# Patient Record
Sex: MALE | Race: WHITE | Employment: OTHER
[De-identification: names, ages, dates, MRNs, and addresses within clinical notes are randomized per-mention and may not be internally consistent; named-entity substitution may affect disease eponyms.]

---

## 2017-02-17 ENCOUNTER — OFFICE VISIT (OUTPATIENT)
Dept: UROLOGY | Facility: CLINIC | Age: 77
End: 2017-02-17

## 2017-02-17 VITALS
HEART RATE: 79 BPM | HEIGHT: 73 IN | DIASTOLIC BLOOD PRESSURE: 73 MMHG | BODY MASS INDEX: 31.28 KG/M2 | TEMPERATURE: 98 F | SYSTOLIC BLOOD PRESSURE: 125 MMHG | WEIGHT: 236 LBS

## 2017-02-17 DIAGNOSIS — N40.1 BPH WITH OBSTRUCTION/LOWER URINARY TRACT SYMPTOMS: ICD-10-CM

## 2017-02-17 DIAGNOSIS — N13.8 BPH WITH OBSTRUCTION/LOWER URINARY TRACT SYMPTOMS: ICD-10-CM

## 2017-02-17 DIAGNOSIS — N20.0 KIDNEY STONES: Primary | ICD-10-CM

## 2017-02-17 DIAGNOSIS — N21.0 BLADDER STONES: ICD-10-CM

## 2017-02-17 PROCEDURE — 99214 OFFICE O/P EST MOD 30 MIN: CPT | Performed by: UROLOGY

## 2017-02-17 ASSESSMENT — ENCOUNTER SYMPTOMS
VOMITING: 0
EYE REDNESS: 0
ABDOMINAL PAIN: 0
BACK PAIN: 0
COUGH: 0
SHORTNESS OF BREATH: 0
COLOR CHANGE: 0
NAUSEA: 0
WHEEZING: 0
EYE PAIN: 0

## 2017-02-20 ENCOUNTER — HOSPITAL ENCOUNTER (OUTPATIENT)
Dept: GENERAL RADIOLOGY | Age: 77
Discharge: HOME | End: 2017-02-20
Payer: MEDICARE

## 2017-02-20 ENCOUNTER — HOSPITAL ENCOUNTER (OUTPATIENT)
Age: 77
Discharge: HOME | End: 2017-02-20
Payer: MEDICARE

## 2017-02-20 DIAGNOSIS — N20.0 KIDNEY STONES: ICD-10-CM

## 2017-02-20 PROCEDURE — 74000 XR ABDOMEN LIMITED (KUB): CPT

## 2017-02-20 PROCEDURE — 74000 XR ABDOMEN LIMITED (KUB): CPT | Performed by: RADIOLOGY

## 2017-03-21 ENCOUNTER — OFFICE VISIT (OUTPATIENT)
Dept: INTERNAL MEDICINE CLINIC | Age: 77
End: 2017-03-21
Payer: MEDICARE

## 2017-03-21 VITALS
WEIGHT: 238 LBS | DIASTOLIC BLOOD PRESSURE: 70 MMHG | BODY MASS INDEX: 31.54 KG/M2 | HEIGHT: 73 IN | SYSTOLIC BLOOD PRESSURE: 122 MMHG

## 2017-03-21 DIAGNOSIS — I11.0 HYPERTENSIVE HEART DISEASE WITH HEART FAILURE (HCC): ICD-10-CM

## 2017-03-21 DIAGNOSIS — M17.11 PRIMARY OSTEOARTHRITIS OF RIGHT KNEE: ICD-10-CM

## 2017-03-21 DIAGNOSIS — Z13.29 THYROID DISORDER SCREENING: ICD-10-CM

## 2017-03-21 DIAGNOSIS — Z23 NEED FOR VACCINATION: ICD-10-CM

## 2017-03-21 DIAGNOSIS — K21.9 GASTROESOPHAGEAL REFLUX DISEASE, ESOPHAGITIS PRESENCE NOT SPECIFIED: ICD-10-CM

## 2017-03-21 DIAGNOSIS — Z12.5 PROSTATE CANCER SCREENING: ICD-10-CM

## 2017-03-21 DIAGNOSIS — I10 ESSENTIAL HYPERTENSION: Primary | ICD-10-CM

## 2017-03-21 DIAGNOSIS — Z93.2 ILEOSTOMY STATUS (HCC): ICD-10-CM

## 2017-03-21 PROCEDURE — 90732 PPSV23 VACC 2 YRS+ SUBQ/IM: CPT | Performed by: INTERNAL MEDICINE

## 2017-03-21 PROCEDURE — G0009 ADMIN PNEUMOCOCCAL VACCINE: HCPCS | Performed by: INTERNAL MEDICINE

## 2017-03-21 PROCEDURE — 99213 OFFICE O/P EST LOW 20 MIN: CPT | Performed by: INTERNAL MEDICINE

## 2017-03-21 RX ORDER — SIMVASTATIN 40 MG
TABLET ORAL
Qty: 90 TABLET | Refills: 3 | Status: SHIPPED | OUTPATIENT
Start: 2017-03-21 | End: 2018-03-19 | Stop reason: SDUPTHER

## 2017-03-21 ASSESSMENT — ENCOUNTER SYMPTOMS
COUGH: 0
DOUBLE VISION: 0
NAUSEA: 0
HEARTBURN: 0
HEMOPTYSIS: 0
BACK PAIN: 0
BLURRED VISION: 0

## 2017-03-21 ASSESSMENT — PATIENT HEALTH QUESTIONNAIRE - PHQ9
SUM OF ALL RESPONSES TO PHQ9 QUESTIONS 1 & 2: 0
2. FEELING DOWN, DEPRESSED OR HOPELESS: 0
SUM OF ALL RESPONSES TO PHQ QUESTIONS 1-9: 0
1. LITTLE INTEREST OR PLEASURE IN DOING THINGS: 0

## 2017-03-22 ENCOUNTER — HOSPITAL ENCOUNTER (OUTPATIENT)
Age: 77
Setting detail: SPECIMEN
Discharge: HOME | End: 2017-03-22
Payer: MEDICARE

## 2017-03-22 DIAGNOSIS — Z12.5 PROSTATE CANCER SCREENING: ICD-10-CM

## 2017-03-22 DIAGNOSIS — I10 ESSENTIAL HYPERTENSION: ICD-10-CM

## 2017-03-22 DIAGNOSIS — Z13.29 THYROID DISORDER SCREENING: ICD-10-CM

## 2017-03-22 LAB
ABSOLUTE EOS #: 0.2 K/UL (ref 0–0.4)
ABSOLUTE LYMPH #: 1.6 K/UL (ref 1–4.8)
ABSOLUTE MONO #: 0.7 K/UL (ref 0.1–1.2)
ANION GAP SERPL CALCULATED.3IONS-SCNC: 14 MMOL/L (ref 9–17)
BASOPHILS # BLD: 0 % (ref 0–2)
BASOPHILS ABSOLUTE: 0 K/UL (ref 0–0.2)
BUN BLDV-MCNC: 18 MG/DL (ref 8–23)
BUN/CREAT BLD: ABNORMAL (ref 9–20)
CALCIUM SERPL-MCNC: 9.4 MG/DL (ref 8.6–10.4)
CHLORIDE BLD-SCNC: 105 MMOL/L (ref 98–107)
CHOLESTEROL/HDL RATIO: 3.6
CHOLESTEROL: 151 MG/DL
CO2: 26 MMOL/L (ref 20–31)
CREAT SERPL-MCNC: 0.93 MG/DL (ref 0.7–1.2)
DIFFERENTIAL TYPE: ABNORMAL
EOSINOPHILS RELATIVE PERCENT: 2 % (ref 1–4)
GFR AFRICAN AMERICAN: >60 ML/MIN
GFR NON-AFRICAN AMERICAN: >60 ML/MIN
GFR SERPL CREATININE-BSD FRML MDRD: ABNORMAL ML/MIN/{1.73_M2}
GFR SERPL CREATININE-BSD FRML MDRD: ABNORMAL ML/MIN/{1.73_M2}
GLUCOSE BLD-MCNC: 102 MG/DL (ref 70–99)
HCT VFR BLD CALC: 42.8 % (ref 41–53)
HDLC SERPL-MCNC: 42 MG/DL
HEMOGLOBIN: 14.5 G/DL (ref 13.5–17.5)
LDL CHOLESTEROL: 88 MG/DL (ref 0–130)
LYMPHOCYTES # BLD: 18 % (ref 24–44)
MCH RBC QN AUTO: 29.9 PG (ref 26–34)
MCHC RBC AUTO-ENTMCNC: 33.9 G/DL (ref 31–37)
MCV RBC AUTO: 88.1 FL (ref 80–100)
MONOCYTES # BLD: 8 % (ref 2–11)
PDW BLD-RTO: 14.7 % (ref 12.5–15.4)
PLATELET # BLD: 150 K/UL (ref 140–450)
PLATELET ESTIMATE: ABNORMAL
PMV BLD AUTO: 9.1 FL (ref 6–12)
POTASSIUM SERPL-SCNC: 4.6 MMOL/L (ref 3.7–5.3)
PROSTATE SPECIFIC ANTIGEN: 1.23 UG/L
RBC # BLD: 4.85 M/UL (ref 4.5–5.9)
RBC # BLD: ABNORMAL 10*6/UL
SEG NEUTROPHILS: 72 % (ref 36–66)
SEGMENTED NEUTROPHILS ABSOLUTE COUNT: 6.9 K/UL (ref 1.8–7.7)
SODIUM BLD-SCNC: 145 MMOL/L (ref 135–144)
TRIGL SERPL-MCNC: 103 MG/DL
TSH SERPL DL<=0.05 MIU/L-ACNC: 3.5 MIU/L (ref 0.3–5)
VLDLC SERPL CALC-MCNC: NORMAL MG/DL (ref 1–30)
WBC # BLD: 9.4 K/UL (ref 3.5–11)
WBC # BLD: ABNORMAL 10*3/UL

## 2017-04-26 DIAGNOSIS — N20.0 KIDNEY STONES: ICD-10-CM

## 2017-04-27 RX ORDER — ALLOPURINOL 300 MG/1
TABLET ORAL
Qty: 90 TABLET | Refills: 1 | Status: SHIPPED | OUTPATIENT
Start: 2017-04-27 | End: 2017-11-17 | Stop reason: SDUPTHER

## 2017-06-02 RX ORDER — SILDENAFIL CITRATE 20 MG/1
20 TABLET ORAL PRN
Qty: 30 TABLET | Refills: 0 | Status: SHIPPED | OUTPATIENT
Start: 2017-06-02 | End: 2017-08-14 | Stop reason: SDUPTHER

## 2017-07-05 RX ORDER — FINASTERIDE 5 MG/1
TABLET, FILM COATED ORAL
Qty: 90 TABLET | Refills: 3 | Status: SHIPPED | OUTPATIENT
Start: 2017-07-05 | End: 2017-08-14 | Stop reason: SDUPTHER

## 2017-07-25 ENCOUNTER — OFFICE VISIT (OUTPATIENT)
Dept: INTERNAL MEDICINE CLINIC | Age: 77
End: 2017-07-25
Payer: MEDICARE

## 2017-07-25 ENCOUNTER — HOSPITAL ENCOUNTER (OUTPATIENT)
Age: 77
Setting detail: SPECIMEN
Discharge: HOME | End: 2017-07-25
Payer: MEDICARE

## 2017-07-25 VITALS
SYSTOLIC BLOOD PRESSURE: 104 MMHG | BODY MASS INDEX: 31.41 KG/M2 | DIASTOLIC BLOOD PRESSURE: 62 MMHG | HEIGHT: 73 IN | WEIGHT: 237 LBS

## 2017-07-25 DIAGNOSIS — M17.11 PRIMARY OSTEOARTHRITIS OF RIGHT KNEE: ICD-10-CM

## 2017-07-25 DIAGNOSIS — Z93.2 ILEOSTOMY STATUS (HCC): ICD-10-CM

## 2017-07-25 DIAGNOSIS — M1A.9XX0 CHRONIC GOUT WITHOUT TOPHUS, UNSPECIFIED CAUSE, UNSPECIFIED SITE: ICD-10-CM

## 2017-07-25 DIAGNOSIS — K21.9 GASTROESOPHAGEAL REFLUX DISEASE, ESOPHAGITIS PRESENCE NOT SPECIFIED: ICD-10-CM

## 2017-07-25 DIAGNOSIS — N20.0 RENAL CALCULUS, LEFT: ICD-10-CM

## 2017-07-25 DIAGNOSIS — I11.0 HYPERTENSIVE HEART DISEASE WITH HEART FAILURE (HCC): ICD-10-CM

## 2017-07-25 DIAGNOSIS — H40.9 GLAUCOMA OF RIGHT EYE, UNSPECIFIED GLAUCOMA: ICD-10-CM

## 2017-07-25 DIAGNOSIS — N52.9 ERECTILE DYSFUNCTION, UNSPECIFIED ERECTILE DYSFUNCTION TYPE: ICD-10-CM

## 2017-07-25 DIAGNOSIS — N40.1 BENIGN PROSTATIC HYPERPLASIA WITH LOWER URINARY TRACT SYMPTOMS, UNSPECIFIED MORPHOLOGY: ICD-10-CM

## 2017-07-25 DIAGNOSIS — I11.0 HYPERTENSIVE HEART DISEASE WITH HEART FAILURE (HCC): Primary | ICD-10-CM

## 2017-07-25 LAB
-: ABNORMAL
AMORPHOUS: ABNORMAL
BACTERIA: ABNORMAL
BILIRUBIN URINE: NEGATIVE
CASTS UA: ABNORMAL /LPF (ref 0–2)
COLOR: YELLOW
CRYSTALS, UA: ABNORMAL /HPF
EPITHELIAL CELLS UA: ABNORMAL /HPF (ref 0–5)
GLUCOSE URINE: NEGATIVE
KETONES, URINE: NEGATIVE
LEUKOCYTE ESTERASE, URINE: NEGATIVE
MUCUS: ABNORMAL
NITRITE, URINE: NEGATIVE
OTHER OBSERVATIONS UA: ABNORMAL
PH UA: 5 (ref 5–8)
PROTEIN UA: ABNORMAL
RBC UA: ABNORMAL /HPF (ref 0–2)
RENAL EPITHELIAL, UA: ABNORMAL /HPF
SPECIFIC GRAVITY UA: 1.02 (ref 1–1.03)
TRICHOMONAS: ABNORMAL
TURBIDITY: ABNORMAL
URINE HGB: ABNORMAL
UROBILINOGEN, URINE: NORMAL
WBC UA: ABNORMAL /HPF (ref 0–5)
YEAST: ABNORMAL

## 2017-07-25 PROCEDURE — 99215 OFFICE O/P EST HI 40 MIN: CPT | Performed by: INTERNAL MEDICINE

## 2017-07-25 RX ORDER — OMEPRAZOLE 40 MG/1
CAPSULE, DELAYED RELEASE ORAL
Qty: 30 CAPSULE | Refills: 11 | Status: SHIPPED | OUTPATIENT
Start: 2017-07-25 | End: 2018-08-15 | Stop reason: SDUPTHER

## 2017-07-25 RX ORDER — TAMSULOSIN HYDROCHLORIDE 0.4 MG/1
CAPSULE ORAL
Refills: 0 | COMMUNITY
Start: 2017-06-19 | End: 2017-08-14 | Stop reason: SDUPTHER

## 2017-08-14 ENCOUNTER — OFFICE VISIT (OUTPATIENT)
Dept: UROLOGY | Age: 77
End: 2017-08-14
Payer: MEDICARE

## 2017-08-14 ENCOUNTER — HOSPITAL ENCOUNTER (OUTPATIENT)
Age: 77
Setting detail: SPECIMEN
Discharge: HOME | End: 2017-08-14
Payer: MEDICARE

## 2017-08-14 VITALS
TEMPERATURE: 98.4 F | BODY MASS INDEX: 31.41 KG/M2 | DIASTOLIC BLOOD PRESSURE: 82 MMHG | RESPIRATION RATE: 16 BRPM | WEIGHT: 236.99 LBS | SYSTOLIC BLOOD PRESSURE: 129 MMHG | HEART RATE: 98 BPM | HEIGHT: 73 IN

## 2017-08-14 DIAGNOSIS — R31.29 MICROHEMATURIA: Primary | ICD-10-CM

## 2017-08-14 DIAGNOSIS — N52.9 ERECTILE DYSFUNCTION, UNSPECIFIED ERECTILE DYSFUNCTION TYPE: ICD-10-CM

## 2017-08-14 DIAGNOSIS — R31.29 MICROHEMATURIA: ICD-10-CM

## 2017-08-14 DIAGNOSIS — N40.1 BENIGN PROSTATIC HYPERPLASIA WITH LOWER URINARY TRACT SYMPTOMS, UNSPECIFIED MORPHOLOGY: ICD-10-CM

## 2017-08-14 DIAGNOSIS — N20.0 KIDNEY STONE: ICD-10-CM

## 2017-08-14 LAB
-: ABNORMAL
AMORPHOUS: ABNORMAL
BACTERIA: ABNORMAL
BILIRUBIN URINE: NEGATIVE
CASTS UA: ABNORMAL /LPF (ref 0–8)
COLOR: YELLOW
CRYSTALS, UA: ABNORMAL /HPF
EPITHELIAL CELLS UA: ABNORMAL /HPF (ref 0–5)
GLUCOSE URINE: NEGATIVE
KETONES, URINE: NEGATIVE
LEUKOCYTE ESTERASE, URINE: NEGATIVE
MUCUS: ABNORMAL
NITRITE, URINE: NEGATIVE
OTHER OBSERVATIONS UA: ABNORMAL
PH UA: 5 (ref 5–8)
PROTEIN UA: NEGATIVE
RBC UA: ABNORMAL /HPF (ref 0–4)
RENAL EPITHELIAL, UA: ABNORMAL /HPF
SPECIFIC GRAVITY UA: 1.02 (ref 1–1.03)
TRICHOMONAS: ABNORMAL
TURBIDITY: CLEAR
URINE HGB: ABNORMAL
UROBILINOGEN, URINE: NORMAL
WBC UA: ABNORMAL /HPF (ref 0–5)
YEAST: ABNORMAL

## 2017-08-14 PROCEDURE — 99214 OFFICE O/P EST MOD 30 MIN: CPT | Performed by: NURSE PRACTITIONER

## 2017-08-14 RX ORDER — SILDENAFIL CITRATE 20 MG/1
20 TABLET ORAL PRN
Qty: 30 TABLET | Refills: 5 | Status: SHIPPED | OUTPATIENT
Start: 2017-08-14 | End: 2018-10-23 | Stop reason: SDUPTHER

## 2017-08-14 RX ORDER — FINASTERIDE 5 MG/1
5 TABLET, FILM COATED ORAL DAILY
Qty: 90 TABLET | Refills: 3 | Status: SHIPPED | OUTPATIENT
Start: 2017-08-14 | End: 2018-08-28

## 2017-08-14 RX ORDER — TAMSULOSIN HYDROCHLORIDE 0.4 MG/1
0.4 CAPSULE ORAL DAILY
Qty: 90 CAPSULE | Refills: 3 | Status: SHIPPED | OUTPATIENT
Start: 2017-08-14 | End: 2018-09-06 | Stop reason: SDUPTHER

## 2017-08-14 ASSESSMENT — ENCOUNTER SYMPTOMS
WHEEZING: 0
EYE PAIN: 0
VOMITING: 0
NAUSEA: 0
ABDOMINAL PAIN: 0
EYE REDNESS: 0
COLOR CHANGE: 0
COUGH: 0
SHORTNESS OF BREATH: 0
BACK PAIN: 0

## 2017-08-15 ENCOUNTER — TELEPHONE (OUTPATIENT)
Dept: UROLOGY | Age: 77
End: 2017-08-15

## 2017-08-15 DIAGNOSIS — R31.29 MICROHEMATURIA: Primary | ICD-10-CM

## 2017-08-28 ENCOUNTER — HOSPITAL ENCOUNTER (INPATIENT)
Age: 77
LOS: 1 days | Discharge: HOME | DRG: 694 | End: 2017-08-29
Attending: EMERGENCY MEDICINE | Admitting: INTERNAL MEDICINE
Payer: MEDICARE

## 2017-08-28 ENCOUNTER — HOSPITAL ENCOUNTER (OUTPATIENT)
Dept: CT IMAGING | Age: 77
Discharge: HOME | DRG: 694 | End: 2017-08-28
Payer: MEDICARE

## 2017-08-28 DIAGNOSIS — N13.2 HYDRONEPHROSIS WITH URINARY OBSTRUCTION DUE TO URETERAL CALCULUS: ICD-10-CM

## 2017-08-28 DIAGNOSIS — N20.0 NEPHROLITHIASIS: ICD-10-CM

## 2017-08-28 DIAGNOSIS — R31.29 MICROHEMATURIA: ICD-10-CM

## 2017-08-28 DIAGNOSIS — N17.9 AKI (ACUTE KIDNEY INJURY) (HCC): Primary | ICD-10-CM

## 2017-08-28 PROBLEM — N13.30 HYDRONEPHROSIS, RIGHT: Status: ACTIVE | Noted: 2017-08-28

## 2017-08-28 PROBLEM — I10 HYPERTENSION: Status: ACTIVE | Noted: 2017-08-28

## 2017-08-28 LAB
-: ABNORMAL
AMORPHOUS: ABNORMAL
ANION GAP SERPL CALCULATED.3IONS-SCNC: 12 MMOL/L (ref 9–17)
BACTERIA: ABNORMAL
BILIRUBIN URINE: NEGATIVE
BUN BLDV-MCNC: 25 MG/DL (ref 8–23)
BUN BLDV-MCNC: 25 MG/DL (ref 8–23)
BUN/CREAT BLD: ABNORMAL (ref 9–20)
CALCIUM SERPL-MCNC: 9.5 MG/DL (ref 8.6–10.4)
CASTS UA: ABNORMAL /LPF
CHLORIDE BLD-SCNC: 103 MMOL/L (ref 98–107)
CO2: 25 MMOL/L (ref 20–31)
COLOR: YELLOW
COMMENT UA: ABNORMAL
CREAT SERPL-MCNC: 1.34 MG/DL (ref 0.7–1.2)
CREAT SERPL-MCNC: 1.69 MG/DL (ref 0.7–1.2)
CRYSTALS, UA: ABNORMAL /HPF
EPITHELIAL CELLS UA: ABNORMAL /HPF
GFR AFRICAN AMERICAN: 48 ML/MIN
GFR AFRICAN AMERICAN: >60 ML/MIN
GFR NON-AFRICAN AMERICAN: 39 ML/MIN
GFR NON-AFRICAN AMERICAN: 51 ML/MIN
GFR SERPL CREATININE-BSD FRML MDRD: ABNORMAL ML/MIN/{1.73_M2}
GLUCOSE BLD-MCNC: 86 MG/DL (ref 70–99)
GLUCOSE URINE: NEGATIVE
KETONES, URINE: NEGATIVE
LEUKOCYTE ESTERASE, URINE: NEGATIVE
MUCUS: ABNORMAL
NITRITE, URINE: NEGATIVE
OTHER OBSERVATIONS UA: ABNORMAL
PH UA: 6 (ref 5–8)
POTASSIUM SERPL-SCNC: 4.9 MMOL/L (ref 3.7–5.3)
PROTEIN UA: NEGATIVE
RBC UA: ABNORMAL /HPF
RENAL EPITHELIAL, UA: ABNORMAL /HPF
SODIUM BLD-SCNC: 140 MMOL/L (ref 135–144)
SPECIFIC GRAVITY UA: 1.02 (ref 1–1.03)
TRICHOMONAS: ABNORMAL
TURBIDITY: CLEAR
URINE HGB: ABNORMAL
UROBILINOGEN, URINE: NORMAL
WBC UA: ABNORMAL /HPF
YEAST: ABNORMAL

## 2017-08-28 PROCEDURE — 1200000000 HC SEMI PRIVATE

## 2017-08-28 PROCEDURE — 0T9680Z DRAINAGE OF RIGHT URETER WITH DRAINAGE DEVICE, VIA NATURAL OR ARTIFICIAL OPENING ENDOSCOPIC: ICD-10-PCS | Performed by: INTERNAL MEDICINE

## 2017-08-28 PROCEDURE — 81001 URINALYSIS AUTO W/SCOPE: CPT

## 2017-08-28 PROCEDURE — 94760 N-INVAS EAR/PLS OXIMETRY 1: CPT

## 2017-08-28 PROCEDURE — 6360000002 HC RX W HCPCS: Performed by: EMERGENCY MEDICINE

## 2017-08-28 PROCEDURE — 74178 CT ABD&PLV WO CNTR FLWD CNTR: CPT

## 2017-08-28 PROCEDURE — 2580000003 HC RX 258: Performed by: NURSE PRACTITIONER

## 2017-08-28 PROCEDURE — 6360000002 HC RX W HCPCS: Performed by: RADIOLOGY

## 2017-08-28 PROCEDURE — 82565 ASSAY OF CREATININE: CPT

## 2017-08-28 PROCEDURE — 99285 EMERGENCY DEPT VISIT HI MDM: CPT

## 2017-08-28 PROCEDURE — 99223 1ST HOSP IP/OBS HIGH 75: CPT | Performed by: INTERNAL MEDICINE

## 2017-08-28 PROCEDURE — 84520 ASSAY OF UREA NITROGEN: CPT

## 2017-08-28 PROCEDURE — 36415 COLL VENOUS BLD VENIPUNCTURE: CPT

## 2017-08-28 PROCEDURE — 96374 THER/PROPH/DIAG INJ IV PUSH: CPT

## 2017-08-28 PROCEDURE — 96375 TX/PRO/DX INJ NEW DRUG ADDON: CPT

## 2017-08-28 PROCEDURE — 2580000003 HC RX 258: Performed by: RADIOLOGY

## 2017-08-28 PROCEDURE — 80048 BASIC METABOLIC PNL TOTAL CA: CPT

## 2017-08-28 PROCEDURE — 6370000000 HC RX 637 (ALT 250 FOR IP): Performed by: RADIOLOGY

## 2017-08-28 PROCEDURE — 6360000004 HC RX CONTRAST MEDICATION: Performed by: NURSE PRACTITIONER

## 2017-08-28 PROCEDURE — G0378 HOSPITAL OBSERVATION PER HR: HCPCS

## 2017-08-28 RX ORDER — RANITIDINE 150 MG/1
150 TABLET ORAL DAILY PRN
COMMUNITY
End: 2018-08-15 | Stop reason: ALTCHOICE

## 2017-08-28 RX ORDER — POTASSIUM CHLORIDE 20 MEQ/1
40 TABLET, EXTENDED RELEASE ORAL PRN
Status: DISCONTINUED | OUTPATIENT
Start: 2017-08-28 | End: 2017-08-29 | Stop reason: HOSPADM

## 2017-08-28 RX ORDER — MORPHINE SULFATE 4 MG/ML
4 INJECTION, SOLUTION INTRAMUSCULAR; INTRAVENOUS
Status: DISCONTINUED | OUTPATIENT
Start: 2017-08-28 | End: 2017-08-29 | Stop reason: HOSPADM

## 2017-08-28 RX ORDER — FINASTERIDE 5 MG/1
5 TABLET, FILM COATED ORAL DAILY
Status: DISCONTINUED | OUTPATIENT
Start: 2017-08-28 | End: 2017-08-29 | Stop reason: HOSPADM

## 2017-08-28 RX ORDER — POTASSIUM CHLORIDE 20MEQ/15ML
40 LIQUID (ML) ORAL PRN
Status: DISCONTINUED | OUTPATIENT
Start: 2017-08-28 | End: 2017-08-29 | Stop reason: HOSPADM

## 2017-08-28 RX ORDER — SODIUM CHLORIDE 0.9 % (FLUSH) 0.9 %
10 SYRINGE (ML) INJECTION PRN
Status: DISCONTINUED | OUTPATIENT
Start: 2017-08-28 | End: 2017-08-31 | Stop reason: HOSPADM

## 2017-08-28 RX ORDER — HYDROCODONE BITARTRATE AND ACETAMINOPHEN 5; 325 MG/1; MG/1
1 TABLET ORAL EVERY 4 HOURS PRN
Status: DISCONTINUED | OUTPATIENT
Start: 2017-08-28 | End: 2017-08-29 | Stop reason: HOSPADM

## 2017-08-28 RX ORDER — SODIUM CHLORIDE 0.9 % (FLUSH) 0.9 %
10 SYRINGE (ML) INJECTION PRN
Status: DISCONTINUED | OUTPATIENT
Start: 2017-08-28 | End: 2017-08-29 | Stop reason: HOSPADM

## 2017-08-28 RX ORDER — SODIUM CHLORIDE 9 MG/ML
INJECTION, SOLUTION INTRAVENOUS CONTINUOUS
Status: DISCONTINUED | OUTPATIENT
Start: 2017-08-28 | End: 2017-08-29 | Stop reason: HOSPADM

## 2017-08-28 RX ORDER — BISACODYL 10 MG
10 SUPPOSITORY, RECTAL RECTAL DAILY PRN
Status: DISCONTINUED | OUTPATIENT
Start: 2017-08-28 | End: 2017-08-29 | Stop reason: HOSPADM

## 2017-08-28 RX ORDER — LATANOPROST 50 UG/ML
1 SOLUTION/ DROPS OPHTHALMIC DAILY
Status: DISCONTINUED | OUTPATIENT
Start: 2017-08-28 | End: 2017-08-29 | Stop reason: HOSPADM

## 2017-08-28 RX ORDER — ONDANSETRON 2 MG/ML
4 INJECTION INTRAMUSCULAR; INTRAVENOUS EVERY 6 HOURS PRN
Status: DISCONTINUED | OUTPATIENT
Start: 2017-08-28 | End: 2017-08-29 | Stop reason: HOSPADM

## 2017-08-28 RX ORDER — ACETAMINOPHEN 325 MG/1
650 TABLET ORAL EVERY 4 HOURS PRN
Status: DISCONTINUED | OUTPATIENT
Start: 2017-08-28 | End: 2017-08-29 | Stop reason: HOSPADM

## 2017-08-28 RX ORDER — LISINOPRIL 5 MG/1
5 TABLET ORAL DAILY
Status: DISCONTINUED | OUTPATIENT
Start: 2017-08-28 | End: 2017-08-29

## 2017-08-28 RX ORDER — TAMSULOSIN HYDROCHLORIDE 0.4 MG/1
0.4 CAPSULE ORAL DAILY
Status: DISCONTINUED | OUTPATIENT
Start: 2017-08-28 | End: 2017-08-29 | Stop reason: HOSPADM

## 2017-08-28 RX ORDER — POTASSIUM CHLORIDE 7.45 MG/ML
10 INJECTION INTRAVENOUS PRN
Status: DISCONTINUED | OUTPATIENT
Start: 2017-08-28 | End: 2017-08-29 | Stop reason: HOSPADM

## 2017-08-28 RX ORDER — KETOROLAC TROMETHAMINE 30 MG/ML
15 INJECTION, SOLUTION INTRAMUSCULAR; INTRAVENOUS ONCE
Status: COMPLETED | OUTPATIENT
Start: 2017-08-28 | End: 2017-08-28

## 2017-08-28 RX ORDER — PANTOPRAZOLE SODIUM 40 MG/1
40 TABLET, DELAYED RELEASE ORAL
Status: DISCONTINUED | OUTPATIENT
Start: 2017-08-29 | End: 2017-08-29 | Stop reason: HOSPADM

## 2017-08-28 RX ORDER — FENTANYL CITRATE 50 UG/ML
75 INJECTION, SOLUTION INTRAMUSCULAR; INTRAVENOUS ONCE
Status: COMPLETED | OUTPATIENT
Start: 2017-08-28 | End: 2017-08-28

## 2017-08-28 RX ORDER — SODIUM CHLORIDE 0.9 % (FLUSH) 0.9 %
10 SYRINGE (ML) INJECTION EVERY 12 HOURS SCHEDULED
Status: DISCONTINUED | OUTPATIENT
Start: 2017-08-28 | End: 2017-08-29 | Stop reason: HOSPADM

## 2017-08-28 RX ORDER — MAGNESIUM SULFATE 1 G/100ML
1 INJECTION INTRAVENOUS PRN
Status: DISCONTINUED | OUTPATIENT
Start: 2017-08-28 | End: 2017-08-29 | Stop reason: HOSPADM

## 2017-08-28 RX ORDER — MORPHINE SULFATE 4 MG/ML
2 INJECTION, SOLUTION INTRAMUSCULAR; INTRAVENOUS
Status: DISCONTINUED | OUTPATIENT
Start: 2017-08-28 | End: 2017-08-29 | Stop reason: HOSPADM

## 2017-08-28 RX ORDER — ALLOPURINOL 300 MG/1
300 TABLET ORAL DAILY
Status: DISCONTINUED | OUTPATIENT
Start: 2017-08-28 | End: 2017-08-29 | Stop reason: HOSPADM

## 2017-08-28 RX ORDER — HEPARIN SODIUM 5000 [USP'U]/ML
5000 INJECTION, SOLUTION INTRAVENOUS; SUBCUTANEOUS EVERY 8 HOURS SCHEDULED
Status: DISCONTINUED | OUTPATIENT
Start: 2017-08-28 | End: 2017-08-29 | Stop reason: HOSPADM

## 2017-08-28 RX ORDER — 0.9 % SODIUM CHLORIDE 0.9 %
250 INTRAVENOUS SOLUTION INTRAVENOUS ONCE
Status: COMPLETED | OUTPATIENT
Start: 2017-08-28 | End: 2017-08-28

## 2017-08-28 RX ORDER — SIMVASTATIN 40 MG
40 TABLET ORAL NIGHTLY
Status: DISCONTINUED | OUTPATIENT
Start: 2017-08-28 | End: 2017-08-29 | Stop reason: HOSPADM

## 2017-08-28 RX ADMIN — HEPARIN SODIUM 5000 UNITS: 5000 INJECTION, SOLUTION INTRAVENOUS; SUBCUTANEOUS at 20:18

## 2017-08-28 RX ADMIN — Medication 10 ML: at 20:19

## 2017-08-28 RX ADMIN — Medication 10 ML: at 08:51

## 2017-08-28 RX ADMIN — LATANOPROST 1 DROP: 50 SOLUTION OPHTHALMIC at 20:19

## 2017-08-28 RX ADMIN — SODIUM CHLORIDE 250 ML: 9 INJECTION, SOLUTION INTRAVENOUS at 08:50

## 2017-08-28 RX ADMIN — FENTANYL CITRATE 75 MCG: 50 INJECTION INTRAMUSCULAR; INTRAVENOUS at 16:15

## 2017-08-28 RX ADMIN — IOVERSOL 130 ML: 741 INJECTION INTRA-ARTERIAL; INTRAVENOUS at 08:50

## 2017-08-28 RX ADMIN — KETOROLAC TROMETHAMINE 15 MG: 30 INJECTION, SOLUTION INTRAMUSCULAR at 16:11

## 2017-08-28 RX ADMIN — MORPHINE SULFATE 2 MG: 4 INJECTION, SOLUTION INTRAMUSCULAR; INTRAVENOUS at 23:47

## 2017-08-28 RX ADMIN — HYDROCODONE BITARTRATE AND ACETAMINOPHEN 1 TABLET: 5; 325 TABLET ORAL at 20:18

## 2017-08-28 RX ADMIN — SIMVASTATIN 40 MG: 40 TABLET, FILM COATED ORAL at 20:19

## 2017-08-28 RX ADMIN — SODIUM CHLORIDE: 9 INJECTION, SOLUTION INTRAVENOUS at 22:40

## 2017-08-28 RX ADMIN — LISINOPRIL 5 MG: 5 TABLET ORAL at 20:27

## 2017-08-28 ASSESSMENT — PAIN SCALES - GENERAL
PAINLEVEL_OUTOF10: 3
PAINLEVEL_OUTOF10: 5
PAINLEVEL_OUTOF10: 9
PAINLEVEL_OUTOF10: 4
PAINLEVEL_OUTOF10: 0

## 2017-08-28 NOTE — ED PROVIDER NOTES
during the key portions. I have personally reviewed all images and agree with the resident's interpretation. I have reviewed the emergency nurses triage note. I agree with the chief complaint, past medical history, past surgical history, allergies, medications, social and family history as documented unless otherwise noted below. Documentation of the HPI, Physical Exam and Medical Decision Making performed by medical students or scribes is based on my personal performance of the HPI, PE and MDM. For Phys Assistant/ Nurse Practitioner cases/documentation I have had a face to face evaluation of this patient and have completed at least one if not all key elements of the E/M (history, physical exam, and MDM). Additional findings are as noted.     Tania Villeda MD  Attending Emergency  Physician       Juan Paulson MD  08/28/17 [id number]

## 2017-08-28 NOTE — H&P
[facility]    HISTORY AND PHYSICAL EXAMINATION            Date:   8/28/2017  Patient name:  Nicholas Dietrich  Date of admission:  8/28/2017  2:40 PM  MRN:   [id number]  YOB: 1940    CHIEF COMPLAINT     Chief Complaint   Patient presents with    Flank Pain     Right side     History Obtained From:  Patient and chart review. HISTORY OF PRESENT ILLNESS     The patient is a 76 y.o.  male with history of multiple kidney stones, HTN, HLD, colon resection with colostomy who presented with right flank pain. Patient has had history of kidney stones s/p ESWL 3 years ago and was known to have this right nonobstructing calculi for years. He follows with urology with Dr. Keegan Chopra. He was having microscopic hematuria and had a CT urogram performed today. After the procedure, he developed 9/10 pain in the right lower back. CT urogram showed obstructing 7 mm stone in the right proximal ureter. Denies any incontinence, dysuria, hematuria, urinary frequency, fever/chills. In ED, patient's pain was controlled with fentanyl and toradol. Patient is admitted to the hospital for right nephrolithiasis. PAST MEDICAL HISTORY      has a past medical history of Acute respiratory failure following trauma and surgery ([address] Acute respiratory failure following trauma and surgery ([address] Altered bowel elimination due to intestinal ostomy ([address] Full dentures; Full dentures; Gall stones; GERD (gastroesophageal reflux disease); GI bleed; Hemorrhage of gastrointestinal tract, unspecified; Hemorrhage of gastrointestinal tract, unspecified; Hyperlipidemia; Hypertension; Kidney stones; Kidney stones; Primary localized osteoarthrosis, lower leg; Primary localized osteoarthrosis, lower leg; Prostate disorder; Transient disorder of initiating or maintaining sleep; Transient disorder of initiating or maintaining sleep;  Unspecified disorder of skin and subcutaneous tissue; Wears glasses; and Wears glasses. PAST SURGICAL HISTORY      has a past surgical history that includes Knee arthroscopy (Right); Tonsillectomy; Cataract removal with implant (Bilateral); Kidney stone surgery (Left); Colonoscopy; total colectomy; colostomy (Right); Lithotripsy (Left, 06-20-14); Abdomen surgery; Dilatation, esophagus; fracture surgery; eye surgery; Total knee arthroplasty (03/23/2015); Total knee arthroplasty (Right, 3/23/2015); and joint replacement (Right, 3-23-15). HOME MEDICATIONS     Prior to Admission medications    Medication Sig Start Date End Date Taking? Authorizing Provider   tamsulosin (FLOMAX) 0.4 MG capsule Take 1 capsule by mouth daily 8/14/17  Yes Nila Call CNP   finasteride (PROSCAR) 5 MG tablet Take 1 tablet by mouth daily 8/14/17  Yes Nila Call CNP   sildenafil (REVATIO) 20 MG tablet Take 1 tablet by mouth as needed (take 1-2 tablets as needed for erection) Take 1-5 tabs as needed prior to activity    Do not run through insurance- cash pay. 8/14/17  Yes Nila Call CNP   omeprazole (PRILOSEC) 40 MG delayed release capsule take 1 capsule by mouth once daily 7/25/17  Yes Zenia Juan MD   allopurinol (ZYLOPRIM) 300 MG tablet take 1 tablet by mouth once daily 4/27/17  Yes Tadeo Cameron MD   simvastatin (ZOCOR) 40 MG tablet take 1 tablet by mouth every evening 3/21/17  Yes Zenia Juan MD   lisinopril (PRINIVIL;ZESTRIL) 5 MG tablet Take 1 tablet by mouth daily 11/16/16  Yes Zenia Juan MD   latanoprost (XALATAN) 0.005 % ophthalmic solution  5/11/16  Yes Historical Provider, MD   Multiple Vitamins-Minerals (MULTIVITAMIN & MINERAL PO) Take 1 tablet by mouth 2 times daily. Yes Historical Provider, MD   Travoprost (TRAVATAN OP) Apply 2 drops to eye daily    Historical Provider, MD   acetaminophen (TYLENOL) 500 MG tablet Take 500 mg by mouth every 6 hours as needed for Pain.     Historical Provider, MD   magnesium hydroxide (MILK OF MAGNESIA) 400 MG/5ML suspension Take 5 mLs by mouth daily as needed for Constipation. Historical Provider, MD       ALLERGIES     Sulfa antibiotics and Vimovo [naproxen-esomeprazole]    SOCIAL HISTORY      reports that he has never smoked. He has never used smokeless tobacco. He reports that he does not drink alcohol or use illicit drugs. FAMILY HISTORY     family history includes Heart Disease in his father; Other in his mother. REVIEW OF SYSTEMS     · CONSTITUTIONAL: Negative for weight loss  · EYES: Negative for visual changes, diplopia, scleral icterus. · ENT: Negative for Headaches, hearing loss, vertigo, mouth sores, sore throat. · CARDIOVASCULAR: Negative for lightheadedness/orthostatic symptoms ,chest pain, dyspnea on exertion, palpitations or loss of consciousness. · RESPIRATORY: Negative for cough or wheezing, sputum production, hemoptysis, pleuritic pain. · GASTROINTESTINAL: +colostomy bag, denies abdominal pain  · GENITOURINARY: +right flank pain, voids whenever he empties colostomy bag (4-6x a day), denies dysuria, hematuria, nocturia  · MUSCULOSKELETAL: Negative for gait disturbance, weakness, joint complaints. · INTEGUMENTARY: Negative for rash, pruritis. · NEUROLOGICAL Negative for headache, dizziness, change in muscle strength, numbness/tingling, change in gait, balance, coordination,   · PSYCHIATRIC: Negative for change in mood, affect, memory, mentation, behavior. PHYSICAL EXAM      /74  Pulse 62  Temp 98.3 °F (36.8 °C) (Oral)   Resp 16  Ht 6' 1\" (1.854 m)  Wt 236 lb (107 kg)  SpO2 95%  BMI 31.14 kg/m2     · General Appearance: Well nourished  · HEENT: Normocephalic, no lesions, without obvious abnormality.  Eye: no icterus, no redness  · Lungs: Clear to auscultation bilaterally  · Heart: Regular rate and rhythm, S1, S2 normal, no murmur, click, rub or gallop  · Abdomen: +colostomy bag in place Soft, non-tender; bowel sounds normal; no masses,  no TO 10 /HPF    Casts UA NOT REPORTED /LPF    Crystals UA NOT REPORTED NONE /HPF    Epithelial Cells UA 0 TO 2 /HPF    Renal Epithelial, Urine NOT REPORTED 0 /HPF    Bacteria, UA FEW (A) NONE    Mucus, UA NOT REPORTED NONE    Trichomonas, UA NOT REPORTED NONE    Amorphous, UA 1+ (A) NONE    Other Observations UA NOT REPORTED NREQ    Yeast, UA NOT REPORTED NONE       Imaging/Diagonstics:  Ct Urogram    Result Date: 8/28/2017  EXAMINATION: CT UROGRAM 8/28/2017 8:51 am TECHNIQUE: CT of the abdomen and pelvis was performed before and after the administration of intravenous contrast as per CT urogram protocol. Multiplanar reformatted images as well as MIP urogram images are provided for review. Dose modulation, iterative reconstruction, and/or weight based adjustment of the mA/kV was utilized to reduce the radiation dose to as low as reasonably achievable. COMPARISON: September 25, 2015 HISTORY: Diagnosis-MICROHEMATURIA Pt Complaint-PAINLESS MICROSCOPIC HEMATURIA, PT STATES NO COMPLAINTS, STATES JUST A YEARLY CHECK Pertinent Surgeries-COMPLETE COLECTOMY W/ILEOSTOMY FINDINGS: Lower Chest: Heart size upper limits normal.  Coronary artery calcifications. Trace bilateral pleural effusions associated atelectasis. Small hiatal hernia. KIDNEYS AND URINARY TRACT: No renal calculi are identified. Mild to moderate right hydronephrosis secondary to a 7 mm calculus in the proximal right ureter. Numerous bilateral renal cysts are again seen largest exophytic inferior pole left kidney measuring 8 cm. ORGANS: Lack of intravenous contrast limits evaluation of the solid organs and bowel. The liver re- demonstrates numerous hepatic cysts. Spleen, pancreas adrenal glands and gallbladder appear grossly unremarkable. GI/BOWEL: No bowel obstruction or inflammation. Status post colectomy with right lower quadrant ostomy. PELVIS: Multiple calculi are again seen within the dependent aspect of the bladder.   Stable enlarged prostate gland impinging the base of the bladder. PERITONEUM/RETROPERITONEUM: No lymphadenopathy is noted. Normal caliber aorta with mild atherosclerosis. No ascites or free air. BONES/SOFT TISSUES: Stable multilevel degenerative change of the visualized thoracolumbar spine. 1. Mild to moderate right hydronephrosis secondary to 7 mm calculus in the proximal right ureter. 2. Multiple calculi are again seen within the dependent aspect of the bladder. 3. Stable enlarged prostate gland impinging the base of the bladder. 4. Numerous hepatic and bilateral renal cysts are again seen largest exophytic inferior pole left kidney measuring 8 cm. 5. Trace bilateral pleural effusions. 6. Additional chronic findings as described including coronary artery disease. The findings were sent to the Radiology Results [address] at 12:25 pm on 8/28/2017to be communicated to a licensed caregiver. ASSESSMENT     Active Problems:    Hyperlipidemia    Ileostomy status ([address]    Right nephrolithiasis    Hydronephrosis, right    MERRICK (acute kidney injury) ([address]    Recurrent kidney stones    Hypertension      PLAN     Patient status Admit as inpatient to Med/Surg    Right nephrolithiasis with hydronephrosis and MERRICK  - CT urogram: Mild to moderate right hydronephrosis secondary to 7 mm calculus in the proximal right ureter  - UA negative, Cr 1.64 (b/l 1.0)  - urology consulted  - pain control  - PT/OT  - general diet, NPO after midnight  - resume home meds    DVT prophylaxis: heparin (porcine) injection 5,000 TID  GI prophylaxis: Protonix 40 mg daily    Above plan discussed with the patient and family in room, who agree to the above plan      The severity of this patient's signs and symptoms (specify pain 2/2 right nephrolithiasis) indicate the need for an inpatient admission. This plan will be discussed with the rounding attending: Dr. Abel Villalba.       Pearl Roman MD PGY-1  8/28/2017 5:46 PM  Internal Medicine Inpatient [facility]       Attending    Pt seen and examined today   I have discussed the care of pt , including pertinent history and exam findings,  with the resident. I have reviewed the key elements of all parts of the encounter with the resident. I agree with the assessment, plan and orders as documented by the resident.     Electronically signed by Shani Levine MD on 8/28/2017 at 11:29 PM

## 2017-08-28 NOTE — IP AVS SNAPSHOT
After Visit Summary  (Discharge Instructions)    Medication List for Home    Based on the information you provided to us as well as any changes during this visit, the following is your updated medication list.  Compare this with your prescription bottles at home. If you have any questions or concerns, contact your primary care physician's office. Daily Medication List (This medication list can be shared with any healthcare provider who is helping you manage your medications)      There are NEW medications for you. START taking them after you leave the hospital        Last Dose    Next Dose Due AM NOON PM NIGHT    HYDROcodone-acetaminophen 5-325 MG per tablet   Commonly known as:  NORCO   Take 1 tablet by mouth every 6 hours as needed for Pain . Earliest Fill Date: 8/29/17                1 tablet on 8/28/2017  8:18 PM                              These are medications you told us you were taking at home, CONTINUE taking them after you leave the hospital        Last Dose    Next Dose Due AM NOON PM NIGHT    acetaminophen 500 MG tablet   Commonly known as:  TYLENOL   Take 500 mg by mouth every 6 hours as needed for Pain. As needee                       allopurinol 300 MG tablet   Commonly known as:  ZYLOPRIM   take 1 tablet by mouth once daily                  tomorrow                       finasteride 5 MG tablet   Commonly known as:  PROSCAR   Take 1 tablet by mouth daily                  tomorrow                       latanoprost 0.005 % ophthalmic solution   Commonly known as:  XALATAN                1 drop on 8/28/2017  8:19 PM     Follow home regimen                       magnesium hydroxide 400 MG/5ML suspension   Commonly known as:  MILK OF MAGNESIA   Take 5 mLs by mouth daily as needed for Constipation. MULTIVITAMIN & MINERAL PO   Take 1 tablet by mouth 2 times daily. omeprazole 40 MG delayed release capsule   Commonly known as:  PRILOSEC   take 1 capsule by mouth once daily                                         sildenafil 20 MG tablet   Commonly known as:  REVATIO   Take 1 tablet by mouth as needed (take 1-2 tablets as needed for erection) Take 1-5 tabs as needed prior to activity  Do not run through insurance- cash pay.                                          simvastatin 40 MG tablet   Commonly known as:  ZOCOR   take 1 tablet by mouth every evening                40 mg on 8/28/2017  8:19 PM                            tamsulosin 0.4 MG capsule   Commonly known as:  FLOMAX   Take 1 capsule by mouth daily                                         ZANTAC 150 MG tablet   Generic drug:  ranitidine   Take 150 mg by mouth daily as needed for Heartburn                                           ASK your doctor about these medications if you have questions        Last Dose    Next Dose Due AM NOON PM NIGHT    TRAVATAN OP   Apply 2 drops to eye daily                                           These are the medications you have told us you were taking at home, STOP taking them after you leave the hospital     lisinopril 5 MG tablet   Commonly known as:  PRINIVIL;ZESTRIL            Where to Get Your Medications      You can get these medications from any pharmacy     Bring a paper prescription for each of these medications     HYDROcodone-acetaminophen 5-325 MG per tablet               Allergies as of 8/29/2017        Reactions    Sulfa Antibiotics     Vimovo [Naproxen-esomeprazole]     Pain shoulder      Immunizations as of 8/29/2017  Reviewed on 8/28/2017    Name Date Dose VIS Date Route    Influenza Vaccine, unspecified formulation 10/3/2016 -- -- --    Comment: [facility]    Influenza Vaccine, unspecified formulation 11/24/2015 -- -- --    Comment: uploaded via claim file    Influenza Virus Vaccine 11/12/2014 0.5 mL 8/19/2014 Intramuscular Pneumococcal 13-valent Conjugate ([id number]) 2015 0.5 mL 2013 Intramuscular    Pneumococcal Conjugate 7-valent 2007 -- -- --    Pneumococcal Polysaccharide ([id number]) 3/21/2017 0.5 mL 2015 Intramuscular    Zoster 10/3/2012 -- -- --      Last Vitals          Most Recent Value    Temp  98.1 °F (36.7 °C)    Pulse  74    Resp  14    BP  131/77         After Visit Summary    This summary was created for you. Thank you for entrusting your care to us. The following information includes details about your hospital/visit stay along with steps you should take to help with your recovery once you leave the hospital.  In this packet, you will find information about the topics listed below:    · Instructions about your medications including a list of your home medications  · A summary of your hospital visit  · Follow-up appointments once you have left the hospital  · Your care plan at home      You may receive a survey regarding the care you received during your stay. Your input is valuable to us. We encourage you to complete and return your survey in the envelope provided. We hope you will choose us in the future for your healthcare needs. Patient Information     Patient Name JASPER Cabezas 1940      Care Provided at:     Name Address Phone       [address] [phone]            Your Visit    Here you will find information about your visit, including the reason for your visit. Please take this sheet with you when you visit your doctor or other health care provider in the future. It will help determine the best possible medical care for you at that time. If you have any questions once you leave the hospital, please call the department phone number listed below.         Why you were here     Your primary diagnosis was:  Right Nephrolithiasis    Your diagnoses also included:  Hydronephrosis, Right, [place] (Acute Kidney Appointment with Watson Coulter CNP at [facility] ([phone])   Please arrive 15 minutes prior to appointment, bring photo ID and insurance card. [address]  [address]         Preventive Care        Date Due    Yearly Flu Vaccine (1) 8/1/2017    Tetanus Combination Vaccine (1 - Tdap) 3/21/2019 (Originally 6/21/1959)    Cholesterol Screening 3/22/2022                 Care Plan Once You Return Home    This section includes instructions you will need to follow once you leave the hospital.  Your care team will discuss these with you, so you and those caring for you know how to best care for your health needs at home. This section may also include educational information about certain health topics that may be of help to you. Diet Instructions     ? Good nutrition is important when healing from an illness, injury, or surgery. Follow any nutrition recommendations given to you during your hospital stay. ? If you were given an oral nutrition supplement while in the hospital, continue to take this supplement at home. You can take it with meals, in-between meals, and/or before bedtime. These supplements can be purchased at most local grocery stores, pharmacies, and [employer]-stores. ? If you have any questions about your diet or nutrition, call the hospital and ask for the dietitian. GENERAL DIET           Activity Instructions     Up as tolerated           Labs and Other Follow-ups after Discharge     Basic Metabolic Panel                   MyChart Signup     Our records indicate that you have an active Jovie account. You can view your After Visit Summary by going to [email or web address] and logging in with your Jovie username and password.       If you don't have a Jovie username and password but a parent or guardian has access to your record, the parent or guardian should login with their own [employer] username and password and access your record to view the After Visit Summary. Additional Information  If you have questions, please contact the physician practice where you receive care. Remember, [employer] is NOT to be used for urgent needs. For medical emergencies, dial 911. For questions regarding your [employer] account call [phone]. If you have a clinical question, please call your doctor's office. View your information online  ? Review your current list of  medications, immunization, and allergies. ? Review your future test results online . ? Review your discharge instructions provided by your caregivers at discharge    Certain functionality such as prescription refills, scheduling appointments or sending messages to your provider are not activated if your provider does not use [employer] in his/her office    For questions regarding your [employer] account call [phone]. If you have a clinical question, please call your doctor's office. The information on all pages of the After Visit Summary has been reviewed with me, the patient and/or responsible adult, by my health care provider(s). I had the opportunity to ask questions regarding this information. I understand I should dispose of my armband safely at home to protect my health information. A complete copy of the After Visit Summary has been given to me, the patient and/or responsible adult. Patient Signature/Responsible Adult:____________________    Clinician Signature:_____________________    Date:_____________________    Time:_____________________        More Information           Cystoscopy: Care Instructions  Your Care Instructions  Cystoscopy is a test. It uses a thin, lighted tube called a cystoscope to see the inside of the bladder and the urethra. The urethra is the tube that carries urine out of the body.   This test is helpful because it lets your doctor see areas of your bladder bladder to outside the body. The doctor uses a thin, lighted tool called a cystoscope. Your bladder is filled with fluid. This stretches the bladder so that your doctor can look closely at the inside of your bladder. After the cystoscopy, your urethra may be sore at first, and it may burn when you urinate for the first few days after the procedure. You may feel the need to urinate more often, and your urine may be pink. These symptoms should get better in 1 or 2 days. You will probably be able to go back to most of your usual activities in 1 or 2 days. This care sheet gives you a general idea about how long it will take for you to recover. But each person recovers at a different pace. Follow the steps below to get better as quickly as possible. How can you care for yourself at home? Activity  · Rest when you feel tired. Getting enough sleep will help you recover. · Try to walk each day. Start by walking a little more than you did the day before. Bit by bit, increase the amount you walk. Walking boosts blood flow and helps prevent pneumonia and constipation. · Avoid strenuous activities, such as bicycle riding, jogging, weight lifting, or aerobic exercise, until your doctor says it is okay. · Ask your doctor when you can drive again. · Most people are able to return to work within 1 or 2 days after the procedure. · You may shower and take baths as usual.  · Ask your doctor when it is okay for you to have sex. Diet  · You can eat your normal diet. If your stomach is upset, try bland, low-fat foods like plain rice, broiled chicken, toast, and yogurt. · Drink plenty of fluids (unless your doctor tells you not to). Medicines  · Take pain medicines exactly as directed. ¨ If the doctor gave you a prescription medicine for pain, take it as prescribed. ¨ If you are not taking a prescription pain medicine, ask your doctor if you can take an over-the-counter medicine. long as several months. Your doctor will take it out when you no longer need it. This care sheet gives you a general idea about how long it will take for you to recover. But each person recovers at a different pace. Follow the steps below to get better as quickly as possible. How can you care for yourself at home? Activity  · Rest when you feel tired. Getting enough sleep will help you recover. · Avoid strenuous activities, such as bicycle riding, jogging, weight lifting, or aerobic exercise, until your doctor says it is okay. · Ask your doctor when you can drive again. · Most people are able to return to work the day after the procedure. If your work requires intense activity, you may feel pain in your kidney area or get tired easily. If this happens, you may need to do less strenuous activities while the stent is in. · Ask your doctor when it is okay for you to have sex. Diet  · You can eat your normal diet. If your stomach is upset, try bland, low-fat foods like plain rice, broiled chicken, toast, and yogurt. · Drink plenty of fluids (unless your doctor tells you not to). Medicines  · Your doctor will tell you if and when you can restart your medicines. He or she will also give you instructions about taking any new medicines. · If you take blood thinners, such as warfarin (Coumadin), clopidogrel (Plavix), or aspirin, be sure to talk to your doctor. He or she will tell you if and when to start taking those medicines again. Make sure that you understand exactly what your doctor wants you to do. · Be safe with medicines. Take pain medicines exactly as directed. ¨ If the doctor gave you a prescription medicine for pain, take it as prescribed. ¨ If you are not taking a prescription pain medicine, ask your doctor if you can take an over-the-counter medicine. · If you think your pain medicine is making you sick to your stomach:  ¨ Take your medicine after meals (unless your doctor has told you not to). ¨ Ask your doctor for a different pain medicine. · If your doctor prescribed antibiotics, take them as directed. Do not stop taking them just because you feel better. You need to take the full course of antibiotics. Follow-up care is a key part of your treatment and safety. Be sure to make and go to all appointments, and call your doctor if you are having problems. It's also a good idea to know your test results and keep a list of the medicines you take. When should you call for help? Call 911 anytime you think you may need emergency care. For example, call if:  · You passed out (lost consciousness). · You have severe trouble breathing. · You have sudden chest pain and shortness of breath, or you cough up blood. · You have severe belly pain. Call your doctor now or seek immediate medical care if:  · Part or all of the stent comes out of your urethra. · You have pain that does not get better after you take pain medicine. · You have symptoms of a urinary infection. For example:  ¨ You have blood or pus in your urine. ¨ You have pain in your back just below your rib cage. This is called flank pain. ¨ You have a fever, chills, or body aches. ¨ It hurts to urinate. ¨ You have groin or belly pain. · You cannot control when you urinate, or you leak urine. Watch closely for changes in your health, and be sure to contact your doctor if you have any problems. Where can you learn more? Go to [email or web address] and sign in to your FamilyApp account. Enter O299 in the [facility] box to learn more about \"Ureteral Stent Placement: What to Expect at Home. \"     If you do not have an account, please click on the \"Sign Up Now\" link. Current as of: August 12, 2016  Content Version: 11.2  © 7678-3168 [employer]. Care instructions adapted under license by [facility] ([facility]).  If you have questions about a medical condition Acetaminophen and hydrocodone can pass into breast milk and may harm a nursing baby. You should not breast-feed while using this medicine. How should I take acetaminophen and hydrocodone? Follow all directions on your prescription label. Never take this medicine in larger amounts, or for longer than prescribed. An overdose can damage your liver or cause death. Tell your doctor if the medicine seems to stop working as well in relieving your pain. Hydrocodone may be habit-forming, even at regular doses. Never share this medicine with another person, especially someone with a history of drug abuse or addiction. MISUSE OF NARCOTIC MEDICINE CAN CAUSE ADDICTION, OVERDOSE, OR DEATH, especially in a child or other person using the medicine without a prescription. Selling or giving away acetaminophen and hydrocodone is against the law. Measure liquid medicine with the dosing syringe provided, or with a special dose-measuring spoon or medicine cup. If you do not have a dose-measuring device, ask your pharmacist for one. If you need surgery or medical tests, tell the doctor ahead of time that you are using this medicine. You may need to stop using the medicine for a short time. Do not stop using this medicine suddenly after long-term use, or you could have unpleasant withdrawal symptoms. Ask your doctor how to safely stop using acetaminophen and hydrocodone. Store at room temperature away from moisture and heat. Keep track of the amount of medicine used from each new bottle. Hydrocodone is a drug of abuse and you should be aware if anyone is using your medicine improperly or without a prescription. Always check your bottle to make sure you have received the correct pills (same brand and type) of medicine prescribed by your doctor. What happens if I miss a dose? Since acetaminophen and hydrocodone is taken as needed, you may not be on a dosing schedule.  If you are taking the medication regularly, take the missed dose as soon as you remember. Skip the missed dose if it is almost time for your next scheduled dose. Do not use extra medicine to make up the missed dose. What happens if I overdose? Seek emergency medical attention or call the Poison Help line at 1-305.226.2113. An overdose of acetaminophen and hydrocodone can be fatal.   The first signs of an acetaminophen overdose include loss of appetite, nausea, vomiting, stomach pain, sweating, and confusion or weakness. Later symptoms may include pain in your upper stomach, dark urine, and yellowing of your skin or the whites of your eyes. Overdose symptoms may also include extreme drowsiness, pinpoint pupils, cold and clammy skin, muscle weakness, fainting, weak pulse, slow heart rate, coma, blue lips, shallow breathing, or no breathing  What should I avoid while taking acetaminophen and hydrocodone? This medication may impair your thinking or reactions. Avoid driving or operating machinery until you know how acetaminophen and hydrocodone will affect you. Dizziness or severe drowsiness can cause falls or other accidents. Ask a doctor or pharmacist before using any other cold, allergy, pain, or sleep medication. Acetaminophen (sometimes abbreviated as APAP) is contained in many combination medicines. Taking certain products together can cause you to get too much acetaminophen which can lead to a fatal overdose. Check the label to see if a medicine contains acetaminophen or APAP. Avoid drinking alcohol. It may increase your risk of liver damage while taking acetaminophen. What are the possible side effects of acetaminophen and hydrocodone? Get emergency medical help if you have signs of an allergic reaction: hives; difficulty breathing; swelling of your face, lips, tongue, or throat.   In rare cases, acetaminophen may cause a severe skin reaction that can be fatal. This could occur even if you have taken acetaminophen in the past directions, precautions, warnings, drug interactions, allergic reactions, or adverse effects. If you have questions about the drugs you are taking, check with your doctor, nurse or pharmacist.  Copyright 9158-7544 [address]: 14.11. Revision date: 9/29/2016. Care instructions adapted under license by your healthcare professional. If you have questions about a medical condition or this instruction, always ask your healthcare professional. [place] [address] any warranty or liability for your use of this information.

## 2017-08-28 NOTE — IP AVS SNAPSHOT
Patient Information     Patient Name JASPER Nunn 1940      Important Information for Heart Failure       If your condition worsens or if you have any concerns, call your doctor or seek emergency medical services (dial 9-1-1) as needed. If you have any of the following symptoms/conditions, call your doctor. Call your primary care physician to obtain results of outstanding lab tests, cultures, x-rays, or other tests. If you have a current diagnosis or history of any of the following, please review the information carefully. HEART FAILURE PATIENTS:   DISCHARGE WEIGHT: Weight: 236 lb (107 kg)  Record daily weights. Notify your doctor if you have a weight gain 2 pounds in a day, or 3-5 pounds in 1 week. Notify your doctor for increased shortness of breath, or swelling in legs or feet. Follow a low sodium diet. Resume normal activity unless otherwise instructed by your doctor.

## 2017-08-28 NOTE — ED NOTES
Pt reports having new dx of kidney stone in ureter as outpt at [facility]. Pt returned to ER due to worsening pain.      Steve Tan RN  08/28/17 [id number]

## 2017-08-28 NOTE — PROGRESS NOTES
ER spoke with urology who agreed to see this patient on 8/29/17. [address] [facility]

## 2017-08-28 NOTE — ED NOTES
Pt presents in ED c/o right side flank pain and nausea; began 8/28; pt reports having testing done this morning for the same issues; testing was complete at [facility]. Pts wife at bedside. Pt is eupneic, A&OX4, and PWD. Call light in reach.       Nishi Thakur RN  08/28/17 [id number]

## 2017-08-28 NOTE — PROGRESS NOTES
ADMISSION NOTE       Patient admitted to room  2059. Time of admit:  [address] from:  ER    Reason for admission:  Right nephrolithiasis    Where patient has been residing for the last 24 hrs:  Private residence     Has the patient been admitted to any facility in the last 4 weeks, which one:  no    Family at bedside:  Yes, wife    Patient is currently resting in bed, vitals obtained, denies pain at this time. No distress noted. Patient has been oriented to room, educated on how to use call light, and to call for assistance prior to getting up. Bed in lowest and locked position. 2 siderails up for safety. Call light within reach.

## 2017-08-28 NOTE — ED PROVIDER NOTES
[address]  Emergency Department Encounter  Emergency Medicine Resident     Pt Name: Loida Martinez  MRN: [id number]  [place] 1940  Date of evaluation: 8/28/17  PCP:  Eva Sapp MD    [address] [place] [address]       Chief Complaint   Patient presents with    Flank Pain     Right side       HISTORY OF PRESENT ILLNESS  (Location/Symptom, Timing/Onset, Context/Setting, Quality, Duration, Modifying Factors, Severity.)      Loida Martinez is a 76 y.o. male who presents with Right flank pain since 8:30 this morning. Patient has CT urogram to evaluate known kidney stones at approximately 8:15 this morning. Patient states almost immediately after the test was finished she began to have sharp, stabbing, intermittent right flank pain, rated as 7/10. Patient complains of associated nausea and chills. Patient denies abdominal pain, radiation of pain, fever, chest pain, shortness breath. CT urogram shows mild to moderate right hydronephrosis with 7 mm calculus. Patient has appointment with urologist on 9/12/17. PAST MEDICAL / SURGICAL / SOCIAL / FAMILY HISTORY      has a past medical history of Acute respiratory failure following trauma and surgery ([address] Acute respiratory failure following trauma and surgery ([address] Altered bowel elimination due to intestinal ostomy ([address] Full dentures; Full dentures; Gall stones; GERD (gastroesophageal reflux disease); GI bleed; Hemorrhage of gastrointestinal tract, unspecified; Hemorrhage of gastrointestinal tract, unspecified; Hyperlipidemia; Hypertension; Kidney stones; Kidney stones; Primary localized osteoarthrosis, lower leg; Primary localized osteoarthrosis, lower leg; Prostate disorder; Transient disorder of initiating or maintaining sleep; Transient disorder of initiating or maintaining sleep; Unspecified disorder of skin and subcutaneous tissue; Wears glasses; and Wears glasses.      has a past surgical history that includes Knee arthroscopy Dat Gates MD   latanoprost (XALATAN) 0.005 % ophthalmic solution  5/11/16  Yes Historical Provider, MD   Multiple Vitamins-Minerals (MULTIVITAMIN & MINERAL PO) Take 1 tablet by mouth 2 times daily. Yes Historical Provider, MD   Travoprost (TRAVATAN OP) Apply 2 drops to eye daily    Historical Provider, MD   acetaminophen (TYLENOL) 500 MG tablet Take 500 mg by mouth every 6 hours as needed for Pain. Historical Provider, MD   magnesium hydroxide (MILK OF MAGNESIA) 400 MG/5ML suspension Take 5 mLs by mouth daily as needed for Constipation.     Historical Provider, MD       REVIEW OF SYSTEMS    (2-9 systems for level 4, 10 or more for level 5)      Constitutional ROS - No recent fevers, + recent chills  Neurological ROS - No Headache, No Syncope  Opthalmologic ROS- No eye pain, No vision changes   ENT ROS - No sore throat, No congestion  Respiratory ROS - No cough, No shortness of breath  Cardiovascular ROS - No chest pain, No palpitations   Gastrointestinal ROS - No abdominal pain, + nausea, No vomiting, + right flank pain  Genito-Urinary ROS - No dysuria, No hematuria  Musculoskeletal ROS - No back pain, No neck pain  Dermatological ROS - No wound, No rash      PHYSICAL EXAM   (up to 7 for level 4, 8 or more for level 5)      INITIAL VITALS:   BP (!) 145/65  Pulse 65  Temp 97.7 °F (36.5 °C) (Oral)   Resp 18  Ht 6' 1\" (1.854 m)  Wt 236 lb (107 kg)  SpO2 98%  BMI 31.14 kg/m2    CONSTITUTIONAL: AOx4, no apparent distress, appears stated age   HEAD: normocephalic, atraumatic   ENT: moist mucous membranes, uvula midline   NECK: supple, symmetric   BACK: symmetric   LUNGS: clear to auscultation bilaterally   CARDIOVASCULAR: regular rate and rhythm, no murmurs, rubs or gallops   ABDOMEN: Soft, Mild right-sided CVA tenderness, non-distended   : deferred     NEUROLOGIC:  MAEx4, no focal sensory or motor deficits   MUSCULOSKELETAL: no clubbing, cyanosis or edema   SKIN: no exposed rash     DIFFERENTIAL DIAGNOSIS     PLAN (LABS / IMAGING / EKG):  Orders Placed This Encounter   Procedures    Basic Metabolic Panel    UA W/REFLEX CULTURE    Microscopic Urinalysis    Inpatient consult to Internal Medicine    Inpatient consult to Urology       MEDICATIONS ORDERED:  Orders Placed This Encounter   Medications    ketorolac (TORADOL) injection 15 mg    fentaNYL (SUBLIMAZE) injection 75 mcg       DDX: Kidney stone, hydronephrosis    DIAGNOSTIC RESULTS / EMERGENCY DEPARTMENT COURSE / MDM     LABS:  Results for orders placed or performed during the hospital encounter of 24/71/73   Basic Metabolic Panel   Result Value Ref Range    Glucose 86 70 - 99 mg/dL    BUN 25 (H) 8 - 23 mg/dL    CREATININE 1.69 (H) 0.70 - 1.20 mg/dL    Bun/Cre Ratio NOT REPORTED 9 - 20    Calcium 9.5 8.6 - 10.4 mg/dL    Sodium 140 135 - 144 mmol/L    Potassium 4.9 3.7 - 5.3 mmol/L    Chloride 103 98 - 107 mmol/L    CO2 25 20 - 31 mmol/L    Anion Gap 12 9 - 17 mmol/L    GFR Non-African American 39 (L) >60 mL/min    GFR  48 (L) >60 mL/min    GFR Comment          GFR Staging NOT REPORTED    UA W/REFLEX CULTURE   Result Value Ref Range    Color, UA YELLOW YEL    Turbidity UA CLEAR CLEAR    Glucose, Ur NEGATIVE NEG    Bilirubin Urine NEGATIVE NEG    Ketones, Urine NEGATIVE NEG    Specific Gravity, UA 1.019 1.000 - 1.030    Urine Hgb SMALL (A) NEG    pH, UA 6.0 5.0 - 8.0    Protein, UA NEGATIVE NEG    Urobilinogen, Urine Normal NORM    Nitrite, Urine NEGATIVE NEG    Leukocyte Esterase, Urine NEGATIVE NEG    Urinalysis Comments NOT REPORTED    Microscopic Urinalysis   Result Value Ref Range    -          WBC, UA 0 TO 2 /HPF    RBC, UA 5 TO 10 /HPF    Casts UA NOT REPORTED /LPF    Crystals UA NOT REPORTED NONE /HPF    Epithelial Cells UA 0 TO 2 /HPF    Renal Epithelial, Urine NOT REPORTED 0 /HPF    Bacteria, UA FEW (A) NONE    Mucus, UA NOT REPORTED NONE    Trichomonas, UA NOT REPORTED NONE    Amorphous, UA 1+ (A) NONE    Other Observations change of the visualized thoracolumbar spine. 1. Mild to moderate right hydronephrosis secondary to 7 mm calculus in the proximal right ureter. 2. Multiple calculi are again seen within the dependent aspect of the bladder. 3. Stable enlarged prostate gland impinging the base of the bladder. 4. Numerous hepatic and bilateral renal cysts are again seen largest exophytic inferior pole left kidney measuring 8 cm. 5. Trace bilateral pleural effusions. 6. Additional chronic findings as described including coronary artery disease. The findings were sent to the Radiology Results [address] at 12:25 pm on 8/28/2017to be communicated to a licensed caregiver. EKG  None indicated    All EKG's are interpreted by the Emergency Department Physician who either signs or Co-signs this chart in the absence of a cardiologist.    EMERGENCY DEPARTMENT COURSE:  76 y.o. male with right-sided flank pain being this morning after CT urogram.  CT urogram shows mild to moderate right hydronephrosis and 7 mm stone. Will order BMP and urinalysis. Will then speak with patients Urologist, Dr. Kevin Carlin. PLAN/MEDS:  Orders Placed This Encounter   Procedures    Basic Metabolic Panel    UA W/REFLEX CULTURE    Microscopic Urinalysis    Inpatient consult to Internal Medicine    Inpatient consult to Urology       Orders Placed This Encounter   Medications    ketorolac (TORADOL) injection 15 mg    fentaNYL (SUBLIMAZE) injection 75 mcg     Patient found to have worsening creatinine from this morning as well as persistent right flank pain and a known stone. Decision was made to admit the patient to the hospital.    4:45- I spoke with Dr. Francoise Pierson who is agreeable to admit the patient to the hospital.    5:32 PM- I spoke with urology who is agreeable to see the patient tomorrow in inpatient setting.     PROCEDURES:  None    CONSULTS:  IP CONSULT TO INTERNAL MEDICINE  IP CONSULT TO UROLOGY    CRITICAL CARE:  None    FINAL IMPRESSION      1. MERRICK (acute kidney injury) ([facility]    2. Nephrolithiasis    3. Hydronephrosis with urinary obstruction due to ureteral calculus          DISPOSITION / PLAN     DISPOSITION     PATIENT REFERRED TO:  No follow-up provider specified. DISCHARGE MEDICATIONS:  New Prescriptions    No medications on file       eKyanna Eastman DO  Emergency Medicine Resident    (Please note that portions of this note were completed with a voice recognition program.  Efforts were made to edit the dictations but occasionally words are mis-transcribed.  Whenever words are used in this note in any gender, they shall be construed as though they were used in the gender appropriate to the circumstances; and whenever words are used in this note in the singular or plural form, they shall be construed as though they were used in the form appropriate to the circumstances.)       Keyanna Eastman DO  Resident  08/28/17 8441

## 2017-08-29 ENCOUNTER — ANESTHESIA EVENT (OUTPATIENT)
Dept: OPERATING ROOM | Age: 77
DRG: 694 | End: 2017-08-29
Payer: MEDICARE

## 2017-08-29 ENCOUNTER — APPOINTMENT (OUTPATIENT)
Dept: GENERAL RADIOLOGY | Age: 77
DRG: 694 | End: 2017-08-29
Payer: MEDICARE

## 2017-08-29 ENCOUNTER — ANESTHESIA (OUTPATIENT)
Dept: OPERATING ROOM | Age: 77
DRG: 694 | End: 2017-08-29
Payer: MEDICARE

## 2017-08-29 VITALS — DIASTOLIC BLOOD PRESSURE: 57 MMHG | TEMPERATURE: 74 F | SYSTOLIC BLOOD PRESSURE: 98 MMHG | OXYGEN SATURATION: 98 %

## 2017-08-29 VITALS
SYSTOLIC BLOOD PRESSURE: 131 MMHG | OXYGEN SATURATION: 98 % | DIASTOLIC BLOOD PRESSURE: 77 MMHG | HEART RATE: 74 BPM | HEIGHT: 73 IN | TEMPERATURE: 98.1 F | WEIGHT: 236 LBS | BODY MASS INDEX: 31.28 KG/M2 | RESPIRATION RATE: 14 BRPM

## 2017-08-29 LAB
ANION GAP SERPL CALCULATED.3IONS-SCNC: 15 MMOL/L (ref 9–17)
BUN BLDV-MCNC: 28 MG/DL (ref 8–23)
BUN/CREAT BLD: ABNORMAL (ref 9–20)
CALCIUM SERPL-MCNC: 8.8 MG/DL (ref 8.6–10.4)
CHLORIDE BLD-SCNC: 104 MMOL/L (ref 98–107)
CO2: 21 MMOL/L (ref 20–31)
CREAT SERPL-MCNC: 2.11 MG/DL (ref 0.7–1.2)
GFR AFRICAN AMERICAN: 37 ML/MIN
GFR NON-AFRICAN AMERICAN: 30 ML/MIN
GFR SERPL CREATININE-BSD FRML MDRD: ABNORMAL ML/MIN/{1.73_M2}
GFR SERPL CREATININE-BSD FRML MDRD: ABNORMAL ML/MIN/{1.73_M2}
GLUCOSE BLD-MCNC: 96 MG/DL (ref 70–99)
HCT VFR BLD CALC: 40.3 % (ref 41–53)
HEMOGLOBIN: 13.3 G/DL (ref 13.5–17.5)
MCH RBC QN AUTO: 30 PG (ref 26–34)
MCHC RBC AUTO-ENTMCNC: 33.1 G/DL (ref 31–37)
MCV RBC AUTO: 90.6 FL (ref 80–100)
PDW BLD-RTO: 14.1 % (ref 11.5–14.9)
PLATELET # BLD: 119 K/UL (ref 150–450)
PMV BLD AUTO: 8.9 FL (ref 6–12)
POTASSIUM SERPL-SCNC: 4.4 MMOL/L (ref 3.7–5.3)
RBC # BLD: 4.45 M/UL (ref 4.5–5.9)
SODIUM BLD-SCNC: 140 MMOL/L (ref 135–144)
WBC # BLD: 8.5 K/UL (ref 3.5–11)

## 2017-08-29 PROCEDURE — 3209999900 FLUORO FOR SURGICAL PROCEDURES

## 2017-08-29 PROCEDURE — 3700000000 HC ANESTHESIA ATTENDED CARE: Performed by: UROLOGY

## 2017-08-29 PROCEDURE — 3600000012 HC SURGERY LEVEL 2 ADDTL 15MIN: Performed by: UROLOGY

## 2017-08-29 PROCEDURE — 36415 COLL VENOUS BLD VENIPUNCTURE: CPT

## 2017-08-29 PROCEDURE — C1769 GUIDE WIRE: HCPCS | Performed by: UROLOGY

## 2017-08-29 PROCEDURE — 80048 BASIC METABOLIC PNL TOTAL CA: CPT

## 2017-08-29 PROCEDURE — 6360000002 HC RX W HCPCS: Performed by: RADIOLOGY

## 2017-08-29 PROCEDURE — 2580000003 HC RX 258: Performed by: NURSE PRACTITIONER

## 2017-08-29 PROCEDURE — 6360000002 HC RX W HCPCS

## 2017-08-29 PROCEDURE — C2617 STENT, NON-COR, TEM W/O DEL: HCPCS | Performed by: UROLOGY

## 2017-08-29 PROCEDURE — 2500000003 HC RX 250 WO HCPCS

## 2017-08-29 PROCEDURE — 99232 SBSQ HOSP IP/OBS MODERATE 35: CPT | Performed by: INTERNAL MEDICINE

## 2017-08-29 PROCEDURE — 3600000002 HC SURGERY LEVEL 2 BASE: Performed by: UROLOGY

## 2017-08-29 PROCEDURE — G0378 HOSPITAL OBSERVATION PER HR: HCPCS

## 2017-08-29 PROCEDURE — 85027 COMPLETE CBC AUTOMATED: CPT

## 2017-08-29 PROCEDURE — 74000 XR ABDOMEN LIMITED (KUB): CPT

## 2017-08-29 PROCEDURE — 3700000001 HC ADD 15 MINUTES (ANESTHESIA): Performed by: UROLOGY

## 2017-08-29 PROCEDURE — 7100000000 HC PACU RECOVERY - FIRST 15 MIN: Performed by: UROLOGY

## 2017-08-29 PROCEDURE — 7100000001 HC PACU RECOVERY - ADDTL 15 MIN: Performed by: UROLOGY

## 2017-08-29 DEVICE — STENT URET 6FR L28CM PERCFLX HYDR+ DBL PGTL THRD 2: Type: IMPLANTABLE DEVICE | Status: FUNCTIONAL

## 2017-08-29 RX ORDER — DIPHENHYDRAMINE HYDROCHLORIDE 50 MG/ML
12.5 INJECTION INTRAMUSCULAR; INTRAVENOUS
Status: DISCONTINUED | OUTPATIENT
Start: 2017-08-29 | End: 2017-08-29

## 2017-08-29 RX ORDER — ONDANSETRON 2 MG/ML
4 INJECTION INTRAMUSCULAR; INTRAVENOUS
Status: DISCONTINUED | OUTPATIENT
Start: 2017-08-29 | End: 2017-08-29

## 2017-08-29 RX ORDER — MEPERIDINE HYDROCHLORIDE 25 MG/ML
12.5 INJECTION INTRAMUSCULAR; INTRAVENOUS; SUBCUTANEOUS EVERY 5 MIN PRN
Status: DISCONTINUED | OUTPATIENT
Start: 2017-08-29 | End: 2017-08-29

## 2017-08-29 RX ORDER — PROPOFOL 10 MG/ML
INJECTION, EMULSION INTRAVENOUS PRN
Status: DISCONTINUED | OUTPATIENT
Start: 2017-08-29 | End: 2017-08-29 | Stop reason: SDUPTHER

## 2017-08-29 RX ORDER — FENTANYL CITRATE 50 UG/ML
INJECTION, SOLUTION INTRAMUSCULAR; INTRAVENOUS PRN
Status: DISCONTINUED | OUTPATIENT
Start: 2017-08-29 | End: 2017-08-29 | Stop reason: SDUPTHER

## 2017-08-29 RX ORDER — MIDAZOLAM HYDROCHLORIDE 1 MG/ML
INJECTION INTRAMUSCULAR; INTRAVENOUS PRN
Status: DISCONTINUED | OUTPATIENT
Start: 2017-08-29 | End: 2017-08-29 | Stop reason: SDUPTHER

## 2017-08-29 RX ORDER — LABETALOL HYDROCHLORIDE 5 MG/ML
5 INJECTION, SOLUTION INTRAVENOUS EVERY 10 MIN PRN
Status: DISCONTINUED | OUTPATIENT
Start: 2017-08-29 | End: 2017-08-29

## 2017-08-29 RX ORDER — HYDROCODONE BITARTRATE AND ACETAMINOPHEN 5; 325 MG/1; MG/1
1 TABLET ORAL EVERY 6 HOURS PRN
Qty: 10 TABLET | Refills: 0 | Status: SHIPPED | OUTPATIENT
Start: 2017-08-29 | End: 2017-11-28 | Stop reason: ALTCHOICE

## 2017-08-29 RX ORDER — LIDOCAINE HYDROCHLORIDE 10 MG/ML
INJECTION, SOLUTION EPIDURAL; INFILTRATION; INTRACAUDAL; PERINEURAL PRN
Status: DISCONTINUED | OUTPATIENT
Start: 2017-08-29 | End: 2017-08-29 | Stop reason: SDUPTHER

## 2017-08-29 RX ORDER — CEFAZOLIN SODIUM 1 G/3ML
INJECTION, POWDER, FOR SOLUTION INTRAMUSCULAR; INTRAVENOUS PRN
Status: DISCONTINUED | OUTPATIENT
Start: 2017-08-29 | End: 2017-08-29 | Stop reason: SDUPTHER

## 2017-08-29 RX ORDER — MORPHINE SULFATE 2 MG/ML
2 INJECTION, SOLUTION INTRAMUSCULAR; INTRAVENOUS EVERY 5 MIN PRN
Status: DISCONTINUED | OUTPATIENT
Start: 2017-08-29 | End: 2017-08-29

## 2017-08-29 RX ADMIN — CEFAZOLIN SODIUM 1000 MG: 1 INJECTION, POWDER, FOR SOLUTION INTRAMUSCULAR; INTRAVENOUS at 12:51

## 2017-08-29 RX ADMIN — MIDAZOLAM 1 MG: 1 INJECTION INTRAMUSCULAR; INTRAVENOUS at 12:38

## 2017-08-29 RX ADMIN — MORPHINE SULFATE 2 MG: 4 INJECTION, SOLUTION INTRAMUSCULAR; INTRAVENOUS at 04:39

## 2017-08-29 RX ADMIN — LIDOCAINE HYDROCHLORIDE 50 MG: 10 INJECTION, SOLUTION EPIDURAL; INFILTRATION; INTRACAUDAL; PERINEURAL at 12:40

## 2017-08-29 RX ADMIN — ONDANSETRON 4 MG: 2 INJECTION INTRAMUSCULAR; INTRAVENOUS at 12:38

## 2017-08-29 RX ADMIN — PROPOFOL 200 MG: 10 INJECTION, EMULSION INTRAVENOUS at 12:40

## 2017-08-29 RX ADMIN — FENTANYL CITRATE 50 MCG: 50 INJECTION, SOLUTION INTRAMUSCULAR; INTRAVENOUS at 12:40

## 2017-08-29 RX ADMIN — SODIUM CHLORIDE: 9 INJECTION, SOLUTION INTRAVENOUS at 12:39

## 2017-08-29 ASSESSMENT — PAIN SCALES - GENERAL
PAINLEVEL_OUTOF10: 4
PAINLEVEL_OUTOF10: 0

## 2017-08-29 ASSESSMENT — ENCOUNTER SYMPTOMS
STRIDOR: 0
SHORTNESS OF BREATH: 0

## 2017-08-29 ASSESSMENT — PAIN - FUNCTIONAL ASSESSMENT: PAIN_FUNCTIONAL_ASSESSMENT: 0-10

## 2017-08-29 NOTE — PROGRESS NOTES
secondary to a 7 mm calculus in the proximal right ureter. Numerous bilateral renal cysts are again seen largest exophytic inferior pole left kidney measuring 8 cm. ORGANS: Lack of intravenous contrast limits evaluation of the solid organs and bowel. The liver re- demonstrates numerous hepatic cysts. Spleen, pancreas adrenal glands and gallbladder appear grossly unremarkable. GI/BOWEL: No bowel obstruction or inflammation. Status post colectomy with right lower quadrant ostomy. PELVIS: Multiple calculi are again seen within the dependent aspect of the bladder. Stable enlarged prostate gland impinging the base of the bladder. PERITONEUM/RETROPERITONEUM: No lymphadenopathy is noted. Normal caliber aorta with mild atherosclerosis. No ascites or free air. BONES/SOFT TISSUES: Stable multilevel degenerative change of the visualized thoracolumbar spine. 1. Mild to moderate right hydronephrosis secondary to 7 mm calculus in the proximal right ureter. 2. Multiple calculi are again seen within the dependent aspect of the bladder. 3. Stable enlarged prostate gland impinging the base of the bladder. 4. Numerous hepatic and bilateral renal cysts are again seen largest exophytic inferior pole left kidney measuring 8 cm. 5. Trace bilateral pleural effusions. 6. Additional chronic findings as described including coronary artery disease. The findings were sent to the Radiology Results [address] at 12:25 pm on 8/28/2017to be communicated to a licensed caregiver.        ASSSESSMENT     Principal Problem:    Right nephrolithiasis  Active Problems:    Hyperlipidemia    Ileostomy status (HCC)    Glaucoma of right eye    Hydronephrosis, right    MERRICK (acute kidney injury) ([address]    Recurrent kidney stones    Hypertension      PLAN     Right nephrolithiasis with hydronephrosis and MERRICK  - CT urogram: Mild to moderate right hydronephrosis secondary to 7 mm calculus in the proximal right ureter  - UA negative  - Cr 1.64 (b/l 1.0) -> 2.11   - pain control  - PT/OT  - NPO  - stent placement and cysto per urology this afternoon  - follow up urology as outpatient     DVT prophylaxis: heparin (porcine) injection 5,000 TID  GI prophylaxis: Protonix 40 mg daily    Plan will be discussed with the rounding attending: Dr. Jing Weber. MD JEOVANNY Verma [address]. Phone [phone]   Fax: [phone]  Answering Service: [phone]      IM Attending    Pt seen and examined today   I have discussed the care of pt , including pertinent history and exam findings,  with the resident. I have reviewed the key elements of all parts of the encounter with the resident. I agree with the assessment, plan and orders as documented by the resident.     Electronically signed by Rufino Blankenship MD on 8/29/2017 at 12:40 PM

## 2017-08-29 NOTE — FLOWSHEET NOTE
08/29/17 0836   Encounter Summary   Place of [address] [place] [address] Completed   Spiritual/Buddhist   Intervention Contacted support as requested per patient/family request   Who? [facility]   Why?  Spiritual Support   At Request Of Patient/Family

## 2017-08-29 NOTE — CARE COORDINATION
CASE MANAGEMENT NOTE:    Admission Date:  8/28/2017 Isaiasscar Doctor is a 76 y.o.  male    Admitted for : Right nephrolithiasis [N20.0]    Met with:  Patient and wife    PCP:  Dr. Og Graft:  John[employer] Ojeda Medicare, has prescription drug coverage      Current Residence/ Living Arrangements:  Condo on one level, 2 steps to enter             Current Services PTA:  None    Is patient agreeable to VNS: No    Freedom of choice provided: NA         VNS chosen:  NA    DME: None    Home Oxygen: None    Nebulizer: None    Supplier: NA    Potential Assistance Needed: NA    SNF needed: No    Pharmacy:  [facility] on [address]      Does Patient want to use MEDS to BEDS? NO    Family Members/Caregivers that pt would like involved in their care:    Yes, wife    If yes, list name here:  Mrs. Diana Tnag.    Transportation Provider: Patient drives                       Discharge Plan:  Patient lives in [place] with wife. Patient is independent with driving and ADLs. No DME needed, and patient does not require VNS. Pt. Has [employer] with prescription drug coverage.   Plan is for patient to discharge to home today.//NH              Readmission Risk              Readmission Risk:        6.25       Age 72 or Greater:  1    Admitted from SNF or Requires Paid or Family Care:  0    Currently has CHF,COPD,ARF,CRI,or is on dialysis:  0    Takes more than 5 Prescription Medications:  4    Takes Digoxin,Insulin,Anticoagulants,Narcotics or ASA/Plavix:  [facility] in Past 12 Months:  0    On Disability:  0    Patient Considers own Health:  1.25          Electronically signed by: Philip Marinelli RN on 8/29/2017 at 2:45 PM

## 2017-08-29 NOTE — BRIEF OP NOTE
Brief Postoperative Note    Abhijeet Rodas  YOB: 1940  [id number]    Pre-operative Diagnosis: right ureteral stone, bladder stones    Post-operative Diagnosis: Same    Procedure: cysto, right ureteral stent placement. Anesthesia: General    Surgeons/Assistants: Matt    Estimated Blood Loss: less than 50     Complications: None    Specimens: Was Not Obtained    Findings: multiple large bladder stones noted, right ureteral stent placed.      Electronically signed by Guanaco Rollins MD on 8/29/2017 at 1:02 PM

## 2017-08-29 NOTE — CONSULTS
bettye. eyes, cataracts extracted with iol's    FRACTURE SURGERY      JOINT REPLACEMENT Right 3-23-15    KIDNEY STONE SURGERY Left     KNEE ARTHROSCOPY Right     LITHOTRIPSY Left 06-20-14    w/ cystoscopy C & P    TONSILLECTOMY      TOTAL COLECTOMY      \"PARTIAL SMALL INTESTINES , PARTIAL RECTUM\"    TOTAL KNEE ARTHROPLASTY  03/23/2015    Right with biomet and GPS product application    TOTAL KNEE ARTHROPLASTY Right 3/23/2015     Previous  surgery: none     Medications:    Scheduled Meds:   finasteride  5 mg Oral Daily    allopurinol  300 mg Oral Daily    lisinopril  5 mg Oral Daily    pantoprazole  40 mg Oral QAM AC    simvastatin  40 mg Oral Nightly    tamsulosin  0.4 mg Oral Daily    sodium chloride flush  10 mL Intravenous 2 times per day    heparin (porcine)  5,000 Units Subcutaneous 3 times per day    latanoprost  1 drop Right Eye Daily     Continuous Infusions:   sodium chloride 100 mL/hr at 08/28/17 2240     PRN Meds:.sodium chloride flush, potassium chloride **OR** potassium chloride **OR** potassium chloride, magnesium sulfate, acetaminophen, morphine **OR** morphine, magnesium hydroxide, bisacodyl, ondansetron, HYDROcodone 5 mg - acetaminophen    Allergies:  Sulfa antibiotics and Vimovo [naproxen-esomeprazole]    Social History:    Social History     Social History    Marital status:      Spouse name: N/A    Number of children: N/A    Years of education: N/A     Occupational History    Not on file.      Social History Main Topics    Smoking status: Never Smoker    Smokeless tobacco: Never Used    Alcohol use No      Comment: occassional    Drug use: No    Sexual activity: Not on file     Other Topics Concern    Not on file     Social History Narrative       Family History:    Family History   Problem Relation Age of Onset    Heart Disease Father     Other Mother      Previous Urologic Family history: none    REVIEW OF SYSTEMS:  Constitutional: negative  Eyes: negative  Respiratory: negative  Cardiovascular: negative  Gastrointestinal: negative  Genitourinary: see HPI  Musculoskeletal: negative  Skin: negative   Neurological: negative  Hematological/Lymphatic: negative  Psychological: negative    Physical Exam:    This a 76 y.o. male   Patient Vitals for the past 24 hrs:   BP Temp Temp src Pulse Resp SpO2 Height Weight   08/29/17 0540 137/81 98.4 °F (36.9 °C) - 82 17 96 % - -   08/28/17 2338 (!) 101/57 98.2 °F (36.8 °C) - 55 17 96 % - -   08/28/17 1820 (!) 147/72 97.9 °F (36.6 °C) Oral 56 18 97 % - -   08/28/17 1744 130/74 98.3 °F (36.8 °C) Oral 62 16 95 % - -   08/28/17 1445 (!) 145/65 97.7 °F (36.5 °C) Oral 65 18 98 % 6' 1\" (1.854 m) 236 lb (107 kg)     Constitutional: Patient in no acute distress; Neuro: alert and oriented to person place and time. Psych: Mood and affect normal.  Skin: Normal  Lungs: Respiratory effort normal  Cardiovascular:  Normal peripheral pulses  Abdomen: Soft, non-tender, non-distended with no CVA, flank pain, hepatosplenomegaly or hernia. Kidneys normal.  Bladder non-tender and not distended.   Lymphatics: no palpable lymphadenopathy  Penis normal and circumcised  Urethral meatus normal  Scrotal exam normal  Testicles normal bilaterally  Epididymis normal bilaterally  No evidence of inguinal hernia  LABS:  Recent Labs      08/29/17   0624   WBC  8.5   HGB  13.3*   HCT  40.3*   MCV  90.6   PLT  119*     Recent Labs      08/28/17   0717  08/28/17   1410  08/29/17   0624   NA   --   140  140   K   --   4.9  4.4   CL   --   103  104   CO2   --   25  21   BUN  25*  25*  28*   CREATININE  1.34*  1.69*  2.11*     Lab Results   Component Value Date    PSA 1.23 03/22/2017    PSA 1.59 12/23/2014    PSA 1.05 07/23/2013       Additional Lab/culture results:    Urinalysis: Recent Labs      08/28/17   1621   COLORU  YELLOW   PHUR  6.0   WBCUA  0 TO 2   RBCUA  5 TO 10   MUCUS  NOT REPORTED   TRICHOMONAS  NOT REPORTED   YEAST  NOT REPORTED   BACTERIA

## 2017-08-29 NOTE — DISCHARGE INSTR - DIET

## 2017-08-29 NOTE — DISCHARGE SUMMARY
[facility]    Discharge Summary      NAME:  Macario Conner  :  1940  MRN:  [id number]    Admit date:  2017  Discharge date:   2017    Admitting Physician:  Chente Cordoba MD    Primary Diagnosis on Admission:   Present on Admission:   Right nephrolithiasis   Hydronephrosis, right   MERRICK (acute kidney injury) ([address]   Recurrent kidney stones   Hypertension   Hyperlipidemia   Ileostomy status ([address]   Glaucoma of right eye      Secondary Diagnoses:   does not have any pertinent problems on file. Admission Condition:  poor     Discharged Condition: good    Hospital Course: The patient was admitted for the management of right nephrolithiasis. CT urogram showed 7 mm obstructing calculi in the proximal right ureter. Patient was given pain control and urology consult. Urology performed cysto and stent placement on the right ureter. Today on day of discharge pt feels better with no further complaints. Vitals and Labs are at pts baseline. All consultants involved during this admission are agreeable to d/c.     Consults:  urology    Significant Diagnostic/theraputic interventions: cysto and right ureteral stent placement      Lab Results   Component Value Date    WBC 8.5 2017    HGB 13.3 (L) 2017    HCT 40.3 (L) 2017     (L) 2017    CHOL 151 2017    TRIG 103 2017    HDL 42 2017    ALT 17 2016    AST 20 2016     2017    K 4.4 2017     2017    CREATININE 2.11 (H) 2017    BUN 28 (H) 2017    CO2 21 2017    TSH 3.50 2017    PSA 1.23 2017    INR 1.1 2012    LABMICR 5 2012         Disposition:   home    Instructions to Patient:     Follow up with Dylan Richey MD in  1 week    Dylan Richey, [address] [place] [id number] [phone]          Samanta Montoya MD Chip Latif Arvin [address] [facility] [phone]    Call  his office will call to set up stent removal    Daniele Owusu MD  [address]  [phone]    In 1 week  call for hospital follow up      Discharge Medications:     Gomez Barba   Home Medication Instructions [id number]    Printed on:08/29/17 1621   Medication Information                      acetaminophen (TYLENOL) 500 MG tablet  Take 500 mg by mouth every 6 hours as needed for Pain. allopurinol (ZYLOPRIM) 300 MG tablet  take 1 tablet by mouth once daily             finasteride (PROSCAR) 5 MG tablet  Take 1 tablet by mouth daily             HYDROcodone-acetaminophen (NORCO) 5-325 MG per tablet  Take 1 tablet by mouth every 6 hours as needed for Pain . Earliest Fill Date: 8/29/17             latanoprost (XALATAN) 0.005 % ophthalmic solution               magnesium hydroxide (MILK OF MAGNESIA) 400 MG/5ML suspension  Take 5 mLs by mouth daily as needed for Constipation. Multiple Vitamins-Minerals (MULTIVITAMIN & MINERAL PO)  Take 1 tablet by mouth 2 times daily. omeprazole (PRILOSEC) 40 MG delayed release capsule  take 1 capsule by mouth once daily             ranitidine (ZANTAC) 150 MG tablet  Take 150 mg by mouth daily as needed for Heartburn             sildenafil (REVATIO) 20 MG tablet  Take 1 tablet by mouth as needed (take 1-2 tablets as needed for erection) Take 1-5 tabs as needed prior to activity    Do not run through insurance- cash pay.              simvastatin (ZOCOR) 40 MG tablet  take 1 tablet by mouth every evening             tamsulosin (FLOMAX) 0.4 MG capsule  Take 1 capsule by mouth daily             Travoprost (TRAVATAN OP)  Apply 2 drops to eye daily                 Send Copies to: Daniele Owusu MD,       Note that over 30 minutes was spent in preparing discharge papers, discussing discharge with patient and family, medication review,

## 2017-08-30 ENCOUNTER — HOSPITAL ENCOUNTER (EMERGENCY)
Age: 77
Discharge: HOME | End: 2017-08-30
Attending: EMERGENCY MEDICINE
Payer: MEDICARE

## 2017-08-30 VITALS
DIASTOLIC BLOOD PRESSURE: 67 MMHG | RESPIRATION RATE: 24 BRPM | BODY MASS INDEX: 30.88 KG/M2 | WEIGHT: 233 LBS | OXYGEN SATURATION: 94 % | HEIGHT: 73 IN | TEMPERATURE: 97.9 F | HEART RATE: 87 BPM | SYSTOLIC BLOOD PRESSURE: 118 MMHG

## 2017-08-30 DIAGNOSIS — R31.9 HEMATURIA: Primary | ICD-10-CM

## 2017-08-30 LAB
-: ABNORMAL
ABSOLUTE EOS #: 0.4 K/UL (ref 0–0.4)
ABSOLUTE LYMPH #: 1.7 K/UL (ref 1–4.8)
ABSOLUTE MONO #: 0.8 K/UL (ref 0.1–1.3)
AMORPHOUS: ABNORMAL
ANION GAP SERPL CALCULATED.3IONS-SCNC: 13 MMOL/L (ref 9–17)
BACTERIA: ABNORMAL
BASOPHILS # BLD: 1 %
BASOPHILS ABSOLUTE: 0.1 K/UL (ref 0–0.2)
BILIRUBIN URINE: NEGATIVE
BUN BLDV-MCNC: 23 MG/DL (ref 8–23)
BUN/CREAT BLD: ABNORMAL (ref 9–20)
CALCIUM SERPL-MCNC: 9.2 MG/DL (ref 8.6–10.4)
CASTS UA: ABNORMAL /LPF
CHLORIDE BLD-SCNC: 105 MMOL/L (ref 98–107)
CO2: 24 MMOL/L (ref 20–31)
COLOR: ABNORMAL
COMMENT UA: ABNORMAL
CREAT SERPL-MCNC: 1.44 MG/DL (ref 0.7–1.2)
CRYSTALS, UA: ABNORMAL /HPF
DIFFERENTIAL TYPE: ABNORMAL
EOSINOPHILS RELATIVE PERCENT: 6 %
EPITHELIAL CELLS UA: ABNORMAL /HPF
GFR AFRICAN AMERICAN: 57 ML/MIN
GFR NON-AFRICAN AMERICAN: 47 ML/MIN
GFR SERPL CREATININE-BSD FRML MDRD: ABNORMAL ML/MIN/{1.73_M2}
GFR SERPL CREATININE-BSD FRML MDRD: ABNORMAL ML/MIN/{1.73_M2}
GLUCOSE BLD-MCNC: 122 MG/DL (ref 70–99)
GLUCOSE URINE: NEGATIVE
HCT VFR BLD CALC: 39.6 % (ref 41–53)
HEMOGLOBIN: 12.9 G/DL (ref 13.5–17.5)
KETONES, URINE: NEGATIVE
LEUKOCYTE ESTERASE, URINE: ABNORMAL
LYMPHOCYTES # BLD: 23 %
MCH RBC QN AUTO: 29.9 PG (ref 26–34)
MCHC RBC AUTO-ENTMCNC: 32.7 G/DL (ref 31–37)
MCV RBC AUTO: 91.4 FL (ref 80–100)
MONOCYTES # BLD: 11 %
MUCUS: ABNORMAL
NITRITE, URINE: NEGATIVE
OTHER OBSERVATIONS UA: ABNORMAL
PDW BLD-RTO: 13.7 % (ref 11.5–14.9)
PH UA: 5.5 (ref 5–8)
PLATELET # BLD: 143 K/UL (ref 150–450)
PLATELET ESTIMATE: ABNORMAL
PMV BLD AUTO: 9 FL (ref 6–12)
POTASSIUM SERPL-SCNC: 5.3 MMOL/L (ref 3.7–5.3)
PROTEIN UA: ABNORMAL
RBC # BLD: 4.34 M/UL (ref 4.5–5.9)
RBC # BLD: ABNORMAL 10*6/UL
RBC UA: ABNORMAL /HPF
RENAL EPITHELIAL, UA: ABNORMAL /HPF
SEG NEUTROPHILS: 59 %
SEGMENTED NEUTROPHILS ABSOLUTE COUNT: 4.3 K/UL (ref 1.3–9.1)
SODIUM BLD-SCNC: 142 MMOL/L (ref 135–144)
SPECIFIC GRAVITY UA: 1.01 (ref 1–1.03)
TRICHOMONAS: ABNORMAL
TURBIDITY: ABNORMAL
URINE HGB: ABNORMAL
UROBILINOGEN, URINE: NORMAL
WBC # BLD: 7.3 K/UL (ref 3.5–11)
WBC # BLD: ABNORMAL 10*3/UL
WBC UA: ABNORMAL /HPF
YEAST: ABNORMAL

## 2017-08-30 PROCEDURE — 87086 URINE CULTURE/COLONY COUNT: CPT

## 2017-08-30 PROCEDURE — 99283 EMERGENCY DEPT VISIT LOW MDM: CPT

## 2017-08-30 PROCEDURE — 81001 URINALYSIS AUTO W/SCOPE: CPT

## 2017-08-30 PROCEDURE — 36415 COLL VENOUS BLD VENIPUNCTURE: CPT

## 2017-08-30 PROCEDURE — 80048 BASIC METABOLIC PNL TOTAL CA: CPT

## 2017-08-30 PROCEDURE — 85025 COMPLETE CBC W/AUTO DIFF WBC: CPT

## 2017-08-31 ENCOUNTER — CARE COORDINATION (OUTPATIENT)
Dept: CARE COORDINATION | Age: 77
End: 2017-08-31

## 2017-08-31 LAB
CULTURE: NO GROWTH
CULTURE: NORMAL
Lab: NORMAL
SPECIMEN DESCRIPTION: NORMAL
SPECIMEN DESCRIPTION: NORMAL
STATUS: NORMAL

## 2017-08-31 NOTE — OP NOTE
[address] [facility]                [address] [facility], [facility]                               OPERATIVE REPORT    PATIENT NAME: Elodia Tanner                        :       1940  MED REC NO:   [id number]                                         ROOM:      2059  ACCOUNT NO:   [de-identified]                                      ADMISSION  DATE:  2017  PROVIDER:     Siobhan Danielson          DATE OF PROCEDURE:  2017    SURGEON:  Siobhan Danielson. PREOPERATIVE DIAGNOSIS:  Right ureteral stone and acute renal failure. POSTOPERATIVE DIAGNOSIS:  Right ureteral stone and acute renal  failure. PROCEDURE PERFORMED:  Cystoscopy with right ureteral stent placement. ANESTHESIA:  General.    COMPLICATIONS:  None. BLEEDING:  None. SPECIMENS:  None. DRAINS:  A 6 x 26 stent. HISTORY OF PRESENT ILLNESS:  The patient is a 64-year-old male well  known to me with a history of bladder stones and BPH. He recently had  an episode of gross hematuria. He underwent a CT ureter, which  demonstrated an obstructing 7 mm proximal right ureteral stone. He  was admitted with flank pain. His creatinine continued to worsen. He  is here today for stent placement. PROCEDURE IN DETAIL:  The patient was brought back to the operating  room and laid on the operating table in supine position. Once general  anesthesia was obtained, he was placed in dorsal lithotomy position,  prepped and draped in the usual sterile fashion. Time-out was  performed. He was appropriately identified, antibiotics were  administered. The cystoscope was inserted through the urethra into  the bladder. The prostate was quite enlarged. Upon entering into the  bladder, multiple large bladder stones were seen. I was able to  identify the right ureteral orifice relatively well. The remainder of  the bladder was unremarkable.   After _____, wire was advanced up the  right ureteral

## 2017-09-01 ENCOUNTER — TELEPHONE (OUTPATIENT)
Dept: UROLOGY | Age: 77
End: 2017-09-01

## 2017-09-01 DIAGNOSIS — Z01.818 PRE-OP TESTING: Primary | ICD-10-CM

## 2017-09-06 ENCOUNTER — HOSPITAL ENCOUNTER (OUTPATIENT)
Age: 77
Discharge: HOME | End: 2017-09-06
Payer: MEDICARE

## 2017-09-06 PROCEDURE — 93005 ELECTROCARDIOGRAM TRACING: CPT

## 2017-09-07 ENCOUNTER — TELEPHONE (OUTPATIENT)
Dept: UROLOGY | Age: 77
End: 2017-09-07

## 2017-09-07 LAB
EKG ATRIAL RATE: 83 BPM
EKG P AXIS: 57 DEGREES
EKG P-R INTERVAL: 186 MS
EKG Q-T INTERVAL: 412 MS
EKG QRS DURATION: 78 MS
EKG QTC CALCULATION (BAZETT): 484 MS
EKG R AXIS: 50 DEGREES
EKG T AXIS: 66 DEGREES
EKG VENTRICULAR RATE: 83 BPM

## 2017-09-08 ENCOUNTER — HOSPITAL ENCOUNTER (OUTPATIENT)
Age: 77
Setting detail: SPECIMEN
Discharge: HOME | End: 2017-09-08
Payer: MEDICARE

## 2017-09-08 DIAGNOSIS — Z01.818 PRE-OP TESTING: ICD-10-CM

## 2017-09-08 LAB
-: NORMAL
AMORPHOUS: NORMAL
BACTERIA: NORMAL
BILIRUBIN URINE: NEGATIVE
CASTS UA: NORMAL /LPF (ref 0–8)
COLOR: ABNORMAL
COMMENT UA: ABNORMAL
CRYSTALS, UA: NORMAL /HPF
EPITHELIAL CELLS UA: NORMAL /HPF (ref 0–5)
GLUCOSE URINE: NEGATIVE
KETONES, URINE: NEGATIVE
LEUKOCYTE ESTERASE, URINE: ABNORMAL
MUCUS: NORMAL
NITRITE, URINE: NEGATIVE
OTHER OBSERVATIONS UA: NORMAL
PH UA: 5 (ref 5–8)
PROTEIN UA: ABNORMAL
RBC UA: NORMAL /HPF (ref 0–4)
RENAL EPITHELIAL, UA: NORMAL /HPF
SPECIFIC GRAVITY UA: 1.02 (ref 1–1.03)
TRICHOMONAS: NORMAL
TURBIDITY: ABNORMAL
URINE HGB: ABNORMAL
UROBILINOGEN, URINE: NORMAL
WBC UA: NORMAL /HPF (ref 0–5)
YEAST: NORMAL

## 2017-09-09 LAB
CULTURE: NORMAL
CULTURE: NORMAL
Lab: NORMAL
SPECIMEN DESCRIPTION: NORMAL
STATUS: NORMAL

## 2017-09-11 ENCOUNTER — HOSPITAL ENCOUNTER (OUTPATIENT)
Age: 77
Discharge: HOME | End: 2017-09-11
Payer: MEDICARE

## 2017-09-11 PROCEDURE — 82365 CALCULUS SPECTROSCOPY: CPT

## 2017-09-12 ENCOUNTER — TELEPHONE (OUTPATIENT)
Dept: UROLOGY | Age: 77
End: 2017-09-12

## 2017-09-15 LAB
STONE COMPOSITION: NORMAL
STONE DESCRIPTION: NORMAL
STONE MASS: NORMAL MG
STONE NUMBER: NORMAL
STONE SIZE: NORMAL MM

## 2017-10-31 ENCOUNTER — OFFICE VISIT (OUTPATIENT)
Dept: UROLOGY | Age: 77
End: 2017-10-31
Payer: MEDICARE

## 2017-10-31 VITALS
HEIGHT: 73 IN | BODY MASS INDEX: 31.62 KG/M2 | SYSTOLIC BLOOD PRESSURE: 137 MMHG | DIASTOLIC BLOOD PRESSURE: 86 MMHG | WEIGHT: 238.6 LBS | HEART RATE: 89 BPM | TEMPERATURE: 97.9 F

## 2017-10-31 DIAGNOSIS — N40.1 BPH WITH OBSTRUCTION/LOWER URINARY TRACT SYMPTOMS: ICD-10-CM

## 2017-10-31 DIAGNOSIS — N21.0 BLADDER STONES: Primary | ICD-10-CM

## 2017-10-31 DIAGNOSIS — N13.8 BPH WITH OBSTRUCTION/LOWER URINARY TRACT SYMPTOMS: ICD-10-CM

## 2017-10-31 DIAGNOSIS — N20.1 URETERAL STONE: ICD-10-CM

## 2017-10-31 PROCEDURE — 99214 OFFICE O/P EST MOD 30 MIN: CPT | Performed by: UROLOGY

## 2017-10-31 ASSESSMENT — ENCOUNTER SYMPTOMS
BACK PAIN: 0
NAUSEA: 0
VOMITING: 0
WHEEZING: 0
EYE REDNESS: 0
EYE PAIN: 0
ABDOMINAL PAIN: 0
COUGH: 0
COLOR CHANGE: 0
SHORTNESS OF BREATH: 0

## 2017-10-31 NOTE — PROGRESS NOTES
[facility]  [facility] [place]  [address]  [address]  [address] [facility] [address]  Dept: [address] [facility] Urology Office Note - Established    Patient:  Carolina Juan  YOB: 1940  Date: 10/31/2017    The patient is a 76 y.o. male who presents today for evaluation of the following problems:   Chief Complaint   Patient presents with    Follow-up     URS Cystolithopaxy       HPI  Here in follow up for on ureteroscopy and cystolithalopaxy. He is dong well. Stent is out. He has a good stream and feels like he empties well. He is usually up 1 time at night. He is on Flomax and is happy with his voiding overall. Summary of old records: N/A    Additional History: N/A    Procedures Today: N/A    Urinalysis today:  No results found for this visit on 10/31/17. Last several PSA's:  Lab Results   Component Value Date    PSA 1.23 03/22/2017    PSA 1.59 12/23/2014    PSA 1.05 07/23/2013     Last total testosterone:  No results found for: TESTOSTERONE    AUA Symptom Score (10/31/2017):   INCOMPLETE EMPTYING: How often have you had the sensation of not emptying your bladder?: Not at all  FREQUENCY: How often do you have to urinate less than every two hours?: Not at all  INTERMITTENCY: How often have you found you stopped and started again several times when you urinated?: Not at all  URGENCY: How often have you found it difficult to postpone urination?: Not at all  WEAK STREAM: How often have you had a weak urinary stream?: Not at all  STRAINING: How often have you had to strain to start  urination?: Not at all  NOCTURIA: How many times did you typically get up at night to uriniate?: NONE  TOTAL I-PSS SCORE[de-identified] 0  How would you feel if you were to spend the rest of your life with your urinary condition?: Pleased    Last BUN and creatinine:  Lab Results   Component Value Date    BUN 23 08/30/2017     Lab Results   Component Value Date    CREATININE 1.44 (H) 08/30/2017 Additional Lab/Culture results: none    Reviewed stone analysis, mix of calcium and uric acid stones. Imaging Reviewed during this Office Visit: none    (results were independently reviewed by physician and radiology report verified)    PAST MEDICAL, FAMILY AND SOCIAL HISTORY UPDATE:  Past Medical History:   Diagnosis Date    Acute respiratory failure following trauma and surgery ([address]     Acute respiratory failure following trauma and surgery ([address]     Altered bowel elimination due to intestinal ostomy ([address]     iliostomy    Full dentures     pt said he has partial upper and lower    Full dentures     Gall stones     GERD (gastroesophageal reflux disease)     GI bleed     Hemorrhage of gastrointestinal tract, unspecified     Hemorrhage of gastrointestinal tract, unspecified     Hyperlipidemia     Hypertension     Kidney stones     Kidney stones     Primary localized osteoarthrosis, lower leg     both shoulders, neck, legs.  Primary localized osteoarthrosis, lower leg     Prostate disorder     Transient disorder of initiating or maintaining sleep     Transient disorder of initiating or maintaining sleep     Unspecified disorder of skin and subcutaneous tissue     Wears glasses     Wears glasses      Past Surgical History:   Procedure Laterality Date    ABDOMEN SURGERY      CATARACT REMOVAL WITH IMPLANT Bilateral     COLONOSCOPY      X3    COLOSTOMY Right     COLOSTOMY BAG ON RT.  SIDE    CYSTOSCOPY Right 8/29/2017    CYSTOSCOPY URETERAL STENT INSERTION performed by Anival Sarabia MD at [facility] 6, ESOPHAGUS      EYE SURGERY      bettye. eyes, cataracts extracted with iol's    FRACTURE SURGERY      JOINT REPLACEMENT Right 3-23-15    KIDNEY STONE SURGERY Left     KNEE ARTHROSCOPY Right     LITHOTRIPSY Left 06-20-14    w/ cystoscopy C & P    TONSILLECTOMY      TOTAL COLECTOMY      \"PARTIAL SMALL INTESTINES , PARTIAL RECTUM\"    TOTAL KNEE ARTHROPLASTY  03/23/2015 Right with biomet and GPS product application    TOTAL KNEE ARTHROPLASTY Right 3/23/2015     Family History   Problem Relation Age of Onset    Heart Disease Father    Gerry Moore Other Mother      Outpatient Prescriptions Marked as Taking for the 10/31/17 encounter (Office Visit) with Max Espitia MD   Medication Sig Dispense Refill    HYDROcodone-acetaminophen (NORCO) 5-325 MG per tablet Take 1 tablet by mouth every 6 hours as needed for Pain . Earliest Fill Date: 8/29/17 10 tablet 0    ranitidine (ZANTAC) 150 MG tablet Take 150 mg by mouth daily as needed for Heartburn      tamsulosin (FLOMAX) 0.4 MG capsule Take 1 capsule by mouth daily 90 capsule 3    finasteride (PROSCAR) 5 MG tablet Take 1 tablet by mouth daily 90 tablet 3    sildenafil (REVATIO) 20 MG tablet Take 1 tablet by mouth as needed (take 1-2 tablets as needed for erection) Take 1-5 tabs as needed prior to activity    Do not run through insurance- cash pay. 30 tablet 5    omeprazole (PRILOSEC) 40 MG delayed release capsule take 1 capsule by mouth once daily 30 capsule 11    allopurinol (ZYLOPRIM) 300 MG tablet take 1 tablet by mouth once daily 90 tablet 1    simvastatin (ZOCOR) 40 MG tablet take 1 tablet by mouth every evening 90 tablet 3    latanoprost (XALATAN) 0.005 % ophthalmic solution   0    Travoprost (TRAVATAN OP) Apply 2 drops to eye daily      acetaminophen (TYLENOL) 500 MG tablet Take 500 mg by mouth every 6 hours as needed for Pain.  magnesium hydroxide (MILK OF MAGNESIA) 400 MG/5ML suspension Take 5 mLs by mouth daily as needed for Constipation.  Multiple Vitamins-Minerals (MULTIVITAMIN & MINERAL PO) Take 1 tablet by mouth 2 times daily.          Sulfa antibiotics and Vimovo [naproxen-esomeprazole]  History   Smoking Status    Never Smoker   Smokeless Tobacco    Never Used     (If patient a smoker, smoking cessation counseling offered)    History   Alcohol Use No     Comment: occassional       REVIEW OF SYSTEMS:  Review of Systems   Constitutional: Negative for appetite change, chills and fever. Eyes: Negative for pain, redness and visual disturbance. Respiratory: Negative for cough, shortness of breath and wheezing. Cardiovascular: Negative for chest pain and leg swelling. Gastrointestinal: Negative for abdominal pain, nausea and vomiting. Genitourinary: Negative for difficulty urinating, discharge, dysuria, flank pain, frequency, hematuria, scrotal swelling, testicular pain and urgency. Musculoskeletal: Negative for back pain, joint swelling and myalgias. Skin: Negative for color change, rash and wound. Neurological: Negative for dizziness, tremors and numbness. Hematological: Negative for adenopathy. Does not bruise/bleed easily. Physical Exam:      Vitals:    10/31/17 0901   BP: 137/86   Pulse: 89   Temp: 97.9 °F (36.6 °C)     Body mass index is 31.48 kg/m². Patient is a 76 y.o. male in no acute distress and alert and oriented to person, place and time. Physical Exam  Constitutional: Patient in no acute distress. Neuro: Alert and oriented to person, place and time. Psych: Mood normal, affect normal  Skin: No rash noted  HEENT: Head: Normocephalic and atraumatic  Conjunctivae and EOM are normal. Pupils are equal, round  Nose: Normal  Right External Ear: Normal; Left External Ear: Normal  Mouth: Mucosa Moist  Neck: Supple  Lungs: Respiratory effort is normal  Cardiovascular: Warm & Pink  Abdomen: Soft, non-tender, non-distended with no CVA,  No flank tenderness,  Or hepatosplenomegaly   Lymphatics: No palpable lymphadenopathy. Bladder non-tender and not distended. Musculoskeletal: Normal gait and station  Testiscles: Normal, bilaterally  Prostate:    Assessment and Plan      1. Bladder stones    2. Ureteral stone    3. BPH with obstruction/lower urinary tract symptoms           Plan:         Overall doing well.    Had bladder stones, likely related to BPH, but doing well on Flomax  Discussed stone prevention, continue Allopurinol. F/U in 1 year. Increase fluid output. Return in about 1 year (around 10/31/2018). Prescriptions Ordered:  No orders of the defined types were placed in this encounter. Orders Placed:  No orders of the defined types were placed in this encounter.          Gordo Hurtado MD

## 2017-10-31 NOTE — LETTER
[facility]  [facility] [place]  [address]  [address]  [address] [place] [address]  Dept: [phone]  Dept Fax: [phone]        10/31/17    Patient: Miki Mejía  YOB: 1940    Dear Darren Meeks MD,    I had the pleasure of seeing one of your patients, Farzana Moeller today in the office today. Below are the relevant portions of my assessment and plan of care. IMPRESSION:     1. Bladder stones    2. Ureteral stone    3. BPH with obstruction/lower urinary tract symptoms        PLAN:        Overall doing well. Had bladder stones, likely related to BPH, but doing well on Flomax  Discussed stone prevention, continue Allopurinol. F/U in 1 year. Increase fluid output. Return in about 1 year (around 10/31/2018). Prescriptions Ordered:  No orders of the defined types were placed in this encounter. Orders Placed:  No orders of the defined types were placed in this encounter. Thank you for allowing me to participate in the care of this patient. I will keep you updated on this patient's follow up and I look forward to serving you and your patients again in the future.         Amrik Peñaloza MD

## 2017-11-17 DIAGNOSIS — N20.0 KIDNEY STONES: ICD-10-CM

## 2017-11-17 RX ORDER — ALLOPURINOL 300 MG/1
TABLET ORAL
Qty: 90 TABLET | Refills: 2 | Status: SHIPPED | OUTPATIENT
Start: 2017-11-17 | End: 2018-08-15 | Stop reason: SDUPTHER

## 2017-11-28 ENCOUNTER — OFFICE VISIT (OUTPATIENT)
Dept: INTERNAL MEDICINE CLINIC | Age: 77
End: 2017-11-28
Payer: MEDICARE

## 2017-11-28 VITALS
BODY MASS INDEX: 31.54 KG/M2 | WEIGHT: 238 LBS | SYSTOLIC BLOOD PRESSURE: 134 MMHG | HEIGHT: 73 IN | DIASTOLIC BLOOD PRESSURE: 78 MMHG

## 2017-11-28 DIAGNOSIS — N13.30 HYDRONEPHROSIS, RIGHT: ICD-10-CM

## 2017-11-28 DIAGNOSIS — N20.0 RIGHT NEPHROLITHIASIS: ICD-10-CM

## 2017-11-28 DIAGNOSIS — Z93.2 ILEOSTOMY STATUS (HCC): ICD-10-CM

## 2017-11-28 DIAGNOSIS — E78.2 MIXED HYPERLIPIDEMIA: ICD-10-CM

## 2017-11-28 DIAGNOSIS — N17.9 AKI (ACUTE KIDNEY INJURY) (HCC): Primary | ICD-10-CM

## 2017-11-28 DIAGNOSIS — N20.0 RECURRENT KIDNEY STONES: ICD-10-CM

## 2017-11-28 DIAGNOSIS — I10 ESSENTIAL HYPERTENSION: ICD-10-CM

## 2017-11-28 DIAGNOSIS — I11.0 HYPERTENSIVE HEART DISEASE WITH HEART FAILURE (HCC): ICD-10-CM

## 2017-11-28 DIAGNOSIS — H40.9 GLAUCOMA OF RIGHT EYE, UNSPECIFIED GLAUCOMA TYPE: ICD-10-CM

## 2017-11-28 PROBLEM — N52.9 ERECTILE DYSFUNCTION: Status: RESOLVED | Noted: 2017-07-25 | Resolved: 2017-11-28

## 2017-11-28 PROCEDURE — 99214 OFFICE O/P EST MOD 30 MIN: CPT | Performed by: INTERNAL MEDICINE

## 2017-11-28 NOTE — PROGRESS NOTES
Skin is warm and dry. Chevis Pries / INSERTS / [place]    [x] Reviewed    LAB TESTS ;    CBC:  Lab Results   Component Value Date    WBC 7.3 08/30/2017    HGB 12.9 08/30/2017     08/30/2017     02/16/2012        BMP:    Lab Results   Component Value Date     08/30/2017    K 5.3 08/30/2017     08/30/2017    CO2 24 08/30/2017    BUN 23 08/30/2017    CREATININE 1.44 08/30/2017    GLUCOSE 122 08/30/2017    GLUCOSE 100 02/16/2012      LIVER PROFILE:  Lab Results   Component Value Date    ALT 17 05/23/2016    AST 20 05/23/2016    PROT 7.2 05/23/2016    BILITOT 0.62 05/23/2016    LABALBU 4.1 05/23/2016    LABALBU 4.3 02/16/2012                       Imaging/[employer]stics:  Xr Abdomen Limited (kub)    Result Date: 2/20/2017  EXAMINATION: SUPINE VIEW(S) OF THE ABDOMEN 2/20/2017 11:09 am COMPARISON: 07/22/2014 HISTORY: ORDERING SYSTEM PROVIDED HISTORY: Kidney stone  6 mm right renal calculi midpole right kidney not previously detected under comparison study        INSERTS ;         LANIE / Christos Hodge was seen today for congestive heart failure. Diagnoses and all orders for this visit:    Hypertensive heart disease with heart failure ([address]  Good Control . Continue present plan. Gastroesophageal reflux disease, esophagitis presence not specified  Good Control . Continue present plan. Renal calculus, left  Asymptomatic   Primary osteoarthritis of right knee    Ileostomy status (HCC)  Stable . Therapy reviewed . Continue present regimen. Encounter Diagnoses   Name Primary?  Hypertensive heart disease with heart failure (HCC) Yes    Gastroesophageal reflux disease, esophagitis presence not specified     Renal calculus, left     Primary osteoarthritis of right knee     Ileostomy status ([address]     Benign prostatic hyperplasia with lower urinary tract symptoms, unspecified morphology  Fair control . Therapy reviewed .    Continue to monitor Amy Apt Glaucoma of right eye, unspecified glaucoma Asymptomatic     Chronic gout without tophus, unspecified cause, unspecified site  Asymptomatic         Erectile dysfunction, unspecified erectile dysfunction type  doing well . --- patient is doing fine, advised to loose 4 pounds . SALIENT  ACTIONS TODAYS VISIT     Today's office visit SALIENT ACTIONS   Medications Discontinued During This Encounter   Medication Reason    HYDROcodone-acetaminophen ([address]) 5-325 MG per tablet Therapy completed      No orders of the defined types were placed in this encounter. Orders Placed This Encounter   Procedures    Basic Metabolic Panel     Standing Status:   Future     Standing Expiration Date:   11/28/2018    CBC Auto Differential     Standing Status:   Future     Standing Expiration Date:   11/28/2018    Urinalysis with Microscopic     Standing Status:   Future     Standing Expiration Date:   11/28/2018     Order Specific Question:   SPECIFY(EX-CATH,MIDSTREAM,CYSTO,ETC)? Answer:   midstrweam           Current Outpatient Prescriptions   Medication Sig Dispense Refill    allopurinol (ZYLOPRIM) 300 MG tablet take 1 tablet by mouth once daily 90 tablet 2    ranitidine (ZANTAC) 150 MG tablet Take 150 mg by mouth daily as needed for Heartburn      tamsulosin (FLOMAX) 0.4 MG capsule Take 1 capsule by mouth daily 90 capsule 3    finasteride (PROSCAR) 5 MG tablet Take 1 tablet by mouth daily 90 tablet 3    sildenafil (REVATIO) 20 MG tablet Take 1 tablet by mouth as needed (take 1-2 tablets as needed for erection) Take 1-5 tabs as needed prior to activity    Do not run through insurance- cash pay.  30 tablet 5    omeprazole (PRILOSEC) 40 MG delayed release capsule take 1 capsule by mouth once daily 30 capsule 11    simvastatin (ZOCOR) 40 MG tablet take 1 tablet by mouth every evening 90 tablet 3    latanoprost (XALATAN) 0.005 % ophthalmic solution   0    Travoprost (TRAVATAN OP) Apply 2 drops to

## 2018-02-26 ENCOUNTER — HOSPITAL ENCOUNTER (OUTPATIENT)
Age: 78
Setting detail: SPECIMEN
Discharge: HOME | End: 2018-02-26
Payer: MEDICARE

## 2018-02-26 DIAGNOSIS — I10 ESSENTIAL HYPERTENSION: ICD-10-CM

## 2018-02-26 DIAGNOSIS — N17.9 AKI (ACUTE KIDNEY INJURY) (HCC): ICD-10-CM

## 2018-02-26 DIAGNOSIS — N20.0 RECURRENT KIDNEY STONES: ICD-10-CM

## 2018-02-26 LAB
-: NORMAL
ABSOLUTE EOS #: 0.21 K/UL (ref 0–0.44)
ABSOLUTE IMMATURE GRANULOCYTE: <0.03 K/UL (ref 0–0.3)
ABSOLUTE LYMPH #: 1.66 K/UL (ref 1.1–3.7)
ABSOLUTE MONO #: 0.6 K/UL (ref 0.1–1.2)
AMORPHOUS: NORMAL
ANION GAP SERPL CALCULATED.3IONS-SCNC: 11 MMOL/L (ref 9–17)
BACTERIA: NORMAL
BASOPHILS # BLD: 1 % (ref 0–2)
BASOPHILS ABSOLUTE: 0.08 K/UL (ref 0–0.2)
BILIRUBIN URINE: NEGATIVE
BUN BLDV-MCNC: 17 MG/DL (ref 8–23)
BUN/CREAT BLD: NORMAL (ref 9–20)
CALCIUM SERPL-MCNC: 9.2 MG/DL (ref 8.6–10.4)
CASTS UA: NORMAL /LPF (ref 0–8)
CHLORIDE BLD-SCNC: 106 MMOL/L (ref 98–107)
CO2: 25 MMOL/L (ref 20–31)
COLOR: YELLOW
CREAT SERPL-MCNC: 0.82 MG/DL (ref 0.7–1.2)
CRYSTALS, UA: NORMAL /HPF
DIFFERENTIAL TYPE: NORMAL
EOSINOPHILS RELATIVE PERCENT: 3 % (ref 1–4)
EPITHELIAL CELLS UA: NORMAL /HPF (ref 0–5)
GFR AFRICAN AMERICAN: >60 ML/MIN
GFR NON-AFRICAN AMERICAN: >60 ML/MIN
GFR SERPL CREATININE-BSD FRML MDRD: NORMAL ML/MIN/{1.73_M2}
GFR SERPL CREATININE-BSD FRML MDRD: NORMAL ML/MIN/{1.73_M2}
GLUCOSE BLD-MCNC: 92 MG/DL (ref 70–99)
GLUCOSE URINE: NEGATIVE
HCT VFR BLD CALC: 41.4 % (ref 40.7–50.3)
HEMOGLOBIN: 13.4 G/DL (ref 13–17)
IMMATURE GRANULOCYTES: 0 %
KETONES, URINE: NEGATIVE
LEUKOCYTE ESTERASE, URINE: NEGATIVE
LYMPHOCYTES # BLD: 24 % (ref 24–43)
MCH RBC QN AUTO: 29.1 PG (ref 25.2–33.5)
MCHC RBC AUTO-ENTMCNC: 32.4 G/DL (ref 28.4–34.8)
MCV RBC AUTO: 90 FL (ref 82.6–102.9)
MONOCYTES # BLD: 9 % (ref 3–12)
MUCUS: NORMAL
NITRITE, URINE: NEGATIVE
NRBC AUTOMATED: 0 PER 100 WBC
OTHER OBSERVATIONS UA: NORMAL
PDW BLD-RTO: 13.6 % (ref 11.8–14.4)
PH UA: 5.5 (ref 5–8)
PLATELET # BLD: 148 K/UL (ref 138–453)
PLATELET ESTIMATE: NORMAL
PMV BLD AUTO: 11.1 FL (ref 8.1–13.5)
POTASSIUM SERPL-SCNC: 4 MMOL/L (ref 3.7–5.3)
PROTEIN UA: NEGATIVE
RBC # BLD: 4.6 M/UL (ref 4.21–5.77)
RBC # BLD: NORMAL 10*6/UL
RBC UA: NORMAL /HPF (ref 0–4)
RENAL EPITHELIAL, UA: NORMAL /HPF
SEG NEUTROPHILS: 63 % (ref 36–65)
SEGMENTED NEUTROPHILS ABSOLUTE COUNT: 4.35 K/UL (ref 1.5–8.1)
SODIUM BLD-SCNC: 142 MMOL/L (ref 135–144)
SPECIFIC GRAVITY UA: 1.02 (ref 1–1.03)
TRICHOMONAS: NORMAL
TURBIDITY: CLEAR
URINE HGB: NEGATIVE
UROBILINOGEN, URINE: NORMAL
WBC # BLD: 6.9 K/UL (ref 3.5–11.3)
WBC # BLD: NORMAL 10*3/UL
WBC UA: NORMAL /HPF (ref 0–5)
YEAST: NORMAL

## 2018-03-19 RX ORDER — SIMVASTATIN 40 MG
TABLET ORAL
Qty: 90 TABLET | Refills: 3 | Status: SHIPPED | OUTPATIENT
Start: 2018-03-19 | End: 2019-01-15 | Stop reason: SDUPTHER

## 2018-03-23 ENCOUNTER — OFFICE VISIT (OUTPATIENT)
Dept: ORTHOPEDIC SURGERY | Age: 78
End: 2018-03-23
Payer: MEDICARE

## 2018-03-23 VITALS — HEART RATE: 88 BPM | WEIGHT: 236 LBS | HEIGHT: 73 IN | BODY MASS INDEX: 31.28 KG/M2 | RESPIRATION RATE: 18 BRPM

## 2018-03-23 DIAGNOSIS — Z96.651 HISTORY OF TOTAL RIGHT KNEE REPLACEMENT: Primary | ICD-10-CM

## 2018-03-23 PROCEDURE — 99213 OFFICE O/P EST LOW 20 MIN: CPT | Performed by: ORTHOPAEDIC SURGERY

## 2018-03-23 NOTE — PROGRESS NOTES
Isaac Hu M.D.            [address] [facility] [address] [facility],[address], [facility] [address]             Dept Phone: [phone]             Dept Fax:  [phone] returns today he's 3 years status post right total knee. He has no complains whatsoever. He states that he does walk several miles on a treadmill without difficulty    Examination patient's right knee notes his motion is 0-135 degrees. Excellent stability and patellar tracking throughout. X-rays taken today reviewed by me show the right total knee next the position without interval change. Impression  3 years status post right total knee    Plan  Patient is doing remarkably well. He is very active and exercising. We'll see him back here 2 years          Review of Systems   Constitutional: Negative. HENT: Negative. Respiratory: Negative. Cardiovascular: Negative. Musculoskeletal: Negative. Neurological: Negative. Past Medical History:   Diagnosis Date    Acute respiratory failure following trauma and surgery ([address]     Acute respiratory failure following trauma and surgery ([address]     Altered bowel elimination due to intestinal ostomy ([address]     iliostomy    Full dentures     pt said he has partial upper and lower    Full dentures     Gall stones     GERD (gastroesophageal reflux disease)     GI bleed     Hemorrhage of gastrointestinal tract, unspecified     Hemorrhage of gastrointestinal tract, unspecified     Hyperlipidemia     Hypertension     Kidney stones     Kidney stones     Primary localized osteoarthrosis, lower leg     both shoulders, neck, legs.     Primary localized osteoarthrosis, lower leg     Prostate disorder     Transient disorder of initiating or maintaining sleep     Transient disorder of initiating or maintaining sleep     Unspecified disorder of skin and subcutaneous tissue     Wears glasses     Wears glasses      Past Surgical History:   Procedure Laterality Date    ABDOMEN SURGERY      CATARACT REMOVAL WITH IMPLANT Bilateral     COLONOSCOPY      X3    COLOSTOMY Right     COLOSTOMY BAG ON RT. SIDE    CYSTOSCOPY Right 8/29/2017    CYSTOSCOPY URETERAL STENT INSERTION performed by Ulices Huerta MD at [facility] 6, ESOPHAGUS      EYE SURGERY      bettye. eyes, cataracts extracted with iol's    FRACTURE SURGERY      JOINT REPLACEMENT Right 3-23-15    KIDNEY STONE SURGERY Left     KNEE ARTHROSCOPY Right     LITHOTRIPSY Left 06-20-14    w/ cystoscopy C & P    TONSILLECTOMY      TOTAL COLECTOMY      \"PARTIAL SMALL INTESTINES , PARTIAL RECTUM\"    TOTAL KNEE ARTHROPLASTY  03/23/2015    Right with biomet and GPS product application    TOTAL KNEE ARTHROPLASTY Right 3/23/2015     Family History   Problem Relation Age of Onset    Heart Disease Father     Other Mother            Orders Placed This Encounter   Procedures    XR Knee Bilateral Standing     Standing Status:   Future     Number of Occurrences:   1     Standing Expiration Date:   3/23/2019       1. History of total right knee replacement        Tru Wu MD    Please note that this chart was generated using voice recognition Dragon dictation software. Although every effort was made to ensure the accuracy of this automated transcription, some errors in transcription may have occurred.

## 2018-03-27 ENCOUNTER — OFFICE VISIT (OUTPATIENT)
Dept: INTERNAL MEDICINE CLINIC | Age: 78
End: 2018-03-27
Payer: MEDICARE

## 2018-03-27 VITALS
HEIGHT: 73 IN | BODY MASS INDEX: 31.28 KG/M2 | DIASTOLIC BLOOD PRESSURE: 87 MMHG | SYSTOLIC BLOOD PRESSURE: 150 MMHG | WEIGHT: 236 LBS

## 2018-03-27 DIAGNOSIS — E78.2 MIXED HYPERLIPIDEMIA: Primary | ICD-10-CM

## 2018-03-27 DIAGNOSIS — N40.1 BENIGN PROSTATIC HYPERPLASIA WITH WEAK URINARY STREAM: ICD-10-CM

## 2018-03-27 DIAGNOSIS — Z93.2 ILEOSTOMY STATUS (HCC): ICD-10-CM

## 2018-03-27 DIAGNOSIS — I50.32 HEART FAILURE, DIASTOLIC, CHRONIC (HCC): ICD-10-CM

## 2018-03-27 DIAGNOSIS — H40.9 GLAUCOMA OF RIGHT EYE, UNSPECIFIED GLAUCOMA TYPE: ICD-10-CM

## 2018-03-27 DIAGNOSIS — N20.0 RECURRENT KIDNEY STONES: ICD-10-CM

## 2018-03-27 DIAGNOSIS — R39.12 BENIGN PROSTATIC HYPERPLASIA WITH WEAK URINARY STREAM: ICD-10-CM

## 2018-03-27 DIAGNOSIS — M1A.9XX0 CHRONIC GOUT WITHOUT TOPHUS, UNSPECIFIED CAUSE, UNSPECIFIED SITE: ICD-10-CM

## 2018-03-27 DIAGNOSIS — I11.0 HYPERTENSIVE HEART DISEASE WITH HEART FAILURE (HCC): ICD-10-CM

## 2018-03-27 DIAGNOSIS — K21.9 GASTROESOPHAGEAL REFLUX DISEASE, ESOPHAGITIS PRESENCE NOT SPECIFIED: ICD-10-CM

## 2018-03-27 PROBLEM — I10 HYPERTENSION: Status: RESOLVED | Noted: 2017-08-28 | Resolved: 2018-03-27

## 2018-03-27 PROBLEM — N13.30 HYDRONEPHROSIS, RIGHT: Status: RESOLVED | Noted: 2017-08-28 | Resolved: 2018-03-27

## 2018-03-27 PROBLEM — N17.9 AKI (ACUTE KIDNEY INJURY) (HCC): Status: RESOLVED | Noted: 2017-08-28 | Resolved: 2018-03-27

## 2018-03-27 PROCEDURE — 99214 OFFICE O/P EST MOD 30 MIN: CPT | Performed by: INTERNAL MEDICINE

## 2018-03-27 ASSESSMENT — PATIENT HEALTH QUESTIONNAIRE - PHQ9
SUM OF ALL RESPONSES TO PHQ QUESTIONS 1-9: 0
SUM OF ALL RESPONSES TO PHQ9 QUESTIONS 1 & 2: 0
2. FEELING DOWN, DEPRESSED OR HOPELESS: 0
1. LITTLE INTEREST OR PLEASURE IN DOING THINGS: 0

## 2018-08-09 ENCOUNTER — HOSPITAL ENCOUNTER (OUTPATIENT)
Dept: GENERAL RADIOLOGY | Age: 78
Discharge: HOME | End: 2018-08-11
Payer: MEDICARE

## 2018-08-09 ENCOUNTER — HOSPITAL ENCOUNTER (OUTPATIENT)
Age: 78
Discharge: HOME | End: 2018-08-11
Payer: MEDICARE

## 2018-08-09 DIAGNOSIS — N20.0 KIDNEY STONE: ICD-10-CM

## 2018-08-09 PROCEDURE — 74018 RADEX ABDOMEN 1 VIEW: CPT

## 2018-08-14 ENCOUNTER — OFFICE VISIT (OUTPATIENT)
Dept: UROLOGY | Age: 78
End: 2018-08-14
Payer: MEDICARE

## 2018-08-14 VITALS
WEIGHT: 238 LBS | BODY MASS INDEX: 31.54 KG/M2 | HEART RATE: 91 BPM | SYSTOLIC BLOOD PRESSURE: 130 MMHG | HEIGHT: 73 IN | TEMPERATURE: 98.2 F | DIASTOLIC BLOOD PRESSURE: 80 MMHG

## 2018-08-14 DIAGNOSIS — N21.0 BLADDER STONES: ICD-10-CM

## 2018-08-14 DIAGNOSIS — N40.1 BENIGN PROSTATIC HYPERPLASIA WITH NOCTURIA: Primary | ICD-10-CM

## 2018-08-14 DIAGNOSIS — N20.1 URETERAL STONE: ICD-10-CM

## 2018-08-14 DIAGNOSIS — R35.1 BENIGN PROSTATIC HYPERPLASIA WITH NOCTURIA: Primary | ICD-10-CM

## 2018-08-14 PROCEDURE — 99213 OFFICE O/P EST LOW 20 MIN: CPT | Performed by: NURSE PRACTITIONER

## 2018-08-14 RX ORDER — TAMSULOSIN HYDROCHLORIDE 0.4 MG/1
0.4 CAPSULE ORAL DAILY
Qty: 90 CAPSULE | Refills: 3 | Status: CANCELLED | OUTPATIENT
Start: 2018-08-14

## 2018-08-14 RX ORDER — SILDENAFIL CITRATE 20 MG/1
20 TABLET ORAL PRN
Qty: 30 TABLET | Refills: 5 | Status: CANCELLED | OUTPATIENT
Start: 2018-08-14

## 2018-08-14 RX ORDER — FINASTERIDE 5 MG/1
5 TABLET, FILM COATED ORAL DAILY
Qty: 90 TABLET | Refills: 3 | Status: CANCELLED | OUTPATIENT
Start: 2018-08-14

## 2018-08-14 ASSESSMENT — ENCOUNTER SYMPTOMS
EYE PAIN: 0
ABDOMINAL PAIN: 0
BACK PAIN: 0
COLOR CHANGE: 0
VOMITING: 0
NAUSEA: 0
EYE REDNESS: 0
COUGH: 0
WHEEZING: 0
SHORTNESS OF BREATH: 0

## 2018-08-14 NOTE — PROGRESS NOTES
you were to spend the rest of your life with your urinary condition?: Pleased    Last BUN and creatinine:  Lab Results   Component Value Date    BUN 17 02/26/2018     Lab Results   Component Value Date    CREATININE 0.82 02/26/2018       Additional Lab/Culture results:     Imaging Reviewed during this Office Visit:   (results were independently reviewed by physician and radiology report verified)    PAST MEDICAL, FAMILY AND SOCIAL HISTORY UPDATE:  Past Medical History:   Diagnosis Date    Acute respiratory failure following trauma and surgery [facility]     Acute respiratory failure following trauma and surgery ([address]     Altered bowel elimination due to intestinal ostomy ([address]     iliostomy    Full dentures     pt said he has partial upper and lower    Full dentures     Gall stones     GERD (gastroesophageal reflux disease)     GI bleed     Hemorrhage of gastrointestinal tract, unspecified     Hemorrhage of gastrointestinal tract, unspecified     Hyperlipidemia     Hypertension     Kidney stones     Kidney stones     Primary localized osteoarthrosis, lower leg     both shoulders, neck, legs.  Primary localized osteoarthrosis, lower leg     Prostate disorder     Transient disorder of initiating or maintaining sleep     Transient disorder of initiating or maintaining sleep     Unspecified disorder of skin and subcutaneous tissue     Wears glasses     Wears glasses      Past Surgical History:   Procedure Laterality Date    ABDOMEN SURGERY      CATARACT REMOVAL WITH IMPLANT Bilateral     COLONOSCOPY      X3    COLOSTOMY Right     COLOSTOMY BAG ON RT.  SIDE    CYSTOSCOPY Right 8/29/2017    CYSTOSCOPY URETERAL STENT INSERTION performed by Syl Balbuena MD at [facility] 6, ESOPHAGUS      EYE SURGERY      bettye. eyes, cataracts extracted with iol's    FRACTURE SURGERY      JOINT REPLACEMENT Right 3-23-15    KIDNEY STONE SURGERY Left     KNEE ARTHROSCOPY Right     LITHOTRIPSY Left 06-20-14    w/ cystoscopy C & P    TONSILLECTOMY      TOTAL COLECTOMY      \"PARTIAL SMALL INTESTINES , PARTIAL RECTUM\"    TOTAL KNEE ARTHROPLASTY  03/23/2015    Right with biomet and GPS product application    TOTAL KNEE ARTHROPLASTY Right 3/23/2015     Family History   Problem Relation Age of Onset    Heart Disease Father     Other Mother      Outpatient Prescriptions Marked as Taking for the 8/14/18 encounter (Office Visit) with MOISES Edmond CNP   Medication Sig Dispense Refill    simvastatin (ZOCOR) 40 MG tablet take 1 tablet by mouth every evening 90 tablet 3    allopurinol (ZYLOPRIM) 300 MG tablet take 1 tablet by mouth once daily 90 tablet 2    ranitidine (ZANTAC) 150 MG tablet Take 150 mg by mouth daily as needed for Heartburn      tamsulosin (FLOMAX) 0.4 MG capsule Take 1 capsule by mouth daily 90 capsule 3    finasteride (PROSCAR) 5 MG tablet Take 1 tablet by mouth daily 90 tablet 3    sildenafil (REVATIO) 20 MG tablet Take 1 tablet by mouth as needed (take 1-2 tablets as needed for erection) Take 1-5 tabs as needed prior to activity    Do not run through insurance- cash pay. 30 tablet 5    omeprazole (PRILOSEC) 40 MG delayed release capsule take 1 capsule by mouth once daily 30 capsule 11    latanoprost (XALATAN) 0.005 % ophthalmic solution   0    Travoprost (TRAVATAN OP) Apply 2 drops to eye daily      acetaminophen (TYLENOL) 500 MG tablet Take 500 mg by mouth every 6 hours as needed for Pain.  magnesium hydroxide (MILK OF MAGNESIA) 400 MG/5ML suspension Take 5 mLs by mouth daily as needed for Constipation.  Multiple Vitamins-Minerals (MULTIVITAMIN & MINERAL PO) Take 1 tablet by mouth 2 times daily.          Sulfa antibiotics and Vimovo [naproxen-esomeprazole]  History   Smoking Status    Never Smoker   Smokeless Tobacco    Never Used     (If patient a smoker, smoking cessation counseling offered)    History   Alcohol Use No     Comment: occassional       REVIEW OF SYSTEMS:  Review of Systems    Physical Exam:      Vitals:    08/14/18 1003   BP: 130/80   Pulse: 91   Temp: 98.2 °F (36.8 °C)     Body mass index is 31.4 kg/m². Patient is a [de-identified] y.o. male in no acute distress and alert and oriented to person, place and time. Physical Exam  Constitutional: Patient in no acute distress. Neuro: Alert and oriented to person, place and time. Psych: Mood normal, affect normal  Skin: No rash noted  HEENT: Head: Normocephalic and atraumatic  Conjunctivae and EOM are normal. Pupils are equal, round  Nose: Normal  Right External Ear: Normal; Left External Ear: Normal  Mouth: Mucosa Moist  Neck: Supple  Lungs: Respiratory effort is normal  Cardiovascular: strong and regular, no edema  Abdomen: Soft, non-tender, non-distended. Bladder non-tender and not distended. Musculoskeletal: Normal gait and station      Assessment and Plan      1. Benign prostatic hyperplasia with nocturia    2. Ureteral stone    3. Bladder stones           Plan:    Continue Flomax and Finasteride- prefers PCP reorder    KUB shows no stone    Continue to stay well hydrated    Needs no refill on Sildenafil at this time  No Follow-up on file. Prescriptions Ordered:  No orders of the defined types were placed in this encounter. Orders Placed:  No orders of the defined types were placed in this encounter.          MOISES Asher - CNP

## 2018-08-14 NOTE — LETTER
[facility]  [facility] [place]  [address]  [address]  [address] [place] [id number]  Dept: [phone]  Dept Fax: [phone]        8/14/18    Patient: Ermalinda Fothergill  YOB: 1940    Dear Flora Jensen MD,    I had the pleasure of seeing one of your patients, Brianna Castillo today in the office today. Below are the relevant portions of my assessment and plan of care. IMPRESSION:     1. Benign prostatic hyperplasia with nocturia    2. Ureteral stone    3. Bladder stones        PLAN:   Continue Flomax and Finasteride- prefers PCP reorder    KUB shows no stone    Continue to stay well hydrated    Needs no refill on Sildenafil at this time  No Follow-up on file. Prescriptions Ordered:  No orders of the defined types were placed in this encounter. Orders Placed:  No orders of the defined types were placed in this encounter. Thank you for allowing me to participate in the care of this patient. I will keep you updated on this patient's follow up and I look forward to serving you and your patients again in the future.     Alice Jacobsen, APRN - CNP

## 2018-08-15 ENCOUNTER — OFFICE VISIT (OUTPATIENT)
Dept: INTERNAL MEDICINE CLINIC | Age: 78
End: 2018-08-15
Payer: MEDICARE

## 2018-08-15 VITALS
SYSTOLIC BLOOD PRESSURE: 132 MMHG | BODY MASS INDEX: 31.54 KG/M2 | WEIGHT: 238 LBS | HEIGHT: 73 IN | DIASTOLIC BLOOD PRESSURE: 84 MMHG

## 2018-08-15 DIAGNOSIS — E78.2 MIXED HYPERLIPIDEMIA: Primary | ICD-10-CM

## 2018-08-15 DIAGNOSIS — I11.0 HYPERTENSIVE HEART DISEASE WITH HEART FAILURE (HCC): ICD-10-CM

## 2018-08-15 DIAGNOSIS — K43.9 ABDOMINAL WALL HERNIA: ICD-10-CM

## 2018-08-15 PROCEDURE — 99213 OFFICE O/P EST LOW 20 MIN: CPT | Performed by: INTERNAL MEDICINE

## 2018-08-15 RX ORDER — OMEPRAZOLE 40 MG/1
CAPSULE, DELAYED RELEASE ORAL
Qty: 30 CAPSULE | Refills: 11 | Status: SHIPPED | OUTPATIENT
Start: 2018-08-15 | End: 2019-01-09 | Stop reason: SDUPTHER

## 2018-08-15 ASSESSMENT — ENCOUNTER SYMPTOMS
EYE DISCHARGE: 0
SHORTNESS OF BREATH: 0
BLOOD IN STOOL: 0
TROUBLE SWALLOWING: 0
ABDOMINAL DISTENTION: 0
COLOR CHANGE: 0
COUGH: 0
EYE PAIN: 0
WHEEZING: 0
DIARRHEA: 0

## 2018-08-15 ASSESSMENT — PATIENT HEALTH QUESTIONNAIRE - PHQ9
2. FEELING DOWN, DEPRESSED OR HOPELESS: 0
SUM OF ALL RESPONSES TO PHQ QUESTIONS 1-9: 0
SUM OF ALL RESPONSES TO PHQ9 QUESTIONS 1 & 2: 0
1. LITTLE INTEREST OR PLEASURE IN DOING THINGS: 0
SUM OF ALL RESPONSES TO PHQ QUESTIONS 1-9: 0

## 2018-08-15 NOTE — PROGRESS NOTES
Subjective:      Patient ID: Maite Simon is a [de-identified] y.o. male. Chronic Disease Visit Information    BP Readings from Last 3 Encounters:   08/15/18 132/84   08/14/18 130/80   03/27/18 (!) 150/87          Microalb/Crt. Ratio (mcg/mg creat)   Date Value   02/16/2012 5     LDL Cholesterol (mg/dL)   Date Value   03/22/2017 88     HDL (mg/dL)   Date Value   03/22/2017 42     BUN (mg/dL)   Date Value   02/26/2018 17     CREATININE (mg/dL)   Date Value   02/26/2018 0.82     Glucose (mg/dL)   Date Value   02/26/2018 92   02/16/2012 100            Have you changed or started any medications since your last visit including any over-the-counter medicines, vitamins, or herbal medicines? no   Are you having any side effects from any of your medications? -  no  Have you stopped taking any of your medications? Is so, why? -  no    Have you seen any other physician or provider since your last visit? Yes - Records Obtained  Have you had any other diagnostic tests since your last visit? Yes - Records Obtained  Have you been seen in the emergency room and/or had an admission to a hospital since we last saw you? No  Have you had your annual diabetic retinal (eye) exam? No  Have you had your routine dental cleaning in the past 6 months? yes -     Have you activated your [employer] account? If not, what are your barriers?  Yes     Patient Care Team:  Liane Capellan MD as PCP - Eliot Rodriguez MD as PCP - [facility] Attributed Provider         Medical History Review  Past Medical, Family, and Social History reviewed and does contribute to the patient presenting condition    Health Maintenance   Topic Date Due    Shingles Vaccine (1 of 2 - 2 Dose Series) 12/03/2012    DTaP/Tdap/Td vaccine (1 - Tdap) 03/21/2019 (Originally 6/21/1959)    Flu vaccine (1) 09/01/2018    Pneumococcal low/med risk  Completed       For check up  Concern for hernia abdo          Review of Systems   Constitutional: Negative for appetite change, diaphoresis and fatigue. HENT: Negative for ear discharge and trouble swallowing. Eyes: Negative for pain and discharge. Respiratory: Negative for cough, shortness of breath and wheezing. Cardiovascular: Negative for chest pain and palpitations. Gastrointestinal: Negative for abdominal distention, blood in stool and diarrhea. Stoma abdo      Endocrine: Negative for polydipsia and polyphagia. Genitourinary: Negative for difficulty urinating and frequency. Musculoskeletal: Negative for gait problem, myalgias and neck pain. Skin: Negative for color change and rash. Allergic/Immunologic: Negative for environmental allergies and food allergies. Neurological: Negative for dizziness and headaches. Hematological: Negative for adenopathy. Does not bruise/bleed easily. Psychiatric/Behavioral: Negative for behavioral problems and sleep disturbance. Objective:   Physical Exam   Constitutional: He is oriented to person, place, and time. He appears well-developed and well-nourished. Truncal obese   HENT:   Head: Normocephalic and atraumatic. Eyes: Conjunctivae and EOM are normal. Right eye exhibits no discharge. Left eye exhibits no discharge. Right conjunctiva is not injected. Left conjunctiva is not injected. Right eye exhibits normal extraocular motion. Left eye exhibits normal extraocular motion. Neck: Normal range of motion. Neck supple. No JVD present. No edema and no erythema present. No thyroid mass and no thyromegaly present. Cardiovascular: Normal rate and regular rhythm. Exam reveals no friction rub. No murmur heard. Pulmonary/Chest: Effort normal and breath sounds normal. No accessory muscle usage. No tachypnea and no bradypnea. No respiratory distress. He has no wheezes. He has no rales. Abdominal: Soft. Bowel sounds are normal. He exhibits no distension. There is no tenderness. There is no rebound.        Stoma  abdo wal hernia around the stoma  No inceration Musculoskeletal: Normal range of motion. He exhibits no edema or tenderness. Lymphadenopathy:        Head (right side): No submental and no submandibular adenopathy present. Head (left side): No submental and no submandibular adenopathy present. He has no cervical adenopathy. Neurological: He is alert and oriented to person, place, and time. He displays no atrophy. No cranial nerve deficit or sensory deficit. He exhibits normal muscle tone. Coordination normal.   Skin: Skin is warm. No bruising, no ecchymosis and no rash noted. He is not diaphoretic. No pallor. Psychiatric: He has a normal mood and affect. His behavior is normal. His mood appears not anxious. His affect is not angry. His speech is not slurred. He is not aggressive. Cognition and memory are not impaired. He expresses no homicidal ideation. I have personally reviewed and agree with the patient JAILYN, RUBEN and [facility] [place] Luciana is a [de-identified] y.o. male who presents today for follow up on his chronic medical conditions as noted below.   Rafaela Archer is c/o of   Chief Complaint   Patient presents with    Hyperlipidemia       Patient Active Problem List:     Hyperlipidemia     Hypertensive heart disease with heart failure ([address]     Ileostomy status ([address]     Esophageal reflux     Benign prostatic hyperplasia with lower urinary tract symptoms     Glaucoma of right eye     Chronic gout without tophus     Recurrent kidney stones     Past Medical History:   Diagnosis Date    Acute respiratory failure following trauma and surgery ([address]     Acute respiratory failure following trauma and surgery ([address]     Altered bowel elimination due to intestinal ostomy ([address]     iliostomy    Full dentures     pt said he has partial upper and lower    Full dentures     Gall stones     GERD (gastroesophageal reflux disease)     GI bleed     Hemorrhage of gastrointestinal tract, unspecified     Hemorrhage of gastrointestinal tract, tablet 5    omeprazole (PRILOSEC) 40 MG delayed release capsule take 1 capsule by mouth once daily 30 capsule 11    latanoprost (XALATAN) 0.005 % ophthalmic solution   0    Travoprost (TRAVATAN OP) Apply 2 drops to eye daily      acetaminophen (TYLENOL) 500 MG tablet Take 500 mg by mouth every 6 hours as needed for Pain.  magnesium hydroxide (MILK OF MAGNESIA) 400 MG/5ML suspension Take 5 mLs by mouth daily as needed for Constipation.  Multiple Vitamins-Minerals (MULTIVITAMIN & MINERAL PO) Take 1 tablet by mouth 2 times daily. No current facility-administered medications for this visit. ALLERGIES:    Allergies   Allergen Reactions    Sulfa Antibiotics     Vimovo [Naproxen-Esomeprazole]      Pain shoulder         Social History   Substance Use Topics    Smoking status: Never Smoker    Smokeless tobacco: Never Used    Alcohol use No      Comment: occassional      Body mass index is 31.4 kg/m². /84   Ht 6' 1\" (1.854 m)   Wt 238 lb (108 kg)   BMI 31.40 kg/m²     Lab Results   Component Value Date     02/26/2018    K 4.0 02/26/2018     02/26/2018    CO2 25 02/26/2018    BUN 17 02/26/2018    CREATININE 0.82 02/26/2018    GLUCOSE 92 02/26/2018    GLUCOSE 100 02/16/2012    CALCIUM 9.2 02/26/2018    PROT 7.2 05/23/2016    LABALBU 4.1 05/23/2016    LABALBU 4.3 02/16/2012    BILITOT 0.62 05/23/2016    ALKPHOS 58 05/23/2016    AST 20 05/23/2016    ALT 17 05/23/2016       Lab Results   Component Value Date    WBC 6.9 02/26/2018    RBC 4.60 02/26/2018    RBC 4.59 02/16/2012    HGB 13.4 02/26/2018    HCT 41.4 02/26/2018    MCV 90.0 02/26/2018    MCH 29.1 02/26/2018    MCHC 32.4 02/26/2018    RDW 13.6 02/26/2018     02/26/2018     02/16/2012    MPV 11.1 02/26/2018       No results found for: LABA1C    Lab Results   Component Value Date    HDL 42 03/22/2017    LDLCHOLESTEROL 88 03/22/2017       Assessment / Plan:      Diagnosis Orders   1. Mixed hyperlipidemia     2.

## 2018-08-28 RX ORDER — FINASTERIDE 5 MG/1
TABLET, FILM COATED ORAL
Qty: 90 TABLET | Refills: 3 | Status: SHIPPED | OUTPATIENT
Start: 2018-08-28 | End: 2019-08-27 | Stop reason: SDUPTHER

## 2018-09-07 RX ORDER — TAMSULOSIN HYDROCHLORIDE 0.4 MG/1
0.4 CAPSULE ORAL DAILY
Qty: 90 CAPSULE | Refills: 3 | Status: SHIPPED | OUTPATIENT
Start: 2018-09-07 | End: 2019-08-27 | Stop reason: SDUPTHER

## 2018-09-21 ENCOUNTER — OFFICE VISIT (OUTPATIENT)
Dept: INTERNAL MEDICINE CLINIC | Age: 78
End: 2018-09-21
Payer: MEDICARE

## 2018-09-21 VITALS
DIASTOLIC BLOOD PRESSURE: 74 MMHG | SYSTOLIC BLOOD PRESSURE: 138 MMHG | HEIGHT: 73 IN | OXYGEN SATURATION: 98 % | WEIGHT: 237 LBS | HEART RATE: 75 BPM | BODY MASS INDEX: 31.41 KG/M2

## 2018-09-21 DIAGNOSIS — Z23 NEED FOR VACCINATION: ICD-10-CM
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
## 2018-09-21 DIAGNOSIS — M79.605 LEFT LEG PAIN: ICD-10-CM

## 2018-09-21 PROCEDURE — 99213 OFFICE O/P EST LOW 20 MIN: CPT | Performed by: PHYSICIAN ASSISTANT

## 2018-09-21 PROCEDURE — 90662 IIV NO PRSV INCREASED AG IM: CPT | Performed by: PHYSICIAN ASSISTANT

## 2018-09-21 PROCEDURE — G0008 ADMIN INFLUENZA VIRUS VAC: HCPCS | Performed by: PHYSICIAN ASSISTANT

## 2018-09-21 ASSESSMENT — ENCOUNTER SYMPTOMS
SHORTNESS OF BREATH: 0
BACK PAIN: 0
COUGH: 0

## 2018-09-21 NOTE — PROGRESS NOTES
finasteride (PROSCAR) 5 MG tablet take 1 tablet by mouth once daily 90 tablet 3    allopurinol (ZYLOPRIM) 300 MG tablet take 1 tablet by mouth once daily 90 tablet 3    omeprazole (PRILOSEC) 40 MG delayed release capsule take 1 capsule by mouth once daily 30 capsule 11    simvastatin (ZOCOR) 40 MG tablet take 1 tablet by mouth every evening 90 tablet 3    sildenafil (REVATIO) 20 MG tablet Take 1 tablet by mouth as needed (take 1-2 tablets as needed for erection) Take 1-5 tabs as needed prior to activity    Do not run through insurance- cash pay. 30 tablet 5    latanoprost (XALATAN) 0.005 % ophthalmic solution   0    Travoprost (TRAVATAN OP) Apply 2 drops to eye daily      acetaminophen (TYLENOL) 500 MG tablet Take 500 mg by mouth every 6 hours as needed for Pain.  magnesium hydroxide (MILK OF MAGNESIA) 400 MG/5ML suspension Take 5 mLs by mouth daily as needed for Constipation.  Multiple Vitamins-Minerals (MULTIVITAMIN & MINERAL PO) Take 1 tablet by mouth 2 times daily. No current facility-administered medications for this visit. SOCIAL HISTORY    Reviewed and no change from previous record. Kishore Hernandez  reports that he has never smoked. He has never used smokeless tobacco.    FAMILY HISTORY:    Reviewed and No change from previous visit    REVIEW OF SYSTEMS:    Review of Systems   Constitutional: Negative for chills and fever. Respiratory: Negative for cough and shortness of breath. Cardiovascular: Negative for chest pain, palpitations and leg swelling. Musculoskeletal: Positive for falls and joint pain. Negative for back pain and neck pain. Skin: Negative for itching and rash.      PHYSICAL EXAM:      Vitals:    09/21/18 1128   BP: 138/74   Pulse: 75   SpO2: 98%   Weight: 237 lb (107.5 kg)   Height: 6' 0.99\" (1.854 m)     BP Readings from Last 3 Encounters:   09/21/18 138/74   08/15/18 132/84   08/14/18 130/80      General - alert, well appearing, and in no distress  Skin - Dragon dictation software. Although every effort was made to ensure the accuracy of this automated transcription, some errors in transcription may have occurred.

## 2018-09-21 NOTE — PATIENT INSTRUCTIONS
motion hurts, try it without bending your knee quite as far. This may help you avoid any painful motion. 3. Slowly move your leg up and down. 4. Repeat 8 to 12 times. 5. When you can do this exercise with ease and no pain, add some resistance. To do this:  6. Tie the ends of an exercise band together to form a loop. Attach one end of the loop to a secure object or shut a door on it to hold it in place. (Or you can have someone hold one end of the loop to provide resistance.)  7. Loop the other end of the exercise band around the lower part of your affected leg. 8. Repeat steps 1 through 4, slowly pulling back on the exercise band with your leg. Hip extension    1. Stand facing a wall with your hands on the wall at about chest level. 2. Keeping the knee of your affected leg straight, kick that leg straight back behind you. 3. Relax, and lower your leg back to the starting position. 4. Repeat 8 to 12 times. 5. When you can do this exercise with ease and no pain, add some resistance. To do this:  6. Tie the ends of an exercise band together to form a loop. Attach one end of the loop to a secure object or shut a door on it to hold it in place. (Or you can have someone hold one end of the loop to provide resistance.)  7. Loop the other end of the exercise band around the lower part of your affected leg. 8. Repeat steps 1 through 4, slowly pulling back on the exercise band with your leg. Hamstring wall stretch    1. Lie on your back in a doorway, with your good leg through the open door. 2. Slide your affected leg up the wall to straighten your knee. You should feel a gentle stretch down the back of your leg. 1. Do not arch your back. 2. Do not bend either knee. 3. Keep one heel touching the floor and the other heel touching the wall. Do not point your toes. 3. Hold the stretch for at least 1 minute to begin. Then try to lengthen the time you hold the stretch to as long as 6 minutes.   4. Repeat 2 to 4

## 2018-10-23 DIAGNOSIS — N52.9 ERECTILE DYSFUNCTION, UNSPECIFIED ERECTILE DYSFUNCTION TYPE: Primary | ICD-10-CM

## 2018-10-23 RX ORDER — SILDENAFIL CITRATE 20 MG/1
TABLET ORAL
Qty: 30 TABLET | Refills: 11 | Status: SHIPPED | OUTPATIENT
Start: 2018-10-23 | End: 2019-01-09

## 2018-10-23 NOTE — TELEPHONE ENCOUNTER
Pt request refill for sildenafil last written on 8/14/17 for #30 with 5 refills. Last office visit was 8/14/18. Please advise.

## 2019-01-09 ENCOUNTER — OFFICE VISIT (OUTPATIENT)
Dept: INTERNAL MEDICINE CLINIC | Age: 79
End: 2019-01-09
Payer: MEDICARE

## 2019-01-09 VITALS
OXYGEN SATURATION: 98 % | WEIGHT: 237 LBS | BODY MASS INDEX: 31.41 KG/M2 | HEIGHT: 73 IN | HEART RATE: 89 BPM | SYSTOLIC BLOOD PRESSURE: 138 MMHG | DIASTOLIC BLOOD PRESSURE: 84 MMHG

## 2019-01-09 DIAGNOSIS — N20.0 RECURRENT KIDNEY STONES: ICD-10-CM

## 2019-01-09 DIAGNOSIS — K21.9 GASTROESOPHAGEAL REFLUX DISEASE, ESOPHAGITIS PRESENCE NOT SPECIFIED: ICD-10-CM

## 2019-01-09 DIAGNOSIS — I11.0 HYPERTENSIVE HEART DISEASE WITH HEART FAILURE (HCC): Primary | ICD-10-CM

## 2019-01-09 DIAGNOSIS — Z93.2 ILEOSTOMY STATUS (HCC): ICD-10-CM

## 2019-01-09 DIAGNOSIS — M1A.9XX0 CHRONIC GOUT WITHOUT TOPHUS, UNSPECIFIED CAUSE, UNSPECIFIED SITE: ICD-10-CM

## 2019-01-09 DIAGNOSIS — H40.9 GLAUCOMA OF RIGHT EYE, UNSPECIFIED GLAUCOMA TYPE: ICD-10-CM

## 2019-01-09 DIAGNOSIS — E78.2 MIXED HYPERLIPIDEMIA: ICD-10-CM

## 2019-01-09 DIAGNOSIS — R35.1 BENIGN PROSTATIC HYPERPLASIA WITH NOCTURIA: ICD-10-CM

## 2019-01-09 DIAGNOSIS — N40.1 BENIGN PROSTATIC HYPERPLASIA WITH NOCTURIA: ICD-10-CM

## 2019-01-09 PROCEDURE — 99214 OFFICE O/P EST MOD 30 MIN: CPT | Performed by: INTERNAL MEDICINE

## 2019-01-09 RX ORDER — OMEPRAZOLE 40 MG/1
40 CAPSULE, DELAYED RELEASE ORAL DAILY
Qty: 90 CAPSULE | Refills: 3 | Status: SHIPPED | OUTPATIENT
Start: 2019-01-09 | End: 2020-01-30

## 2019-01-09 ASSESSMENT — PATIENT HEALTH QUESTIONNAIRE - PHQ9
SUM OF ALL RESPONSES TO PHQ QUESTIONS 1-9: 0
SUM OF ALL RESPONSES TO PHQ9 QUESTIONS 1 & 2: 0
1. LITTLE INTEREST OR PLEASURE IN DOING THINGS: 0
SUM OF ALL RESPONSES TO PHQ QUESTIONS 1-9: 0
2. FEELING DOWN, DEPRESSED OR HOPELESS: 0

## 2019-01-15 ENCOUNTER — OFFICE VISIT (OUTPATIENT)
Dept: INTERNAL MEDICINE CLINIC | Age: 79
End: 2019-01-15
Payer: MEDICARE

## 2019-01-15 VITALS
DIASTOLIC BLOOD PRESSURE: 90 MMHG | BODY MASS INDEX: 31.41 KG/M2 | HEART RATE: 74 BPM | WEIGHT: 236.99 LBS | HEIGHT: 73 IN | SYSTOLIC BLOOD PRESSURE: 160 MMHG | OXYGEN SATURATION: 98 %

## 2019-01-15 DIAGNOSIS — I35.0 SENILE CALCIFIC AORTIC VALVE SCLEROSIS: ICD-10-CM

## 2019-01-15 DIAGNOSIS — I10 ESSENTIAL HYPERTENSION: Primary | ICD-10-CM

## 2019-01-15 PROCEDURE — 99213 OFFICE O/P EST LOW 20 MIN: CPT | Performed by: INTERNAL MEDICINE

## 2019-01-15 RX ORDER — AMLODIPINE BESYLATE 5 MG/1
5 TABLET ORAL DAILY
Qty: 30 TABLET | Refills: 0 | Status: SHIPPED | OUTPATIENT
Start: 2019-01-15 | End: 2019-02-08 | Stop reason: SDUPTHER

## 2019-01-15 RX ORDER — SIMVASTATIN 40 MG
20 TABLET ORAL NIGHTLY
Qty: 45 TABLET | Refills: 3 | Status: SHIPPED | OUTPATIENT
Start: 2019-01-15 | End: 2019-06-14

## 2019-01-15 RX ORDER — LISINOPRIL 10 MG/1
10 TABLET ORAL DAILY
Qty: 90 TABLET | Refills: 1 | Status: SHIPPED | OUTPATIENT
Start: 2019-01-15 | End: 2019-01-15

## 2019-01-22 ENCOUNTER — HOSPITAL ENCOUNTER (OUTPATIENT)
Dept: NON INVASIVE DIAGNOSTICS | Age: 79
Discharge: HOME | End: 2019-01-22
Payer: MEDICARE

## 2019-01-22 ENCOUNTER — HOSPITAL ENCOUNTER (OUTPATIENT)
Dept: GENERAL RADIOLOGY | Age: 79
Discharge: HOME | End: 2019-01-24
Payer: MEDICARE

## 2019-01-22 ENCOUNTER — HOSPITAL ENCOUNTER (OUTPATIENT)
Age: 79
Discharge: HOME | End: 2019-01-22
Payer: MEDICARE

## 2019-01-22 ENCOUNTER — HOSPITAL ENCOUNTER (OUTPATIENT)
Age: 79
Discharge: HOME | End: 2019-01-24
Payer: MEDICARE

## 2019-01-22 DIAGNOSIS — I35.0 SENILE CALCIFIC AORTIC VALVE SCLEROSIS: ICD-10-CM

## 2019-01-22 DIAGNOSIS — I10 ESSENTIAL HYPERTENSION: ICD-10-CM

## 2019-01-22 LAB
EKG ATRIAL RATE: 90 BPM
EKG P AXIS: 76 DEGREES
EKG P-R INTERVAL: 208 MS
EKG Q-T INTERVAL: 398 MS
EKG QRS DURATION: 82 MS
EKG QTC CALCULATION (BAZETT): 435 MS
EKG R AXIS: 64 DEGREES
EKG T AXIS: 67 DEGREES
EKG VENTRICULAR RATE: 72 BPM
LV EF: 58 %
LVEF MODALITY: NORMAL

## 2019-01-22 PROCEDURE — 71046 X-RAY EXAM CHEST 2 VIEWS: CPT

## 2019-01-22 PROCEDURE — 93005 ELECTROCARDIOGRAM TRACING: CPT

## 2019-01-22 PROCEDURE — 93306 TTE W/DOPPLER COMPLETE: CPT

## 2019-02-08 DIAGNOSIS — I10 ESSENTIAL HYPERTENSION: ICD-10-CM

## 2019-02-08 RX ORDER — AMLODIPINE BESYLATE 5 MG/1
5 TABLET ORAL DAILY
Qty: 30 TABLET | Refills: 3 | Status: SHIPPED | OUTPATIENT
Start: 2019-02-08 | End: 2019-06-14 | Stop reason: SDUPTHER

## 2019-02-20 ENCOUNTER — TELEPHONE (OUTPATIENT)
Dept: INTERNAL MEDICINE CLINIC | Age: 79
End: 2019-02-20

## 2019-02-21 ENCOUNTER — HOSPITAL ENCOUNTER (OUTPATIENT)
Age: 79
Setting detail: SPECIMEN
Discharge: HOME | End: 2019-02-21
Payer: MEDICARE

## 2019-02-21 DIAGNOSIS — E78.2 MIXED HYPERLIPIDEMIA: ICD-10-CM

## 2019-02-21 DIAGNOSIS — I11.0 HYPERTENSIVE HEART DISEASE WITH HEART FAILURE (HCC): ICD-10-CM

## 2019-02-21 LAB
-: NORMAL
ALBUMIN SERPL-MCNC: 4.1 G/DL (ref 3.5–5.2)
ALBUMIN/GLOBULIN RATIO: 1.2 (ref 1–2.5)
ALP BLD-CCNC: 56 U/L (ref 40–129)
ALT SERPL-CCNC: 14 U/L (ref 5–41)
AMORPHOUS: NORMAL
ANION GAP SERPL CALCULATED.3IONS-SCNC: 14 MMOL/L (ref 9–17)
AST SERPL-CCNC: 17 U/L
BACTERIA: NORMAL
BILIRUB SERPL-MCNC: 0.55 MG/DL (ref 0.3–1.2)
BILIRUBIN URINE: NEGATIVE
BUN BLDV-MCNC: 17 MG/DL (ref 8–23)
BUN/CREAT BLD: ABNORMAL (ref 9–20)
CALCIUM SERPL-MCNC: 9.4 MG/DL (ref 8.6–10.4)
CASTS UA: NORMAL /LPF (ref 0–8)
CHLORIDE BLD-SCNC: 107 MMOL/L (ref 98–107)
CHOLESTEROL/HDL RATIO: 4.4
CHOLESTEROL: 172 MG/DL
CO2: 25 MMOL/L (ref 20–31)
COLOR: YELLOW
CREAT SERPL-MCNC: 0.88 MG/DL (ref 0.7–1.2)
CRYSTALS, UA: NORMAL /HPF
EPITHELIAL CELLS UA: NORMAL /HPF (ref 0–5)
GFR AFRICAN AMERICAN: >60 ML/MIN
GFR NON-AFRICAN AMERICAN: >60 ML/MIN
GFR SERPL CREATININE-BSD FRML MDRD: ABNORMAL ML/MIN/{1.73_M2}
GFR SERPL CREATININE-BSD FRML MDRD: ABNORMAL ML/MIN/{1.73_M2}
GLUCOSE BLD-MCNC: 106 MG/DL (ref 70–99)
GLUCOSE URINE: NEGATIVE
HCT VFR BLD CALC: 45 % (ref 40.7–50.3)
HDLC SERPL-MCNC: 39 MG/DL
HEMOGLOBIN: 14.9 G/DL (ref 13–17)
KETONES, URINE: NEGATIVE
LDL CHOLESTEROL: 100 MG/DL (ref 0–130)
LEUKOCYTE ESTERASE, URINE: NEGATIVE
MCH RBC QN AUTO: 29.9 PG (ref 25.2–33.5)
MCHC RBC AUTO-ENTMCNC: 33.1 G/DL (ref 28.4–34.8)
MCV RBC AUTO: 90.2 FL (ref 82.6–102.9)
MUCUS: NORMAL
NITRITE, URINE: NEGATIVE
NRBC AUTOMATED: 0 PER 100 WBC
OTHER OBSERVATIONS UA: NORMAL
PDW BLD-RTO: 13.5 % (ref 11.8–14.4)
PH UA: 5 (ref 5–8)
PLATELET # BLD: 159 K/UL (ref 138–453)
PMV BLD AUTO: 11.1 FL (ref 8.1–13.5)
POTASSIUM SERPL-SCNC: 4.2 MMOL/L (ref 3.7–5.3)
PROTEIN UA: NEGATIVE
RBC # BLD: 4.99 M/UL (ref 4.21–5.77)
RBC UA: NORMAL /HPF (ref 0–4)
RENAL EPITHELIAL, UA: NORMAL /HPF
SODIUM BLD-SCNC: 146 MMOL/L (ref 135–144)
SPECIFIC GRAVITY UA: 1.02 (ref 1–1.03)
TOTAL PROTEIN: 7.5 G/DL (ref 6.4–8.3)
TRICHOMONAS: NORMAL
TRIGL SERPL-MCNC: 167 MG/DL
TURBIDITY: CLEAR
URINE HGB: NEGATIVE
UROBILINOGEN, URINE: NORMAL
VLDLC SERPL CALC-MCNC: ABNORMAL MG/DL (ref 1–30)
WBC # BLD: 7.3 K/UL (ref 3.5–11.3)
WBC UA: NORMAL /HPF (ref 0–5)
YEAST: NORMAL

## 2019-02-26 ENCOUNTER — OFFICE VISIT (OUTPATIENT)
Dept: INTERNAL MEDICINE CLINIC | Age: 79
End: 2019-02-26
Payer: MEDICARE

## 2019-02-26 VITALS
OXYGEN SATURATION: 98 % | DIASTOLIC BLOOD PRESSURE: 78 MMHG | HEIGHT: 73 IN | WEIGHT: 239 LBS | SYSTOLIC BLOOD PRESSURE: 122 MMHG | HEART RATE: 84 BPM | BODY MASS INDEX: 31.68 KG/M2

## 2019-02-26 DIAGNOSIS — Z93.2 ILEOSTOMY STATUS (HCC): ICD-10-CM

## 2019-02-26 DIAGNOSIS — M1A.9XX0 CHRONIC GOUT WITHOUT TOPHUS, UNSPECIFIED CAUSE, UNSPECIFIED SITE: ICD-10-CM

## 2019-02-26 DIAGNOSIS — E78.2 MIXED HYPERLIPIDEMIA: ICD-10-CM

## 2019-02-26 DIAGNOSIS — N40.1 BENIGN PROSTATIC HYPERPLASIA WITH NOCTURIA: ICD-10-CM

## 2019-02-26 DIAGNOSIS — R35.1 BENIGN PROSTATIC HYPERPLASIA WITH NOCTURIA: ICD-10-CM

## 2019-02-26 DIAGNOSIS — I11.0 HYPERTENSIVE HEART DISEASE WITH HEART FAILURE (HCC): Primary | ICD-10-CM

## 2019-02-26 PROCEDURE — 99214 OFFICE O/P EST MOD 30 MIN: CPT | Performed by: INTERNAL MEDICINE

## 2019-02-26 ASSESSMENT — PATIENT HEALTH QUESTIONNAIRE - PHQ9
SUM OF ALL RESPONSES TO PHQ9 QUESTIONS 1 & 2: 0
SUM OF ALL RESPONSES TO PHQ QUESTIONS 1-9: 0
1. LITTLE INTEREST OR PLEASURE IN DOING THINGS: 0
2. FEELING DOWN, DEPRESSED OR HOPELESS: 0
SUM OF ALL RESPONSES TO PHQ QUESTIONS 1-9: 0

## 2019-03-12 ENCOUNTER — TELEPHONE (OUTPATIENT)
Dept: INTERNAL MEDICINE CLINIC | Age: 79
End: 2019-03-12

## 2019-06-14 DIAGNOSIS — I10 ESSENTIAL HYPERTENSION: ICD-10-CM

## 2019-06-14 RX ORDER — AMLODIPINE BESYLATE 5 MG/1
TABLET ORAL
Qty: 30 TABLET | Refills: 3 | Status: SHIPPED | OUTPATIENT
Start: 2019-06-14 | End: 2019-09-29 | Stop reason: SDUPTHER

## 2019-07-18 ENCOUNTER — OFFICE VISIT (OUTPATIENT)
Dept: INTERNAL MEDICINE CLINIC | Age: 79
End: 2019-07-18
Payer: MEDICARE

## 2019-07-18 VITALS
HEIGHT: 73 IN | DIASTOLIC BLOOD PRESSURE: 88 MMHG | WEIGHT: 238 LBS | HEART RATE: 84 BPM | SYSTOLIC BLOOD PRESSURE: 132 MMHG | BODY MASS INDEX: 31.54 KG/M2 | OXYGEN SATURATION: 97 %

## 2019-07-18 DIAGNOSIS — M1A.9XX0 CHRONIC GOUT WITHOUT TOPHUS, UNSPECIFIED CAUSE, UNSPECIFIED SITE: ICD-10-CM

## 2019-07-18 DIAGNOSIS — N40.1 BENIGN PROSTATIC HYPERPLASIA WITH NOCTURIA: ICD-10-CM

## 2019-07-18 DIAGNOSIS — I10 ESSENTIAL HYPERTENSION: Primary | ICD-10-CM

## 2019-07-18 DIAGNOSIS — H40.9 GLAUCOMA OF RIGHT EYE, UNSPECIFIED GLAUCOMA TYPE: ICD-10-CM

## 2019-07-18 DIAGNOSIS — I50.32 HEART FAILURE, DIASTOLIC, CHRONIC (HCC): ICD-10-CM

## 2019-07-18 DIAGNOSIS — Z93.2 ILEOSTOMY STATUS (HCC): ICD-10-CM

## 2019-07-18 DIAGNOSIS — I11.0 HYPERTENSIVE HEART DISEASE WITH HEART FAILURE (HCC): ICD-10-CM

## 2019-07-18 DIAGNOSIS — K21.9 GASTROESOPHAGEAL REFLUX DISEASE, ESOPHAGITIS PRESENCE NOT SPECIFIED: ICD-10-CM

## 2019-07-18 DIAGNOSIS — E78.2 MIXED HYPERLIPIDEMIA: ICD-10-CM

## 2019-07-18 DIAGNOSIS — C44.310 BASAL CELL CARCINOMA (BCC) OF FACE: ICD-10-CM

## 2019-07-18 DIAGNOSIS — R35.1 BENIGN PROSTATIC HYPERPLASIA WITH NOCTURIA: ICD-10-CM

## 2019-07-18 DIAGNOSIS — I35.0 SENILE CALCIFIC AORTIC VALVE SCLEROSIS: ICD-10-CM

## 2019-07-18 PROCEDURE — 99214 OFFICE O/P EST MOD 30 MIN: CPT | Performed by: INTERNAL MEDICINE

## 2019-07-18 NOTE — PROGRESS NOTES
Visit Information    Have you changed or started any medications since your last visit including any over-the-counter medicines, vitamins, or herbal medicines? no   Are you having any side effects from any of your medications? -  no  Have you stopped taking any of your medications? Is so, why? -  no    Have you seen any other physician or provider since your last visit? No  Have you had any other diagnostic tests since your last visit? No  Have you been seen in the emergency room and/or had an admission to a hospital since we last saw you? No  Have you had your routine dental cleaning in the past 6 months? yes -     Have you activated your [employer] account? If not, what are your barriers?  Yes     Patient Care Team:  Brody Rivera MD as PCP - Joseph Mary MD as PCP - [facility]    Medical History Review  Past Medical, Family, and Social History reviewed and does not contribute to the patient presenting condition    Health Maintenance   Topic Date Due    DTaP/Tdap/Td vaccine (1 - Tdap) 06[birth year]21/1957    Annual Wellness Visit (AWV)  06/21/2001    Shingles Vaccine (2 of 3) 11/28/2012    Flu vaccine (1) 09/01/2019    Pneumococcal 65+ years Vaccine  Completed

## 2019-07-23 ENCOUNTER — HOSPITAL ENCOUNTER (OUTPATIENT)
Age: 79
Setting detail: SPECIMEN
Discharge: HOME | End: 2019-07-23
Payer: MEDICARE

## 2019-07-25 LAB — DERMATOLOGY PATHOLOGY REPORT: NORMAL

## 2019-08-09 DIAGNOSIS — I10 ESSENTIAL HYPERTENSION: ICD-10-CM

## 2019-08-09 DIAGNOSIS — N20.0 KIDNEY STONES: ICD-10-CM

## 2019-08-12 RX ORDER — ALLOPURINOL 300 MG/1
300 TABLET ORAL DAILY
Qty: 90 TABLET | Refills: 5 | Status: SHIPPED | OUTPATIENT
Start: 2019-08-12 | End: 2020-08-11 | Stop reason: SDUPTHER

## 2019-08-27 ENCOUNTER — OFFICE VISIT (OUTPATIENT)
Dept: UROLOGY | Age: 79
End: 2019-08-27
Payer: MEDICARE

## 2019-08-27 VITALS
BODY MASS INDEX: 31.68 KG/M2 | HEART RATE: 88 BPM | SYSTOLIC BLOOD PRESSURE: 133 MMHG | HEIGHT: 73 IN | WEIGHT: 239 LBS | TEMPERATURE: 98.2 F | DIASTOLIC BLOOD PRESSURE: 83 MMHG

## 2019-08-27 DIAGNOSIS — N40.1 BPH WITH OBSTRUCTION/LOWER URINARY TRACT SYMPTOMS: Primary | ICD-10-CM

## 2019-08-27 DIAGNOSIS — N13.8 BPH WITH OBSTRUCTION/LOWER URINARY TRACT SYMPTOMS: Primary | ICD-10-CM

## 2019-08-27 PROCEDURE — 99214 OFFICE O/P EST MOD 30 MIN: CPT | Performed by: UROLOGY

## 2019-08-27 RX ORDER — SIMVASTATIN 40 MG
1 TABLET ORAL DAILY
Refills: 0 | COMMUNITY
Start: 2019-07-23 | End: 2020-01-28 | Stop reason: SDUPTHER

## 2019-08-27 RX ORDER — FINASTERIDE 5 MG/1
5 TABLET, FILM COATED ORAL DAILY
Qty: 90 TABLET | Refills: 3 | Status: ON HOLD | OUTPATIENT
Start: 2019-08-27 | End: 2020-09-14 | Stop reason: ALTCHOICE

## 2019-08-27 RX ORDER — TAMSULOSIN HYDROCHLORIDE 0.4 MG/1
0.4 CAPSULE ORAL DAILY
Qty: 90 CAPSULE | Refills: 3 | Status: ON HOLD | OUTPATIENT
Start: 2019-08-27 | End: 2020-09-14 | Stop reason: ALTCHOICE

## 2019-08-27 ASSESSMENT — ENCOUNTER SYMPTOMS
COLOR CHANGE: 0
COUGH: 0
EYE REDNESS: 0
WHEEZING: 0
ABDOMINAL PAIN: 0
SHORTNESS OF BREATH: 0
EYE PAIN: 0
NAUSEA: 0
BACK PAIN: 0
VOMITING: 0

## 2019-08-27 NOTE — PROGRESS NOTES
Review of Systems   Constitutional: Negative for activity change, chills and fever. Eyes: Negative for pain, redness and visual disturbance. Respiratory: Negative for cough, shortness of breath and wheezing. Cardiovascular: Negative for chest pain and leg swelling. Gastrointestinal: Negative for abdominal pain, nausea and vomiting. Genitourinary: Negative for difficulty urinating, discharge, dysuria, flank pain, frequency, hematuria, scrotal swelling and testicular pain. Musculoskeletal: Negative for back pain, joint swelling and myalgias. Skin: Negative for color change and rash. Neurological: Negative for dizziness, tremors and numbness. Hematological: Negative for adenopathy. Does not bruise/bleed easily.
30 tablet 3    omeprazole (PRILOSEC) 40 MG delayed release capsule Take 1 capsule by mouth daily 90 capsule 3    latanoprost (XALATAN) 0.005 % ophthalmic solution   0    acetaminophen (TYLENOL) 500 MG tablet Take 500 mg by mouth every 6 hours as needed for Pain.  Multiple Vitamins-Minerals (MULTIVITAMIN & MINERAL PO) Take 1 tablet by mouth 2 times daily. Sulfa antibiotics and Vimovo [naproxen-esomeprazole]  Social History     Tobacco Use   Smoking Status Never Smoker   Smokeless Tobacco Never Used     (Ifpatient a smoker, smoking cessation counseling offered)    Social History     Substance and Sexual Activity   Alcohol Use No    Alcohol/week: 0.0 standard drinks    Comment: occassional       REVIEW OF SYSTEMS:  Review of Systems    Physical Exam:      Vitals:    08/27/19 0920   BP: 133/83   Pulse: 88   Temp: 98.2 °F (36.8 °C)     Body mass index is 31.54 kg/m². Patient is a 78 y.o. male in no acute distress and alert and oriented to person, place and time. Physical Exam  Constitutional: Patient in no acute distress. Neuro: Alert and oriented to person, place and time. Psych: Mood normal, affect normal  Lungs: Respiratory effort is normal  Cardiovascular: Warm & Pink  Abdomen: Soft, non-tender, non-distended with no CVA,  No flank tenderness,  Or hepatosplenomegaly   Lymphatics: No palpablelymphadenopathy. Bladder non-tender and not distended. Musculoskeletal: Normal gait and station      Assessment and Plan      1. BPH with obstruction/lower urinary tract symptoms           Plan:          Doing well on combination therapy. Happy with voiding  F/U in 1 year. Return in about 1 year (around 8/27/2020).     Prescriptions Ordered:  Orders Placed This Encounter   Medications    tamsulosin (FLOMAX) 0.4 MG capsule     Sig: Take 1 capsule by mouth daily     Dispense:  90 capsule     Refill:  3    finasteride (PROSCAR) 5 MG tablet     Sig: Take 1 tablet by mouth daily     Dispense:  90 tablet

## 2019-08-29 ASSESSMENT — ENCOUNTER SYMPTOMS
COUGH: 0
ROS SKIN COMMENTS: LESION ON FOREHEAD
SHORTNESS OF BREATH: 0

## 2019-09-29 DIAGNOSIS — I10 ESSENTIAL HYPERTENSION: ICD-10-CM

## 2019-09-30 RX ORDER — AMLODIPINE BESYLATE 5 MG/1
TABLET ORAL
Qty: 30 TABLET | Refills: 3 | Status: SHIPPED | OUTPATIENT
Start: 2019-09-30 | End: 2020-02-03

## 2019-12-17 RX ORDER — SILDENAFIL CITRATE 20 MG/1
20 TABLET ORAL DAILY PRN
Qty: 5 TABLET | Refills: 0 | Status: CANCELLED | OUTPATIENT
Start: 2019-12-17

## 2019-12-18 RX ORDER — SILDENAFIL CITRATE 20 MG/1
20 TABLET ORAL DAILY PRN
Qty: 30 TABLET | Refills: 5 | Status: SHIPPED | OUTPATIENT
Start: 2019-12-18 | End: 2019-12-20 | Stop reason: SDUPTHER

## 2019-12-20 RX ORDER — SILDENAFIL CITRATE 20 MG/1
20 TABLET ORAL DAILY PRN
Qty: 30 TABLET | Refills: 5 | Status: SHIPPED | OUTPATIENT
Start: 2019-12-20 | End: 2021-01-01 | Stop reason: SDUPTHER

## 2020-01-01 ENCOUNTER — HOSPITAL ENCOUNTER (OUTPATIENT)
Age: 80
Setting detail: SPECIMEN
Discharge: HOME | End: 2020-11-16
Payer: MEDICARE

## 2020-01-01 ENCOUNTER — OFFICE VISIT (OUTPATIENT)
Dept: UROLOGY | Age: 80
End: 2020-01-01
Payer: MEDICARE

## 2020-01-01 ENCOUNTER — VIRTUAL VISIT (OUTPATIENT)
Dept: INTERNAL MEDICINE CLINIC | Age: 80
End: 2020-01-01
Payer: MEDICARE

## 2020-01-01 VITALS — DIASTOLIC BLOOD PRESSURE: 88 MMHG | TEMPERATURE: 98.2 F | SYSTOLIC BLOOD PRESSURE: 136 MMHG

## 2020-01-01 LAB
ALBUMIN SERPL-MCNC: 4.1 G/DL (ref 3.5–5.2)
ALBUMIN/GLOBULIN RATIO: 1.3 (ref 1–2.5)
ALP BLD-CCNC: 42 U/L (ref 40–129)
ALT SERPL-CCNC: 12 U/L (ref 5–41)
ANION GAP SERPL CALCULATED.3IONS-SCNC: 8 MMOL/L (ref 9–17)
AST SERPL-CCNC: 20 U/L
BILIRUB SERPL-MCNC: 0.58 MG/DL (ref 0.3–1.2)
BUN BLDV-MCNC: 18 MG/DL (ref 8–23)
BUN/CREAT BLD: ABNORMAL (ref 9–20)
CALCIUM SERPL-MCNC: 9.4 MG/DL (ref 8.6–10.4)
CHLORIDE BLD-SCNC: 108 MMOL/L (ref 98–107)
CHOLESTEROL/HDL RATIO: 3.7
CHOLESTEROL: 164 MG/DL
CO2: 25 MMOL/L (ref 20–31)
CREAT SERPL-MCNC: 0.9 MG/DL (ref 0.7–1.2)
GFR AFRICAN AMERICAN: >60 ML/MIN
GFR NON-AFRICAN AMERICAN: >60 ML/MIN
GFR SERPL CREATININE-BSD FRML MDRD: ABNORMAL ML/MIN/{1.73_M2}
GFR SERPL CREATININE-BSD FRML MDRD: ABNORMAL ML/MIN/{1.73_M2}
GLUCOSE BLD-MCNC: 96 MG/DL (ref 70–99)
HCT VFR BLD CALC: 46.7 % (ref 40.7–50.3)
HDLC SERPL-MCNC: 44 MG/DL
HEMOGLOBIN: 14.9 G/DL (ref 13–17)
LDL CHOLESTEROL: 98 MG/DL (ref 0–130)
MCH RBC QN AUTO: 30.5 PG (ref 25.2–33.5)
MCHC RBC AUTO-ENTMCNC: 31.9 G/DL (ref 28.4–34.8)
MCV RBC AUTO: 95.5 FL (ref 82.6–102.9)
NRBC AUTOMATED: 0 PER 100 WBC
PDW BLD-RTO: 13.6 % (ref 11.8–14.4)
PLATELET # BLD: 175 K/UL (ref 138–453)
PMV BLD AUTO: 10.9 FL (ref 8.1–13.5)
POTASSIUM SERPL-SCNC: 5 MMOL/L (ref 3.7–5.3)
RBC # BLD: 4.89 M/UL (ref 4.21–5.77)
SODIUM BLD-SCNC: 141 MMOL/L (ref 135–144)
TOTAL PROTEIN: 7.2 G/DL (ref 6.4–8.3)
TRIGL SERPL-MCNC: 112 MG/DL
VLDLC SERPL CALC-MCNC: NORMAL MG/DL (ref 1–30)
WBC # BLD: 7.3 K/UL (ref 3.5–11.3)

## 2020-01-01 PROCEDURE — G0407 INPT/TELE FOLLOW UP 25: HCPCS | Performed by: INTERNAL MEDICINE

## 2020-01-01 PROCEDURE — 99212 OFFICE O/P EST SF 10 MIN: CPT | Performed by: UROLOGY

## 2020-01-01 RX ORDER — FINASTERIDE 5 MG/1
5 TABLET, FILM COATED ORAL DAILY
Qty: 30 TABLET | Refills: 1 | Status: SHIPPED | OUTPATIENT
Start: 2020-01-01 | End: 2020-01-01 | Stop reason: SDUPTHER

## 2020-01-01 RX ORDER — TAMSULOSIN HYDROCHLORIDE 0.4 MG/1
0.4 CAPSULE ORAL DAILY
COMMUNITY
End: 2020-01-01 | Stop reason: SDUPTHER

## 2020-01-01 RX ORDER — ALLOPURINOL 300 MG/1
TABLET ORAL
Qty: 90 TABLET | Refills: 5 | Status: SHIPPED | OUTPATIENT
Start: 2020-01-01 | End: 2021-01-01

## 2020-01-01 RX ORDER — FINASTERIDE 5 MG/1
5 TABLET, FILM COATED ORAL DAILY
COMMUNITY
End: 2020-01-01 | Stop reason: SDUPTHER

## 2020-01-01 RX ORDER — FINASTERIDE 5 MG/1
5 TABLET, FILM COATED ORAL DAILY
Qty: 90 TABLET | Refills: 3 | OUTPATIENT
Start: 2020-01-01

## 2020-01-01 RX ORDER — TAMSULOSIN HYDROCHLORIDE 0.4 MG/1
0.4 CAPSULE ORAL DAILY
Qty: 30 CAPSULE | Refills: 1 | Status: SHIPPED | OUTPATIENT
Start: 2020-01-01 | End: 2021-01-01 | Stop reason: SDUPTHER

## 2020-01-01 RX ORDER — TAMSULOSIN HYDROCHLORIDE 0.4 MG/1
0.4 CAPSULE ORAL DAILY
Qty: 90 CAPSULE | Refills: 3 | OUTPATIENT
Start: 2020-01-01

## 2020-01-01 RX ORDER — FINASTERIDE 5 MG/1
5 TABLET, FILM COATED ORAL DAILY
Qty: 30 TABLET | Refills: 11 | Status: SHIPPED | OUTPATIENT
Start: 2020-01-01 | End: 2021-01-01

## 2020-01-01 RX ORDER — AMLODIPINE BESYLATE 5 MG/1
TABLET ORAL
Qty: 30 TABLET | Refills: 3 | Status: SHIPPED | OUTPATIENT
Start: 2020-01-01 | End: 2021-01-01

## 2020-01-01 RX ORDER — TAMSULOSIN HYDROCHLORIDE 0.4 MG/1
0.4 CAPSULE ORAL DAILY
Qty: 30 CAPSULE | Refills: 1 | Status: SHIPPED | OUTPATIENT
Start: 2020-01-01 | End: 2020-01-01 | Stop reason: SDUPTHER

## 2020-01-01 ASSESSMENT — PATIENT HEALTH QUESTIONNAIRE - PHQ9
SUM OF ALL RESPONSES TO PHQ9 QUESTIONS 1 & 2: 2
SUM OF ALL RESPONSES TO PHQ QUESTIONS 1-9: 2
SUM OF ALL RESPONSES TO PHQ QUESTIONS 1-9: 2
2. FEELING DOWN, DEPRESSED OR HOPELESS: 1
1. LITTLE INTEREST OR PLEASURE IN DOING THINGS: 1
SUM OF ALL RESPONSES TO PHQ QUESTIONS 1-9: 2

## 2020-01-01 ASSESSMENT — ENCOUNTER SYMPTOMS
DIARRHEA: 0
WHEEZING: 0
COUGH: 0
VOMITING: 0
NAUSEA: 0
BACK PAIN: 0
EYE REDNESS: 0
CONSTIPATION: 0
ABDOMINAL PAIN: 0
SHORTNESS OF BREATH: 0
EYE PAIN: 0

## 2020-01-27 NOTE — TELEPHONE ENCOUNTER
Medication: Zocor  Last visit: 7/18/19  Next visit: 1/30/2020  Last refill: 7/23/19  Pharmacy: [address] [place] [address]

## 2020-01-28 RX ORDER — SIMVASTATIN 40 MG
40 TABLET ORAL DAILY
Qty: 90 TABLET | Refills: 3 | Status: CANCELLED | OUTPATIENT
Start: 2020-01-28

## 2020-01-28 RX ORDER — SIMVASTATIN 20 MG
20 TABLET ORAL DAILY
Qty: 90 TABLET | Refills: 1 | Status: SHIPPED | OUTPATIENT
Start: 2020-01-28 | End: 2021-01-01

## 2020-01-30 ENCOUNTER — OFFICE VISIT (OUTPATIENT)
Dept: INTERNAL MEDICINE CLINIC | Age: 80
End: 2020-01-30
Payer: MEDICARE

## 2020-01-30 ENCOUNTER — HOSPITAL ENCOUNTER (OUTPATIENT)
Age: 80
Setting detail: SPECIMEN
Discharge: HOME | End: 2020-01-30
Payer: MEDICARE

## 2020-01-30 VITALS
HEART RATE: 72 BPM | HEIGHT: 73 IN | WEIGHT: 230 LBS | SYSTOLIC BLOOD PRESSURE: 114 MMHG | DIASTOLIC BLOOD PRESSURE: 70 MMHG | BODY MASS INDEX: 30.48 KG/M2

## 2020-01-30 LAB
-: ABNORMAL
ALBUMIN SERPL-MCNC: 4 G/DL (ref 3.5–5.2)
ALBUMIN/GLOBULIN RATIO: 1.6 (ref 1–2.5)
ALP BLD-CCNC: 51 U/L (ref 40–129)
ALT SERPL-CCNC: 28 U/L (ref 5–41)
AMORPHOUS: ABNORMAL
ANION GAP SERPL CALCULATED.3IONS-SCNC: 12 MMOL/L (ref 9–17)
AST SERPL-CCNC: 17 U/L
BACTERIA: ABNORMAL
BILIRUB SERPL-MCNC: 0.53 MG/DL (ref 0.3–1.2)
BILIRUBIN URINE: NEGATIVE
BUN BLDV-MCNC: 38 MG/DL (ref 8–23)
BUN/CREAT BLD: ABNORMAL (ref 9–20)
CALCIUM SERPL-MCNC: 8.7 MG/DL (ref 8.6–10.4)
CASTS UA: ABNORMAL /LPF (ref 0–2)
CHLORIDE BLD-SCNC: 103 MMOL/L (ref 98–107)
CO2: 25 MMOL/L (ref 20–31)
COLOR: ABNORMAL
COMMENT UA: ABNORMAL
CREAT SERPL-MCNC: 1.21 MG/DL (ref 0.7–1.2)
CRYSTALS, UA: ABNORMAL /HPF
EPITHELIAL CELLS UA: ABNORMAL /HPF (ref 0–5)
GFR AFRICAN AMERICAN: >60 ML/MIN
GFR NON-AFRICAN AMERICAN: 58 ML/MIN
GFR SERPL CREATININE-BSD FRML MDRD: ABNORMAL ML/MIN/{1.73_M2}
GFR SERPL CREATININE-BSD FRML MDRD: ABNORMAL ML/MIN/{1.73_M2}
GLUCOSE BLD-MCNC: 76 MG/DL (ref 70–99)
GLUCOSE URINE: NEGATIVE
HCT VFR BLD CALC: 43.1 % (ref 40.7–50.3)
HEMOGLOBIN: 13.8 G/DL (ref 13–17)
KETONES, URINE: NEGATIVE
LEUKOCYTE ESTERASE, URINE: NEGATIVE
MCH RBC QN AUTO: 29.6 PG (ref 25.2–33.5)
MCHC RBC AUTO-ENTMCNC: 32 G/DL (ref 28.4–34.8)
MCV RBC AUTO: 92.5 FL (ref 82.6–102.9)
MUCUS: ABNORMAL
NITRITE, URINE: NEGATIVE
NRBC AUTOMATED: 0 PER 100 WBC
OTHER OBSERVATIONS UA: ABNORMAL
PDW BLD-RTO: 13.8 % (ref 11.8–14.4)
PH UA: 5 (ref 5–8)
PLATELET # BLD: 145 K/UL (ref 138–453)
PMV BLD AUTO: 11.5 FL (ref 8.1–13.5)
POTASSIUM SERPL-SCNC: 3.6 MMOL/L (ref 3.7–5.3)
PROTEIN UA: ABNORMAL
RBC # BLD: 4.66 M/UL (ref 4.21–5.77)
RBC UA: ABNORMAL /HPF (ref 0–2)
RENAL EPITHELIAL, UA: ABNORMAL /HPF
SODIUM BLD-SCNC: 140 MMOL/L (ref 135–144)
SPECIFIC GRAVITY UA: 1.03 (ref 1–1.03)
TOTAL PROTEIN: 6.5 G/DL (ref 6.4–8.3)
TRICHOMONAS: ABNORMAL
TURBIDITY: ABNORMAL
URIC ACID: 5.7 MG/DL (ref 3.4–7)
URINE HGB: NEGATIVE
UROBILINOGEN, URINE: NORMAL
WBC # BLD: 7.3 K/UL (ref 3.5–11.3)
WBC UA: ABNORMAL /HPF (ref 0–5)
YEAST: ABNORMAL

## 2020-01-30 PROCEDURE — 99214 OFFICE O/P EST MOD 30 MIN: CPT | Performed by: INTERNAL MEDICINE

## 2020-01-30 ASSESSMENT — PATIENT HEALTH QUESTIONNAIRE - PHQ9
SUM OF ALL RESPONSES TO PHQ QUESTIONS 1-9: 0
SUM OF ALL RESPONSES TO PHQ9 QUESTIONS 1 & 2: 0
1. LITTLE INTEREST OR PLEASURE IN DOING THINGS: 0
2. FEELING DOWN, DEPRESSED OR HOPELESS: 0
SUM OF ALL RESPONSES TO PHQ QUESTIONS 1-9: 0

## 2020-01-30 NOTE — PROGRESS NOTES
Visit Information    Have you changed or started any medications since your last visit including any over-the-counter medicines, vitamins, or herbal medicines? no   Are you having any side effects from any of your medications? -  no  Have you stopped taking any of your medications? Is so, why? -  no    Have you seen any other physician or provider since your last visit? No  Have you had any other diagnostic tests since your last visit? No  Have you been seen in the emergency room and/or had an admission to a hospital since we last saw you? No  Have you had your routine dental cleaning in the past 6 months? yes -     Have you activated your [employer] account? If not, what are your barriers? Yes     Patient Care Team:  John Martin MD as PCP - Marie Cardoza MD as PCP - [facility]    Medical History Review  Past Medical, Family, and Social History reviewed and does contribute to the patient presenting condition    Health Maintenance   Topic Date Due    DTaP/Tdap/Td vaccine (1 - Tdap) [birth year]/1949    Shingles Vaccine (2 of 3) 11/28/2012    Annual Wellness Visit (AWV)  05/29/2019    Flu vaccine (1) 09/01/2019    Lipid screen  02/21/2020    Pneumococcal 65+ years Vaccine  Completed     Chief Complaint   Patient presents with    Annual Exam     Pt is here for routine visit.     Congestive Heart Failure      HCC GAP    Other     artificial feeding

## 2020-02-03 RX ORDER — AMLODIPINE BESYLATE 5 MG/1
TABLET ORAL
Qty: 30 TABLET | Refills: 3 | Status: SHIPPED | OUTPATIENT
Start: 2020-02-03 | End: 2020-06-15

## 2020-06-15 RX ORDER — AMLODIPINE BESYLATE 5 MG/1
TABLET ORAL
Qty: 30 TABLET | Refills: 3 | Status: SHIPPED | OUTPATIENT
Start: 2020-06-15 | End: 2020-01-01

## 2020-08-11 ENCOUNTER — OFFICE VISIT (OUTPATIENT)
Dept: INTERNAL MEDICINE CLINIC | Age: 80
End: 2020-08-11
Payer: MEDICARE

## 2020-08-11 VITALS
HEIGHT: 72 IN | TEMPERATURE: 97.3 F | SYSTOLIC BLOOD PRESSURE: 136 MMHG | BODY MASS INDEX: 31.42 KG/M2 | WEIGHT: 232 LBS | DIASTOLIC BLOOD PRESSURE: 78 MMHG

## 2020-08-11 PROBLEM — L98.9 SKIN LESION OF LEFT UPPER EXTREMITY: Status: ACTIVE | Noted: 2020-08-11

## 2020-08-11 PROBLEM — L81.9 PIGMENTED SKIN LESION OF SUSPECTED MALIGNANT NATURE: Status: ACTIVE | Noted: 2020-08-11

## 2020-08-11 PROCEDURE — G0438 PPPS, INITIAL VISIT: HCPCS | Performed by: INTERNAL MEDICINE

## 2020-08-11 RX ORDER — ALLOPURINOL 300 MG/1
300 TABLET ORAL DAILY
Qty: 90 TABLET | Refills: 5 | Status: SHIPPED | OUTPATIENT
Start: 2020-08-11 | End: 2020-01-01

## 2020-08-11 RX ORDER — ACETAMINOPHEN 500 MG
500 TABLET ORAL EVERY 6 HOURS PRN
Qty: 120 TABLET | Refills: 1 | Status: SHIPPED | OUTPATIENT
Start: 2020-08-11 | End: 2021-01-01 | Stop reason: DRUGHIGH

## 2020-08-11 ASSESSMENT — LIFESTYLE VARIABLES: HOW OFTEN DO YOU HAVE A DRINK CONTAINING ALCOHOL: 0

## 2020-08-11 ASSESSMENT — PATIENT HEALTH QUESTIONNAIRE - PHQ9
SUM OF ALL RESPONSES TO PHQ QUESTIONS 1-9: 0
SUM OF ALL RESPONSES TO PHQ QUESTIONS 1-9: 0

## 2020-08-11 NOTE — PROGRESS NOTES
OFFICE VISIT  PROGRESS NOTE         Date of patient's visit: 8/11/20  Patient's Name:  Cleo Bennett  YOB: 1940       Patient Care Team:  Milo Luz MD as PCP - Eugenia Aleman MD as PCP - [facility]        SUBJECTIVE     HISTORY           History of present illness     Pertinent details  added to ,       Chief Complaint   Patient presents with   Peri Langton Medicare AWV          Encounter Diagnoses   Name Primary?  Kidney stones     Chronic gout without tophus, unspecified cause, unspecified site Yes    Routine general medical examination at a health care facility     Hypertensive heart disease with heart failure ([facility]     Mixed hyperlipidemia     Ileostomy status ([facility]     Benign prostatic hyperplasia with nocturia         Symptom / problem          --history obtained from patient. -   Patient is known to have chronic gout which is under fair control  He is taking medicines regularly   Hypertension with heart failure is compliant with medication  [place] Heart Association class II  No headaches   Prostate enlargement symptoms are mild and stable     MEDICATIONS:      Current Outpatient Medications   Medication Sig Dispense Refill    acetaminophen (TYLENOL) 500 MG tablet Take 1 tablet by mouth every 6 hours as needed for Pain 120 tablet 1    allopurinol (ZYLOPRIM) 300 MG tablet Take 1 tablet by mouth daily 90 tablet 5    amLODIPine (NORVASC) 5 MG tablet take 1 tablet by mouth once daily 30 tablet 3    simvastatin (ZOCOR) 20 MG tablet Take 1 tablet by mouth daily 90 tablet 1    sildenafil (REVATIO) 20 MG tablet Take 1 tablet by mouth daily as needed (sexual activity) 30 tablet 5    tamsulosin (FLOMAX) 0.4 MG capsule Take 1 capsule by mouth daily 90 capsule 3    finasteride (PROSCAR) 5 MG tablet Take 1 tablet by mouth daily 90 tablet 3    latanoprost (XALATAN) 0.005 % ophthalmic solution   0    Multiple Vitamins-Minerals (MULTIVITAMIN & MINERAL PO) Take 1 tablet by mouth 2 times daily. No current facility-administered medications for this visit. ALLERGIES:      Allergies   Allergen Reactions    Sulfa Antibiotics     Vimovo [Naproxen-Esomeprazole]      Pain shoulder         SOCIAL HISTORY   Reviewed and no change from previous record. Benito Gould  reports that he has never smoked. He has never used smokeless tobacco.    FAMILY HISTORY:    Reviewed and No change from previous visit    REVIEW OF SYSTEMS:    Review of Systems        OBJECTIVE      Physical exam           Vitals:    08/11/20 1133   BP: 136/78   Temp: 97.3 °F (36.3 °C)   Weight: 232 lb (105.2 kg)   Height: 6' (1.829 m)         Physical Exam   Vitals:  /78   Temp 97.3 °F (36.3 °C)   Ht 6' (1.829 m)   Wt 232 lb (105.2 kg)   BMI 31.46 kg/m²                 Body mass index is 31.46 kg/m².      Vitals:    08/11/20 1133   BP: 136/78   Temp: 97.3 °F (36.3 °C)   Weight: 232 lb (105.2 kg)   Height: 6' (1.829 m)       General -alert, well appearing, and in no distress  Skin - normal coloration and turgor, no rashes,no suspicious skin lesions noted  Eyes - pupils equal and reactive, extraocular eye movementsintact  Ears - bilateral TM's and external ear canals normal  Nose - normal and patent, no erythema, discharge or polyps  Mouth - mucous membranes are moist, pharynx normal without lesions  Neck - supple, no significant adenopathy  Lymphatics - no palpable lymphadenopathy, no hepatosplenomegaly    Chest - clear to auscultation, no wheezes, rales or rhonchi, symmetric air entry    Heart - normal rate, regular rhythm, no murmurs, rubs, clicks or gallops    Extremities - peripheral pulses normal, no pedal edema       Abdomen - soft, nontender, nondistended, no masses or organomegaly    Back - 5.3 mmol/L    Chloride 103 98 - 107 mmol/L    CO2 25 20 - 31 mmol/L    Anion Gap 12 9 - 17 mmol/L    Alkaline Phosphatase 51 40 - 129 U/L    ALT 28 5 - 41 U/L    AST 17 <40 U/L    Total Bilirubin 0.53 0.3 - 1.2 mg/dL    Total Protein 6.5 6.4 - 8.3 g/dL    Alb 4.0 3.5 - 5.2 g/dL    Albumin/Globulin Ratio 1.6 1.0 - 2.5    GFR Non- 58 (L) >60 mL/min    GFR African American >60 >60 mL/min    GFR Comment          GFR Staging NOT REPORTED    CBC   Result Value Ref Range    WBC 7.3 3.5 - 11.3 k/uL    RBC 4.66 4.21 - 5.77 m/uL    Hemoglobin 13.8 13.0 - 17.0 g/dL    Hematocrit 43.1 40.7 - 50.3 %    MCV 92.5 82.6 - 102.9 fL    MCH 29.6 25.2 - 33.5 pg    MCHC 32.0 28.4 - 34.8 g/dL    RDW 13.8 11.8 - 14.4 %    Platelets 944 691 - 089 k/uL    MPV 11.5 8.1 - 13.5 fL    NRBC Automated 0.0 0.0 per 100 WBC   Microscopic Urinalysis   Result Value Ref Range    -          WBC, UA 2 TO 5 0 - 5 /HPF    RBC, UA None 0 - 2 /HPF    Casts UA NOT REPORTED 0 - 2 /LPF    Crystals, UA NOT REPORTED None /HPF    Epithelial Cells UA 0 TO 2 0 - 5 /HPF    Renal Epithelial, UA NOT REPORTED 0 /HPF    Bacteria, UA NOT REPORTED None    Mucus, UA 1+ (A) None    Trichomonas, UA NOT REPORTED None    Amorphous, UA NOT REPORTED None    Other Observations UA NOT REPORTED NOT REQ. Yeast, UA NOT REPORTED None                     VITALS INCLUDING BMI REVIEWED WITH PATIENT  Labs reviewed as noted above   Discussed with patient        ASSESSMENT / Gaines Vic was seen today for medicare awv. Diagnoses and all orders for this visit:    Chronic gout without tophus, unspecified cause, unspecified site  Fair control with treatment  -     acetaminophen (TYLENOL) 500 MG tablet; Take 1 tablet by mouth every 6 hours as needed for Pain    Kidney stones  Continue allopurinol to prevent renal stones  -     allopurinol (ZYLOPRIM) 300 MG tablet;  Take 1 tablet by mouth daily    Routine general medical examination at [facility] facility    Hypertensive heart disease with heart failure (HCC)  Good control  CHF compensated  Mixed hyperlipidemia    Ileostomy status ([facility] [address]  Functioning well  Benign prostatic hyperplasia with nocturia      Patient has a suspicious lesion on his arm  Pigmented with clear outside border  Referred to  to say for excision  High risk for melanoma         SALIENT  ACTIONS TODAYS VISIT       Today's office visit SALIENT ACTIONS    ----            Discussed use, benefit, and side effects of prescribed medications. [x] yes    All patient questions answered. Patient voiced understanding. Patient given educational materials - see patient instructions  [] yes         Medications Discontinued During This Encounter   Medication Reason    acetaminophen (TYLENOL) 500 MG tablet REORDER    allopurinol (ZYLOPRIM) 300 MG tablet REORDER        No orders of the defined types were placed in this encounter. Patient Instructions     Personalized Preventive Plan for Regina Mckeon - 8/11/2020  Medicare offers a range of preventive health benefits. Some of the tests and screenings are paid in full while other may be subject to a deductible, co-insurance, and/or copay. Some of these benefits include a comprehensive review of your medical history including lifestyle, illnesses that may run in your family, and various assessments and screenings as appropriate. After reviewing your medical record and screening and assessments performed today your provider may have ordered immunizations, labs, imaging, and/or referrals for you. A list of these orders (if applicable) as well as your Preventive Care list are included within your After Visit Summary for your review. Other Preventive Recommendations:    · A preventive eye exam performed by an eye specialist is recommended every 1-2 years to screen for glaucoma; cataracts, macular degeneration, and other eye disorders.   · A preventive dental visit is recommended every 6 months. · Try to get at least 150 minutes of exercise per week or 10,000 steps per day on a pedometer . · Order or download the FREE \"Exercise & Physical Activity: Your Everyday Guide\" from [employer]. Call 8-113.656.1365 or search [employer] online. · You need 3859-8684 mg of calcium and 9148-1311 IU of vitamin D per day. It is possible to meet your calcium requirement with diet alone, but a vitamin D supplement is usually necessary to meet this goal.  · When exposed to the sun, use a sunscreen that protects against both UVA and UVB radiation with an SPF of 30 or greater. Reapply every 2 to 3 hours or after sweating, drying off with a towel, or swimming. · Always wear a seat belt when traveling in a car. Always wear a helmet when riding a bicycle or motorcycle. Medication List          Accurate as of August 11, 2020 11:53 AM. If you have any questions, ask your nurse or doctor.             CONTINUE taking these medications    acetaminophen 500 MG tablet  Commonly known as:  TYLENOL  Take 1 tablet by mouth every 6 hours as needed for Pain     allopurinol 300 MG tablet  Commonly known as:  ZYLOPRIM  Take 1 tablet by mouth daily     amLODIPine 5 MG tablet  Commonly known as:  NORVASC  take 1 tablet by mouth once daily     finasteride 5 MG tablet  Commonly known as:  PROSCAR  Take 1 tablet by mouth daily     latanoprost 0.005 % ophthalmic solution  Commonly known as:  XALATAN     MULTIVITAMIN & MINERAL PO     sildenafil 20 MG tablet  Commonly known as:  REVATIO  Take 1 tablet by mouth daily as needed (sexual activity)     simvastatin 20 MG tablet  Commonly known as:  ZOCOR  Take 1 tablet by mouth daily     tamsulosin 0.4 MG capsule  Commonly known as:  FLOMAX  Take 1 capsule by mouth daily           Where to Get Your Medications      These medications were sent to [facility], [address] [facility] Ethnicity: Non-/Non    : 1940 Race: Jenise Kelly is here for Medicare AWV    Screenings for behavioral, psychosocial and functional/safety risks, and cognitive dysfunction are all negative except as indicated below. These results, as well as other patient data from the [address] form, are documented in Flowsheets linked to this Encounter. Allergies   Allergen Reactions    Sulfa Antibiotics     Vimovo [Naproxen-Esomeprazole]      Pain shoulder           Prior to Visit Medications    Medication Sig Taking? Authorizing Provider   acetaminophen (TYLENOL) 500 MG tablet Take 1 tablet by mouth every 6 hours as needed for Pain Yes Tawnya Craig MD   allopurinol (ZYLOPRIM) 300 MG tablet Take 1 tablet by mouth daily Yes Tawnya Craig MD   amLODIPine (NORVASC) 5 MG tablet take 1 tablet by mouth once daily Yes Tawnya Craig MD   simvastatin (ZOCOR) 20 MG tablet Take 1 tablet by mouth daily Yes Tawnya Craig MD   sildenafil (REVATIO) 20 MG tablet Take 1 tablet by mouth daily as needed (sexual activity) Yes MOISES Unger CNP   tamsulosin (FLOMAX) 0.4 MG capsule Take 1 capsule by mouth daily Yes Jose Negro MD   finasteride (PROSCAR) 5 MG tablet Take 1 tablet by mouth daily Yes Jose Negro MD   latanoprost (XALATAN) 0.005 % ophthalmic solution  Yes Historical Provider, MD   Multiple Vitamins-Minerals (MULTIVITAMIN & MINERAL PO) Take 1 tablet by mouth 2 times daily.  Yes Historical Provider, MD         Past Medical History:   Diagnosis Date    Acute respiratory failure following trauma and surgery ([address]     Acute respiratory failure following trauma and surgery ([address]     Altered bowel elimination due to intestinal ostomy ([address]     iliostomy    Full dentures     pt said he has partial upper and lower    Full dentures     Gall stones     GERD (gastroesophageal reflux disease)     GI bleed     Hemorrhage of gastrointestinal tract, unspecified     Hemorrhage of gastrointestinal tract, unspecified     Hyperlipidemia     Hypertension     Kidney stones     Kidney stones     Primary localized osteoarthrosis, lower leg     both shoulders, neck, legs.  Primary localized osteoarthrosis, lower leg     Prostate disorder     Transient disorder of initiating or maintaining sleep     Transient disorder of initiating or maintaining sleep     Unspecified disorder of skin and subcutaneous tissue     Wears glasses     Wears glasses        Past Surgical History:   Procedure Laterality Date    ABDOMEN SURGERY      CATARACT REMOVAL WITH IMPLANT Bilateral     COLONOSCOPY      X3    COLOSTOMY Right     COLOSTOMY BAG ON RT. SIDE    CYSTOSCOPY Right 8/29/2017    CYSTOSCOPY URETERAL STENT INSERTION performed by [facility]arci [facility]ilar[facility], ESOPHAGUS      EYE SURGERY      bettye. eyes, cataracts extracted with iol's    FRACTURE SURGERY      JOINT REPLACEMENT Right 3-23-15    KIDNEY STONE SURGERY Left     KNEE ARTHROSCOPY Right     LITHOTRIPSY Left 06-20-14    w/ cystoscopy C & P    TONSILLECTOMY      TOTAL COLECTOMY      \"PARTIAL SMALL INTESTINES , PARTIAL RECTUM\"    TOTAL KNEE ARTHROPLASTY  03/23/2015    Right with biomet and GPS product application    TOTAL KNEE ARTHROPLASTY Right 3/23/2015         Family History   Problem Relation Age of Onset    Heart Disease Father     Other Mother        CareTeam (Including outside providers/suppliers regularly involved in providing care):   Patient Care Team:  Sukhjinder Gonzales MD as PCP - Betsy Pepper MD as PCP - [facility] Empaneled Provider    Wt Readings from Last 3 Encounters:   08/11/20 232 lb (105.2 kg)   01/30/20 230 lb (104.3 kg)   08/27/19 239 lb (108.4 kg)     Vitals:    08/11/20 1133   BP: 136/78   Temp: 97.3 °F (36.3 °C)   Weight: 232 lb (105.2 kg)   Height: 6' (1.829 m)     Body mass index is 31.46 kg/m².     Based upon direct observation of the patient, evaluation of cognition reveals recent and remote memory intact. Patient's complete Health Risk Assessment and screening values have been reviewed and are found in Flowsheets. The following problems were reviewed today and where indicated follow up appointments were made and/or referrals ordered. Positive Risk Factor Screenings with Interventions:     General Health:  General  In general, how would you say your health is?: Very Good  In the past 7 days, have you experienced any of the following? New or Increased Pain, New or Increased Fatigue, Loneliness, Social Isolation, Stress or Anger?: None of These  Do you get the social and emotional support that you need?: Yes  Do you have a Living Will?: (!) No  General Health Risk Interventions:  · [address] [facility] Habits/Nutrition:  Health Habits/Nutrition  Do you exercise for at least 20 minutes 2-3 times per week?: Yes  Have you lost any weight without trying in the past 3 months?: No  Do you eat fewer than 2 meals per day?: No  Have you seen a dentist within the past year?: Yes  Body mass index is 31.46 kg/m².   Health Habits/Nutrition Interventions:  · Advised    Personalized Preventive Plan   Current Health Maintenance Status  Immunization History   Administered Date(s) Administered    Influenza Vaccine, unspecified formulation 11/24/2015, 10/03/2016    Influenza Virus Vaccine 11/12/2014    Influenza, High Dose (Fluzone 65 yrs and older) 11/24/2017, 09/21/2018    Pneumococcal Conjugate 13-valent ([id number]) 08/11/2015    Pneumococcal Conjugate 7-valent (Prevnar7) 01/01/2007    Pneumococcal Polysaccharide ([id number]) 03/21/2017    Zoster Live (Zostavax) 10/03/2012        Health Maintenance   Topic Date Due    DTaP/Tdap/Td vaccine (1 - Tdap) 06/21/1957    Shingles Vaccine (2 of 3) 11/28/2012    Annual Wellness Visit (AWV)  05/29/2019    Lipid screen  02/21/2020    Flu vaccine (1) 09/01/2020    Pneumococcal 65+ years Vaccine  Completed    Hepatitis A vaccine  Aged Out    Hepatitis B vaccine  Aged Out    Hib vaccine  Aged Out    Meningococcal (ACWY) vaccine  Aged Out     Recommendations for [employer] Due: see orders and patient instructions/AVS.  . Recommended screening schedule for the next 5-10 years is provided to the patient in written form: see Patient Ro Mccain was seen today for medicare awv. Diagnoses and all orders for this visit:    Chronic gout without tophus, unspecified cause, unspecified site  -     acetaminophen (TYLENOL) 500 MG tablet; Take 1 tablet by mouth every 6 hours as needed for Pain    Kidney stones  -     allopurinol (ZYLOPRIM) 300 MG tablet; Take 1 tablet by mouth daily    Routine general medical examination at a health care facility    Hypertensive heart disease with heart failure ([facility] [address]    Mixed hyperlipidemia    [facility] ([facility]    Benign prostatic hyperplasia with nocturia                  Medicare Annual Wellness Visit  Name: Alhaji Beatty Date: 2020   MRN: [id number] Sex: Male   Age: 78 y.o. Ethnicity: Non-/Non    : 1940 Race: Viviane Gama is here for Medicare AWV    Screenings for behavioral, psychosocial and functional/safety risks, and cognitive dysfunction are all negative except as indicated below. These results, as well as other patient data from the [address] form, are documented in Flowsheets linked to this Encounter. Allergies   Allergen Reactions    Sulfa Antibiotics     Vimovo [Naproxen-Esomeprazole]      Pain shoulder         Prior to Visit Medications    Medication Sig Taking?  Authorizing Provider   acetaminophen (TYLENOL) 500 MG tablet Take 1 tablet by mouth every 6 hours as needed for Pain Yes Gee Gonzalez MD   allopurinol (ZYLOPRIM) 300 MG tablet Take 1 tablet by mouth daily Yes Gee Gonzalez MD   amLODIPine (NORVASC) 5 MG tablet take 1 tablet by mouth once daily Yes Qasim THOMAS Julio Whittaker MD   simvastatin (ZOCOR) 20 MG tablet Take 1 tablet by mouth daily Yes Diana Davila MD   sildenafil (REVATIO) 20 MG tablet Take 1 tablet by mouth daily as needed (sexual activity) Yes MOISES Willson - CNP   tamsulosin (FLOMAX) 0.4 MG capsule Take 1 capsule by mouth daily Yes Raquel Nolan MD   finasteride (PROSCAR) 5 MG tablet Take 1 tablet by mouth daily Yes Raquel Nolan MD   latanoprost (XALATAN) 0.005 % ophthalmic solution  Yes Historical Provider, MD   Multiple Vitamins-Minerals (MULTIVITAMIN & MINERAL PO) Take 1 tablet by mouth 2 times daily. Yes Historical Provider, MD       Past Medical History:   Diagnosis Date    Acute respiratory failure following trauma and surgery ([facility]     Acute respiratory failure following trauma and surgery ([facility] [facility]     Altered bowel elimination due to intestinal ostomy ([address]     iliostomy    Full dentures     pt said he has partial upper and lower    Full dentures     Gall stones     GERD (gastroesophageal reflux disease)     GI bleed     Hemorrhage of gastrointestinal tract, unspecified     Hemorrhage of gastrointestinal tract, unspecified     Hyperlipidemia     Hypertension     Kidney stones     Kidney stones     Primary localized osteoarthrosis, lower leg     both shoulders, neck, legs.  Primary localized osteoarthrosis, lower leg     Prostate disorder     Transient disorder of initiating or maintaining sleep     Transient disorder of initiating or maintaining sleep     Unspecified disorder of skin and subcutaneous tissue     Wears glasses     Wears glasses      Past Surgical History:   Procedure Laterality Date    ABDOMEN SURGERY      CATARACT REMOVAL WITH IMPLANT Bilateral     COLONOSCOPY      X3    COLOSTOMY Right     COLOSTOMY BAG ON RT.  SIDE    CYSTOSCOPY Right 8/29/2017    CYSTOSCOPY URETERAL STENT INSERTION performed by Raquel Nolan MD at [place] [address], ESOPHAGUS      EYE SURGERY      bettye. eyes, cataracts extracted with iol's    FRACTURE SURGERY      JOINT REPLACEMENT Right 3-23-15    KIDNEY STONE SURGERY Left     KNEE ARTHROSCOPY Right     LITHOTRIPSY Left 06-20-14    w/ cystoscopy C & P    TONSILLECTOMY      TOTAL COLECTOMY      \"PARTIAL SMALL INTESTINES , PARTIAL RECTUM\"    TOTAL KNEE ARTHROPLASTY  03/23/2015    Right with biomet and GPS product application    TOTAL KNEE ARTHROPLASTY Right 3/23/2015       Family History   Problem Relation Age of Onset    Heart Disease Father     Other Mother        CareTeam (Including outside providers/suppliers regularly involved in providing care):   Patient Care Team:  Renate Calles MD as PCP - Edu Quach MD as PCP - [facility]    Wt Readings from Last 3 Encounters:   08/11/20 232 lb (105.2 kg)   01/30/20 230 lb (104.3 kg)   08/27/19 239 lb (108.4 kg)     Vitals:    08/11/20 1133   BP: 136/78   Temp: 97.3 °F (36.3 °C)   Weight: 232 lb (105.2 kg)   Height: 6' (1.829 m)     Body mass index is 31.46 kg/m². Based upon direct observation of the patient, evaluation of cognition reveals recent and remote memory intact. Physical Exam     Patient's complete Health Risk Assessment and screening values have been reviewed and are found in Flowsheets. The following problems were reviewed today and where indicated follow up appointments were made and/or referrals ordered. Positive Risk Factor Screenings with Interventions:             General Health:  General  In general, how would you say your health is?: Very Good  In the past 7 days, have you experienced any of the following?  New or Increased Pain, New or Increased Fatigue, Loneliness, Social Isolation, Stress or Anger?: None of These  Do you get the social and emotional support that you need?: Yes  Do you have a Living Will?: (!) No      Health Habits/Nutrition:  Health Habits/Nutrition  Do you exercise for at least 20 minutes 2-3 times per week?: Yes  Have you lost any weight without trying in the past 3 months?: No  Do you eat fewer than 2 meals per day?: No  Have you seen a dentist within the past year?: Yes  Body mass index is 31.46 kg/m². Positive Risk Factor Interventions:          Personalized Preventive Plan   Current Health Maintenance Status  Immunization History   Administered Date(s) Administered    Influenza Vaccine, unspecified formulation 11/24/2015, 10/03/2016    Influenza Virus Vaccine 11/12/2014    Influenza, High Dose (Fluzone 65 yrs and older) 11/24/2017, 09/21/2018    Pneumococcal Conjugate 13-valent (Agazxsx45) 08/11/2015    Pneumococcal Conjugate 7-valent (Prevnar7) 01/01/2007    Pneumococcal Polysaccharide ([id number]) 03/21/2017    Zoster Live (Zostavax) 10/03/2012        Health Maintenance   Topic Date Due    DTaP/Tdap/Td vaccine (1 - Tdap) 06/21/1957    Shingles Vaccine (2 of 3) 11/28/2012    Annual Wellness Visit (AWV)  05/29/2019    Lipid screen  02/21/2020    Flu vaccine (1) 09/01/2020    Pneumococcal 65+ years Vaccine  Completed    Hepatitis A vaccine  Aged Out    Hepatitis B vaccine  Aged Out    Hib vaccine  Aged Out    Meningococcal (ACWY) vaccine  Aged Out     Recommendations for [employer] Due: see orders and patient instructions/AVS.    Recommended screening schedule for the next 5-10 years is provided to the patient in written form: see Patient Pasha Kramer was seen today for medicare awv. Diagnoses and all orders for this visit:    Chronic gout without tophus, unspecified cause, unspecified site  -     acetaminophen (TYLENOL) 500 MG tablet; Take 1 tablet by mouth every 6 hours as needed for Pain    Kidney stones  -     allopurinol (ZYLOPRIM) 300 MG tablet;  Take 1 tablet by mouth daily    Routine general medical examination at a health care facility    Hypertensive heart disease with heart failure ([address]    Mixed hyperlipidemia    Ileostomy status ([address]    Benign prostatic hyperplasia with nocturia

## 2020-08-11 NOTE — PATIENT INSTRUCTIONS
Personalized Preventive Plan for Kenny Riojas - 8/11/2020  Medicare offers a range of preventive health benefits. Some of the tests and screenings are paid in full while other may be subject to a deductible, co-insurance, and/or copay. Some of these benefits include a comprehensive review of your medical history including lifestyle, illnesses that may run in your family, and various assessments and screenings as appropriate. After reviewing your medical record and screening and assessments performed today your provider may have ordered immunizations, labs, imaging, and/or referrals for you. A list of these orders (if applicable) as well as your Preventive Care list are included within your After Visit Summary for your review. Other Preventive Recommendations:    · A preventive eye exam performed by an eye specialist is recommended every 1-2 years to screen for glaucoma; cataracts, macular degeneration, and other eye disorders. · A preventive dental visit is recommended every 6 months. · Try to get at least 150 minutes of exercise per week or 10,000 steps per day on a pedometer . · Order or download the FREE \"Exercise & Physical Activity: Your Everyday Guide\" from [employer]. Call 1-606.838.5355 or search [employer] online. · You need 7505-2892 mg of calcium and 8973-8567 IU of vitamin D per day. It is possible to meet your calcium requirement with diet alone, but a vitamin D supplement is usually necessary to meet this goal.  · When exposed to the sun, use a sunscreen that protects against both UVA and UVB radiation with an SPF of 30 or greater. Reapply every 2 to 3 hours or after sweating, drying off with a towel, or swimming. · Always wear a seat belt when traveling in a car. Always wear a helmet when riding a bicycle or motorcycle.

## 2020-09-10 ENCOUNTER — HOSPITAL ENCOUNTER (OUTPATIENT)
Dept: PREADMISSION TESTING | Age: 80
Setting detail: SPECIMEN
Discharge: HOME | End: 2020-09-14
Payer: MEDICARE

## 2020-09-10 PROCEDURE — U0003 INFECTIOUS AGENT DETECTION BY NUCLEIC ACID (DNA OR RNA); SEVERE ACUTE RESPIRATORY SYNDROME CORONAVIRUS 2 (SARS-COV-2) (CORONAVIRUS DISEASE [COVID-19]), AMPLIFIED PROBE TECHNIQUE, MAKING USE OF HIGH THROUGHPUT TECHNOLOGIES AS DESCRIBED BY CMS-2020-01-R: HCPCS

## 2020-09-12 LAB — SARS-COV-2, NAA: NOT DETECTED

## 2020-09-14 ENCOUNTER — HOSPITAL ENCOUNTER (OUTPATIENT)
Age: 80
Setting detail: OUTPATIENT SURGERY
Discharge: HOME | End: 2020-09-14
Attending: SURGERY | Admitting: SURGERY
Payer: MEDICARE

## 2020-09-14 VITALS
TEMPERATURE: 97.7 F | HEART RATE: 67 BPM | BODY MASS INDEX: 30.48 KG/M2 | OXYGEN SATURATION: 96 % | SYSTOLIC BLOOD PRESSURE: 148 MMHG | WEIGHT: 230 LBS | RESPIRATION RATE: 10 BRPM | DIASTOLIC BLOOD PRESSURE: 72 MMHG | HEIGHT: 73 IN

## 2020-09-14 PROCEDURE — 7100000010 HC PHASE II RECOVERY - FIRST 15 MIN: Performed by: SURGERY

## 2020-09-14 PROCEDURE — 88305 TISSUE EXAM BY PATHOLOGIST: CPT

## 2020-09-14 PROCEDURE — 2500000003 HC RX 250 WO HCPCS: Performed by: SURGERY

## 2020-09-14 PROCEDURE — 2709999900 HC NON-CHARGEABLE SUPPLY: Performed by: SURGERY

## 2020-09-14 PROCEDURE — 3600000012 HC SURGERY LEVEL 2 ADDTL 15MIN: Performed by: SURGERY

## 2020-09-14 PROCEDURE — 7100000011 HC PHASE II RECOVERY - ADDTL 15 MIN: Performed by: SURGERY

## 2020-09-14 PROCEDURE — 3600000002 HC SURGERY LEVEL 2 BASE: Performed by: SURGERY

## 2020-09-14 RX ORDER — LIDOCAINE HYDROCHLORIDE AND EPINEPHRINE 10; 10 MG/ML; UG/ML
INJECTION, SOLUTION INFILTRATION; PERINEURAL PRN
Status: DISCONTINUED | OUTPATIENT
Start: 2020-09-14 | End: 2020-09-14 | Stop reason: ALTCHOICE

## 2020-09-14 RX ORDER — CEPHALEXIN 500 MG/1
CAPSULE ORAL
Qty: 21 CAPSULE | Refills: 0 | Status: SHIPPED | OUTPATIENT
Start: 2020-09-14 | End: 2021-01-01

## 2020-09-14 ASSESSMENT — PAIN SCALES - GENERAL: PAINLEVEL_OUTOF10: 0

## 2020-09-14 ASSESSMENT — PAIN - FUNCTIONAL ASSESSMENT: PAIN_FUNCTIONAL_ASSESSMENT: 0-10

## 2020-09-14 NOTE — H&P
HISTORY and [address]       NAME:  Chad Reese  MRN: [id number]   YOB: 1940   Date: 9/14/2020   Age: 78 y.o. Gender: male       COMPLAINT AND PRESENT HISTORY:     Chad Reese is 78 y.o.,  male, here for [address] [facility]  Left. Pt notice the mole on the posterior left shoulder about one month ago, Pt denies any pain. Pt denies history of skin cancer and denies family history of skin cancer No recent falls or trauma. No redness, swelling or rashes. Pt denies any other symptoms. Pt denies fever/chills, no chest pin, no SOB, no headache or palpitation   Pt drink one cup of black coffee this morning, pt took his AM medication multi vitamin, Tamsulosin, and Finasteride. PAST MEDICAL HISTORY     Past Medical History:   Diagnosis Date    Acute respiratory failure following trauma and surgery ([address]     Acute respiratory failure following trauma and surgery ([address]     Altered bowel elimination due to intestinal ostomy ([address]     iliostomy    Full dentures     pt said he has partial upper and lower    Full dentures     Gall stones     GERD (gastroesophageal reflux disease)     GI bleed     Hemorrhage of gastrointestinal tract, unspecified     Hemorrhage of gastrointestinal tract, unspecified     Hyperlipidemia     Hypertension     Kidney stones     Kidney stones     Primary localized osteoarthrosis, lower leg     both shoulders, neck, legs.     Primary localized osteoarthrosis, lower leg     Prostate disorder     Transient disorder of initiating or maintaining sleep     Transient disorder of initiating or maintaining sleep     Unspecified disorder of skin and subcutaneous tissue     Wears glasses     Wears glasses        SURGICAL HISTORY       Past Surgical History:   Procedure Laterality Date    ABDOMEN SURGERY      CATARACT REMOVAL WITH IMPLANT Bilateral     COLONOSCOPY      X3    COLOSTOMY Right COLOSTOMY BAG ON RT.  SIDE    CYSTOSCOPY Right 8/29/2017    CYSTOSCOPY URETERAL STENT INSERTION performed by Rneé Tejada MD at [facility], ESOPHAGUS      EYE SURGERY      bettye. eyes, cataracts extracted with iol's    FRACTURE SURGERY      JOINT REPLACEMENT Right 3-23-15    KIDNEY STONE SURGERY Left     KNEE ARTHROSCOPY Right     LITHOTRIPSY Left 06-20-14    w/ cystoscopy C & P    TONSILLECTOMY      TOTAL COLECTOMY      \"PARTIAL SMALL INTESTINES , PARTIAL RECTUM\"    TOTAL KNEE ARTHROPLASTY  03/23/2015    Right with biomet and GPS product application    TOTAL KNEE ARTHROPLASTY Right 3/23/2015       FAMILY HISTORY       Family History   Problem Relation Age of Onset    Heart Disease Father     Other Mother        SOCIAL HISTORY       Social History     Socioeconomic History    Marital status:      Spouse name: Not on file    Number of children: Not on file    Years of education: Not on file    Highest education level: Not on file   Occupational History    Not on file   Social Needs    Financial resource strain: Not on file    Food insecurity     Worry: Not on file     Inability: Not on file    Transportation needs     Medical: Not on file     Non-medical: Not on file   Tobacco Use    Smoking status: Never Smoker    Smokeless tobacco: Never Used   Substance and Sexual Activity    Alcohol use: No     Alcohol/week: 0.0 standard drinks     Comment: occassional    Drug use: No    Sexual activity: Not on file   Lifestyle    Physical activity     Days per week: Not on file     Minutes per session: Not on file    Stress: Not on file   Relationships    Social connections     Talks on phone: Not on file     Gets together: Not on file     Attends Scientologist service: Not on file     Active member of club or organization: Not on file     Attends meetings of clubs or organizations: Not on file     Relationship status: Not on file    Intimate partner violence     Fear of current or ex partner: Not on file     Emotionally abused: Not on file     Physically abused: Not on file     Forced sexual activity: Not on file   Other Topics Concern    Not on file   Social History Narrative    Not on file           REVIEW OF SYSTEMS      Allergies   Allergen Reactions    Sulfa Antibiotics     Vimovo [Naproxen-Esomeprazole]      Pain shoulder         No current facility-administered medications on file prior to encounter. Current Outpatient Medications on File Prior to Encounter   Medication Sig Dispense Refill    acetaminophen (TYLENOL) 500 MG tablet Take 1 tablet by mouth every 6 hours as needed for Pain 120 tablet 1    allopurinol (ZYLOPRIM) 300 MG tablet Take 1 tablet by mouth daily 90 tablet 5    amLODIPine (NORVASC) 5 MG tablet take 1 tablet by mouth once daily 30 tablet 3    simvastatin (ZOCOR) 20 MG tablet Take 1 tablet by mouth daily 90 tablet 1    sildenafil (REVATIO) 20 MG tablet Take 1 tablet by mouth daily as needed (sexual activity) 30 tablet 5    latanoprost (XALATAN) 0.005 % ophthalmic solution Place 3 drops into the right eye 1 drop in left eye  0    Multiple Vitamins-Minerals (MULTIVITAMIN & MINERAL PO) Take 1 tablet by mouth 2 times daily. Negative except for what is mentioned in the HPI. GENERAL PHYSICAL EXAM     Vitals: /78   Pulse 62   Temp 96.8 °F (36 °C) (Infrared)   Resp 16   Ht 6' 1\" (1.854 m)   Wt 230 lb (104.3 kg)   SpO2 100%   BMI 30.34 kg/m²  Body mass index is 30.34 kg/m². GENERAL APPEARANCE:   Mecca Stark is 78 y.o.,  male, mildly obese, nourished, conscious, alert. Does not appear to be distress or pain at this time. SKIN:  Warm, dry, no cyanosis or jaundice. smale Red mole on the posterior left shoulder               HEAD:  Normocephalic, atraumatic, no swelling or tenderness. EYES:  Pupils equal, reactive to light.            EARS:  No discharge, no marked hearing loss.               NOSE:  No rhinorrhea, epistaxis or septal deformity. THROAT:  Not congested. No ulceration bleeding or discharge. NECK:  No stiffness, trachea central.  No palpable masses or L.N.                 CHEST:  Symmetrical and equal on expansion. HEART:  RRR S1 > S2. No audible murmurs or gallops. LUNGS:  Equal on expansion, normal breath sounds. No adventitious sounds. ABDOMEN:  Obese. Soft on palpation. No dysphagia, No localized tenderness. No guarding or rigidity. No palpable hepatosplenomegaly. LYMPHATICS:  No palpable cervical lymphadenopathy. LOCOMOTOR, BACK AND SPINE:  No tenderness or deformities. EXTREMITIES:  Symmetrical, no pretibial edema. Chandras sign negative. No discoloration or ulcerations. NEUROLOGIC:  The patient is conscious, alert, oriented,Cranial nerve II-XII intact, taste and smell were not examined. No apparent focal sensory or motor deficits.              PROVISIONAL DIAGNOSES / SURGERY:    MOLE LEFT POSTERIOR SHOULDER  MOLE EXCISION POSTERIOR SHOULDER  LEFT   Patient Active Problem List    Diagnosis Date Noted    Skin lesion of left upper extremity 08/11/2020    Pigmented skin lesion of suspected malignant nature 08/11/2020    Recurrent kidney stones 08/28/2017    Benign prostatic hyperplasia with lower urinary tract symptoms 07/25/2017    Glaucoma of right eye 07/25/2017    Chronic gout without tophus 07/25/2017    Hyperlipidemia 05/07/2014    Hypertensive heart disease with heart failure ([facility] 05/07/2014    Ileostomy status ([facility] 05/07/2014           MOISES Maria - CNP on 9/14/2020 at 8:28 AM

## 2020-09-15 LAB — SURGICAL PATHOLOGY REPORT: NORMAL

## 2020-10-05 NOTE — TELEPHONE ENCOUNTER
Refill request received for tamsulosin and finasteride. Pt was last seen on 8/27/19 and is due for yearly check. Appt scheduled for 10/27/20 and states he has plenty of medication to last until then.

## 2020-10-07 NOTE — TELEPHONE ENCOUNTER
Will need f/u. Gave 30 with refill. Be sure [facility] has been filling for PCP to ensure he is not getting meds from 2 different pharmacy and not taking them as ordered.

## 2020-10-07 NOTE — TELEPHONE ENCOUNTER
Pt 's pharmacy called to let us know that the pt was confused and does need a refill on these medications. I spoke to the pt , according to his office visit in 2018 he wanted PCP to order these medications .  He now wants us to order them [facility]

## 2020-10-27 NOTE — PROGRESS NOTES
[address] [address]  Dept: [address] [facility] Urology Office Note - Established    Patient:  Javier Brody  YOB: 1940  Date: 10/27/2020    The patient is a 78 y.o. male who presents todayfor evaluation of the following problems:   Chief Complaint   Patient presents with    1 Year Follow Up       HPI  He is here in follow up for BPH. He is voiding well. He continues on Flomax. No recent hematuria or dysuria. He is on Finasteride as well. He has no voiding complaints at this time. Summary of old records: N/A    Additional History: N/A    Procedures Today: N/A    Urinalysis today:  No results found for this visit on 10/27/20.   Last several PSA's:  Lab Results   Component Value Date    PSA 1.23 03/22/2017    PSA 1.59 12/23/2014    PSA 1.05 07/23/2013     Last total testosterone:  No results found for: TESTOSTERONE    AUA Symptom Score (10/27/2020):  INCOMPLETE EMPTYING: How often have you had the sensation of not emptying your bladder?: Not at all  FREQUENCY: How often do you have to urinate less than every two hours?: Not at all  INTERMITTENCY: How often have you found you stopped and started again several times when you urinated?: Not at all  URGENCY: How often have you found it difficult to postpone urination?: Not at all  WEAK STREAM: How often have you had a weak urinary stream?: Not at all  STRAINING: How often have you had to strain to start  urination?: Not at all  NOCTURIA: How many times did you typically get up at night to uriniate?: NONE  TOTAL I-PSS SCORE[de-identified] 0       Last BUN and creatinine:  Lab Results   Component Value Date    BUN 38 (H) 01/30/2020     Lab Results   Component Value Date    CREATININE 1.21 (H) 01/30/2020       Additional Lab/Culture results: none    Imaging Reviewed during this Office Visit: none  (results were independently reviewed by physician and radiology report verified)    PAST MEDICAL, FAMILY AND SOCIAL HISTORY UPDATE:  Past Medical History:   Diagnosis Date    [facility]     Acute respiratory failure following trauma and surgery ([facility]     Altered bowel elimination due to intestinal ostomy ([address]     iliostomy    Full dentures     pt said he has partial upper and lower    Full dentures     Gall stones     GERD (gastroesophageal reflux disease)     GI bleed     Hemorrhage of gastrointestinal tract, unspecified     Hemorrhage of gastrointestinal tract, unspecified     Hyperlipidemia     Hypertension     Kidney stones     Kidney stones     Primary localized osteoarthrosis, lower leg     both shoulders, neck, legs.  Primary localized osteoarthrosis, lower leg     Prostate disorder     Transient disorder of initiating or maintaining sleep     Transient disorder of initiating or maintaining sleep     Unspecified disorder of skin and subcutaneous tissue     Wears glasses     Wears glasses      Past Surgical History:   Procedure Laterality Date    ABDOMEN SURGERY      CATARACT REMOVAL WITH IMPLANT Bilateral     COLONOSCOPY      X3    COLOSTOMY Right     COLOSTOMY BAG ON RT.  SIDE    CYSTOSCOPY Right 8/29/2017    CYSTOSCOPY URETERAL STENT INSERTION performed by Aliza Mills MD at [facility] 6, ESOPHAGUS      EYE SURGERY      bettye. eyes, cataracts extracted with iol's    FRACTURE SURGERY      JOINT REPLACEMENT Right 3-23-15    KIDNEY STONE SURGERY Left     KNEE ARTHROSCOPY Right     LITHOTRIPSY Left 06-20-14    w/ cystoscopy C & P    SHOULDER SURGERY Left 9/14/2020    MOLE EXCISION POSTERIOR SHOULDER performed by Virginia Deleon MD at [facility] , PARTIAL RECTUM\"    TOTAL KNEE ARTHROPLASTY  03/23/2015    Right with biomet and GPS product application    TOTAL KNEE ARTHROPLASTY Right 3/23/2015 Family History   Problem Relation Age of Onset    Heart Disease Father    Alan Vora Other Mother      Outpatient Medications Marked as Taking for the 10/27/20 encounter (Office Visit) with Alejandra Russell MD   Medication Sig Dispense Refill    finasteride (PROSCAR) 5 MG tablet Take 1 tablet by mouth daily 30 tablet 1    tamsulosin (FLOMAX) 0.4 MG capsule Take 1 capsule by mouth daily 30 capsule 1    amLODIPine (NORVASC) 5 MG tablet take 1 tablet by mouth once daily 30 tablet 3    cephALEXin (KEFLEX) 500 MG capsule 500 mgTake three times daily 21 capsule 0    acetaminophen (TYLENOL) 500 MG tablet Take 1 tablet by mouth every 6 hours as needed for Pain 120 tablet 1    allopurinol (ZYLOPRIM) 300 MG tablet Take 1 tablet by mouth daily 90 tablet 5    simvastatin (ZOCOR) 20 MG tablet Take 1 tablet by mouth daily 90 tablet 1    sildenafil (REVATIO) 20 MG tablet Take 1 tablet by mouth daily as needed (sexual activity) 30 tablet 5    latanoprost (XALATAN) 0.005 % ophthalmic solution Place 3 drops into the right eye 1 drop in left eye  0    Multiple Vitamins-Minerals (MULTIVITAMIN & MINERAL PO) Take 1 tablet by mouth 2 times daily. Sulfa antibiotics and Vimovo [naproxen-esomeprazole]  Social History     Tobacco Use   Smoking Status Never Smoker   Smokeless Tobacco Never Used     (Ifpatient a smoker, smoking cessation counseling offered)    Social History     Substance and Sexual Activity   Alcohol Use No    Alcohol/week: 0.0 standard drinks    Comment: occassional       REVIEW OF SYSTEMS:  Review of Systems    Physical Exam:      Vitals:    10/27/20 1001   BP: 136/88   Temp: 98.2 °F (36.8 °C)     There is no height or weight on file to calculate BMI. Patient is a 78 y.o. male in no acute distress and alert and oriented to person, place and time. Physical Exam  Constitutional: Patient in no acute distress. Neuro: Alert and oriented to person, place and time.   Psych: Mood normal, affect normal  Skin: No rash noted  Lungs: Respiratory effort is normal  Cardiovascular: Warm & Pink  Abdomen: Soft, non-tender, non-distended with no CVA,  No flank tenderness,  Or hepatosplenomegaly   Lymphatics: No palpablelymphadenopathy. Bladder non-tender and not distended. Musculoskeletal: Normal gait and station      Assessment and Plan      1. BPH with obstruction/lower urinary tract symptoms           Plan:          He is doing well with combo therapy. No voiding complaints. Can F/U in 1 year. Return in about 1 year (around 10/27/2021). Prescriptions Ordered:  No orders of the defined types were placed in this encounter. Orders Placed:  No orders of the defined types were placed in this encounter. Estuardo Dueñas MD    Agree with the ROS entered by the MA.

## 2020-10-27 NOTE — PROGRESS NOTES
Review of Systems   Constitutional: Negative for chills, fatigue and fever. Eyes: Negative for pain, redness and visual disturbance. Respiratory: Negative for cough, shortness of breath and wheezing. Cardiovascular: Negative for chest pain and leg swelling. Gastrointestinal: Negative for abdominal pain, constipation, diarrhea, nausea and vomiting. Genitourinary: Negative for difficulty urinating, dysuria, flank pain, frequency, hematuria, scrotal swelling, testicular pain and urgency. Musculoskeletal: Negative for back pain, joint swelling and myalgias. Skin: Negative for rash and wound. Neurological: Negative for dizziness, tremors and numbness. Hematological: Does not bruise/bleed easily.

## 2020-10-27 NOTE — LETTER
[address] [address]  Dept: [phone]  Dept Fax: [phone]        10/27/20    Patient: Iman Claudio  YOB: 1940    Dear Lynda Roque MD,    I had the pleasure of seeing one of your patients, Martínez Cunningham today in the office today. Below are the relevant portions of my assessment and plan of care. IMPRESSION:  1. BPH with obstruction/lower urinary tract symptoms        PLAN:  He is doing well with combo therapy. No voiding complaints. Can F/U in 1 year. Return in about 1 year (around 10/27/2021). Prescriptions Ordered:  No orders of the defined types were placed in this encounter. Orders Placed:  No orders of the defined types were placed in this encounter. Thank you for allowing me to participate in the care of this patient. I will keep you updated on this patient's follow up and I look forward to serving you and your patients again in the future.         Kapil Steve MD

## 2020-12-15 NOTE — PROGRESS NOTES
OFFICE VISIT  PROGRESS NOTE         Date of patient's visit: 12/16/20  Patient's Name:  Brandee Torres  YOB: 1940       Patient Care Team:  Chrystal Prieto MD as PCP - Renae Stovall MD as PCP - [facility] Empaneled Provider        SUBJECTIVE     HISTORY           History of present illness     Pertinent details  added to ,       Chief Complaint   Patient presents with    Follow-up     4 months          Encounter Diagnoses   Name Primary?  Ileostomy status ([facility] Yes    Hypertensive heart disease with heart failure (HCC)         Symptom / problem          --history obtained from patient. -   Was a phone visit  Patient at home  22 minutes    Patient known to have multiple medical problems including gout and recurrent stones takes allopurinol    Also known to have BPH is on tamsulosin with some improvement   Has ileostomy   Has Revatio dose Zocor amlodipine if needed     MEDICATIONS:      Current Outpatient Medications   Medication Sig Dispense Refill    allopurinol (ZYLOPRIM) 300 MG tablet take 1 tablet by mouth once daily 90 tablet 5    tamsulosin (FLOMAX) 0.4 MG capsule Take 1 capsule by mouth daily 30 capsule 1    amLODIPine (NORVASC) 5 MG tablet take 1 tablet by mouth once daily 30 tablet 3    acetaminophen (TYLENOL) 500 MG tablet Take 1 tablet by mouth every 6 hours as needed for Pain 120 tablet 1    simvastatin (ZOCOR) 20 MG tablet Take 1 tablet by mouth daily 90 tablet 1    sildenafil (REVATIO) 20 MG tablet Take 1 tablet by mouth daily as needed (sexual activity) 30 tablet 5    latanoprost (XALATAN) 0.005 % ophthalmic solution Place 3 drops into the right eye 1 drop in left eye  0    Multiple Vitamins-Minerals (MULTIVITAMIN & MINERAL PO) Take 1 tablet by mouth 2 times daily.  finasteride (PROSCAR) 5 MG tablet Take 1 tablet by mouth daily 30 tablet 11    cephALEXin (KEFLEX) 500 MG capsule 500 mgTake three times daily (Patient not taking: Reported on 12/15/2020) 21 capsule 0     No current facility-administered medications for this visit. ALLERGIES:      Allergies   Allergen Reactions    Sulfa Antibiotics     Vimovo [Naproxen-Esomeprazole]      Pain shoulder         SOCIAL HISTORY   Reviewed and no change from previous record. Sharyn Moritz  reports that he has never smoked. He has never used smokeless tobacco.    FAMILY HISTORY:    Reviewed and No change from previous visit    REVIEW OF SYSTEMS:    Review of Systems        OBJECTIVE      Physical exam           There were no vitals filed for this visit. Physical Exam   Vitals: There were no vitals taken for this visit. There is no height or weight on file to calculate BMI. There were no vitals filed for this visit.     General -alert, well appearing, and in no distress  Skin - normal coloration and turgor, no rashes,no suspicious skin lesions noted  Eyes - pupils equal and reactive, extraocular eye movementsintact  Ears - bilateral TM's and external ear canals normal  Nose - normal and patent, no erythema, discharge or polyps  Mouth - mucous membranes are moist, pharynx normal without lesions  Neck - supple, no significant adenopathy  Lymphatics - no palpable lymphadenopathy, no hepatosplenomegaly    Chest - clear to auscultation, no wheezes, rales or rhonchi, symmetric air entry    Heart - normal rate, regular rhythm, no murmurs, rubs, clicks or gallops    Extremities - peripheral pulses normal, no pedal edema       Abdomen - soft, nontender, nondistended, no masses or organomegaly    Back - full range of motion, notenderness, palpable spasm or pain on motion    Neurological - alert, oriented, normal speech, no focal sensory or motor deficit  Musculoskeletal - no joint tenderness, deformity or swelling Tarsha Pressley / FERDINAND / Richard Coleman    [x] Reviewed       Labs      URINE ANALYSIS: No results found for: LABURIN     CBC:  Lab Results   Component Value Date    WBC 7.3 11/16/2020    HGB 14.9 11/16/2020     11/16/2020     02/16/2012        BMP:    Lab Results   Component Value Date     11/16/2020    K 5.0 11/16/2020     11/16/2020    CO2 25 11/16/2020    BUN 18 11/16/2020    CREATININE 0.90 11/16/2020    GLUCOSE 96 11/16/2020    GLUCOSE 100 02/16/2012        No components found for: LDLC  No results found for: LABA1C  No results found for: POCGLU   .     LIVER PROFILE:  Lab Results   Component Value Date    ALT 12 11/16/2020    AST 20 11/16/2020    PROT 7.2 11/16/2020    BILITOT 0.58 11/16/2020    LABALBU 4.1 11/16/2020    LABALBU 4.3 02/16/2012          Results for orders placed or performed during the hospital encounter of 11/16/20   Lipid Panel   Result Value Ref Range    Cholesterol 164 <200 mg/dL    HDL 44 >40 mg/dL    LDL Cholesterol 98 0 - 130 mg/dL    Chol/HDL Ratio 3.7 <5    Triglycerides 112 <150 mg/dL    VLDL NOT REPORTED 1 - 30 mg/dL   Comprehensive Metabolic Panel   Result Value Ref Range    Glucose 96 70 - 99 mg/dL    BUN 18 8 - 23 mg/dL    CREATININE 0.90 0.70 - 1.20 mg/dL    Bun/Cre Ratio NOT REPORTED 9 - 20    Calcium 9.4 8.6 - 10.4 mg/dL    Sodium 141 135 - 144 mmol/L    Potassium 5.0 3.7 - 5.3 mmol/L    Chloride 108 (H) 98 - 107 mmol/L    CO2 25 20 - 31 mmol/L    Anion Gap 8 (L) 9 - 17 mmol/L    Alkaline Phosphatase 42 40 - 129 U/L    ALT 12 5 - 41 U/L    AST 20 <40 U/L    Total Bilirubin 0.58 0.3 - 1.2 mg/dL    Total Protein 7.2 6.4 - 8.3 g/dL    Alb 4.1 3.5 - 5.2 g/dL    Albumin/Globulin Ratio 1.3 1.0 - 2.5    GFR Non-African American >60 >60 mL/min    GFR African American >60 >60 mL/min    GFR Comment          GFR Staging NOT REPORTED    CBC   Result Value Ref Range    WBC 7.3 3.5 - 11.3 k/uL    RBC 4.89 4.21 - 5.77 m/uL    Hemoglobin 14.9 13.0 - 17.0 g/dL Hematocrit 46.7 40.7 - 50.3 %    MCV 95.5 82.6 - 102.9 fL    MCH 30.5 25.2 - 33.5 pg    MCHC 31.9 28.4 - 34.8 g/dL    RDW 13.6 11.8 - 14.4 %    Platelets 623 808 - 669 k/uL    MPV 10.9 8.1 - 13.5 fL    NRBC Automated 0.0 0.0 per 100 WBC                     VITALS INCLUDING BMI REVIEWED WITH PATIENT  Labs reviewed as noted above   Discussed with patient        ASSESSMENT / Renatebrianda Gutierrez was seen today for follow-up. Diagnoses and all orders for this visit:    Ileostomy status ([facility] [address]    Hypertensive heart disease with heart failure ([facility] [address]    Patient known to have hypertension  He denies any chest pain shortness of breath  He is concerned about obesity related issues and multiple family stressors which I counseled him about    Ileostomy working pretty good         [facility] VISIT       Today's office visit 12 [address]    ----            Discussed use, benefit, and side effects of prescribed medications. [x] yes    All patient questions answered. Patient voiced understanding. Patient given educational materials - see patient instructions  [] yes         There are no discontinued medications. No orders of the defined types were placed in this encounter. There are no Patient Instructions on file for this visit. Medication List          Accurate as of December 15, 2020 11:59 PM. If you have any questions, ask your nurse or doctor.             CONTINUE taking these medications    acetaminophen 500 MG tablet  Commonly known as: TYLENOL  Take 1 tablet by mouth every 6 hours as needed for Pain     allopurinol 300 MG tablet  Commonly known as: ZYLOPRIM  take 1 tablet by mouth once daily     amLODIPine 5 MG tablet  Commonly known as: NORVASC  take 1 tablet by mouth once daily     cephALEXin 500 MG capsule  Commonly known as: KEFLEX  500 mgTake three times daily     latanoprost 0.005 % ophthalmic solution  Commonly known as: XALATAN     MULTIVITAMIN & MINERAL PO sildenafil 20 MG tablet  Commonly known as: REVATIO  Take 1 tablet by mouth daily as needed (sexual activity)     simvastatin 20 MG tablet  Commonly known as: ZOCOR  Take 1 tablet by mouth daily     tamsulosin 0.4 MG capsule  Commonly known as: FLOMAX  Take 1 capsule by mouth daily                FOLLOW UP  PLANS     No follow-ups on file. MD JE[facility]ANNY [facility]  [address], [address] [place] [address]. Phone [phone]   Fax: [phone]  Answering Service: [phone]  Visit Information    Have you changed or started any medications since your last visit including any over-the-counter medicines, vitamins, or herbal medicines? no   Are you having any side effects from any of your medications? -  no  Have you stopped taking any of your medications? Is so, why? -  no    Have you seen any other physician or provider since your last visit? Yes - Records Obtained  Have you had any other diagnostic tests since your last visit? Yes - Records Obtained  Have you been seen in the emergency room and/or had an admission to a hospital since we last saw you? No  Have you had your routine dental cleaning in the past 6 months? yes -     Have you activated your [employer] account? If not, what are your barriers?  Yes     Patient Care Team:  Willem Porter MD as PCP - Grover Berg MD as PCP - [facility]    Medical History Review  Past Medical, Family, and Social History reviewed and does not contribute to the patient presenting condition    Health Maintenance   Topic Date Due    DTaP/Tdap/Td vaccine (1 - Tdap) 06/21/1957    Shingles Vaccine (2 of 3) 11/28/2012    Annual Wellness Visit (AWV)  08/12/2021    Lipid screen  11/16/2021    Flu vaccine  Completed    Pneumococcal 65+ years Vaccine  Completed    Hepatitis A vaccine  Aged Out    Hepatitis B vaccine  Aged Out    Hib vaccine  Aged Out    Meningococcal (ACWY) vaccine  Aged Out

## 2021-01-01 ENCOUNTER — APPOINTMENT (OUTPATIENT)
Dept: CT IMAGING | Age: 81
DRG: 871 | End: 2021-01-01
Payer: MEDICARE

## 2021-01-01 ENCOUNTER — APPOINTMENT (OUTPATIENT)
Dept: NON INVASIVE DIAGNOSTICS | Age: 81
DRG: 871 | End: 2021-01-01
Payer: MEDICARE

## 2021-01-01 ENCOUNTER — APPOINTMENT (OUTPATIENT)
Dept: GENERAL RADIOLOGY | Age: 81
End: 2021-01-01
Payer: MEDICARE

## 2021-01-01 ENCOUNTER — HOSPITAL ENCOUNTER (OUTPATIENT)
Dept: WOUND CARE | Age: 81
Discharge: HOME | End: 2021-03-25
Payer: MEDICARE

## 2021-01-01 ENCOUNTER — HOSPITAL ENCOUNTER (INPATIENT)
Age: 81
LOS: 9 days | DRG: 871 | End: 2021-09-26
Attending: EMERGENCY MEDICINE | Admitting: INTERNAL MEDICINE
Payer: MEDICARE

## 2021-01-01 ENCOUNTER — APPOINTMENT (OUTPATIENT)
Dept: GENERAL RADIOLOGY | Age: 81
DRG: 871 | End: 2021-01-01
Payer: MEDICARE

## 2021-01-01 ENCOUNTER — HOSPITAL ENCOUNTER (INPATIENT)
Age: 81
LOS: 4 days | Discharge: NURSING, REHAB OR LONG-TERM CARE | DRG: 871 | End: 2021-08-27
Attending: EMERGENCY MEDICINE | Admitting: INTERNAL MEDICINE
Payer: MEDICARE

## 2021-01-01 ENCOUNTER — APPOINTMENT (OUTPATIENT)
Dept: CT IMAGING | Age: 81
End: 2021-01-01
Payer: MEDICARE

## 2021-01-01 ENCOUNTER — APPOINTMENT (OUTPATIENT)
Dept: INTERVENTIONAL RADIOLOGY/VASCULAR | Age: 81
DRG: 871 | End: 2021-01-01
Payer: MEDICARE

## 2021-01-01 ENCOUNTER — HOSPITAL ENCOUNTER (OUTPATIENT)
Dept: WOUND CARE | Age: 81
Discharge: HOME | End: 2021-04-09
Payer: MEDICARE

## 2021-01-01 ENCOUNTER — HOSPITAL ENCOUNTER (EMERGENCY)
Age: 81
Discharge: HOME | End: 2021-08-19
Attending: EMERGENCY MEDICINE
Payer: MEDICARE

## 2021-01-01 ENCOUNTER — HOSPITAL ENCOUNTER (EMERGENCY)
Age: 81
Discharge: ANOTHER HOSPITAL | End: 2021-07-09
Attending: STUDENT IN AN ORGANIZED HEALTH CARE EDUCATION/TRAINING PROGRAM
Payer: MEDICARE

## 2021-01-01 ENCOUNTER — OFFICE VISIT (OUTPATIENT)
Dept: INTERNAL MEDICINE CLINIC | Age: 81
End: 2021-01-01
Payer: MEDICARE

## 2021-01-01 ENCOUNTER — TELEPHONE (OUTPATIENT)
Dept: UROLOGY | Age: 81
End: 2021-01-01

## 2021-01-01 VITALS
BODY MASS INDEX: 30.48 KG/M2 | HEART RATE: 57 BPM | TEMPERATURE: 98.1 F | DIASTOLIC BLOOD PRESSURE: 57 MMHG | RESPIRATION RATE: 18 BRPM | SYSTOLIC BLOOD PRESSURE: 94 MMHG | WEIGHT: 230 LBS | OXYGEN SATURATION: 98 % | HEIGHT: 73 IN

## 2021-01-01 VITALS
SYSTOLIC BLOOD PRESSURE: 128 MMHG | DIASTOLIC BLOOD PRESSURE: 76 MMHG | TEMPERATURE: 97.2 F | HEART RATE: 80 BPM | RESPIRATION RATE: 16 BRPM

## 2021-01-01 VITALS
HEIGHT: 73 IN | OXYGEN SATURATION: 92 % | BODY MASS INDEX: 25.48 KG/M2 | TEMPERATURE: 97.7 F | WEIGHT: 192.24 LBS | SYSTOLIC BLOOD PRESSURE: 149 MMHG | DIASTOLIC BLOOD PRESSURE: 118 MMHG

## 2021-01-01 VITALS
DIASTOLIC BLOOD PRESSURE: 78 MMHG | TEMPERATURE: 97.8 F | RESPIRATION RATE: 18 BRPM | SYSTOLIC BLOOD PRESSURE: 132 MMHG | HEART RATE: 79 BPM

## 2021-01-01 VITALS
WEIGHT: 230 LBS | OXYGEN SATURATION: 96 % | RESPIRATION RATE: 18 BRPM | HEIGHT: 73 IN | DIASTOLIC BLOOD PRESSURE: 70 MMHG | BODY MASS INDEX: 30.48 KG/M2 | TEMPERATURE: 97.9 F | SYSTOLIC BLOOD PRESSURE: 118 MMHG | HEART RATE: 81 BPM

## 2021-01-01 VITALS
BODY MASS INDEX: 31 KG/M2 | TEMPERATURE: 97 F | OXYGEN SATURATION: 97 % | HEART RATE: 81 BPM | DIASTOLIC BLOOD PRESSURE: 82 MMHG | SYSTOLIC BLOOD PRESSURE: 130 MMHG | WEIGHT: 235 LBS

## 2021-01-01 VITALS
RESPIRATION RATE: 20 BRPM | HEART RATE: 83 BPM | BODY MASS INDEX: 24.66 KG/M2 | DIASTOLIC BLOOD PRESSURE: 70 MMHG | OXYGEN SATURATION: 96 % | WEIGHT: 186.07 LBS | SYSTOLIC BLOOD PRESSURE: 144 MMHG | HEIGHT: 73 IN | TEMPERATURE: 97.9 F

## 2021-01-01 DIAGNOSIS — U07.1 COVID-19: ICD-10-CM

## 2021-01-01 DIAGNOSIS — N39.0 URINARY TRACT INFECTION IN MALE: ICD-10-CM

## 2021-01-01 DIAGNOSIS — I71.019 DISSECTION OF THORACIC AORTA: Primary | ICD-10-CM

## 2021-01-01 DIAGNOSIS — N39.0 URINARY TRACT INFECTION WITH HEMATURIA, SITE UNSPECIFIED: Primary | ICD-10-CM

## 2021-01-01 DIAGNOSIS — K43.9 VENTRAL HERNIA WITHOUT OBSTRUCTION OR GANGRENE: ICD-10-CM

## 2021-01-01 DIAGNOSIS — R31.9 URINARY TRACT INFECTION WITH HEMATURIA, SITE UNSPECIFIED: Primary | ICD-10-CM

## 2021-01-01 DIAGNOSIS — E78.2 MIXED HYPERLIPIDEMIA: ICD-10-CM

## 2021-01-01 DIAGNOSIS — I10 ESSENTIAL HYPERTENSION: ICD-10-CM

## 2021-01-01 DIAGNOSIS — I11.0 HYPERTENSIVE HEART DISEASE WITH HEART FAILURE (HCC): Primary | ICD-10-CM

## 2021-01-01 DIAGNOSIS — A41.9 SEPTICEMIA (HCC): Primary | ICD-10-CM

## 2021-01-01 DIAGNOSIS — Z93.2 ILEOSTOMY STATUS (HCC): ICD-10-CM

## 2021-01-01 DIAGNOSIS — K30 NON-ULCER DYSPEPSIA: ICD-10-CM

## 2021-01-01 DIAGNOSIS — R09.02 HYPOXIA: ICD-10-CM

## 2021-01-01 DIAGNOSIS — M1A.9XX0 CHRONIC GOUT WITHOUT TOPHUS, UNSPECIFIED CAUSE, UNSPECIFIED SITE: ICD-10-CM

## 2021-01-01 DIAGNOSIS — R13.10 DYSPHAGIA, UNSPECIFIED TYPE: Primary | ICD-10-CM

## 2021-01-01 LAB
-: ABNORMAL
-: ABNORMAL
-: NORMAL
ABSOLUTE BANDS #: 0.23 K/UL (ref 0–1)
ABSOLUTE BANDS #: 0.44 K/UL (ref 0–1)
ABSOLUTE EOS #: 0 K/UL (ref 0–0.4)
ABSOLUTE EOS #: 0 K/UL (ref 0–0.4)
ABSOLUTE EOS #: 0.1 K/UL (ref 0–0.4)
ABSOLUTE EOS #: 0.2 K/UL (ref 0–0.4)
ABSOLUTE IMMATURE GRANULOCYTE: ABNORMAL K/UL (ref 0–0.3)
ABSOLUTE LYMPH #: 0.68 K/UL (ref 1–4.8)
ABSOLUTE LYMPH #: 1 K/UL (ref 1–4.8)
ABSOLUTE LYMPH #: 1.1 K/UL (ref 1–4.8)
ABSOLUTE LYMPH #: 1.8 K/UL (ref 1–4.8)
ABSOLUTE MONO #: 0.5 K/UL (ref 0.1–1.3)
ABSOLUTE MONO #: 0.57 K/UL (ref 0.1–1.3)
ABSOLUTE MONO #: 0.8 K/UL (ref 0.1–1.2)
ABSOLUTE MONO #: 1.76 K/UL (ref 0.1–1.3)
ALBUMIN SERPL-MCNC: 3.3 G/DL (ref 3.5–5.2)
ALBUMIN SERPL-MCNC: 3.3 G/DL (ref 3.5–5.2)
ALBUMIN SERPL-MCNC: 3.6 G/DL (ref 3.5–5.2)
ALBUMIN/GLOBULIN RATIO: ABNORMAL (ref 1–2.5)
ALLEN TEST: ABNORMAL
ALP BLD-CCNC: 101 U/L (ref 40–129)
ALP BLD-CCNC: 111 U/L (ref 40–129)
ALP BLD-CCNC: 46 U/L (ref 40–129)
ALT SERPL-CCNC: 15 U/L (ref 5–41)
ALT SERPL-CCNC: 16 U/L (ref 5–41)
ALT SERPL-CCNC: 9 U/L (ref 5–41)
AMORPHOUS: ABNORMAL
AMORPHOUS: ABNORMAL
ANION GAP SERPL CALCULATED.3IONS-SCNC: 10 MMOL/L (ref 9–17)
ANION GAP SERPL CALCULATED.3IONS-SCNC: 11 MMOL/L (ref 9–17)
ANION GAP SERPL CALCULATED.3IONS-SCNC: 11 MMOL/L (ref 9–17)
ANION GAP SERPL CALCULATED.3IONS-SCNC: 14 MMOL/L (ref 9–17)
ANION GAP SERPL CALCULATED.3IONS-SCNC: 14 MMOL/L (ref 9–17)
ANION GAP SERPL CALCULATED.3IONS-SCNC: 16 MMOL/L (ref 9–17)
ANION GAP SERPL CALCULATED.3IONS-SCNC: 6 MMOL/L (ref 9–17)
ANION GAP SERPL CALCULATED.3IONS-SCNC: 8 MMOL/L (ref 9–17)
ANION GAP SERPL CALCULATED.3IONS-SCNC: 9 MMOL/L (ref 9–17)
AST SERPL-CCNC: 15 U/L
AST SERPL-CCNC: 17 U/L
AST SERPL-CCNC: 20 U/L
BACTERIA: ABNORMAL
BACTERIA: ABNORMAL
BANDS: 2 % (ref 0–10)
BANDS: 2 % (ref 0–10)
BASOPHILS # BLD: 0 % (ref 0–2)
BASOPHILS # BLD: 0 % (ref 0–2)
BASOPHILS # BLD: 1 % (ref 0–2)
BASOPHILS # BLD: 1 % (ref 0–2)
BASOPHILS ABSOLUTE: 0 K/UL (ref 0–0.2)
BASOPHILS ABSOLUTE: 0 K/UL (ref 0–0.2)
BASOPHILS ABSOLUTE: 0.1 K/UL (ref 0–0.2)
BASOPHILS ABSOLUTE: 0.1 K/UL (ref 0–0.2)
BILIRUB SERPL-MCNC: 0.34 MG/DL (ref 0.3–1.2)
BILIRUB SERPL-MCNC: 0.68 MG/DL (ref 0.3–1.2)
BILIRUB SERPL-MCNC: 0.79 MG/DL (ref 0.3–1.2)
BILIRUBIN URINE: NEGATIVE
BILIRUBIN URINE: NEGATIVE
BNP INTERPRETATION: ABNORMAL
BUN BLDV-MCNC: 13 MG/DL (ref 8–23)
BUN BLDV-MCNC: 17 MG/DL (ref 8–23)
BUN BLDV-MCNC: 18 MG/DL (ref 8–23)
BUN BLDV-MCNC: 27 MG/DL (ref 8–23)
BUN BLDV-MCNC: 31 MG/DL (ref 8–23)
BUN BLDV-MCNC: 31 MG/DL (ref 8–23)
BUN BLDV-MCNC: 33 MG/DL (ref 8–23)
BUN BLDV-MCNC: 34 MG/DL (ref 8–23)
BUN BLDV-MCNC: 39 MG/DL (ref 8–23)
BUN BLDV-MCNC: 41 MG/DL (ref 8–23)
BUN BLDV-MCNC: 44 MG/DL (ref 8–23)
BUN BLDV-MCNC: 44 MG/DL (ref 8–23)
BUN BLDV-MCNC: 46 MG/DL (ref 8–23)
BUN BLDV-MCNC: 49 MG/DL (ref 8–23)
BUN BLDV-MCNC: 51 MG/DL (ref 8–23)
BUN BLDV-MCNC: 60 MG/DL (ref 8–23)
BUN BLDV-MCNC: 68 MG/DL (ref 8–23)
BUN/CREAT BLD: ABNORMAL (ref 9–20)
C-REACTIVE PROTEIN: 179 MG/L (ref 0–5)
C-REACTIVE PROTEIN: 44.6 MG/L (ref 0–5)
CALCIUM SERPL-MCNC: 10 MG/DL (ref 8.6–10.4)
CALCIUM SERPL-MCNC: 10.1 MG/DL (ref 8.6–10.4)
CALCIUM SERPL-MCNC: 10.1 MG/DL (ref 8.6–10.4)
CALCIUM SERPL-MCNC: 8.3 MG/DL (ref 8.6–10.4)
CALCIUM SERPL-MCNC: 8.5 MG/DL (ref 8.6–10.4)
CALCIUM SERPL-MCNC: 8.7 MG/DL (ref 8.6–10.4)
CALCIUM SERPL-MCNC: 9 MG/DL (ref 8.6–10.4)
CALCIUM SERPL-MCNC: 9.1 MG/DL (ref 8.6–10.4)
CALCIUM SERPL-MCNC: 9.4 MG/DL (ref 8.6–10.4)
CALCIUM SERPL-MCNC: 9.5 MG/DL (ref 8.6–10.4)
CALCIUM SERPL-MCNC: 9.6 MG/DL (ref 8.6–10.4)
CALCIUM SERPL-MCNC: 9.7 MG/DL (ref 8.6–10.4)
CALCIUM SERPL-MCNC: 9.8 MG/DL (ref 8.6–10.4)
CALCIUM SERPL-MCNC: 9.8 MG/DL (ref 8.6–10.4)
CARBOXYHEMOGLOBIN: 1.1 % (ref 0–5)
CASTS UA: ABNORMAL /LPF
CASTS UA: ABNORMAL /LPF
CHLORIDE BLD-SCNC: 101 MMOL/L (ref 98–107)
CHLORIDE BLD-SCNC: 101 MMOL/L (ref 98–107)
CHLORIDE BLD-SCNC: 103 MMOL/L (ref 98–107)
CHLORIDE BLD-SCNC: 106 MMOL/L (ref 98–107)
CHLORIDE BLD-SCNC: 107 MMOL/L (ref 98–107)
CHLORIDE BLD-SCNC: 109 MMOL/L (ref 98–107)
CHLORIDE BLD-SCNC: 112 MMOL/L (ref 98–107)
CHLORIDE BLD-SCNC: 113 MMOL/L (ref 98–107)
CHLORIDE BLD-SCNC: 114 MMOL/L (ref 98–107)
CHLORIDE BLD-SCNC: 115 MMOL/L (ref 98–107)
CHLORIDE BLD-SCNC: 116 MMOL/L (ref 98–107)
CHLORIDE BLD-SCNC: 117 MMOL/L (ref 98–107)
CHLORIDE BLD-SCNC: 119 MMOL/L (ref 98–107)
CHLORIDE BLD-SCNC: 120 MMOL/L (ref 98–107)
CHLORIDE BLD-SCNC: 99 MMOL/L (ref 98–107)
CO2: 19 MMOL/L (ref 20–31)
CO2: 21 MMOL/L (ref 20–31)
CO2: 22 MMOL/L (ref 20–31)
CO2: 22 MMOL/L (ref 20–31)
CO2: 23 MMOL/L (ref 20–31)
CO2: 24 MMOL/L (ref 20–31)
CO2: 25 MMOL/L (ref 20–31)
CO2: 26 MMOL/L (ref 20–31)
COLOR: ABNORMAL
COLOR: ABNORMAL
COMMENT UA: ABNORMAL
COMMENT UA: ABNORMAL
CREAT SERPL-MCNC: 0.83 MG/DL (ref 0.7–1.2)
CREAT SERPL-MCNC: 0.83 MG/DL (ref 0.7–1.2)
CREAT SERPL-MCNC: 0.91 MG/DL (ref 0.7–1.2)
CREAT SERPL-MCNC: 0.95 MG/DL (ref 0.7–1.2)
CREAT SERPL-MCNC: 0.97 MG/DL (ref 0.7–1.2)
CREAT SERPL-MCNC: 1.01 MG/DL (ref 0.7–1.2)
CREAT SERPL-MCNC: 1.04 MG/DL (ref 0.7–1.2)
CREAT SERPL-MCNC: 1.09 MG/DL (ref 0.7–1.2)
CREAT SERPL-MCNC: 1.1 MG/DL (ref 0.7–1.2)
CREAT SERPL-MCNC: 1.15 MG/DL (ref 0.7–1.2)
CREAT SERPL-MCNC: 1.15 MG/DL (ref 0.7–1.2)
CREAT SERPL-MCNC: 1.46 MG/DL (ref 0.7–1.2)
CREAT SERPL-MCNC: 1.55 MG/DL (ref 0.7–1.2)
CREAT SERPL-MCNC: 1.81 MG/DL (ref 0.7–1.2)
CREAT SERPL-MCNC: 1.9 MG/DL (ref 0.7–1.2)
CREAT SERPL-MCNC: 1.98 MG/DL (ref 0.7–1.2)
CREAT SERPL-MCNC: 2.46 MG/DL (ref 0.7–1.2)
CRYSTALS, UA: ABNORMAL /HPF
CRYSTALS, UA: ABNORMAL /HPF
CULTURE: ABNORMAL
D-DIMER QUANTITATIVE: 10.82 MG/L FEU (ref 0–0.59)
D-DIMER QUANTITATIVE: 3.58 MG/L FEU (ref 0–0.59)
D-DIMER QUANTITATIVE: 3.6 MG/L FEU (ref 0–0.59)
D-DIMER QUANTITATIVE: 5.05 MG/L FEU (ref 0–0.59)
DIFFERENTIAL TYPE: ABNORMAL
EKG ATRIAL RATE: 101 BPM
EKG ATRIAL RATE: 58 BPM
EKG ATRIAL RATE: 91 BPM
EKG ATRIAL RATE: 97 BPM
EKG P AXIS: 101 DEGREES
EKG P AXIS: 41 DEGREES
EKG P AXIS: 57 DEGREES
EKG P AXIS: 83 DEGREES
EKG P-R INTERVAL: 188 MS
EKG P-R INTERVAL: 198 MS
EKG P-R INTERVAL: 202 MS
EKG P-R INTERVAL: 204 MS
EKG Q-T INTERVAL: 306 MS
EKG Q-T INTERVAL: 352 MS
EKG Q-T INTERVAL: 378 MS
EKG Q-T INTERVAL: 430 MS
EKG QRS DURATION: 80 MS
EKG QRS DURATION: 86 MS
EKG QRS DURATION: 88 MS
EKG QRS DURATION: 90 MS
EKG QTC CALCULATION (BAZETT): 396 MS
EKG QTC CALCULATION (BAZETT): 422 MS
EKG QTC CALCULATION (BAZETT): 447 MS
EKG QTC CALCULATION (BAZETT): 464 MS
EKG R AXIS: 2 DEGREES
EKG R AXIS: 32 DEGREES
EKG R AXIS: 55 DEGREES
EKG R AXIS: 9 DEGREES
EKG T AXIS: 34 DEGREES
EKG T AXIS: 42 DEGREES
EKG T AXIS: 49 DEGREES
EKG T AXIS: 53 DEGREES
EKG VENTRICULAR RATE: 101 BPM
EKG VENTRICULAR RATE: 58 BPM
EKG VENTRICULAR RATE: 91 BPM
EKG VENTRICULAR RATE: 97 BPM
EOSINOPHILS RELATIVE PERCENT: 0 % (ref 0–4)
EOSINOPHILS RELATIVE PERCENT: 0 % (ref 0–4)
EOSINOPHILS RELATIVE PERCENT: 1 % (ref 1–4)
EOSINOPHILS RELATIVE PERCENT: 3 % (ref 0–4)
EPITHELIAL CELLS UA: ABNORMAL /HPF
EPITHELIAL CELLS UA: ABNORMAL /HPF
FERRITIN: 2434 UG/L (ref 30–400)
FERRITIN: 3636 UG/L (ref 30–400)
FIO2: ABNORMAL
GFR AFRICAN AMERICAN: 31 ML/MIN
GFR AFRICAN AMERICAN: 39 ML/MIN
GFR AFRICAN AMERICAN: 41 ML/MIN
GFR AFRICAN AMERICAN: 44 ML/MIN
GFR AFRICAN AMERICAN: 52 ML/MIN
GFR AFRICAN AMERICAN: 56 ML/MIN
GFR AFRICAN AMERICAN: >60 ML/MIN
GFR NON-AFRICAN AMERICAN: 25 ML/MIN
GFR NON-AFRICAN AMERICAN: 32 ML/MIN
GFR NON-AFRICAN AMERICAN: 34 ML/MIN
GFR NON-AFRICAN AMERICAN: 36 ML/MIN
GFR NON-AFRICAN AMERICAN: 43 ML/MIN
GFR NON-AFRICAN AMERICAN: 46 ML/MIN
GFR NON-AFRICAN AMERICAN: >60 ML/MIN
GFR SERPL CREATININE-BSD FRML MDRD: ABNORMAL ML/MIN/{1.73_M2}
GLUCOSE BLD-MCNC: 103 MG/DL (ref 70–99)
GLUCOSE BLD-MCNC: 104 MG/DL (ref 70–99)
GLUCOSE BLD-MCNC: 105 MG/DL (ref 70–99)
GLUCOSE BLD-MCNC: 105 MG/DL (ref 70–99)
GLUCOSE BLD-MCNC: 107 MG/DL (ref 70–99)
GLUCOSE BLD-MCNC: 120 MG/DL (ref 70–99)
GLUCOSE BLD-MCNC: 125 MG/DL (ref 70–99)
GLUCOSE BLD-MCNC: 125 MG/DL (ref 70–99)
GLUCOSE BLD-MCNC: 134 MG/DL (ref 70–99)
GLUCOSE BLD-MCNC: 153 MG/DL (ref 70–99)
GLUCOSE BLD-MCNC: 157 MG/DL (ref 70–99)
GLUCOSE BLD-MCNC: 80 MG/DL (ref 70–99)
GLUCOSE BLD-MCNC: 88 MG/DL (ref 70–99)
GLUCOSE BLD-MCNC: 88 MG/DL (ref 70–99)
GLUCOSE BLD-MCNC: 91 MG/DL (ref 70–99)
GLUCOSE BLD-MCNC: 97 MG/DL (ref 70–99)
GLUCOSE BLD-MCNC: 99 MG/DL (ref 70–99)
GLUCOSE URINE: NEGATIVE
GLUCOSE URINE: NEGATIVE
HCO3 ARTERIAL: 23.8 MMOL/L (ref 22–26)
HCT VFR BLD CALC: 30.7 % (ref 41–53)
HCT VFR BLD CALC: 30.7 % (ref 41–53)
HCT VFR BLD CALC: 31.8 % (ref 41–53)
HCT VFR BLD CALC: 33 % (ref 41–53)
HCT VFR BLD CALC: 38.1 % (ref 41–53)
HCT VFR BLD CALC: 38.1 % (ref 41–53)
HCT VFR BLD CALC: 38.3 % (ref 41–53)
HCT VFR BLD CALC: 38.4 % (ref 41–53)
HCT VFR BLD CALC: 39.6 % (ref 41–53)
HCT VFR BLD CALC: 39.7 % (ref 41–53)
HCT VFR BLD CALC: 40.1 % (ref 41–53)
HCT VFR BLD CALC: 40.2 % (ref 41–53)
HCT VFR BLD CALC: 41 % (ref 41–53)
HCT VFR BLD CALC: 42.7 % (ref 41–53)
HCT VFR BLD CALC: 43.9 % (ref 41–53)
HCT VFR BLD CALC: 44 % (ref 41–53)
HCT VFR BLD CALC: 44.1 % (ref 41–53)
HEMOGLOBIN: 10 G/DL (ref 13.5–17.5)
HEMOGLOBIN: 10.2 G/DL (ref 13.5–17.5)
HEMOGLOBIN: 10.6 G/DL (ref 13.5–17.5)
HEMOGLOBIN: 11.8 G/DL (ref 13.5–17.5)
HEMOGLOBIN: 12.1 G/DL (ref 13.5–17.5)
HEMOGLOBIN: 12.3 G/DL (ref 13.5–17.5)
HEMOGLOBIN: 12.6 G/DL (ref 13.5–17.5)
HEMOGLOBIN: 12.8 G/DL (ref 13.5–17.5)
HEMOGLOBIN: 13.1 G/DL (ref 13.5–17.5)
HEMOGLOBIN: 13.1 G/DL (ref 13.5–17.5)
HEMOGLOBIN: 13.8 G/DL (ref 13.5–17.5)
HEMOGLOBIN: 14 G/DL (ref 13.5–17.5)
HEMOGLOBIN: 14 G/DL (ref 13.5–17.5)
HEMOGLOBIN: 9.7 G/DL (ref 13.5–17.5)
IMMATURE GRANULOCYTES: ABNORMAL %
INR BLD: 1.1
KETONES, URINE: ABNORMAL
KETONES, URINE: NEGATIVE
LACTATE DEHYDROGENASE: 360 U/L (ref 135–225)
LACTATE DEHYDROGENASE: 449 U/L (ref 135–225)
LACTIC ACID, SEPSIS WHOLE BLOOD: ABNORMAL MMOL/L (ref 0.5–1.9)
LACTIC ACID, SEPSIS WHOLE BLOOD: NORMAL MMOL/L (ref 0.5–1.9)
LACTIC ACID, SEPSIS WHOLE BLOOD: NORMAL MMOL/L (ref 0.5–1.9)
LACTIC ACID, SEPSIS: 1.2 MMOL/L (ref 0.5–1.9)
LACTIC ACID, SEPSIS: 1.8 MMOL/L (ref 0.5–1.9)
LACTIC ACID, SEPSIS: 2.4 MMOL/L (ref 0.5–1.9)
LACTIC ACID: 0.9 MMOL/L (ref 0.5–2.2)
LACTIC ACID: 1 MMOL/L (ref 0.5–2.2)
LACTIC ACID: 2.2 MMOL/L (ref 0.5–2.2)
LEUKOCYTE ESTERASE, URINE: ABNORMAL
LEUKOCYTE ESTERASE, URINE: ABNORMAL
LIPASE: 17 U/L (ref 13–60)
LV EF: 55 %
LVEF MODALITY: NORMAL
LYMPHOCYTES # BLD: 11 % (ref 24–44)
LYMPHOCYTES # BLD: 27 % (ref 24–44)
LYMPHOCYTES # BLD: 5 % (ref 24–44)
LYMPHOCYTES # BLD: 6 % (ref 24–44)
Lab: ABNORMAL
MAGNESIUM: 1.7 MG/DL (ref 1.6–2.6)
MAGNESIUM: 1.8 MG/DL (ref 1.6–2.6)
MAGNESIUM: 1.9 MG/DL (ref 1.6–2.6)
MAGNESIUM: 1.9 MG/DL (ref 1.6–2.6)
MAGNESIUM: 2 MG/DL (ref 1.6–2.6)
MAGNESIUM: 2 MG/DL (ref 1.6–2.6)
MAGNESIUM: 2.1 MG/DL (ref 1.6–2.6)
MAGNESIUM: 2.2 MG/DL (ref 1.6–2.6)
MAGNESIUM: 2.2 MG/DL (ref 1.6–2.6)
MCH RBC QN AUTO: 26.4 PG (ref 26–34)
MCH RBC QN AUTO: 26.5 PG (ref 26–34)
MCH RBC QN AUTO: 26.5 PG (ref 26–34)
MCH RBC QN AUTO: 26.6 PG (ref 26–34)
MCH RBC QN AUTO: 26.7 PG (ref 26–34)
MCH RBC QN AUTO: 26.9 PG (ref 26–34)
MCH RBC QN AUTO: 27 PG (ref 26–34)
MCH RBC QN AUTO: 27.2 PG (ref 26–34)
MCH RBC QN AUTO: 27.7 PG (ref 26–34)
MCH RBC QN AUTO: 27.7 PG (ref 26–34)
MCH RBC QN AUTO: 27.8 PG (ref 26–34)
MCH RBC QN AUTO: 27.9 PG (ref 26–34)
MCH RBC QN AUTO: 28.1 PG (ref 26–34)
MCH RBC QN AUTO: 28.2 PG (ref 26–34)
MCH RBC QN AUTO: 30.3 PG (ref 26–34)
MCHC RBC AUTO-ENTMCNC: 30.6 G/DL (ref 31–37)
MCHC RBC AUTO-ENTMCNC: 30.7 G/DL (ref 31–37)
MCHC RBC AUTO-ENTMCNC: 31.3 G/DL (ref 31–37)
MCHC RBC AUTO-ENTMCNC: 31.4 G/DL (ref 31–37)
MCHC RBC AUTO-ENTMCNC: 31.5 G/DL (ref 31–37)
MCHC RBC AUTO-ENTMCNC: 31.7 G/DL (ref 31–37)
MCHC RBC AUTO-ENTMCNC: 31.8 G/DL (ref 31–37)
MCHC RBC AUTO-ENTMCNC: 31.9 G/DL (ref 31–37)
MCHC RBC AUTO-ENTMCNC: 32.1 G/DL (ref 31–37)
MCHC RBC AUTO-ENTMCNC: 32.2 G/DL (ref 31–37)
MCHC RBC AUTO-ENTMCNC: 32.2 G/DL (ref 31–37)
MCHC RBC AUTO-ENTMCNC: 32.3 G/DL (ref 31–37)
MCHC RBC AUTO-ENTMCNC: 33.1 G/DL (ref 31–37)
MCHC RBC AUTO-ENTMCNC: 33.4 G/DL (ref 31–37)
MCV RBC AUTO: 83.4 FL (ref 80–100)
MCV RBC AUTO: 83.5 FL (ref 80–100)
MCV RBC AUTO: 83.5 FL (ref 80–100)
MCV RBC AUTO: 84.5 FL (ref 80–100)
MCV RBC AUTO: 84.5 FL (ref 80–100)
MCV RBC AUTO: 84.8 FL (ref 80–100)
MCV RBC AUTO: 85.3 FL (ref 80–100)
MCV RBC AUTO: 85.6 FL (ref 80–100)
MCV RBC AUTO: 86.3 FL (ref 80–100)
MCV RBC AUTO: 86.5 FL (ref 80–100)
MCV RBC AUTO: 86.5 FL (ref 80–100)
MCV RBC AUTO: 86.8 FL (ref 80–100)
MCV RBC AUTO: 86.9 FL (ref 80–100)
MCV RBC AUTO: 87.1 FL (ref 80–100)
MCV RBC AUTO: 87.4 FL (ref 80–100)
MCV RBC AUTO: 89.3 FL (ref 80–100)
MCV RBC AUTO: 90.5 FL (ref 80–100)
METHEMOGLOBIN: 0.3 % (ref 0–1.9)
MODE: ABNORMAL
MONOCYTES # BLD: 5 % (ref 1–7)
MONOCYTES # BLD: 8 % (ref 1–7)
MONOCYTES # BLD: 8 % (ref 1–7)
MONOCYTES # BLD: 9 % (ref 2–11)
MORPHOLOGY: ABNORMAL
MUCUS: ABNORMAL
MUCUS: ABNORMAL
MYOGLOBIN: 116 NG/ML (ref 28–72)
NEGATIVE BASE EXCESS, ART: 0.8 MMOL/L (ref 0–2)
NITRITE, URINE: NEGATIVE
NITRITE, URINE: NEGATIVE
NOTIFICATION TIME: ABNORMAL
NOTIFICATION: ABNORMAL
NRBC AUTOMATED: ABNORMAL PER 100 WBC
O2 DEVICE/FLOW/%: ABNORMAL
O2 SAT, ARTERIAL: 91.6 % (ref 95–98)
OTHER OBSERVATIONS UA: ABNORMAL
OTHER OBSERVATIONS UA: ABNORMAL
OXYHEMOGLOBIN: ABNORMAL % (ref 95–98)
PARTIAL THROMBOPLASTIN TIME: 28.7 SEC (ref 24–36)
PARTIAL THROMBOPLASTIN TIME: 32.5 SEC (ref 24–36)
PARTIAL THROMBOPLASTIN TIME: 35.2 SEC (ref 24–36)
PATIENT TEMP: 37
PCO2 ARTERIAL: 37.5 MMHG (ref 35–45)
PCO2, ART, TEMP ADJ: ABNORMAL (ref 35–45)
PDW BLD-RTO: 14 % (ref 11.5–14.9)
PDW BLD-RTO: 15 % (ref 12.5–15.4)
PDW BLD-RTO: 15.1 % (ref 11.5–14.9)
PDW BLD-RTO: 15.2 % (ref 11.5–14.9)
PDW BLD-RTO: 15.2 % (ref 11.5–14.9)
PDW BLD-RTO: 15.3 % (ref 11.5–14.9)
PDW BLD-RTO: 15.5 % (ref 11.5–14.9)
PDW BLD-RTO: 17 % (ref 11.5–14.9)
PDW BLD-RTO: 17.1 % (ref 11.5–14.9)
PDW BLD-RTO: 17.2 % (ref 11.5–14.9)
PDW BLD-RTO: 17.3 % (ref 11.5–14.9)
PDW BLD-RTO: 17.4 % (ref 11.5–14.9)
PDW BLD-RTO: 17.5 % (ref 11.5–14.9)
PDW BLD-RTO: 17.7 % (ref 11.5–14.9)
PDW BLD-RTO: 18 % (ref 11.5–14.9)
PEEP/CPAP: ABNORMAL
PH ARTERIAL: 7.41 (ref 7.35–7.45)
PH UA: 5.5 (ref 5–8)
PH UA: 5.5 (ref 5–8)
PH, ART, TEMP ADJ: ABNORMAL (ref 7.35–7.45)
PLATELET # BLD: 102 K/UL (ref 150–450)
PLATELET # BLD: 121 K/UL (ref 150–450)
PLATELET # BLD: 122 K/UL (ref 150–450)
PLATELET # BLD: 132 K/UL (ref 150–450)
PLATELET # BLD: 134 K/UL (ref 150–450)
PLATELET # BLD: 137 K/UL (ref 150–450)
PLATELET # BLD: 146 K/UL (ref 150–450)
PLATELET # BLD: 155 K/UL (ref 150–450)
PLATELET # BLD: 156 K/UL (ref 150–450)
PLATELET # BLD: 159 K/UL (ref 150–450)
PLATELET # BLD: 161 K/UL (ref 150–450)
PLATELET # BLD: 170 K/UL (ref 150–450)
PLATELET # BLD: 173 K/UL (ref 150–450)
PLATELET # BLD: 177 K/UL (ref 150–450)
PLATELET # BLD: 178 K/UL (ref 150–450)
PLATELET # BLD: 184 K/UL (ref 150–450)
PLATELET # BLD: 210 K/UL (ref 140–450)
PLATELET ESTIMATE: ABNORMAL
PMV BLD AUTO: 7.8 FL (ref 6–12)
PMV BLD AUTO: 7.9 FL (ref 6–12)
PMV BLD AUTO: 7.9 FL (ref 6–12)
PMV BLD AUTO: 8.1 FL (ref 6–12)
PMV BLD AUTO: 8.2 FL (ref 6–12)
PMV BLD AUTO: 8.2 FL (ref 6–12)
PMV BLD AUTO: 8.3 FL (ref 6–12)
PMV BLD AUTO: 8.4 FL (ref 6–12)
PMV BLD AUTO: 8.5 FL (ref 6–12)
PMV BLD AUTO: 8.6 FL (ref 6–12)
PMV BLD AUTO: 8.8 FL (ref 6–12)
PMV BLD AUTO: 9.1 FL (ref 6–12)
PMV BLD AUTO: 9.2 FL (ref 6–12)
PMV BLD AUTO: 9.3 FL (ref 6–12)
PO2 ARTERIAL: 64.8 MMHG (ref 80–100)
PO2, ART, TEMP ADJ: ABNORMAL MMHG (ref 80–100)
POSITIVE BASE EXCESS, ART: ABNORMAL MMOL/L (ref 0–2)
POTASSIUM SERPL-SCNC: 3.4 MMOL/L (ref 3.7–5.3)
POTASSIUM SERPL-SCNC: 3.7 MMOL/L (ref 3.7–5.3)
POTASSIUM SERPL-SCNC: 3.9 MMOL/L (ref 3.7–5.3)
POTASSIUM SERPL-SCNC: 4 MMOL/L (ref 3.7–5.3)
POTASSIUM SERPL-SCNC: 4.2 MMOL/L (ref 3.7–5.3)
POTASSIUM SERPL-SCNC: 4.3 MMOL/L (ref 3.7–5.3)
POTASSIUM SERPL-SCNC: 4.4 MMOL/L (ref 3.7–5.3)
POTASSIUM SERPL-SCNC: 4.4 MMOL/L (ref 3.7–5.3)
POTASSIUM SERPL-SCNC: 4.6 MMOL/L (ref 3.7–5.3)
POTASSIUM SERPL-SCNC: 4.7 MMOL/L (ref 3.7–5.3)
POTASSIUM SERPL-SCNC: 4.7 MMOL/L (ref 3.7–5.3)
POTASSIUM SERPL-SCNC: 4.9 MMOL/L (ref 3.7–5.3)
POTASSIUM SERPL-SCNC: 5.1 MMOL/L (ref 3.7–5.3)
POTASSIUM SERPL-SCNC: 5.1 MMOL/L (ref 3.7–5.3)
POTASSIUM SERPL-SCNC: 5.4 MMOL/L (ref 3.7–5.3)
PRO-BNP: 1549 PG/ML
PRO-BNP: 1719 PG/ML
PRO-BNP: 2812 PG/ML
PROCALCITONIN: 0.15 NG/ML
PROTEIN UA: ABNORMAL
PROTEIN UA: ABNORMAL
PROTHROMBIN TIME: 13.9 SEC (ref 11.8–14.6)
PROTHROMBIN TIME: 14.4 SEC (ref 11.8–14.6)
PROTHROMBIN TIME: 14.7 SEC (ref 11.8–14.6)
PSV: ABNORMAL
PT. POSITION: ABNORMAL
RBC # BLD: 3.51 M/UL (ref 4.5–5.9)
RBC # BLD: 3.56 M/UL (ref 4.5–5.9)
RBC # BLD: 3.6 M/UL (ref 4.5–5.9)
RBC # BLD: 3.81 M/UL (ref 4.5–5.9)
RBC # BLD: 4.25 M/UL (ref 4.5–5.9)
RBC # BLD: 4.37 M/UL (ref 4.5–5.9)
RBC # BLD: 4.4 M/UL (ref 4.5–5.9)
RBC # BLD: 4.56 M/UL (ref 4.5–5.9)
RBC # BLD: 4.58 M/UL (ref 4.5–5.9)
RBC # BLD: 4.63 M/UL (ref 4.5–5.9)
RBC # BLD: 4.69 M/UL (ref 4.5–5.9)
RBC # BLD: 4.7 M/UL (ref 4.5–5.9)
RBC # BLD: 4.91 M/UL (ref 4.5–5.9)
RBC # BLD: 4.94 M/UL (ref 4.5–5.9)
RBC # BLD: 5.18 M/UL (ref 4.5–5.9)
RBC # BLD: 5.21 M/UL (ref 4.5–5.9)
RBC # BLD: 5.29 M/UL (ref 4.5–5.9)
RBC # BLD: ABNORMAL 10*6/UL
RBC UA: ABNORMAL /HPF
RBC UA: ABNORMAL /HPF
REASON FOR REJECTION: NORMAL
RENAL EPITHELIAL, UA: ABNORMAL /HPF
RENAL EPITHELIAL, UA: ABNORMAL /HPF
RESPIRATORY RATE: 23
SAMPLE SITE: ABNORMAL
SARS-COV-2, RAPID: DETECTED
SARS-COV-2, RAPID: NOT DETECTED
SEG NEUTROPHILS: 61 % (ref 36–66)
SEG NEUTROPHILS: 78 % (ref 36–66)
SEG NEUTROPHILS: 85 % (ref 36–66)
SEG NEUTROPHILS: 87 % (ref 36–66)
SEGMENTED NEUTROPHILS ABSOLUTE COUNT: 18.7 K/UL (ref 1.3–9.1)
SEGMENTED NEUTROPHILS ABSOLUTE COUNT: 4.1 K/UL (ref 1.3–9.1)
SEGMENTED NEUTROPHILS ABSOLUTE COUNT: 7.4 K/UL (ref 1.8–7.7)
SEGMENTED NEUTROPHILS ABSOLUTE COUNT: 9.82 K/UL (ref 1.3–9.1)
SET RATE: ABNORMAL
SODIUM BLD-SCNC: 134 MMOL/L (ref 135–144)
SODIUM BLD-SCNC: 135 MMOL/L (ref 135–144)
SODIUM BLD-SCNC: 137 MMOL/L (ref 135–144)
SODIUM BLD-SCNC: 139 MMOL/L (ref 135–144)
SODIUM BLD-SCNC: 140 MMOL/L (ref 135–144)
SODIUM BLD-SCNC: 140 MMOL/L (ref 135–144)
SODIUM BLD-SCNC: 142 MMOL/L (ref 135–144)
SODIUM BLD-SCNC: 144 MMOL/L (ref 135–144)
SODIUM BLD-SCNC: 145 MMOL/L (ref 135–144)
SODIUM BLD-SCNC: 145 MMOL/L (ref 135–144)
SODIUM BLD-SCNC: 148 MMOL/L (ref 135–144)
SODIUM BLD-SCNC: 149 MMOL/L (ref 135–144)
SODIUM BLD-SCNC: 149 MMOL/L (ref 135–144)
SODIUM BLD-SCNC: 150 MMOL/L (ref 135–144)
SODIUM BLD-SCNC: 151 MMOL/L (ref 135–144)
SODIUM BLD-SCNC: 152 MMOL/L (ref 135–144)
SODIUM BLD-SCNC: 152 MMOL/L (ref 135–144)
SPECIFIC GRAVITY UA: 1.01 (ref 1–1.03)
SPECIFIC GRAVITY UA: 1.02 (ref 1–1.03)
SPECIMEN DESCRIPTION: ABNORMAL
SPECIMEN DESCRIPTION: NORMAL
TEXT FOR RESPIRATORY: ABNORMAL
THYROXINE, FREE: 1.47 NG/DL (ref 0.93–1.7)
TOTAL CK: 24 U/L (ref 39–308)
TOTAL HB: ABNORMAL G/DL (ref 12–16)
TOTAL PROTEIN: 6.3 G/DL (ref 6.4–8.3)
TOTAL PROTEIN: 7.6 G/DL (ref 6.4–8.3)
TOTAL PROTEIN: 7.7 G/DL (ref 6.4–8.3)
TOTAL RATE: ABNORMAL
TRICHOMONAS: ABNORMAL
TRICHOMONAS: ABNORMAL
TROPONIN INTERP: ABNORMAL
TROPONIN INTERP: NORMAL
TROPONIN INTERP: NORMAL
TROPONIN T: ABNORMAL NG/ML
TROPONIN T: NORMAL NG/ML
TROPONIN T: NORMAL NG/ML
TROPONIN, HIGH SENSITIVITY: 17 NG/L (ref 0–22)
TROPONIN, HIGH SENSITIVITY: 22 NG/L (ref 0–22)
TROPONIN, HIGH SENSITIVITY: 45 NG/L (ref 0–22)
TROPONIN, HIGH SENSITIVITY: 51 NG/L (ref 0–22)
TROPONIN, HIGH SENSITIVITY: 53 NG/L (ref 0–22)
TROPONIN, HIGH SENSITIVITY: 64 NG/L (ref 0–22)
TROPONIN, HIGH SENSITIVITY: 68 NG/L (ref 0–22)
TROPONIN, HIGH SENSITIVITY: 77 NG/L (ref 0–22)
TROPONIN, HIGH SENSITIVITY: 79 NG/L (ref 0–22)
TSH SERPL DL<=0.05 MIU/L-ACNC: 5.98 MIU/L (ref 0.3–5)
TURBIDITY: ABNORMAL
TURBIDITY: ABNORMAL
URINE HGB: ABNORMAL
URINE HGB: ABNORMAL
UROBILINOGEN, URINE: NORMAL
UROBILINOGEN, URINE: NORMAL
VT: ABNORMAL
WBC # BLD: 10.7 K/UL (ref 3.5–11)
WBC # BLD: 11 K/UL (ref 3.5–11)
WBC # BLD: 11.3 K/UL (ref 3.5–11)
WBC # BLD: 12.2 K/UL (ref 3.5–11)
WBC # BLD: 14.3 K/UL (ref 3.5–11)
WBC # BLD: 14.7 K/UL (ref 3.5–11)
WBC # BLD: 15.4 K/UL (ref 3.5–11)
WBC # BLD: 15.4 K/UL (ref 3.5–11)
WBC # BLD: 15.7 K/UL (ref 3.5–11)
WBC # BLD: 16.1 K/UL (ref 3.5–11)
WBC # BLD: 22 K/UL (ref 3.5–11)
WBC # BLD: 25.3 K/UL (ref 3.5–11)
WBC # BLD: 6.6 K/UL (ref 3.5–11)
WBC # BLD: 6.8 K/UL (ref 3.5–11)
WBC # BLD: 8.6 K/UL (ref 3.5–11)
WBC # BLD: 9.3 K/UL (ref 3.5–11)
WBC # BLD: 9.5 K/UL (ref 3.5–11)
WBC # BLD: ABNORMAL 10*3/UL
WBC UA: ABNORMAL /HPF
WBC UA: ABNORMAL /HPF
YEAST: ABNORMAL
YEAST: ABNORMAL
ZZ NTE CLEAN UP: ORDERED TEST: NORMAL
ZZ NTE WITH NAME CLEAN UP: SPECIMEN SOURCE: NORMAL

## 2021-01-01 PROCEDURE — 6360000002 HC RX W HCPCS: Performed by: NURSE PRACTITIONER

## 2021-01-01 PROCEDURE — 85027 COMPLETE CBC AUTOMATED: CPT

## 2021-01-01 PROCEDURE — 2580000003 HC RX 258: Performed by: NURSE PRACTITIONER

## 2021-01-01 PROCEDURE — 2580000003 HC RX 258: Performed by: INTERNAL MEDICINE

## 2021-01-01 PROCEDURE — 83605 ASSAY OF LACTIC ACID: CPT

## 2021-01-01 PROCEDURE — 6370000000 HC RX 637 (ALT 250 FOR IP): Performed by: NURSE PRACTITIONER

## 2021-01-01 PROCEDURE — P9612 CATHETERIZE FOR URINE SPEC: HCPCS

## 2021-01-01 PROCEDURE — 36415 COLL VENOUS BLD VENIPUNCTURE: CPT

## 2021-01-01 PROCEDURE — 6370000000 HC RX 637 (ALT 250 FOR IP): Performed by: INTERNAL MEDICINE

## 2021-01-01 PROCEDURE — 87086 URINE CULTURE/COLONY COUNT: CPT

## 2021-01-01 PROCEDURE — 2580000003 HC RX 258: Performed by: EMERGENCY MEDICINE

## 2021-01-01 PROCEDURE — 80048 BASIC METABOLIC PNL TOTAL CA: CPT

## 2021-01-01 PROCEDURE — 85730 THROMBOPLASTIN TIME PARTIAL: CPT

## 2021-01-01 PROCEDURE — 96375 TX/PRO/DX INJ NEW DRUG ADDON: CPT

## 2021-01-01 PROCEDURE — 99233 SBSQ HOSP IP/OBS HIGH 50: CPT | Performed by: INTERNAL MEDICINE

## 2021-01-01 PROCEDURE — 05HB33Z INSERTION OF INFUSION DEVICE INTO RIGHT BASILIC VEIN, PERCUTANEOUS APPROACH: ICD-10-PCS | Performed by: INTERNAL MEDICINE

## 2021-01-01 PROCEDURE — 97110 THERAPEUTIC EXERCISES: CPT

## 2021-01-01 PROCEDURE — 6360000002 HC RX W HCPCS: Performed by: INTERNAL MEDICINE

## 2021-01-01 PROCEDURE — 99232 SBSQ HOSP IP/OBS MODERATE 35: CPT | Performed by: INTERNAL MEDICINE

## 2021-01-01 PROCEDURE — 71045 X-RAY EXAM CHEST 1 VIEW: CPT

## 2021-01-01 PROCEDURE — 6370000000 HC RX 637 (ALT 250 FOR IP)

## 2021-01-01 PROCEDURE — 2060000000 HC ICU INTERMEDIATE R&B

## 2021-01-01 PROCEDURE — 83880 ASSAY OF NATRIURETIC PEPTIDE: CPT

## 2021-01-01 PROCEDURE — 87635 SARS-COV-2 COVID-19 AMP PRB: CPT

## 2021-01-01 PROCEDURE — 87150 DNA/RNA AMPLIFIED PROBE: CPT

## 2021-01-01 PROCEDURE — 83735 ASSAY OF MAGNESIUM: CPT

## 2021-01-01 PROCEDURE — 84443 ASSAY THYROID STIM HORMONE: CPT

## 2021-01-01 PROCEDURE — 99232 SBSQ HOSP IP/OBS MODERATE 35: CPT | Performed by: NURSE PRACTITIONER

## 2021-01-01 PROCEDURE — 99285 EMERGENCY DEPT VISIT HI MDM: CPT

## 2021-01-01 PROCEDURE — 74230 X-RAY XM SWLNG FUNCJ C+: CPT

## 2021-01-01 PROCEDURE — 74176 CT ABD & PELVIS W/O CONTRAST: CPT

## 2021-01-01 PROCEDURE — 84484 ASSAY OF TROPONIN QUANT: CPT

## 2021-01-01 PROCEDURE — 2580000003 HC RX 258

## 2021-01-01 PROCEDURE — 93308 TTE F-UP OR LMTD: CPT

## 2021-01-01 PROCEDURE — 81001 URINALYSIS AUTO W/SCOPE: CPT

## 2021-01-01 PROCEDURE — 93005 ELECTROCARDIOGRAM TRACING: CPT | Performed by: STUDENT IN AN ORGANIZED HEALTH CARE EDUCATION/TRAINING PROGRAM

## 2021-01-01 PROCEDURE — 6360000002 HC RX W HCPCS: Performed by: STUDENT IN AN ORGANIZED HEALTH CARE EDUCATION/TRAINING PROGRAM

## 2021-01-01 PROCEDURE — 87077 CULTURE AEROBIC IDENTIFY: CPT

## 2021-01-01 PROCEDURE — 2580000003 HC RX 258: Performed by: STUDENT IN AN ORGANIZED HEALTH CARE EDUCATION/TRAINING PROGRAM

## 2021-01-01 PROCEDURE — 99214 OFFICE O/P EST MOD 30 MIN: CPT | Performed by: INTERNAL MEDICINE

## 2021-01-01 PROCEDURE — 83874 ASSAY OF MYOGLOBIN: CPT

## 2021-01-01 PROCEDURE — 84295 ASSAY OF SERUM SODIUM: CPT

## 2021-01-01 PROCEDURE — 87205 SMEAR GRAM STAIN: CPT

## 2021-01-01 PROCEDURE — 87040 BLOOD CULTURE FOR BACTERIA: CPT

## 2021-01-01 PROCEDURE — 99222 1ST HOSP IP/OBS MODERATE 55: CPT | Performed by: INTERNAL MEDICINE

## 2021-01-01 PROCEDURE — 80053 COMPREHEN METABOLIC PANEL: CPT

## 2021-01-01 PROCEDURE — 2709999900 IR FLUORO GUIDED CVA DEVICE PLMT/REPLACE/REMOVAL

## 2021-01-01 PROCEDURE — 6360000004 HC RX CONTRAST MEDICATION: Performed by: STUDENT IN AN ORGANIZED HEALTH CARE EDUCATION/TRAINING PROGRAM

## 2021-01-01 PROCEDURE — 96365 THER/PROPH/DIAG IV INF INIT: CPT

## 2021-01-01 PROCEDURE — 76937 US GUIDE VASCULAR ACCESS: CPT | Performed by: RADIOLOGY

## 2021-01-01 PROCEDURE — 85610 PROTHROMBIN TIME: CPT

## 2021-01-01 PROCEDURE — 82728 ASSAY OF FERRITIN: CPT

## 2021-01-01 PROCEDURE — 87186 SC STD MICRODIL/AGAR DIL: CPT

## 2021-01-01 PROCEDURE — 97166 OT EVAL MOD COMPLEX 45 MIN: CPT

## 2021-01-01 PROCEDURE — 36573 INSJ PICC RS&I 5 YR+: CPT | Performed by: RADIOLOGY

## 2021-01-01 PROCEDURE — 99222 1ST HOSP IP/OBS MODERATE 55: CPT | Performed by: NURSE PRACTITIONER

## 2021-01-01 PROCEDURE — 84439 ASSAY OF FREE THYROXINE: CPT

## 2021-01-01 PROCEDURE — 92610 EVALUATE SWALLOWING FUNCTION: CPT

## 2021-01-01 PROCEDURE — 71046 X-RAY EXAM CHEST 2 VIEWS: CPT

## 2021-01-01 PROCEDURE — 87088 URINE BACTERIA CULTURE: CPT

## 2021-01-01 PROCEDURE — 86140 C-REACTIVE PROTEIN: CPT

## 2021-01-01 PROCEDURE — 85025 COMPLETE CBC W/AUTO DIFF WBC: CPT

## 2021-01-01 PROCEDURE — 96374 THER/PROPH/DIAG INJ IV PUSH: CPT

## 2021-01-01 PROCEDURE — 99291 CRITICAL CARE FIRST HOUR: CPT

## 2021-01-01 PROCEDURE — 36600 WITHDRAWAL OF ARTERIAL BLOOD: CPT

## 2021-01-01 PROCEDURE — 99223 1ST HOSP IP/OBS HIGH 75: CPT | Performed by: INTERNAL MEDICINE

## 2021-01-01 PROCEDURE — 99291 CRITICAL CARE FIRST HOUR: CPT | Performed by: INTERNAL MEDICINE

## 2021-01-01 PROCEDURE — 02HV33Z INSERTION OF INFUSION DEVICE INTO SUPERIOR VENA CAVA, PERCUTANEOUS APPROACH: ICD-10-PCS | Performed by: INTERNAL MEDICINE

## 2021-01-01 PROCEDURE — 92611 MOTION FLUOROSCOPY/SWALLOW: CPT

## 2021-01-01 PROCEDURE — 84145 PROCALCITONIN (PCT): CPT

## 2021-01-01 PROCEDURE — 99213 OFFICE O/P EST LOW 20 MIN: CPT

## 2021-01-01 PROCEDURE — 96361 HYDRATE IV INFUSION ADD-ON: CPT

## 2021-01-01 PROCEDURE — 82550 ASSAY OF CK (CPK): CPT

## 2021-01-01 PROCEDURE — 2700000000 HC OXYGEN THERAPY PER DAY

## 2021-01-01 PROCEDURE — 2500000003 HC RX 250 WO HCPCS: Performed by: NURSE PRACTITIONER

## 2021-01-01 PROCEDURE — 94761 N-INVAS EAR/PLS OXIMETRY MLT: CPT

## 2021-01-01 PROCEDURE — 93306 TTE W/DOPPLER COMPLETE: CPT

## 2021-01-01 PROCEDURE — 2000000000 HC ICU R&B

## 2021-01-01 PROCEDURE — 97162 PT EVAL MOD COMPLEX 30 MIN: CPT

## 2021-01-01 PROCEDURE — 6360000002 HC RX W HCPCS: Performed by: EMERGENCY MEDICINE

## 2021-01-01 PROCEDURE — 93005 ELECTROCARDIOGRAM TRACING: CPT | Performed by: EMERGENCY MEDICINE

## 2021-01-01 PROCEDURE — 83615 LACTATE (LD) (LDH) ENZYME: CPT

## 2021-01-01 PROCEDURE — 92526 ORAL FUNCTION THERAPY: CPT

## 2021-01-01 PROCEDURE — C1751 CATH, INF, PER/CENT/MIDLINE: HCPCS

## 2021-01-01 PROCEDURE — 97530 THERAPEUTIC ACTIVITIES: CPT

## 2021-01-01 PROCEDURE — 6360000002 HC RX W HCPCS

## 2021-01-01 PROCEDURE — XW033N5 INTRODUCTION OF MEROPENEM-VABORBACTAM ANTI-INFECTIVE INTO PERIPHERAL VEIN, PERCUTANEOUS APPROACH, NEW TECHNOLOGY GROUP 5: ICD-10-PCS | Performed by: INTERNAL MEDICINE

## 2021-01-01 PROCEDURE — 82805 BLOOD GASES W/O2 SATURATION: CPT

## 2021-01-01 PROCEDURE — 70450 CT HEAD/BRAIN W/O DYE: CPT

## 2021-01-01 PROCEDURE — 72125 CT NECK SPINE W/O DYE: CPT

## 2021-01-01 PROCEDURE — 93010 ELECTROCARDIOGRAM REPORT: CPT | Performed by: INTERNAL MEDICINE

## 2021-01-01 PROCEDURE — 83690 ASSAY OF LIPASE: CPT

## 2021-01-01 PROCEDURE — 71275 CT ANGIOGRAPHY CHEST: CPT

## 2021-01-01 PROCEDURE — 51798 US URINE CAPACITY MEASURE: CPT

## 2021-01-01 PROCEDURE — 85379 FIBRIN DEGRADATION QUANT: CPT

## 2021-01-01 PROCEDURE — 36410 VNPNXR 3YR/> PHY/QHP DX/THER: CPT | Performed by: RADIOLOGY

## 2021-01-01 PROCEDURE — 99212 OFFICE O/P EST SF 10 MIN: CPT

## 2021-01-01 PROCEDURE — 1200000000 HC SEMI PRIVATE

## 2021-01-01 PROCEDURE — 2500000003 HC RX 250 WO HCPCS: Performed by: STUDENT IN AN ORGANIZED HEALTH CARE EDUCATION/TRAINING PROGRAM

## 2021-01-01 PROCEDURE — 6370000000 HC RX 637 (ALT 250 FOR IP): Performed by: EMERGENCY MEDICINE

## 2021-01-01 PROCEDURE — 71250 CT THORAX DX C-: CPT

## 2021-01-01 PROCEDURE — 36556 INSERT NON-TUNNEL CV CATH: CPT

## 2021-01-01 RX ORDER — ATORVASTATIN CALCIUM 20 MG/1
20 TABLET, FILM COATED ORAL DAILY
Status: DISCONTINUED | OUTPATIENT
Start: 2021-01-01 | End: 2021-01-01 | Stop reason: HOSPADM

## 2021-01-01 RX ORDER — M-VIT,TX,IRON,MINS/CALC/FOLIC 27MG-0.4MG
1 TABLET ORAL DAILY
Status: DISCONTINUED | OUTPATIENT
Start: 2021-01-01 | End: 2021-01-01 | Stop reason: HOSPADM

## 2021-01-01 RX ORDER — SODIUM CHLORIDE 9 MG/ML
INJECTION, SOLUTION INTRAVENOUS CONTINUOUS
Status: DISCONTINUED | OUTPATIENT
Start: 2021-01-01 | End: 2021-01-01 | Stop reason: HOSPADM

## 2021-01-01 RX ORDER — MORPHINE SULFATE 2 MG/ML
1 INJECTION, SOLUTION INTRAMUSCULAR; INTRAVENOUS ONCE
Status: COMPLETED | OUTPATIENT
Start: 2021-01-01 | End: 2021-01-01

## 2021-01-01 RX ORDER — ASCORBIC ACID 500 MG
500 TABLET ORAL 2 TIMES DAILY
COMMUNITY

## 2021-01-01 RX ORDER — TIMOLOL MALEATE 5 MG/ML
1 SOLUTION/ DROPS OPHTHALMIC DAILY
COMMUNITY
Start: 2021-01-01

## 2021-01-01 RX ORDER — 0.9 % SODIUM CHLORIDE 0.9 %
1000 INTRAVENOUS SOLUTION INTRAVENOUS ONCE
Status: COMPLETED | OUTPATIENT
Start: 2021-01-01 | End: 2021-01-01

## 2021-01-01 RX ORDER — SODIUM CHLORIDE 0.9 % (FLUSH) 0.9 %
10 SYRINGE (ML) INJECTION PRN
Status: DISCONTINUED | OUTPATIENT
Start: 2021-01-01 | End: 2021-01-01 | Stop reason: HOSPADM

## 2021-01-01 RX ORDER — ESMOLOL HYDROCHLORIDE 10 MG/ML
50-300 INJECTION, SOLUTION INTRAVENOUS CONTINUOUS
Status: DISCONTINUED | OUTPATIENT
Start: 2021-01-01 | End: 2021-01-01 | Stop reason: HOSPADM

## 2021-01-01 RX ORDER — TRAZODONE HYDROCHLORIDE 150 MG/1
150 TABLET ORAL NIGHTLY
COMMUNITY

## 2021-01-01 RX ORDER — ACETAMINOPHEN 325 MG/1
650 TABLET ORAL EVERY 6 HOURS PRN
Status: DISCONTINUED | OUTPATIENT
Start: 2021-01-01 | End: 2021-01-01 | Stop reason: HOSPADM

## 2021-01-01 RX ORDER — QUETIAPINE FUMARATE 25 MG/1
25 TABLET, FILM COATED ORAL NIGHTLY
Status: DISCONTINUED | OUTPATIENT
Start: 2021-01-01 | End: 2021-01-01 | Stop reason: HOSPADM

## 2021-01-01 RX ORDER — ACETAMINOPHEN 650 MG/1
650 SUPPOSITORY RECTAL EVERY 6 HOURS PRN
Status: DISCONTINUED | OUTPATIENT
Start: 2021-01-01 | End: 2021-01-01 | Stop reason: HOSPADM

## 2021-01-01 RX ORDER — 0.9 % SODIUM CHLORIDE 0.9 %
80 INTRAVENOUS SOLUTION INTRAVENOUS ONCE
Status: COMPLETED | OUTPATIENT
Start: 2021-01-01 | End: 2021-01-01

## 2021-01-01 RX ORDER — SODIUM CHLORIDE 0.9 % (FLUSH) 0.9 %
5-40 SYRINGE (ML) INJECTION EVERY 12 HOURS SCHEDULED
Status: DISCONTINUED | OUTPATIENT
Start: 2021-01-01 | End: 2021-01-01 | Stop reason: HOSPADM

## 2021-01-01 RX ORDER — ONDANSETRON 2 MG/ML
4 INJECTION INTRAMUSCULAR; INTRAVENOUS EVERY 6 HOURS PRN
Status: DISCONTINUED | OUTPATIENT
Start: 2021-01-01 | End: 2021-01-01 | Stop reason: HOSPADM

## 2021-01-01 RX ORDER — LANOLIN ALCOHOL/MO/W.PET/CERES
3 CREAM (GRAM) TOPICAL NIGHTLY PRN
Status: DISCONTINUED | OUTPATIENT
Start: 2021-01-01 | End: 2021-01-01 | Stop reason: HOSPADM

## 2021-01-01 RX ORDER — FUROSEMIDE 20 MG/1
20 TABLET ORAL DAILY
COMMUNITY

## 2021-01-01 RX ORDER — ASCORBIC ACID 500 MG
500 TABLET ORAL DAILY
Status: DISCONTINUED | OUTPATIENT
Start: 2021-01-01 | End: 2021-01-01 | Stop reason: HOSPADM

## 2021-01-01 RX ORDER — AMIODARONE HYDROCHLORIDE 200 MG/1
200 TABLET ORAL DAILY
COMMUNITY

## 2021-01-01 RX ORDER — M-VIT,TX,IRON,MINS/CALC/FOLIC 27MG-0.4MG
1 TABLET ORAL DAILY
COMMUNITY

## 2021-01-01 RX ORDER — DEXAMETHASONE SODIUM PHOSPHATE 10 MG/ML
6 INJECTION, SOLUTION INTRAMUSCULAR; INTRAVENOUS ONCE
Status: COMPLETED | OUTPATIENT
Start: 2021-01-01 | End: 2021-01-01

## 2021-01-01 RX ORDER — POLYETHYLENE GLYCOL 3350 17 G/17G
17 POWDER, FOR SOLUTION ORAL DAILY PRN
Status: DISCONTINUED | OUTPATIENT
Start: 2021-01-01 | End: 2021-01-01 | Stop reason: HOSPADM

## 2021-01-01 RX ORDER — ACETAMINOPHEN 325 MG/1
650 TABLET ORAL ONCE
Status: COMPLETED | OUTPATIENT
Start: 2021-01-01 | End: 2021-01-01

## 2021-01-01 RX ORDER — SILDENAFIL CITRATE 20 MG/1
TABLET ORAL
Qty: 30 TABLET | Refills: 11 | Status: SHIPPED | OUTPATIENT
Start: 2021-01-01 | End: 2021-01-01

## 2021-01-01 RX ORDER — DIVALPROEX SODIUM 125 MG/1
125 TABLET, DELAYED RELEASE ORAL 2 TIMES DAILY
COMMUNITY

## 2021-01-01 RX ORDER — ASPIRIN 81 MG/1
81 TABLET, CHEWABLE ORAL DAILY
Status: DISCONTINUED | OUTPATIENT
Start: 2021-01-01 | End: 2021-01-01 | Stop reason: SDUPTHER

## 2021-01-01 RX ORDER — SODIUM CHLORIDE 0.9 % (FLUSH) 0.9 %
5-40 SYRINGE (ML) INJECTION PRN
Status: DISCONTINUED | OUTPATIENT
Start: 2021-01-01 | End: 2021-01-01

## 2021-01-01 RX ORDER — SODIUM CHLORIDE, SODIUM LACTATE, POTASSIUM CHLORIDE, AND CALCIUM CHLORIDE .6; .31; .03; .02 G/100ML; G/100ML; G/100ML; G/100ML
1000 INJECTION, SOLUTION INTRAVENOUS ONCE
Status: COMPLETED | OUTPATIENT
Start: 2021-01-01 | End: 2021-01-01

## 2021-01-01 RX ORDER — ONDANSETRON 2 MG/ML
4 INJECTION INTRAMUSCULAR; INTRAVENOUS EVERY 6 HOURS PRN
Status: DISCONTINUED | OUTPATIENT
Start: 2021-01-01 | End: 2021-01-01

## 2021-01-01 RX ORDER — ONDANSETRON 4 MG/1
4 TABLET, FILM COATED ORAL EVERY 6 HOURS PRN
COMMUNITY

## 2021-01-01 RX ORDER — FERROUS SULFATE 325(65) MG
325 TABLET ORAL
COMMUNITY

## 2021-01-01 RX ORDER — AMLODIPINE BESYLATE 5 MG/1
TABLET ORAL
Qty: 30 TABLET | Refills: 3 | Status: SHIPPED | OUTPATIENT
Start: 2021-01-01 | End: 2021-01-01 | Stop reason: SDUPTHER

## 2021-01-01 RX ORDER — QUETIAPINE FUMARATE 25 MG/1
25 TABLET, FILM COATED ORAL 2 TIMES DAILY PRN
Status: DISCONTINUED | OUTPATIENT
Start: 2021-01-01 | End: 2021-01-01 | Stop reason: HOSPADM

## 2021-01-01 RX ORDER — SODIUM CHLORIDE 9 MG/ML
25 INJECTION, SOLUTION INTRAVENOUS PRN
Status: DISCONTINUED | OUTPATIENT
Start: 2021-01-01 | End: 2021-01-01 | Stop reason: HOSPADM

## 2021-01-01 RX ORDER — SODIUM CHLORIDE 0.9 % (FLUSH) 0.9 %
5-40 SYRINGE (ML) INJECTION EVERY 12 HOURS SCHEDULED
Status: DISCONTINUED | OUTPATIENT
Start: 2021-01-01 | End: 2021-01-01

## 2021-01-01 RX ORDER — FUROSEMIDE 20 MG/1
20 TABLET ORAL DAILY
Status: DISCONTINUED | OUTPATIENT
Start: 2021-01-01 | End: 2021-01-01 | Stop reason: HOSPADM

## 2021-01-01 RX ORDER — HEPARIN SODIUM 5000 [USP'U]/ML
5000 INJECTION, SOLUTION INTRAVENOUS; SUBCUTANEOUS EVERY 8 HOURS SCHEDULED
Status: DISCONTINUED | OUTPATIENT
Start: 2021-01-01 | End: 2021-01-01

## 2021-01-01 RX ORDER — ATROPINE SULFATE 0.1 MG/ML
1 INJECTION INTRAVENOUS ONCE
Status: COMPLETED | OUTPATIENT
Start: 2021-01-01 | End: 2021-01-01

## 2021-01-01 RX ORDER — NOREPINEPHRINE BIT/0.9 % NACL 16MG/250ML
2-20 INFUSION BOTTLE (ML) INTRAVENOUS CONTINUOUS
Status: DISCONTINUED | OUTPATIENT
Start: 2021-01-01 | End: 2021-01-01

## 2021-01-01 RX ORDER — LATANOPROST 50 UG/ML
1 SOLUTION/ DROPS OPHTHALMIC NIGHTLY
Status: DISCONTINUED | OUTPATIENT
Start: 2021-01-01 | End: 2021-01-01 | Stop reason: HOSPADM

## 2021-01-01 RX ORDER — ONDANSETRON 4 MG/1
4 TABLET, ORALLY DISINTEGRATING ORAL EVERY 8 HOURS PRN
Status: DISCONTINUED | OUTPATIENT
Start: 2021-01-01 | End: 2021-01-01

## 2021-01-01 RX ORDER — AMIODARONE HYDROCHLORIDE 200 MG/1
200 TABLET ORAL DAILY
Status: DISCONTINUED | OUTPATIENT
Start: 2021-01-01 | End: 2021-01-01 | Stop reason: HOSPADM

## 2021-01-01 RX ORDER — FAMOTIDINE 20 MG/1
20 TABLET, FILM COATED ORAL DAILY
Status: DISCONTINUED | OUTPATIENT
Start: 2021-01-01 | End: 2021-01-01 | Stop reason: HOSPADM

## 2021-01-01 RX ORDER — POTASSIUM CHLORIDE 20 MEQ/1
20 TABLET, EXTENDED RELEASE ORAL DAILY
COMMUNITY

## 2021-01-01 RX ORDER — HEPARIN SODIUM 5000 [USP'U]/ML
5000 INJECTION, SOLUTION INTRAVENOUS; SUBCUTANEOUS EVERY 8 HOURS
COMMUNITY
End: 2021-01-01

## 2021-01-01 RX ORDER — FERROUS SULFATE 325(65) MG
325 TABLET ORAL
Status: DISCONTINUED | OUTPATIENT
Start: 2021-01-01 | End: 2021-01-01 | Stop reason: HOSPADM

## 2021-01-01 RX ORDER — ACETAMINOPHEN 325 MG/1
650 TABLET ORAL EVERY 4 HOURS PRN
Status: DISCONTINUED | OUTPATIENT
Start: 2021-01-01 | End: 2021-01-01

## 2021-01-01 RX ORDER — MAGNESIUM HYDROXIDE/ALUMINUM HYDROXICE/SIMETHICONE 120; 1200; 1200 MG/30ML; MG/30ML; MG/30ML
30 SUSPENSION ORAL EVERY 6 HOURS PRN
Status: DISCONTINUED | OUTPATIENT
Start: 2021-01-01 | End: 2021-01-01 | Stop reason: HOSPADM

## 2021-01-01 RX ORDER — SUCRALFATE ORAL 1 G/10ML
1 SUSPENSION ORAL
Qty: 1200 ML | Refills: 1 | Status: SHIPPED | OUTPATIENT
Start: 2021-01-01 | End: 2021-01-01

## 2021-01-01 RX ORDER — ONDANSETRON 2 MG/ML
4 INJECTION INTRAMUSCULAR; INTRAVENOUS ONCE
Status: COMPLETED | OUTPATIENT
Start: 2021-01-01 | End: 2021-01-01

## 2021-01-01 RX ORDER — EPINEPHRINE 1 MG/ML
INJECTION, SOLUTION, CONCENTRATE INTRAVENOUS
Status: COMPLETED | OUTPATIENT
Start: 2021-01-01 | End: 2021-01-01

## 2021-01-01 RX ORDER — POTASSIUM CHLORIDE 7.45 MG/ML
10 INJECTION INTRAVENOUS PRN
Status: DISCONTINUED | OUTPATIENT
Start: 2021-01-01 | End: 2021-01-01 | Stop reason: HOSPADM

## 2021-01-01 RX ORDER — ASPIRIN 81 MG/1
81 TABLET ORAL DAILY
Status: DISCONTINUED | OUTPATIENT
Start: 2021-01-01 | End: 2021-01-01 | Stop reason: HOSPADM

## 2021-01-01 RX ORDER — ATORVASTATIN CALCIUM 20 MG/1
20 TABLET, FILM COATED ORAL DAILY
COMMUNITY

## 2021-01-01 RX ORDER — ASPIRIN 81 MG/1
81 TABLET, CHEWABLE ORAL DAILY
COMMUNITY

## 2021-01-01 RX ORDER — POTASSIUM CHLORIDE 20 MEQ/1
40 TABLET, EXTENDED RELEASE ORAL PRN
Status: DISCONTINUED | OUTPATIENT
Start: 2021-01-01 | End: 2021-01-01 | Stop reason: HOSPADM

## 2021-01-01 RX ORDER — ACETAMINOPHEN 325 MG/1
650 TABLET ORAL EVERY 8 HOURS PRN
Status: ON HOLD | COMMUNITY
End: 2021-01-01 | Stop reason: HOSPADM

## 2021-01-01 RX ORDER — SODIUM CHLORIDE 9 MG/ML
25 INJECTION, SOLUTION INTRAVENOUS PRN
Status: DISCONTINUED | OUTPATIENT
Start: 2021-01-01 | End: 2021-01-01

## 2021-01-01 RX ORDER — TIMOLOL MALEATE 5 MG/ML
1 SOLUTION/ DROPS OPHTHALMIC DAILY
Status: DISCONTINUED | OUTPATIENT
Start: 2021-01-01 | End: 2021-01-01 | Stop reason: HOSPADM

## 2021-01-01 RX ORDER — AMLODIPINE BESYLATE 5 MG/1
5 TABLET ORAL DAILY
Qty: 90 TABLET | Refills: 2 | OUTPATIENT
Start: 2021-01-01 | End: 2021-01-01

## 2021-01-01 RX ORDER — HEPARIN SODIUM 5000 [USP'U]/ML
5000 INJECTION, SOLUTION INTRAVENOUS; SUBCUTANEOUS EVERY 8 HOURS SCHEDULED
Status: DISCONTINUED | OUTPATIENT
Start: 2021-01-01 | End: 2021-01-01 | Stop reason: HOSPADM

## 2021-01-01 RX ORDER — IPRATROPIUM BROMIDE AND ALBUTEROL SULFATE 2.5; .5 MG/3ML; MG/3ML
1 SOLUTION RESPIRATORY (INHALATION) EVERY 6 HOURS PRN
COMMUNITY

## 2021-01-01 RX ORDER — METHOCARBAMOL 750 MG/1
750 TABLET, FILM COATED ORAL EVERY 8 HOURS PRN
COMMUNITY

## 2021-01-01 RX ORDER — SIMVASTATIN 20 MG
TABLET ORAL
Qty: 90 TABLET | Refills: 1 | Status: SHIPPED | OUTPATIENT
Start: 2021-01-01 | End: 2021-01-01

## 2021-01-01 RX ORDER — SODIUM CHLORIDE 450 MG/100ML
INJECTION, SOLUTION INTRAVENOUS CONTINUOUS
Status: DISCONTINUED | OUTPATIENT
Start: 2021-01-01 | End: 2021-01-01 | Stop reason: HOSPADM

## 2021-01-01 RX ORDER — DEXAMETHASONE 6 MG/1
6 TABLET ORAL DAILY
Status: DISCONTINUED | OUTPATIENT
Start: 2021-01-01 | End: 2021-01-01 | Stop reason: HOSPADM

## 2021-01-01 RX ORDER — ASPIRIN 81 MG/1
81 TABLET, CHEWABLE ORAL DAILY
Status: DISCONTINUED | OUTPATIENT
Start: 2021-01-01 | End: 2021-01-01 | Stop reason: HOSPADM

## 2021-01-01 RX ORDER — AZITHROMYCIN 250 MG/1
250 TABLET, FILM COATED ORAL DAILY
COMMUNITY
Start: 2021-01-01 | End: 2021-01-01

## 2021-01-01 RX ORDER — DIVALPROEX SODIUM 125 MG/1
125 CAPSULE, COATED PELLETS ORAL 2 TIMES DAILY
Status: DISCONTINUED | OUTPATIENT
Start: 2021-01-01 | End: 2021-01-01 | Stop reason: HOSPADM

## 2021-01-01 RX ORDER — ZINC SULFATE 50(220)MG
50 CAPSULE ORAL DAILY
Status: DISCONTINUED | OUTPATIENT
Start: 2021-01-01 | End: 2021-01-01 | Stop reason: HOSPADM

## 2021-01-01 RX ORDER — MAGNESIUM SULFATE 1 G/100ML
1000 INJECTION INTRAVENOUS PRN
Status: DISCONTINUED | OUTPATIENT
Start: 2021-01-01 | End: 2021-01-01 | Stop reason: HOSPADM

## 2021-01-01 RX ORDER — ALUMINA, MAGNESIA, AND SIMETHICONE 2400; 2400; 240 MG/30ML; MG/30ML; MG/30ML
15 SUSPENSION ORAL EVERY 6 HOURS PRN
COMMUNITY

## 2021-01-01 RX ORDER — SODIUM CHLORIDE 9 MG/ML
INJECTION, SOLUTION INTRAVENOUS CONTINUOUS
Status: DISCONTINUED | OUTPATIENT
Start: 2021-01-01 | End: 2021-01-01

## 2021-01-01 RX ORDER — ONDANSETRON 4 MG/1
4 TABLET, ORALLY DISINTEGRATING ORAL EVERY 8 HOURS PRN
Status: DISCONTINUED | OUTPATIENT
Start: 2021-01-01 | End: 2021-01-01 | Stop reason: HOSPADM

## 2021-01-01 RX ORDER — METHYLPREDNISOLONE 4 MG/1
4 TABLET ORAL SEE ADMIN INSTRUCTIONS
COMMUNITY

## 2021-01-01 RX ORDER — ZINC SULFATE 50(220)MG
50 CAPSULE ORAL DAILY
COMMUNITY

## 2021-01-01 RX ORDER — 0.9 % SODIUM CHLORIDE 0.9 %
500 INTRAVENOUS SOLUTION INTRAVENOUS ONCE
Status: COMPLETED | OUTPATIENT
Start: 2021-01-01 | End: 2021-01-01

## 2021-01-01 RX ORDER — LANOLIN ALCOHOL/MO/W.PET/CERES
3 CREAM (GRAM) TOPICAL NIGHTLY PRN
COMMUNITY
End: 2021-01-01

## 2021-01-01 RX ADMIN — FAMOTIDINE 20 MG: 10 INJECTION, SOLUTION INTRAVENOUS at 08:39

## 2021-01-01 RX ADMIN — SODIUM CHLORIDE 500 ML: 9 INJECTION, SOLUTION INTRAVENOUS at 08:31

## 2021-01-01 RX ADMIN — MEROPENEM 1000 MG: 1 INJECTION, POWDER, FOR SOLUTION INTRAVENOUS at 03:13

## 2021-01-01 RX ADMIN — AMIODARONE HYDROCHLORIDE 200 MG: 200 TABLET ORAL at 08:09

## 2021-01-01 RX ADMIN — DEXAMETHASONE 6 MG: 6 TABLET ORAL at 09:29

## 2021-01-01 RX ADMIN — LATANOPROST 1 DROP: 50 SOLUTION OPHTHALMIC at 22:13

## 2021-01-01 RX ADMIN — ATORVASTATIN CALCIUM 20 MG: 20 TABLET, FILM COATED ORAL at 08:43

## 2021-01-01 RX ADMIN — ATORVASTATIN CALCIUM 20 MG: 20 TABLET, FILM COATED ORAL at 08:09

## 2021-01-01 RX ADMIN — TIMOLOL MALEATE 1 DROP: 5 SOLUTION OPHTHALMIC at 09:19

## 2021-01-01 RX ADMIN — CEFTRIAXONE SODIUM 1000 MG: 1 INJECTION, POWDER, FOR SOLUTION INTRAMUSCULAR; INTRAVENOUS at 20:36

## 2021-01-01 RX ADMIN — FERROUS SULFATE TAB 325 MG (65 MG ELEMENTAL FE) 325 MG: 325 (65 FE) TAB at 09:55

## 2021-01-01 RX ADMIN — HEPARIN SODIUM 5000 UNITS: 5000 INJECTION INTRAVENOUS; SUBCUTANEOUS at 06:08

## 2021-01-01 RX ADMIN — CEFTRIAXONE SODIUM 1000 MG: 1 INJECTION, POWDER, FOR SOLUTION INTRAMUSCULAR; INTRAVENOUS at 20:48

## 2021-01-01 RX ADMIN — Medication 3 MG: at 22:23

## 2021-01-01 RX ADMIN — FERROUS SULFATE TAB 325 MG (65 MG ELEMENTAL FE) 325 MG: 325 (65 FE) TAB at 08:14

## 2021-01-01 RX ADMIN — ONDANSETRON 4 MG: 2 INJECTION INTRAMUSCULAR; INTRAVENOUS at 10:25

## 2021-01-01 RX ADMIN — ASPIRIN 81 MG: 81 TABLET, COATED ORAL at 08:09

## 2021-01-01 RX ADMIN — SODIUM CHLORIDE, PRESERVATIVE FREE 10 ML: 5 INJECTION INTRAVENOUS at 03:49

## 2021-01-01 RX ADMIN — MEROPENEM 1000 MG: 1 INJECTION, POWDER, FOR SOLUTION INTRAVENOUS at 00:41

## 2021-01-01 RX ADMIN — OXYCODONE HYDROCHLORIDE AND ACETAMINOPHEN 500 MG: 500 TABLET ORAL at 08:47

## 2021-01-01 RX ADMIN — MEROPENEM 1000 MG: 1 INJECTION, POWDER, FOR SOLUTION INTRAVENOUS at 22:13

## 2021-01-01 RX ADMIN — DEXAMETHASONE 6 MG: 6 TABLET ORAL at 08:13

## 2021-01-01 RX ADMIN — APIXABAN 5 MG: 5 TABLET, FILM COATED ORAL at 20:41

## 2021-01-01 RX ADMIN — HEPARIN SODIUM 5000 UNITS: 5000 INJECTION INTRAVENOUS; SUBCUTANEOUS at 05:32

## 2021-01-01 RX ADMIN — MULTIPLE VITAMINS W/ MINERALS TAB 1 TABLET: TAB at 09:18

## 2021-01-01 RX ADMIN — MEROPENEM 1000 MG: 1 INJECTION, POWDER, FOR SOLUTION INTRAVENOUS at 05:35

## 2021-01-01 RX ADMIN — SODIUM CHLORIDE, POTASSIUM CHLORIDE, SODIUM LACTATE AND CALCIUM CHLORIDE 1000 ML: 600; 310; 30; 20 INJECTION, SOLUTION INTRAVENOUS at 19:22

## 2021-01-01 RX ADMIN — SODIUM CHLORIDE: 9 INJECTION, SOLUTION INTRAVENOUS at 22:00

## 2021-01-01 RX ADMIN — MEROPENEM 1000 MG: 1 INJECTION, POWDER, FOR SOLUTION INTRAVENOUS at 14:05

## 2021-01-01 RX ADMIN — MULTIPLE VITAMINS W/ MINERALS TAB 1 TABLET: TAB at 08:47

## 2021-01-01 RX ADMIN — HEPARIN SODIUM 5000 UNITS: 5000 INJECTION INTRAVENOUS; SUBCUTANEOUS at 22:30

## 2021-01-01 RX ADMIN — FAMOTIDINE 20 MG: 10 INJECTION, SOLUTION INTRAVENOUS at 07:23

## 2021-01-01 RX ADMIN — HEPARIN SODIUM 5000 UNITS: 5000 INJECTION INTRAVENOUS; SUBCUTANEOUS at 21:22

## 2021-01-01 RX ADMIN — SODIUM CHLORIDE, PRESERVATIVE FREE 10 ML: 5 INJECTION INTRAVENOUS at 10:17

## 2021-01-01 RX ADMIN — AMIODARONE HYDROCHLORIDE 200 MG: 200 TABLET ORAL at 08:43

## 2021-01-01 RX ADMIN — SODIUM CHLORIDE: 9 INJECTION, SOLUTION INTRAVENOUS at 08:25

## 2021-01-01 RX ADMIN — SODIUM CHLORIDE, PRESERVATIVE FREE 10 ML: 5 INJECTION INTRAVENOUS at 22:13

## 2021-01-01 RX ADMIN — AMIODARONE HYDROCHLORIDE 200 MG: 200 TABLET ORAL at 07:39

## 2021-01-01 RX ADMIN — ACETAMINOPHEN 650 MG: 325 TABLET, FILM COATED ORAL at 20:48

## 2021-01-01 RX ADMIN — Medication 3 MG: at 21:03

## 2021-01-01 RX ADMIN — OXYCODONE HYDROCHLORIDE AND ACETAMINOPHEN 500 MG: 500 TABLET ORAL at 08:13

## 2021-01-01 RX ADMIN — ATORVASTATIN CALCIUM 20 MG: 20 TABLET, FILM COATED ORAL at 08:19

## 2021-01-01 RX ADMIN — QUETIAPINE FUMARATE 25 MG: 25 TABLET ORAL at 19:48

## 2021-01-01 RX ADMIN — SODIUM CHLORIDE: 4.5 INJECTION, SOLUTION INTRAVENOUS at 16:37

## 2021-01-01 RX ADMIN — MORPHINE SULFATE 1 MG: 2 INJECTION, SOLUTION INTRAMUSCULAR; INTRAVENOUS at 08:31

## 2021-01-01 RX ADMIN — DIVALPROEX SODIUM 125 MG: 125 CAPSULE, COATED PELLETS ORAL at 08:09

## 2021-01-01 RX ADMIN — AMIODARONE HYDROCHLORIDE 200 MG: 200 TABLET ORAL at 08:13

## 2021-01-01 RX ADMIN — HEPARIN SODIUM 5000 UNITS: 5000 INJECTION INTRAVENOUS; SUBCUTANEOUS at 13:58

## 2021-01-01 RX ADMIN — FERROUS SULFATE TAB 325 MG (65 MG ELEMENTAL FE) 325 MG: 325 (65 FE) TAB at 07:39

## 2021-01-01 RX ADMIN — HEPARIN SODIUM 5000 UNITS: 5000 INJECTION INTRAVENOUS; SUBCUTANEOUS at 22:34

## 2021-01-01 RX ADMIN — DIVALPROEX SODIUM 125 MG: 125 CAPSULE, COATED PELLETS ORAL at 07:39

## 2021-01-01 RX ADMIN — MULTIPLE VITAMINS W/ MINERALS TAB 1 TABLET: TAB at 16:39

## 2021-01-01 RX ADMIN — FERROUS SULFATE TAB 325 MG (65 MG ELEMENTAL FE) 325 MG: 325 (65 FE) TAB at 08:19

## 2021-01-01 RX ADMIN — ASPIRIN 81 MG: 81 TABLET, COATED ORAL at 08:14

## 2021-01-01 RX ADMIN — CEFTRIAXONE SODIUM 1000 MG: 1 INJECTION, POWDER, FOR SOLUTION INTRAMUSCULAR; INTRAVENOUS at 22:06

## 2021-01-01 RX ADMIN — MULTIPLE VITAMINS W/ MINERALS TAB 1 TABLET: TAB at 07:39

## 2021-01-01 RX ADMIN — MULTIPLE VITAMINS W/ MINERALS TAB 1 TABLET: TAB at 08:09

## 2021-01-01 RX ADMIN — Medication 3 MG: at 02:23

## 2021-01-01 RX ADMIN — LATANOPROST 1 DROP: 50 SOLUTION OPHTHALMIC at 20:47

## 2021-01-01 RX ADMIN — Medication 3 MG: at 22:32

## 2021-01-01 RX ADMIN — HEPARIN SODIUM 5000 UNITS: 5000 INJECTION INTRAVENOUS; SUBCUTANEOUS at 14:30

## 2021-01-01 RX ADMIN — FERROUS SULFATE TAB 325 MG (65 MG ELEMENTAL FE) 325 MG: 325 (65 FE) TAB at 10:19

## 2021-01-01 RX ADMIN — DIVALPROEX SODIUM 125 MG: 125 CAPSULE, COATED PELLETS ORAL at 17:45

## 2021-01-01 RX ADMIN — MEROPENEM 1000 MG: 1 INJECTION, POWDER, FOR SOLUTION INTRAVENOUS at 09:30

## 2021-01-01 RX ADMIN — SODIUM CHLORIDE 1000 ML: 9 INJECTION, SOLUTION INTRAVENOUS at 00:37

## 2021-01-01 RX ADMIN — LATANOPROST 1 DROP: 50 SOLUTION OPHTHALMIC at 21:24

## 2021-01-01 RX ADMIN — ATORVASTATIN CALCIUM 20 MG: 20 TABLET, FILM COATED ORAL at 09:15

## 2021-01-01 RX ADMIN — ASPIRIN 81 MG: 81 TABLET, COATED ORAL at 09:55

## 2021-01-01 RX ADMIN — FERROUS SULFATE TAB 325 MG (65 MG ELEMENTAL FE) 325 MG: 325 (65 FE) TAB at 12:48

## 2021-01-01 RX ADMIN — DIVALPROEX SODIUM 125 MG: 125 CAPSULE, COATED PELLETS ORAL at 21:19

## 2021-01-01 RX ADMIN — DEXAMETHASONE 6 MG: 6 TABLET ORAL at 08:09

## 2021-01-01 RX ADMIN — OXYCODONE HYDROCHLORIDE AND ACETAMINOPHEN 500 MG: 500 TABLET ORAL at 07:39

## 2021-01-01 RX ADMIN — OXYCODONE HYDROCHLORIDE AND ACETAMINOPHEN 500 MG: 500 TABLET ORAL at 09:29

## 2021-01-01 RX ADMIN — OXYCODONE HYDROCHLORIDE AND ACETAMINOPHEN 500 MG: 500 TABLET ORAL at 08:57

## 2021-01-01 RX ADMIN — AMIODARONE HYDROCHLORIDE 200 MG: 200 TABLET ORAL at 09:29

## 2021-01-01 RX ADMIN — TIMOLOL MALEATE 1 DROP: 5 SOLUTION OPHTHALMIC at 08:20

## 2021-01-01 RX ADMIN — MULTIPLE VITAMINS W/ MINERALS TAB 1 TABLET: TAB at 08:14

## 2021-01-01 RX ADMIN — MULTIPLE VITAMINS W/ MINERALS TAB 1 TABLET: TAB at 09:31

## 2021-01-01 RX ADMIN — HEPARIN SODIUM 5000 UNITS: 5000 INJECTION INTRAVENOUS; SUBCUTANEOUS at 20:33

## 2021-01-01 RX ADMIN — MULTIPLE VITAMINS W/ MINERALS TAB 1 TABLET: TAB at 09:00

## 2021-01-01 RX ADMIN — MEROPENEM 1000 MG: 1 INJECTION, POWDER, FOR SOLUTION INTRAVENOUS at 09:24

## 2021-01-01 RX ADMIN — Medication 3 MG: at 21:19

## 2021-01-01 RX ADMIN — Medication 3 MG: at 20:41

## 2021-01-01 RX ADMIN — SODIUM CHLORIDE, PRESERVATIVE FREE 10 ML: 5 INJECTION INTRAVENOUS at 08:18

## 2021-01-01 RX ADMIN — ZINC SULFATE 220 MG (50 MG) CAPSULE 50 MG: CAPSULE at 07:39

## 2021-01-01 RX ADMIN — CEFTRIAXONE SODIUM 1000 MG: 1 INJECTION, POWDER, FOR SOLUTION INTRAMUSCULAR; INTRAVENOUS at 18:01

## 2021-01-01 RX ADMIN — Medication 3 MG: at 22:01

## 2021-01-01 RX ADMIN — APIXABAN 5 MG: 5 TABLET, FILM COATED ORAL at 21:19

## 2021-01-01 RX ADMIN — SODIUM CHLORIDE, PRESERVATIVE FREE 10 ML: 5 INJECTION INTRAVENOUS at 20:48

## 2021-01-01 RX ADMIN — ASPIRIN 81 MG: 81 TABLET, COATED ORAL at 07:39

## 2021-01-01 RX ADMIN — SODIUM CHLORIDE: 4.5 INJECTION, SOLUTION INTRAVENOUS at 16:44

## 2021-01-01 RX ADMIN — Medication 1 TABLET: at 10:19

## 2021-01-01 RX ADMIN — FERROUS SULFATE TAB 325 MG (65 MG ELEMENTAL FE) 325 MG: 325 (65 FE) TAB at 08:09

## 2021-01-01 RX ADMIN — DIVALPROEX SODIUM 125 MG: 125 CAPSULE, COATED PELLETS ORAL at 16:39

## 2021-01-01 RX ADMIN — SODIUM CHLORIDE, PRESERVATIVE FREE 10 ML: 5 INJECTION INTRAVENOUS at 09:33

## 2021-01-01 RX ADMIN — IOPAMIDOL 100 ML: 755 INJECTION, SOLUTION INTRAVENOUS at 08:18

## 2021-01-01 RX ADMIN — SODIUM CHLORIDE, PRESERVATIVE FREE 10 ML: 5 INJECTION INTRAVENOUS at 10:39

## 2021-01-01 RX ADMIN — APIXABAN 5 MG: 5 TABLET, FILM COATED ORAL at 21:03

## 2021-01-01 RX ADMIN — ACETAMINOPHEN 650 MG: 325 TABLET ORAL at 21:20

## 2021-01-01 RX ADMIN — HEPARIN SODIUM 5000 UNITS: 5000 INJECTION INTRAVENOUS; SUBCUTANEOUS at 14:41

## 2021-01-01 RX ADMIN — ATORVASTATIN CALCIUM 20 MG: 20 TABLET, FILM COATED ORAL at 09:29

## 2021-01-01 RX ADMIN — HEPARIN SODIUM 5000 UNITS: 5000 INJECTION INTRAVENOUS; SUBCUTANEOUS at 15:18

## 2021-01-01 RX ADMIN — DIVALPROEX SODIUM 125 MG: 125 CAPSULE, COATED PELLETS ORAL at 09:55

## 2021-01-01 RX ADMIN — ATROPINE SULFATE 1 MG: 0.1 INJECTION, SOLUTION ENDOTRACHEAL; INTRAMUSCULAR; INTRAVENOUS; SUBCUTANEOUS at 07:48

## 2021-01-01 RX ADMIN — EPINEPHRINE 1 MG: 1 INJECTION, SOLUTION, CONCENTRATE INTRAVENOUS at 07:19

## 2021-01-01 RX ADMIN — DICLOFENAC SODIUM 2 G: 10 GEL TOPICAL at 20:34

## 2021-01-01 RX ADMIN — MEROPENEM 1000 MG: 1 INJECTION, POWDER, FOR SOLUTION INTRAVENOUS at 13:51

## 2021-01-01 RX ADMIN — ASPIRIN 81 MG: 81 TABLET, COATED ORAL at 09:29

## 2021-01-01 RX ADMIN — QUETIAPINE FUMARATE 25 MG: 25 TABLET ORAL at 21:20

## 2021-01-01 RX ADMIN — SODIUM CHLORIDE: 9 INJECTION, SOLUTION INTRAVENOUS at 03:46

## 2021-01-01 RX ADMIN — ASPIRIN 81 MG: 81 TABLET, COATED ORAL at 12:47

## 2021-01-01 RX ADMIN — ATORVASTATIN CALCIUM 20 MG: 20 TABLET, FILM COATED ORAL at 10:20

## 2021-01-01 RX ADMIN — ATORVASTATIN CALCIUM 20 MG: 20 TABLET, FILM COATED ORAL at 12:47

## 2021-01-01 RX ADMIN — ASPIRIN 81 MG: 81 TABLET, COATED ORAL at 08:47

## 2021-01-01 RX ADMIN — SODIUM CHLORIDE: 9 INJECTION, SOLUTION INTRAVENOUS at 04:32

## 2021-01-01 RX ADMIN — Medication 1 TABLET: at 08:19

## 2021-01-01 RX ADMIN — ATORVASTATIN CALCIUM 20 MG: 20 TABLET, FILM COATED ORAL at 08:47

## 2021-01-01 RX ADMIN — DIVALPROEX SODIUM 125 MG: 125 CAPSULE, COATED PELLETS ORAL at 17:14

## 2021-01-01 RX ADMIN — FERROUS SULFATE TAB 325 MG (65 MG ELEMENTAL FE) 325 MG: 325 (65 FE) TAB at 08:43

## 2021-01-01 RX ADMIN — ASPIRIN 81 MG: 81 TABLET, CHEWABLE ORAL at 10:19

## 2021-01-01 RX ADMIN — SODIUM CHLORIDE, PRESERVATIVE FREE 10 ML: 5 INJECTION INTRAVENOUS at 20:42

## 2021-01-01 RX ADMIN — SODIUM CHLORIDE, POTASSIUM CHLORIDE, SODIUM LACTATE AND CALCIUM CHLORIDE 1000 ML: 600; 310; 30; 20 INJECTION, SOLUTION INTRAVENOUS at 20:00

## 2021-01-01 RX ADMIN — SODIUM CHLORIDE: 9 INJECTION, SOLUTION INTRAVENOUS at 20:10

## 2021-01-01 RX ADMIN — Medication 3 MG: at 21:09

## 2021-01-01 RX ADMIN — Medication 3 MG: at 19:49

## 2021-01-01 RX ADMIN — FAMOTIDINE 20 MG: 20 TABLET, FILM COATED ORAL at 20:48

## 2021-01-01 RX ADMIN — HEPARIN SODIUM 5000 UNITS: 5000 INJECTION INTRAVENOUS; SUBCUTANEOUS at 06:33

## 2021-01-01 RX ADMIN — ZINC SULFATE 220 MG (50 MG) CAPSULE 50 MG: CAPSULE at 16:39

## 2021-01-01 RX ADMIN — MEROPENEM 1000 MG: 1 INJECTION, POWDER, FOR SOLUTION INTRAVENOUS at 17:13

## 2021-01-01 RX ADMIN — APIXABAN 5 MG: 5 TABLET, FILM COATED ORAL at 08:14

## 2021-01-01 RX ADMIN — FERROUS SULFATE TAB 325 MG (65 MG ELEMENTAL FE) 325 MG: 325 (65 FE) TAB at 09:29

## 2021-01-01 RX ADMIN — AMIODARONE HYDROCHLORIDE 200 MG: 200 TABLET ORAL at 12:48

## 2021-01-01 RX ADMIN — APIXABAN 5 MG: 5 TABLET, FILM COATED ORAL at 12:47

## 2021-01-01 RX ADMIN — ZINC SULFATE 220 MG (50 MG) CAPSULE 50 MG: CAPSULE at 09:31

## 2021-01-01 RX ADMIN — ATORVASTATIN CALCIUM 20 MG: 20 TABLET, FILM COATED ORAL at 07:39

## 2021-01-01 RX ADMIN — FERROUS SULFATE TAB 325 MG (65 MG ELEMENTAL FE) 325 MG: 325 (65 FE) TAB at 08:58

## 2021-01-01 RX ADMIN — SODIUM CHLORIDE 1000 ML: 9 INJECTION, SOLUTION INTRAVENOUS at 16:00

## 2021-01-01 RX ADMIN — QUETIAPINE FUMARATE 25 MG: 25 TABLET ORAL at 22:13

## 2021-01-01 RX ADMIN — HEPARIN SODIUM 5000 UNITS: 5000 INJECTION INTRAVENOUS; SUBCUTANEOUS at 14:00

## 2021-01-01 RX ADMIN — SODIUM CHLORIDE 1000 ML: 9 INJECTION, SOLUTION INTRAVENOUS at 20:38

## 2021-01-01 RX ADMIN — OXYCODONE HYDROCHLORIDE AND ACETAMINOPHEN 500 MG: 500 TABLET ORAL at 08:09

## 2021-01-01 RX ADMIN — ZINC SULFATE 220 MG (50 MG) CAPSULE 50 MG: CAPSULE at 08:47

## 2021-01-01 RX ADMIN — DEXTROSE MONOHYDRATE 2000 MG: 5 INJECTION INTRAVENOUS at 17:06

## 2021-01-01 RX ADMIN — FAMOTIDINE 20 MG: 10 INJECTION, SOLUTION INTRAVENOUS at 08:19

## 2021-01-01 RX ADMIN — DIVALPROEX SODIUM 125 MG: 125 CAPSULE, COATED PELLETS ORAL at 08:14

## 2021-01-01 RX ADMIN — ZINC SULFATE 220 MG (50 MG) CAPSULE 50 MG: CAPSULE at 09:55

## 2021-01-01 RX ADMIN — FERROUS SULFATE TAB 325 MG (65 MG ELEMENTAL FE) 325 MG: 325 (65 FE) TAB at 09:14

## 2021-01-01 RX ADMIN — HEPARIN SODIUM 5000 UNITS: 5000 INJECTION INTRAVENOUS; SUBCUTANEOUS at 06:09

## 2021-01-01 RX ADMIN — SODIUM CHLORIDE: 4.5 INJECTION, SOLUTION INTRAVENOUS at 13:51

## 2021-01-01 RX ADMIN — DIVALPROEX SODIUM 125 MG: 125 CAPSULE, COATED PELLETS ORAL at 17:37

## 2021-01-01 RX ADMIN — ATORVASTATIN CALCIUM 20 MG: 20 TABLET, FILM COATED ORAL at 08:14

## 2021-01-01 RX ADMIN — SODIUM CHLORIDE: 9 INJECTION, SOLUTION INTRAVENOUS at 14:20

## 2021-01-01 RX ADMIN — APIXABAN 5 MG: 5 TABLET, FILM COATED ORAL at 08:09

## 2021-01-01 RX ADMIN — CEFTRIAXONE SODIUM 1000 MG: 1 INJECTION, POWDER, FOR SOLUTION INTRAMUSCULAR; INTRAVENOUS at 17:56

## 2021-01-01 RX ADMIN — DEXAMETHASONE SODIUM PHOSPHATE 6 MG: 10 INJECTION, SOLUTION INTRAMUSCULAR; INTRAVENOUS at 22:05

## 2021-01-01 RX ADMIN — QUETIAPINE FUMARATE 25 MG: 25 TABLET ORAL at 20:41

## 2021-01-01 RX ADMIN — SODIUM CHLORIDE: 9 INJECTION, SOLUTION INTRAVENOUS at 15:08

## 2021-01-01 RX ADMIN — Medication 1 TABLET: at 08:43

## 2021-01-01 RX ADMIN — AMIODARONE HYDROCHLORIDE 200 MG: 200 TABLET ORAL at 10:20

## 2021-01-01 RX ADMIN — SODIUM CHLORIDE: 9 INJECTION, SOLUTION INTRAVENOUS at 20:05

## 2021-01-01 RX ADMIN — ATORVASTATIN CALCIUM 20 MG: 20 TABLET, FILM COATED ORAL at 09:55

## 2021-01-01 RX ADMIN — QUETIAPINE FUMARATE 25 MG: 25 TABLET ORAL at 21:19

## 2021-01-01 RX ADMIN — DIVALPROEX SODIUM 125 MG: 125 CAPSULE, COATED PELLETS ORAL at 09:30

## 2021-01-01 RX ADMIN — LATANOPROST 1 DROP: 50 SOLUTION OPHTHALMIC at 21:33

## 2021-01-01 RX ADMIN — ATORVASTATIN CALCIUM 20 MG: 20 TABLET, FILM COATED ORAL at 08:57

## 2021-01-01 RX ADMIN — ASPIRIN 81 MG: 81 TABLET, COATED ORAL at 08:57

## 2021-01-01 RX ADMIN — ASPIRIN 81 MG: 81 TABLET, CHEWABLE ORAL at 08:19

## 2021-01-01 RX ADMIN — FAMOTIDINE 20 MG: 20 TABLET, FILM COATED ORAL at 08:43

## 2021-01-01 RX ADMIN — FERROUS SULFATE TAB 325 MG (65 MG ELEMENTAL FE) 325 MG: 325 (65 FE) TAB at 08:47

## 2021-01-01 RX ADMIN — DIVALPROEX SODIUM 125 MG: 125 CAPSULE, COATED PELLETS ORAL at 08:47

## 2021-01-01 RX ADMIN — ENOXAPARIN SODIUM 40 MG: 40 INJECTION SUBCUTANEOUS at 20:36

## 2021-01-01 RX ADMIN — MEROPENEM 1000 MG: 1 INJECTION, POWDER, FOR SOLUTION INTRAVENOUS at 16:37

## 2021-01-01 RX ADMIN — SODIUM CHLORIDE: 9 INJECTION, SOLUTION INTRAVENOUS at 06:09

## 2021-01-01 RX ADMIN — Medication 2 MCG/MIN: at 00:48

## 2021-01-01 RX ADMIN — SODIUM CHLORIDE, PRESERVATIVE FREE 10 ML: 5 INJECTION INTRAVENOUS at 07:40

## 2021-01-01 RX ADMIN — ONDANSETRON 4 MG: 2 INJECTION INTRAMUSCULAR; INTRAVENOUS at 07:23

## 2021-01-01 RX ADMIN — HEPARIN SODIUM 5000 UNITS: 5000 INJECTION INTRAVENOUS; SUBCUTANEOUS at 06:22

## 2021-01-01 RX ADMIN — DIVALPROEX SODIUM 125 MG: 125 CAPSULE, COATED PELLETS ORAL at 08:58

## 2021-01-01 RX ADMIN — TIMOLOL MALEATE 1 DROP: 5 SOLUTION OPHTHALMIC at 08:09

## 2021-01-01 RX ADMIN — SODIUM CHLORIDE 80 ML: 9 INJECTION, SOLUTION INTRAVENOUS at 08:18

## 2021-01-01 RX ADMIN — SODIUM CHLORIDE, PRESERVATIVE FREE 5 ML: 5 INJECTION INTRAVENOUS at 09:59

## 2021-01-01 RX ADMIN — HEPARIN SODIUM 5000 UNITS: 5000 INJECTION INTRAVENOUS; SUBCUTANEOUS at 05:55

## 2021-01-01 RX ADMIN — DEXAMETHASONE 6 MG: 6 TABLET ORAL at 07:39

## 2021-01-01 RX ADMIN — TIMOLOL MALEATE 1 DROP: 5 SOLUTION OPHTHALMIC at 07:40

## 2021-01-01 RX ADMIN — MEROPENEM 1000 MG: 1 INJECTION, POWDER, FOR SOLUTION INTRAVENOUS at 09:15

## 2021-01-01 RX ADMIN — MULTIPLE VITAMINS W/ MINERALS TAB 1 TABLET: TAB at 09:59

## 2021-01-01 RX ADMIN — SODIUM CHLORIDE, PRESERVATIVE FREE 10 ML: 5 INJECTION INTRAVENOUS at 21:33

## 2021-01-01 RX ADMIN — APIXABAN 5 MG: 5 TABLET, FILM COATED ORAL at 19:48

## 2021-01-01 RX ADMIN — MEROPENEM 1000 MG: 1 INJECTION, POWDER, FOR SOLUTION INTRAVENOUS at 13:43

## 2021-01-01 RX ADMIN — MEROPENEM 1000 MG: 1 INJECTION, POWDER, FOR SOLUTION INTRAVENOUS at 10:30

## 2021-01-01 RX ADMIN — QUETIAPINE FUMARATE 25 MG: 25 TABLET ORAL at 16:03

## 2021-01-01 RX ADMIN — OXYCODONE HYDROCHLORIDE AND ACETAMINOPHEN 500 MG: 500 TABLET ORAL at 12:47

## 2021-01-01 RX ADMIN — MEROPENEM 1000 MG: 1 INJECTION, POWDER, FOR SOLUTION INTRAVENOUS at 21:09

## 2021-01-01 RX ADMIN — HEPARIN SODIUM 5000 UNITS: 5000 INJECTION INTRAVENOUS; SUBCUTANEOUS at 14:50

## 2021-01-01 RX ADMIN — APIXABAN 5 MG: 5 TABLET, FILM COATED ORAL at 15:53

## 2021-01-01 RX ADMIN — Medication 3 MG: at 21:20

## 2021-01-01 RX ADMIN — MEROPENEM 1000 MG: 1 INJECTION, POWDER, FOR SOLUTION INTRAVENOUS at 23:42

## 2021-01-01 RX ADMIN — ACETAMINOPHEN 650 MG: 325 TABLET ORAL at 15:49

## 2021-01-01 RX ADMIN — AMIODARONE HYDROCHLORIDE 200 MG: 200 TABLET ORAL at 08:47

## 2021-01-01 RX ADMIN — MEROPENEM 1000 MG: 1 INJECTION, POWDER, FOR SOLUTION INTRAVENOUS at 17:23

## 2021-01-01 RX ADMIN — TIMOLOL MALEATE 1 DROP: 5 SOLUTION OPHTHALMIC at 08:31

## 2021-01-01 RX ADMIN — DEXAMETHASONE 6 MG: 6 TABLET ORAL at 12:47

## 2021-01-01 RX ADMIN — HEPARIN SODIUM 5000 UNITS: 5000 INJECTION INTRAVENOUS; SUBCUTANEOUS at 06:10

## 2021-01-01 RX ADMIN — MEROPENEM 1000 MG: 1 INJECTION, POWDER, FOR SOLUTION INTRAVENOUS at 21:19

## 2021-01-01 RX ADMIN — DIVALPROEX SODIUM 125 MG: 125 CAPSULE, COATED PELLETS ORAL at 19:14

## 2021-01-01 RX ADMIN — CEFTRIAXONE SODIUM 1000 MG: 1 INJECTION, POWDER, FOR SOLUTION INTRAMUSCULAR; INTRAVENOUS at 19:00

## 2021-01-01 RX ADMIN — MEROPENEM 1000 MG: 1 INJECTION, POWDER, FOR SOLUTION INTRAVENOUS at 22:09

## 2021-01-01 RX ADMIN — DIVALPROEX SODIUM 125 MG: 125 CAPSULE, COATED PELLETS ORAL at 17:29

## 2021-01-01 RX ADMIN — ZINC SULFATE 220 MG (50 MG) CAPSULE 50 MG: CAPSULE at 08:58

## 2021-01-01 RX ADMIN — DIVALPROEX SODIUM 125 MG: 125 CAPSULE, COATED PELLETS ORAL at 12:47

## 2021-01-01 RX ADMIN — QUETIAPINE FUMARATE 25 MG: 25 TABLET ORAL at 10:12

## 2021-01-01 RX ADMIN — SODIUM CHLORIDE: 9 INJECTION, SOLUTION INTRAVENOUS at 23:46

## 2021-01-01 RX ADMIN — AMIODARONE HYDROCHLORIDE 200 MG: 200 TABLET ORAL at 08:19

## 2021-01-01 RX ADMIN — ZINC SULFATE 220 MG (50 MG) CAPSULE 50 MG: CAPSULE at 08:09

## 2021-01-01 RX ADMIN — AMIODARONE HYDROCHLORIDE 200 MG: 200 TABLET ORAL at 09:57

## 2021-01-01 RX ADMIN — MEROPENEM 1000 MG: 1 INJECTION, POWDER, FOR SOLUTION INTRAVENOUS at 21:03

## 2021-01-01 RX ADMIN — DIVALPROEX SODIUM 125 MG: 125 CAPSULE, COATED PELLETS ORAL at 16:43

## 2021-01-01 RX ADMIN — ZINC SULFATE 220 MG (50 MG) CAPSULE 50 MG: CAPSULE at 08:15

## 2021-01-01 RX ADMIN — HEPARIN SODIUM 5000 UNITS: 5000 INJECTION INTRAVENOUS; SUBCUTANEOUS at 20:48

## 2021-01-01 RX ADMIN — AMIODARONE HYDROCHLORIDE 200 MG: 200 TABLET ORAL at 08:57

## 2021-01-01 RX ADMIN — ASPIRIN 81 MG: 81 TABLET, CHEWABLE ORAL at 08:43

## 2021-01-01 RX ADMIN — DEXAMETHASONE 6 MG: 6 TABLET ORAL at 17:29

## 2021-01-01 RX ADMIN — HEPARIN SODIUM 5000 UNITS: 5000 INJECTION INTRAVENOUS; SUBCUTANEOUS at 06:31

## 2021-01-01 RX ADMIN — ONDANSETRON 4 MG: 2 INJECTION INTRAMUSCULAR; INTRAVENOUS at 05:32

## 2021-01-01 RX ADMIN — OXYCODONE HYDROCHLORIDE AND ACETAMINOPHEN 500 MG: 500 TABLET ORAL at 09:55

## 2021-01-01 RX ADMIN — MEROPENEM 1000 MG: 1 INJECTION, POWDER, FOR SOLUTION INTRAVENOUS at 09:02

## 2021-01-01 ASSESSMENT — PAIN SCALES - GENERAL
PAINLEVEL_OUTOF10: 0
PAINLEVEL_OUTOF10: 10
PAINLEVEL_OUTOF10: 0
PAINLEVEL_OUTOF10: 8
PAINLEVEL_OUTOF10: 6
PAINLEVEL_OUTOF10: 4
PAINLEVEL_OUTOF10: 0
PAINLEVEL_OUTOF10: 4
PAINLEVEL_OUTOF10: 8
PAINLEVEL_OUTOF10: 0
PAINLEVEL_OUTOF10: 4
PAINLEVEL_OUTOF10: 0
PAINLEVEL_OUTOF10: 4
PAINLEVEL_OUTOF10: 0

## 2021-01-01 ASSESSMENT — ENCOUNTER SYMPTOMS
SHORTNESS OF BREATH: 0
NAUSEA: 0
TROUBLE SWALLOWING: 0
APNEA: 0
PHOTOPHOBIA: 0
APNEA: 0
CHOKING: 0
CONSTIPATION: 0
CHEST TIGHTNESS: 0
ABDOMINAL PAIN: 0
CHEST TIGHTNESS: 0
DIARRHEA: 0
PHOTOPHOBIA: 0
SHORTNESS OF BREATH: 0
CONSTIPATION: 0
NAUSEA: 0
EYE PAIN: 0
CHEST TIGHTNESS: 0
ABDOMINAL DISTENTION: 0
FACIAL SWELLING: 0
EYE PAIN: 0
ABDOMINAL PAIN: 0
BACK PAIN: 1
COUGH: 0
SORE THROAT: 0
COUGH: 0
NAUSEA: 0
ABDOMINAL PAIN: 0
BACK PAIN: 0
CHOKING: 0
COUGH: 0
CONSTIPATION: 0
CONSTIPATION: 0
CHOKING: 0
VOMITING: 0
ABDOMINAL PAIN: 0
RHINORRHEA: 0
TROUBLE SWALLOWING: 1
BACK PAIN: 0
ABDOMINAL DISTENTION: 0
SHORTNESS OF BREATH: 0
NAUSEA: 0
EYE PAIN: 0
RHINORRHEA: 0
TROUBLE SWALLOWING: 1
DIARRHEA: 0
NAUSEA: 0
WHEEZING: 0
BACK PAIN: 0
DIARRHEA: 0
PHOTOPHOBIA: 0
WHEEZING: 0
TROUBLE SWALLOWING: 0
SHORTNESS OF BREATH: 0
NAUSEA: 0
PHOTOPHOBIA: 0
BACK PAIN: 0
NAUSEA: 1
ABDOMINAL PAIN: 0
BACK PAIN: 0
VOMITING: 0
COLOR CHANGE: 0
WHEEZING: 0
COUGH: 0
DIARRHEA: 0
APNEA: 0
APNEA: 0
VOMITING: 0
ABDOMINAL DISTENTION: 0
BACK PAIN: 0
BACK PAIN: 0
VOMITING: 0
TROUBLE SWALLOWING: 0
TROUBLE SWALLOWING: 0
EYE REDNESS: 0
EYE PAIN: 0
CHEST TIGHTNESS: 0
ABDOMINAL PAIN: 0
SHORTNESS OF BREATH: 0
COUGH: 0
COLOR CHANGE: 0
DIARRHEA: 0
CHOKING: 0
COUGH: 0
VOMITING: 0
COUGH: 0
VOMITING: 0
EYE PAIN: 0
WHEEZING: 0
DIARRHEA: 0
PHOTOPHOBIA: 0
ABDOMINAL DISTENTION: 0
ABDOMINAL PAIN: 0
VOMITING: 0

## 2021-01-01 ASSESSMENT — PAIN DESCRIPTION - ORIENTATION: ORIENTATION: MID

## 2021-01-01 ASSESSMENT — PAIN DESCRIPTION - FREQUENCY
FREQUENCY: CONTINUOUS
FREQUENCY: CONTINUOUS

## 2021-01-01 ASSESSMENT — PATIENT HEALTH QUESTIONNAIRE - PHQ9
2. FEELING DOWN, DEPRESSED OR HOPELESS: 1
SUM OF ALL RESPONSES TO PHQ QUESTIONS 1-9: 2
SUM OF ALL RESPONSES TO PHQ QUESTIONS 1-9: 2

## 2021-01-01 ASSESSMENT — PAIN DESCRIPTION - LOCATION
LOCATION: CHEST
LOCATION: BACK

## 2021-01-01 ASSESSMENT — PAIN DESCRIPTION - DESCRIPTORS
DESCRIPTORS: PRESSURE
DESCRIPTORS: ACHING

## 2021-01-01 ASSESSMENT — PAIN DESCRIPTION - PAIN TYPE
TYPE: CHRONIC PAIN
TYPE: ACUTE PAIN

## 2021-02-19 NOTE — TELEPHONE ENCOUNTER
Medication: Sildenafil 20 mg   Last Visit: 10/27/2020  Next Visit: Due in October  Last Refill:12/20/19 30 tabs with 5 refills  Pharmacy: [facility]

## 2021-02-24 NOTE — TELEPHONE ENCOUNTER
Max dose is 100 mg or 5 pills. Document how many he is using. Make sure he has good Rx card for savings at [address] [place] [address] pay is usually more cost effective.

## 2021-03-25 NOTE — PROGRESS NOTES
[facility] Continence Nurse  Initial Ostomy Clinic Visit Note       NAME:  Lore Deal  MEDICAL RECORD NUMBER:  [id number]  AGE: 78 y.o. GENDER: male  : 1940  TODAY'S DATE:  3/25/2021    Subjective:     Reason for Ostomy referral for eval/treatment:  Chronic ileostomy concerns- leakage from pouch, ostomy changing size (telescoping). Lore Deal is a 78 y.o. male referred by:   Dr Irene Guan      Type of ostomy: ileostomy    Location of ostomy: RLQ    Ostomy history: created 8 yrs ago by Dr. Irene Guan s/p total colectomy per pt    Patient Goal of Care: improved pouching outcomes        PAST MEDICAL HISTORY        Diagnosis Date    Acute respiratory failure following trauma and surgery ([address]     Acute respiratory failure following trauma and surgery ([address]     Altered bowel elimination due to intestinal ostomy ([address]     iliostomy    Full dentures     pt said he has partial upper and lower    Full dentures     Gall stones     GERD (gastroesophageal reflux disease)     GI bleed     Hemorrhage of gastrointestinal tract, unspecified     Hemorrhage of gastrointestinal tract, unspecified     Hyperlipidemia     Hypertension     Kidney stones     Kidney stones     Primary localized osteoarthrosis, lower leg     both shoulders, neck, legs.  Primary localized osteoarthrosis, lower leg     Prostate disorder     Transient disorder of initiating or maintaining sleep     Transient disorder of initiating or maintaining sleep     Unspecified disorder of skin and subcutaneous tissue     Wears glasses     Wears glasses        PAST SURGICAL HISTORY    Past Surgical History:   Procedure Laterality Date    ABDOMEN SURGERY      CATARACT REMOVAL WITH IMPLANT Bilateral     COLONOSCOPY      X3    COLOSTOMY Right     COLOSTOMY BAG ON RT.  SIDE    CYSTOSCOPY Right 2017    CYSTOSCOPY URETERAL STENT INSERTION performed by Crystal Long MD at [address] [place] [address]  EYE SURGERY      bettye. eyes, cataracts extracted with iol's    FRACTURE SURGERY      JOINT REPLACEMENT Right 3-23-15    KIDNEY STONE SURGERY Left     KNEE ARTHROSCOPY Right     LITHOTRIPSY Left 06-20-14    w/ cystoscopy C & P    SHOULDER SURGERY Left 9/14/2020    MOLE EXCISION POSTERIOR SHOULDER performed by Connie Kellogg MD at [place] [address] , PARTIAL RECTUM\"    TOTAL KNEE ARTHROPLASTY  03/23/2015    Right with biomet and GPS product application    TOTAL KNEE ARTHROPLASTY Right 3/23/2015       FAMILY HISTORY    Family History   Problem Relation Age of Onset    Heart Disease Father     Other Mother        SOCIAL HISTORY    Social History     Tobacco Use    Smoking status: Never Smoker    Smokeless tobacco: Never Used   Substance Use Topics    Alcohol use: No     Alcohol/week: 0.0 standard drinks     Comment: occassional    Drug use: No       ALLERGIES    Allergies   Allergen Reactions    Sulfa Antibiotics     Vimovo [Naproxen-Esomeprazole]      Pain shoulder             Objective:      /78   Pulse 79   Temp 97.8 °F (36.6 °C) (Oral)   Resp 18       LABS    CBC:   Lab Results   Component Value Date    WBC 7.3 11/16/2020    RBC 4.89 11/16/2020    RBC 4.59 02/16/2012    HGB 14.9 11/16/2020     CMP:  Albumin:    Lab Results   Component Value Date    LABALBU 4.1 11/16/2020    LABALBU 4.3 02/16/2012     PT/INR:    Lab Results   Component Value Date    PROTIME 12.0 11/14/2012    INR 1.1 11/14/2012     HgBA1c:  No results found for: LABA1C  PTT: No components found for: LABPTT      Assessment:       Patient Active Problem List   Diagnosis Code    Hyperlipidemia E78.5    Hypertensive heart disease with heart failure (HCC) I11.0    Ileostomy status (HCC) Z93.2    Benign prostatic hyperplasia with lower urinary tract symptoms N40.1    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 9XX0    Recurrent kidney stones N20.0    Skin lesion of left upper extremity L98.9    Pigmented skin lesion of suspected malignant nature L81.9       Patient Active Problem List   Diagnosis    Hyperlipidemia    Hypertensive heart disease with heart failure (HCC)    Ileostomy status (HCC)    Benign prostatic hyperplasia with lower urinary tract symptoms    Glaucoma of right eye    Chronic gout without tophus    Recurrent kidney stones    Skin lesion of left upper extremity    Pigmented skin lesion of suspected malignant nature         Ostomy and Peristomal skin: The pouch removed easily after just being placed by patient earlier this morning. This creates suspicion that the patient may need an improved skin barrier wipe. Effluent output exits from the medial aspect of the stoma, close to the mucocutaneous junction. The peristomal skin in this area caves in slightly and the skin has a small amount of pseudoverrucous lesion and overgrowth of tissue. This area was treated with silver nitrate. There is a peristomal hernia that is approx 6 cm diameter primarily to the lateral side of the stoma. This should be supported with a hernia belt in attempt to prevent further herniation. While the pouch was off, the stoma was observed expanding and paige in size. The patient's wife states that she cuts the pouch to fit the smallest size of the stoma. This likely causes the stoma to expand under the flange and break the barrier seal with moisture of the stoma. The patient was encouraged to cut the pouch to fit the largest size of the stoma. This way only the moldable ring contracts and expands. Cavilon no sting barrier wipe used on peristomal skin. The patient's other ostomy supplies should work- Canova two piece convex pouch and Adapt barrier ring.     Location: RLQ  Type: ileostomy  Size: 35x50  Height: protrudes about 1cm  Color: red  Output: loose  Creases/Folds: no  Stents/Bridges: no      Plan:     Plan of Care:     Recommend hernia belt    Recommend superior barrier wipe    Supply order called in to [address]    See Discharge Instructions below    The patient was invited to return to ostomy clinic as needed and if he would like to have assistance with managing the hernia belt fitting    Patient/Caregiver Teaching:  Level of patientunderstanding able to:     [x] Indicates understanding       [] Needs reinforcement  [] Unsuccessful      [x] Verbal Understanding  [] Demonstrated understanding       [] No evidence of learning  [] Refused teaching         [] N/A    ___________________________________________    Discharge Instructions            [address] [facility] [address] and [address]     Visit Discharge Instructions    DATE- 3/25/2021      HOME CARE AGENCY-       [address] [employer] no sting barrier wipes  Hernia belts being ordered through:  -[employer]  -[place]-    Cut pouch to fit around largest size of stoma  Switch to Cavilon barrier wipe  Use new ostomy hernia belt once arrives      [facility] [phone]      If you need medical attention outside of the business hours of the [address] [place] [address] Road please contact your PCP or go to the nearest emergency room.           Patient Signature:__________________________________________________ DATE__________    Electronically signed by Jhon Berger RN on 3/25/2021 at 9:57 AM                ____________________________________________       Electronically signed by Jhon Berger RN on  3/25/2021 at 10:04 AM

## 2021-04-09 NOTE — PROGRESS NOTES
[facility] Continence Nurse  Ostomy Clinic Follow Up Note       NAME:  Thais Alan RECORD NUMBER:  [id number]  AGE: 78 y.o. GENDER: male  : 1940  TODAY'S DATE:  2021    Subjective:     Reason for Ostomy visit:  Hernia belt fitting      Nik Alvarado is a 78 y.o. male referred by:   Dr. Ann Grove      Type of ostomy: ileostomy    Location of ostomy: RLQ    Patient Goal of Care: fitting of hernia belt, the patient is unsure how to properly use and the directions are not helpful          Objective:      /76   Pulse 80   Temp 97.2 °F (36.2 °C) (Tympanic)   Resp 16       LABS    CBC:   Lab Results   Component Value Date    WBC 7.3 2020    RBC 4.89 2020    RBC 4.59 2012    HGB 14.9 2020     CMP:  Albumin:    Lab Results   Component Value Date    LABALBU 4.1 2020    LABALBU 4.3 2012     PT/INR:    Lab Results   Component Value Date    PROTIME 12.0 2012    INR 1.1 2012     HgBA1c:  No results found for: LABA1C  PTT: No components found for: LABPTT      Assessment:       Patient Active Problem List   Diagnosis Code    Hyperlipidemia E78.5    Hypertensive heart disease with heart failure (HCC) I11.0    [facility] Z93.2    Benign prostatic hyperplasia with lower urinary tract symptoms N40.1    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 9XX0    Recurrent kidney stones N20.0    Skin lesion of left upper extremity L98.9    Pigmented skin lesion of suspected malignant nature L81.9       Patient Active Problem List   Diagnosis    Hyperlipidemia    Hypertensive heart disease with heart failure (HCC)    Ileostomy status (HCC)    Benign prostatic hyperplasia with lower urinary tract symptoms    Glaucoma of right eye    Chronic gout without tophus    Recurrent kidney stones    Skin lesion of left upper extremity    Pigmented skin lesion of suspected malignant nature         The patient states that the peristomal skin is greatly improved since he last visit to the ostomy clinic and he is only here for the hernia belt fitting. The pt received a coloplast hernia belt in the mail and needs help cutting it to fit around his ostomy pouch. This was accomplished after cutting the hernia belt     Plan:     Plan of Care:     See Discharge Instructions below    Patient/Caregiver Teaching:  Level of patientunderstanding able to:     [x] Indicates understanding       [] Needs reinforcement  [] Unsuccessful      [x] Verbal Understanding  [] Demonstrated understanding       [] No evidence of learning  [] Refused teaching         [] N/A    ___________________________________________    Discharge Instructions            [address] [facility] and [address] [facility]     Visit Discharge Instructions    DATE- 4/9/2021      HOME CARE [address] [place] [address]-  None at this time  Awaiting arrival of custom hernia belt    OSTOMY INSTRUCTIONS-  As instructed at last visit      ADDITIONAL INSTRUCTIONS-  Call for appt if needed      [address] [facility] [phone]      If you need medical attention outside of the business hours of the [address] [place] [address] Road please contact your PCP or go to the nearest emergency room.           Patient Signature:__________________________________________________ DATE__________  Electronically signed by Janelle Melgoza RN on 4/9/2021 at 8:53 AM            ____________________________________________       Electronically signed by Janelle Melgoza RN on  4/9/2021 at 9:34 AM

## 2021-04-13 NOTE — PROGRESS NOTES
OFFICE VISIT  PROGRESS NOTE         Date of patient's visit: 4/13/21  Patient's Name:  Nina Elam  YOB: 1940       Patient Care Team:  Alli Strong MD as PCP - Josie Kocher, MD as PCP - [facility]        SUBJECTIVE     HISTORY           History of present illness     Pertinent details  added to ,       Chief Complaint   Patient presents with    Abdominal Pain    Nasal Congestion          Encounter Diagnoses   Name Primary?  Hypertensive heart disease with heart failure (HCC) Yes    Ileostomy status (HCC)     Non-ulcer dyspepsia     Chronic gout without tophus, unspecified cause, unspecified site     Ventral hernia without obstruction or gangrene         Symptom / problem          --history obtained from patient. -   Patient is here for follow-up with multiple medical conditions  He has subtotal colectomy and has ileostomy  Has a large ventral hernia  Saw Dr. Piyush Martino  Patient has been advised that surgical morbidity mortality is high in all advised to use a abdominal belt which he has   Other than that he has some known peptic dyspepsia   Known to have hypertensive heart disease with heart failure New [place] Heart Association class II       MEDICATIONS:      Current Outpatient Medications   Medication Sig Dispense Refill    sucralfate (CARAFATE) 1 GM/10ML suspension Take 10 mLs by mouth 4 times daily (before meals and nightly) 1200 mL 1    timolol (TIMOPTIC) 0.5 % ophthalmic solution instill 1 drop into both eyes twice a day      sildenafil (REVATIO) 20 MG tablet Take 1-5 tabs as needed prior to activity 30 tablet 11    tamsulosin (FLOMAX) 0.4 MG capsule Take 1 capsule by mouth daily 30 capsule 11    amLODIPine (NORVASC) 5 MG tablet take 1 tablet by mouth once canals normal  Nose - normal and patent, no erythema, discharge or polyps  Mouth - mucous membranes are moist, pharynx normal without lesions  Neck - supple, no significant adenopathy  Lymphatics - no palpable lymphadenopathy, no hepatosplenomegaly    Chest - clear to auscultation, no wheezes, rales or rhonchi, symmetric air entry    Heart - normal rate, regular rhythm, no murmurs, rubs, clicks or gallops    Extremities - peripheral pulses normal, no pedal edema       Abdomen -has ileostomy and ventral hernia    Back - full range of motion, notenderness, palpable spasm or pain on motion    Neurological - alert, oriented, normal speech, no focal sensory or motor deficit  Musculoskeletal - no joint tenderness, deformity or swelling                DIAGNOSTICS / INSERTS / IMAGES    [x] Reviewed       Labs      URINE ANALYSIS: No results found for: LABURIN     CBC:  Lab Results   Component Value Date    WBC 7.3 11/16/2020    HGB 14.9 11/16/2020     11/16/2020     02/16/2012        BMP:    Lab Results   Component Value Date     11/16/2020    K 5.0 11/16/2020     11/16/2020    CO2 25 11/16/2020    BUN 18 11/16/2020    CREATININE 0.90 11/16/2020    GLUCOSE 96 11/16/2020    GLUCOSE 100 02/16/2012        No components found for: LDLC  No results found for: LABA1C  No results found for: POCGLU   .     LIVER PROFILE:  Lab Results   Component Value Date    ALT 12 11/16/2020    AST 20 11/16/2020    PROT 7.2 11/16/2020    BILITOT 0.58 11/16/2020    LABALBU 4.1 11/16/2020    LABALBU 4.3 02/16/2012          Results for orders placed or performed during the hospital encounter of 11/16/20   Lipid Panel   Result Value Ref Range    Cholesterol 164 <200 mg/dL    HDL 44 >40 mg/dL    LDL Cholesterol 98 0 - 130 mg/dL    Chol/HDL Ratio 3.7 <5    Triglycerides 112 <150 mg/dL    VLDL NOT REPORTED 1 - 30 mg/dL   Comprehensive Metabolic Panel   Result Value Ref Range    Glucose 96 70 - 99 mg/dL    BUN 18 8 - 23 mg/dL educational materials - see patient instructions  [] yes         There are no discontinued medications. No orders of the defined types were placed in this encounter. There are no Patient Instructions on file for this visit. Medication List          Accurate as of April 13, 2021 11:59 PM. If you have any questions, ask your nurse or doctor. START taking these medications    sucralfate 1 GM/10ML suspension  Commonly known as: Carafate  Take 10 mLs by mouth 4 times daily (before meals and nightly)  Started by: Andreea Brown MD        CONTINUE taking these medications    acetaminophen 500 MG tablet  Commonly known as: TYLENOL  Take 1 tablet by mouth every 6 hours as needed for Pain     allopurinol 300 MG tablet  Commonly known as: ZYLOPRIM  take 1 tablet by mouth once daily     amLODIPine 5 MG tablet  Commonly known as: NORVASC  take 1 tablet by mouth once daily     cephALEXin 500 MG capsule  Commonly known as: KEFLEX  500 mgTake three times daily     finasteride 5 MG tablet  Commonly known as: PROSCAR  Take 1 tablet by mouth daily     latanoprost 0.005 % ophthalmic solution  Commonly known as: XALATAN     MULTIVITAMIN & MINERAL PO     sildenafil 20 MG tablet  Commonly known as: REVATIO  Take 1-5 tabs as needed prior to activity     simvastatin 20 MG tablet  Commonly known as: ZOCOR  take 1 tablet by mouth once daily     tamsulosin 0.4 MG capsule  Commonly known as: FLOMAX  Take 1 capsule by mouth daily     timolol 0.5 % ophthalmic solution  Commonly known as: TIMOPTIC           Where to Get Your Medications      These medications were sent to [facility], [address], [address]    Phone: [phone] ·   sucralfate 1 GM/10ML suspension             FOLLOW UP  PLANS     Return in about 6 months (around 10/13/2021).     MD JEOVANNY Strange [facility] [place] [address]. Phone [phone]   Fax: [phone]  Answering Service: [phone]    Visit Information    Have you changed or started any medications since your last visit including any over-the-counter medicines, vitamins, or herbal medicines? no   Are you having any side effects from any of your medications? -  no  Have you stopped taking any of your medications? Is so, why? -  no    Have you seen any other physician or provider since your last visit? Yes - Records Obtained  Have you had any other diagnostic tests since your last visit? No  Have you been seen in the emergency room and/or had an admission to a hospital since we last saw you? No  Have you had your routine dental cleaning in the past 6 months? no    Have you activated your [employer] account? If not, what are your barriers?  Yes     Patient Care Team:  Yeny Cagle MD as PCP - Rocco Bradley MD as PCP - [facility] Provider    Medical History Review  Past Medical, Family, and Social History reviewed and does not contribute to the patient presenting condition    Health Maintenance   Topic Date Due    DTaP/Tdap/Td vaccine (1 - Tdap) Never done    Shingles Vaccine (2 of 3) 11/28/2012    Annual Wellness Visit (AWV)  08/12/2021    Lipid screen  11/16/2021    Flu vaccine  Completed    Pneumococcal 65+ years Vaccine  Completed    COVID-19 Vaccine  Completed    Hepatitis A vaccine  Aged Out    Hepatitis B vaccine  Aged Out    Hib vaccine  Aged Out    Meningococcal (ACWY) vaccine  Aged Out

## 2021-04-14 PROBLEM — K43.9 VENTRAL HERNIA: Status: ACTIVE | Noted: 2021-01-01

## 2021-06-04 NOTE — TELEPHONE ENCOUNTER
Suri Fraction from [employer] called requesting pt medication to be changed to 90 day supply since will be cheaper. Per protocol okay to give 90 day supply.

## 2021-07-09 NOTE — ED NOTES
[facility] and spoke with Viky Ray RN at 1004 about a room assignment for this patient at [employer]. Viky Ray stated that she just got off the phone with them and they are waiting for the doctor to put in orders and then give the charge nurse report and then they will call us for a bed assignment. I told Viky Ray that Everett Isaacs is here and waiting and that this is suppose to be stat.      Ari Gonzales  07/09/21 1007

## 2021-07-09 NOTE — FLOWSHEET NOTE
Writer received a perfect serve message to support pt and wife  Pt critical and moaning in pain when writer arrived  Pt wife in shock and did not say much  Wife said that at this time she did not want prayers said but  asked for prayers for the pain and so writer said prayers  SC remains available        07/09/21 0865   Encounter Summary   Services provided to: Patient and family together   Referral/Consult From: Nurse   Support System Children   Continue Visiting   (7/9/21)   Complexity of Encounter Moderate   Length of Encounter 15 minutes   Spiritual Assessment Completed Yes   Routine   Type Initial   Assessment Anxious   Intervention Active listening;Explored feelings, thoughts, concerns;Prayer;Sustaining presence/ Ministry of presence   Outcome Coping

## 2021-07-09 NOTE — ED NOTES
Report given to Tommy Anthony RN from [employer]. Pt transported via [employer] to [facility] in [facility]. Report method by phone. The following was reviewed with receiving RN:   Current vital signs:  BP (!) 94/57   Pulse 57   Temp 98.1 °F (36.7 °C)   Resp 18   Ht 6' 1\" (1.854 m)   Wt 230 lb (104.3 kg)   SpO2 98%   BMI 30.34 kg/m²                MEWS Score: 2     Any medication or safety alerts were reviewed. Any pending diagnostics and notifications were also reviewed, as well as any safety concerns or issues, abnormal labs, abnormal imaging, and abnormal assessment findings. Questions were answered.           Sonja Gavin RN  07/09/21 9113

## 2021-07-09 NOTE — FLOWSHEET NOTE
Writer facilitated conversation between family and medical staff  Writer escorted family to pt room   Present: pt wife, and pt two sons. Family in shock but mutually supporting each other  Pt waiting to be transferred, [employer] present and waiting for room assignment at [facility]       07/09/21 [address] provided to: Family   Continue Visiting   (7/9/21)   Complexity of Encounter Moderate   Length of Encounter 1 hour;30 minutes   Grief and Life Adjustment   Type New Diagnosis   Assessment Approachable; Anxious   Intervention Explored feelings, thoughts, concerns;Sustaining presence/ Ministry of presence   Outcome Expressed gratitude;Receptive; Hopeful;Coping

## 2021-07-09 NOTE — ED NOTES
Report given to Juan Antonio Meade RN from [employer]. Report method in person. The following was reviewed with receiving RN:   Current vital signs:  BP (!) 94/57   Pulse 57   Temp 98.1 °F (36.7 °C)   Resp 18   Ht 6' 1\" (1.854 m)   Wt 230 lb (104.3 kg)   SpO2 98%   BMI 30.34 kg/m²                MEWS Score: 2     Any medication or safety alerts were reviewed. Any pending diagnostics and notifications were also reviewed, as well as any safety concerns or issues, abnormal labs, abnormal imaging, and abnormal assessment findings. Questions were answered.             Angelita Borrego RN  07/09/21 1037

## 2021-07-09 NOTE — ED NOTES
[facility] [facility] and spoke with Chaitanya Garcia RN at 0691 about sending this patient stat to [address] [place] [address],[address]. Aguilar Truong. Chaitanya Garcia stated that she will get a hold of the Cardiothoracic surgeon and then call us back stat.      Cachorro Salcedo  07/09/21 6726

## 2021-07-09 NOTE — ED PROVIDER NOTES
EMERGENCY DEPARTMENT ENCOUNTER    Pt Name: Josue Bashir  MRN: [id number]  [place] 1940  Date of evaluation: 7/9/21  CHIEF COMPLAINT       Chief Complaint   Patient presents with    Chest Pain     HISTORY OF PRESENT ILLNESS   HPI  77-year-old male history of partial colectomy status post ileostomy, hypertension, hyperlipidemia, hypertensive heart disease presents for evaluation of chest pain. Symptoms started about half an hour ago while the patient was on the treadmill. Patient describes substernal pressure. Nonradiating. Moderate and constant. At about a 5 out of 10 now. Has associated nausea and diaphoresis. No vomiting. No other recent illness. No cough or fever or shortness of breath. No lower extremity swelling. No history of DVT or PE. No known alleviating or exacerbating factors. No home treatment prior to arrival.    REVIEW OF SYSTEMS     Review of Systems   Constitutional: Negative for chills and fatigue. HENT: Negative for facial swelling, nosebleeds, rhinorrhea and sore throat. Eyes: Negative for pain and redness. Respiratory: Negative for cough and shortness of breath. Cardiovascular: Positive for chest pain. Negative for leg swelling. Gastrointestinal: Positive for nausea. Negative for abdominal pain, diarrhea and vomiting. Genitourinary: Negative for flank pain and hematuria. Musculoskeletal: Negative for arthralgias and back pain. Skin: Negative for color change and rash. Neurological: Negative for dizziness, tremors, facial asymmetry, speech difficulty, weakness and numbness.      PASTMEDICAL HISTORY     Past Medical History:   Diagnosis Date    Acute respiratory failure following trauma and surgery ([address]     Acute respiratory failure following trauma and surgery ([address]     Altered bowel elimination due to intestinal ostomy ([address]     iliostomy    Full dentures     pt said he has partial upper and lower    Full dentures     Gall stones     GERD (gastroesophageal reflux disease)     GI bleed     Hemorrhage of gastrointestinal tract, unspecified     Hemorrhage of gastrointestinal tract, unspecified     Hyperlipidemia     Hypertension     Kidney stones     Kidney stones     Primary localized osteoarthrosis, lower leg     both shoulders, neck, legs. 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 9XX0    Recurrent kidney stones N20.0    Skin lesion of left upper extremity L98.9    Pigmented skin lesion of suspected malignant nature L81.9    Ventral hernia K43.9     SURGICAL HISTORY       Past Surgical History:   Procedure Laterality Date    ABDOMEN SURGERY      CATARACT REMOVAL WITH IMPLANT Bilateral     COLONOSCOPY      X3    COLOSTOMY Right     COLOSTOMY BAG ON RT.  SIDE    CYSTOSCOPY Right 8/29/2017    CYSTOSCOPY URETERAL STENT INSERTION performed by Nadia Head MD at [facility], ESOPHAGUS      EYE SURGERY      bettye. eyes, cataracts extracted with iol's    FRACTURE SURGERY      JOINT REPLACEMENT Right 3-23-15    KIDNEY STONE SURGERY Left     KNEE ARTHROSCOPY Right     LITHOTRIPSY Left 06-20-14    w/ cystoscopy C & P    SHOULDER SURGERY Left 9/14/2020    MOLE EXCISION POSTERIOR SHOULDER performed by Zoltan Simms MD at [facility] , PARTIAL RECTUM\"    TOTAL KNEE ARTHROPLASTY  03/23/2015 Right with biomet and GPS product application    TOTAL KNEE ARTHROPLASTY Right 3/23/2015     CURRENT MEDICATIONS       Discharge Medication List as of 2021 10:55 AM      CONTINUE these medications which have NOT CHANGED    Details   amLODIPine (NORVASC) 5 MG tablet Take 1 tablet by mouth daily, Disp-90 tablet, R-2Phone In      sucralfate (CARAFATE) 1 GM/10ML suspension Take 10 mLs by mouth 4 times daily (before meals and nightly), Disp-1200 mL, R-1Normal      timolol (TIMOPTIC) 0.5 % ophthalmic solution instill 1 drop into both eyes twice a dayHistorical Med      sildenafil (REVATIO) 20 MG tablet Take 1-5 tabs as needed prior to activity, Disp-30 tablet, R-11Normal      tamsulosin (FLOMAX) 0.4 MG capsule Take 1 capsule by mouth daily, Disp-30 capsule, R-11Normal      simvastatin (ZOCOR) 20 MG tablet take 1 tablet by mouth once daily, Disp-90 tablet, R-1Normal      finasteride (PROSCAR) 5 MG tablet Take 1 tablet by mouth daily, Disp-30 tablet, R-11Normal      allopurinol (ZYLOPRIM) 300 MG tablet take 1 tablet by mouth once daily, Disp-90 tablet,R-5Normal      cephALEXin (KEFLEX) 500 MG capsule 500 mgTake three times daily, Disp-21 capsule,R-0Normal      acetaminophen (TYLENOL) 500 MG tablet Take 1 tablet by mouth every 6 hours as needed for Pain, Disp-120 tablet,R-1Normal      latanoprost (XALATAN) 0.005 % ophthalmic solution Place 3 drops into the right eye 1 drop in left eye, R-0Historical Med      Multiple Vitamins-Minerals (MULTIVITAMIN & MINERAL PO) Take 1 tablet by mouth 2 times daily. ALLERGIES     is allergic to sulfa antibiotics and vimovo [naproxen-esomeprazole]. FAMILY HISTORY     He indicated that his mother is . He indicated that his father is .      SOCIAL HISTORY       Social History     Tobacco Use    Smoking status: Never Smoker    Smokeless tobacco: Never Used   Vaping Use    Vaping Use: Never used   Substance Use Topics    Alcohol use: No     Alcohol/week: 0.0 standard drinks     Comment: occassional    Drug use: No     PHYSICAL EXAM     INITIAL VITALS: BP (!) 94/57   Pulse 57   Temp 98.1 °F (36.7 °C)   Resp 18   Ht 6' 1\" (1.854 m)   Wt 230 lb (104.3 kg)   SpO2 98%   BMI 30.34 kg/m²    Physical Exam  Vitals and nursing note reviewed. Constitutional:       Appearance: Normal appearance. HENT:      Head: Normocephalic and atraumatic. Nose: Nose normal.   Eyes:      Extraocular Movements: Extraocular movements intact. Pupils: Pupils are equal, round, and reactive to light. Cardiovascular:      Rate and Rhythm: Normal rate and regular rhythm. Pulmonary:      Effort: Pulmonary effort is normal. No respiratory distress. Breath sounds: Normal breath sounds. Abdominal:      General: Abdomen is flat. There is no distension. Palpations: Abdomen is soft. There is no mass. Comments: Ileostomy    Musculoskeletal:         General: No swelling. Normal range of motion. Cervical back: Normal range of motion. No rigidity. Skin:     General: Skin is warm and dry. Neurological:      General: No focal deficit present. Mental Status: He is alert. Mental status is at baseline. Cranial Nerves: No cranial nerve deficit. MEDICAL DECISION MAKINyear old male history of ileostomy, PUD, htn presents for evaluation of acute chest pain. Concerning history and physical.  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 Discussed with patient and family will emergently transfer for operative intervention. Discussed with [facility] unfortunately they were unable to accommodate transfer due to ongoing case in the OR at this point. Discussed with Gregg Kaplan in [facility] they were able to accept the patient for admission to cardiac ICU prior to surgery later today.   Arranged to fly patient via [employer] given acute 7/9/2021 at 08:22.      2.  Dissection flap involvement of the right renal artery results in subtle   diminished renal parenchymal enhancement. 3.  Focal dissection flap in the right common iliac artery. 4.  Additional chronic and benign findings, as above. XR CHEST PORTABLE   Final Result   Notable size increase in the aortic contour, corresponding to aortic   enlargement and dissection demonstrated with chest CT today. LABS: All lab results were reviewed by myself, and all abnormals are listed below.   Labs Reviewed   CBC WITH AUTO DIFFERENTIAL - Abnormal; Notable for the following components:       Result Value    RBC 4.25 (*)     Hemoglobin 12.8 (*)     Hematocrit 38.4 (*)     Monocytes 8 (*)     All other components within normal limits   COMPREHENSIVE METABOLIC PANEL W/ REFLEX TO MG FOR LOW K - Abnormal; Notable for the following components:    Glucose 157 (*)     Chloride 109 (*)     Total Protein 6.3 (*)     All other components within normal limits   D-DIMER, QUANTITATIVE - Abnormal; Notable for the following components:    D-Dimer, Quant 10.82 (*)     All other components within normal limits   COVID-19, RAPID   LIPASE   TROPONIN   TROPONIN   APTT   PROTIME-INR   URINALYSIS       EMERGENCY DEPARTMENTCOURSE:         Vitals:    Vitals:    07/09/21 0930 07/09/21 0935 07/09/21 0940 07/09/21 1026   BP: 87/69 (!) 85/51 100/71 (!) 94/57   Pulse: 61 55 57 57   Resp: 13 13 12 18   Temp:    98.1 °F (36.7 °C)   SpO2: 98% 99% 98% 98%   Weight:       Height:           The patient was given the following medications while in the emergency department:  Orders Placed This Encounter   Medications    ondansetron (ZOFRAN) injection 4 mg    famotidine (PEPCID) injection 20 mg    atropine injection 1 mg    iopamidol (ISOVUE-370) 76 % injection 100 mL    0.9 % sodium chloride bolus    DISCONTD: sodium chloride flush 0.9 % injection 10 mL    morphine (PF) injection 1 mg    DISCONTD: esmolol (BREVIBLOC) 2.5gm/250ml NS infusion     CONSULTS:  None    FINAL IMPRESSION      1. Dissection of thoracic aorta [facility])          DISPOSITION/PLAN   DISPOSITION Decision To Transfer 07/09/2021 08:53:10 AM      PATIENT REFERRED TO:  No follow-up provider specified.   DISCHARGE MEDICATIONS:  Discharge Medication List as of 7/9/2021 10:55 AM        Blaine Chicas MD  Attending Emergency Physician                    Blaine Chicas MD  07/09/21 0761

## 2021-07-09 NOTE — ED NOTES
Bed: 08  Expected date:   Expected time:   Means of arrival: [facility]  Comments:  FA[address] ELIDIA Lomas  07/09/21 4894

## 2021-08-19 NOTE — ED NOTES
Writer attempted to contact pt wife for POC update- [phone], busy tone. Writer able to speak with daughter-in-law, Stalin Mcmahon [phone] and updated on POC. Stalin Mcmahon stated patient has been complaining of nausea and food getting \"stuck in his stomach\". She denies that patient has been having any difficulty swallowing- she states patient has been handling liquids without difficulty.       [facility] ELIDIA Bynum  08/19/21 5016

## 2021-08-19 NOTE — ED PROVIDER NOTES
[address] [facility] ED  [address] [facility] [address] [facility] in [place] [address]  Phone: [phone]  Fax: [phone]        Pt Name: Gela Coronel  MRN: [id number]  [place] 1940  Date of evaluation: 8/19/21      CHIEF COMPLAINT     Chief Complaint   Patient presents with    Dysphagia     staff from [address] [place] [address] states patient is having difficulty swallowing, pt denies difficulty swallowing and states \"food just doesn't taste good\"         HISTORY OF PRESENT ILLNESS  (Location/Symptom, Timing/Onset, Context/Setting, Quality, Duration, Modifying Factors, Severity.)    Gela Coronel is a 78 y.o. male who presents with dysphagia. He states he is unable to tolerate oral intake of solids like meats. He can tolerate soft foods and liquids. He has had a 30 lb weight loss over the last 5 weeks. Of note the patient was recently treated at [facility] [place] for a type A aortic dissection. He was treated at [facility] and was inpatient for 9 days. He does not believe he had any complications. He did have an open repair. No nausea or vomiting. No diarrhea or constipation. No fever or chills. No history of dysphagia prior to his surgery. REVIEW OF SYSTEMS    (2-9 systems for level 4, 10 or more for level 5)     Review of Systems   Constitutional: Negative for chills and fever. HENT: Positive for trouble swallowing. Negative for congestion and rhinorrhea. Eyes: Negative for photophobia and visual disturbance. Respiratory: Negative for cough and shortness of breath. Cardiovascular: Negative for chest pain and palpitations. Gastrointestinal: Negative for abdominal pain, diarrhea, nausea and vomiting. Genitourinary: Negative for dysuria, frequency and urgency. Musculoskeletal: Negative for back pain and neck pain. Skin: Negative for rash and wound. Neurological: Negative for dizziness, light-headedness and headaches. Hematological: Negative for adenopathy.  Does not (FLOMAX) 0.4 MG capsule Take 1 capsule by mouth daily, Disp-30 capsule, R-11Normal      simvastatin (ZOCOR) 20 MG tablet take 1 tablet by mouth once daily, Disp-90 tablet, R-1Normal      finasteride (PROSCAR) 5 MG tablet Take 1 tablet by mouth daily, Disp-30 tablet, R-11Normal      allopurinol (ZYLOPRIM) 300 MG tablet take 1 tablet by mouth once daily, Disp-90 tablet,R-5Normal      cephALEXin (KEFLEX) 500 MG capsule 500 mgTake three times daily, Disp-21 capsule,R-0Normal      acetaminophen (TYLENOL) 500 MG tablet Take 1 tablet by mouth every 6 hours as needed for Pain, Disp-120 tablet,R-1Normal      latanoprost (XALATAN) 0.005 % ophthalmic solution Place 3 drops into the right eye 1 drop in left eye, R-0Historical Med      Multiple Vitamins-Minerals (MULTIVITAMIN & MINERAL PO) Take 1 tablet by mouth 2 times daily. ALLERGIES     is allergic to sulfa antibiotics and vimovo [naproxen-esomeprazole]. FAMILY HISTORY     He indicated that his mother is . He indicated that his father is . family history includes Heart Disease in his father; Other in his mother. SOCIAL HISTORY      reports that he has never smoked. He has never used smokeless tobacco. He reports that he does not drink alcohol and does not use drugs. PHYSICAL EXAM    (up to 7 for level 4, 8 or more for level 5)   INITIAL VITALS:  height is 6' 1\" (1.854 m) and weight is 104.3 kg (230 lb). His oral temperature is 97.9 °F (36.6 °C). His blood pressure is 118/70 and his pulse is 81. His respiration is 18 and oxygen saturation is 96%. Physical Exam  Vitals and nursing note reviewed. Constitutional:       Appearance: Normal appearance. HENT:      Head: Normocephalic and atraumatic. Mouth/Throat:      Mouth: Mucous membranes are moist.      Pharynx: Oropharynx is clear. Eyes:      Extraocular Movements: Extraocular movements intact. Pupils: Pupils are equal, round, and reactive to light.    Cardiovascular: Rate and Rhythm: Normal rate and regular rhythm. Pulses: Normal pulses. Heart sounds: Normal heart sounds. No murmur heard. No friction rub. No gallop. Pulmonary:      Effort: Pulmonary effort is normal.      Breath sounds: Normal breath sounds. No wheezing, rhonchi or rales. Abdominal:      General: Abdomen is flat. Bowel sounds are normal.      Palpations: Abdomen is soft. Tenderness: There is no abdominal tenderness. There is no guarding or rebound. Musculoskeletal:         General: No tenderness. Normal range of motion. Cervical back: Normal range of motion and neck supple. No tenderness. Right lower leg: No edema. Left lower leg: No edema. Lymphadenopathy:      Cervical: No cervical adenopathy. Skin:     General: Skin is warm and dry. Capillary Refill: Capillary refill takes less than 2 seconds. Neurological:      Mental Status: He is alert and oriented to person, place, and time. Mental status is at baseline. Psychiatric:         Mood and Affect: Mood normal.         Behavior: Behavior normal.         Thought Content: Thought content normal.         DIFFERENTIAL DIAGNOSIS/ MDM:     78-year-old male here with dysphagia. Recent surgery for a type A aortic dissection. Mild MERRICK, fluids given. I discussed the patient with Dr Divya Dailey from GI. He recommended a barium esophagram initially, but I cannot obtain this study at [facility]. Plan for dysphagia diet, speech evaluation, at the nursing home. Outpatient GI clinic visit and probable EGD.      DIAGNOSTIC RESULTS     EKG: All EKG's are interpreted by the Emergency Department Physician who either signs or Co-signs this chart in the absence of a cardiologist.    none    RADIOLOGY:        Interpretation per the Radiologist below, if available at the time of this note:    XR CHEST (2 VW)    Result Date: 8/19/2021  EXAMINATION: TWO XRAY VIEWS OF THE CHEST 8/19/2021 2:37 pm COMPARISON: July 9, 2021 CT chest HISTORY: ORDERING SYSTEM PROVIDED HISTORY: dysphagia TECHNOLOGIST PROVIDED HISTORY: dysphagia Reason for Exam: dysphagia, in rehab facility, hx limited. Acuity: Acute Type of Exam: Initial FINDINGS: Median sternotomy, stable normal cardiopericardial silhouette/moderate tortuosity of the thoracic aorta/prosthetic heart valve New moderate left basilar opacity.   Otherwise clear lungs Degenerative changes of the thoracic spine/shoulders     New moderate left basilar opacity likely related moderate pleural effusion/pleural thickening, atelectasis versus infiltrate       LABS:  Results for orders placed or performed during the hospital encounter of 04/54/39   Basic Metabolic Panel   Result Value Ref Range    Glucose 97 70 - 99 mg/dL    BUN 34 (H) 8 - 23 mg/dL    CREATININE 1.55 (H) 0.70 - 1.20 mg/dL    Bun/Cre Ratio NOT REPORTED 9 - 20    Calcium 10.0 8.6 - 10.4 mg/dL    Sodium 135 135 - 144 mmol/L    Potassium 5.1 3.7 - 5.3 mmol/L    Chloride 101 98 - 107 mmol/L    CO2 23 20 - 31 mmol/L    Anion Gap 11 9 - 17 mmol/L    GFR Non-African American 43 (L) >60 mL/min    GFR  52 (L) >60 mL/min    GFR Comment          GFR Staging NOT REPORTED    CBC Auto Differential   Result Value Ref Range    WBC 9.3 3.5 - 11.0 k/uL    RBC 4.37 (L) 4.5 - 5.9 m/uL    Hemoglobin 12.1 (L) 13.5 - 17.5 g/dL    Hematocrit 38.1 (L) 41 - 53 %    MCV 87.1 80 - 100 fL    MCH 27.7 26 - 34 pg    MCHC 31.8 31 - 37 g/dL    RDW 15.0 12.5 - 15.4 %    Platelets 418 108 - 745 k/uL    MPV 8.5 6.0 - 12.0 fL    NRBC Automated NOT REPORTED per 100 WBC    Differential Type NOT REPORTED     Seg Neutrophils 78 (H) 36 - 66 %    Lymphocytes 11 (L) 24 - 44 %    Monocytes 9 2 - 11 %    Eosinophils % 1 1 - 4 %    Basophils 1 0 - 2 %    Immature Granulocytes NOT REPORTED 0 %    Segs Absolute 7.40 1.8 - 7.7 k/uL    Absolute Lymph # 1.00 1.0 - 4.8 k/uL    Absolute Mono # 0.80 0.1 - 1.2 k/uL    Absolute Eos # 0.10 0.0 - 0.4 k/uL    Basophils Absolute 0.10 0.0 - 0.2 k/uL Absolute Immature Granulocyte NOT REPORTED 0.00 - 0.30 k/uL    WBC Morphology NOT REPORTED     RBC Morphology NOT REPORTED     Platelet Estimate NOT REPORTED        MERRICK    EMERGENCY DEPARTMENT COURSE:   Vitals:    Vitals:    08/19/21 1253 08/19/21 1301 08/19/21 1549 08/19/21 1700   BP:    118/70   Pulse:  82 80 81   Resp:       Temp:  97.9 °F (36.6 °C)     TempSrc:  Oral     SpO2: 96%      Weight:       Height:         -------------------------  BP: 118/70, Temp: 97.9 °F (36.6 °C), Pulse: 81, Resp: 18          CONSULTS:  GI  I discussed the patient with Dr Julianna Mullen. Please see MDM. PROCEDURES:  None    FINAL IMPRESSION      1.  Dysphagia, unspecified type          DISPOSITION/PLAN   DISPOSITION Decision To Discharge 08/19/2021 05:00:52 PM      CONDITION ON DISPOSITION:   Stable     PATIENT REFERRED TO:  Jameson Greenfield MD  [address] [place] [address] 36. [phone]    Schedule an appointment as soon as possible for a visit in 3 days        DISCHARGE MEDICATIONS:  Discharge Medication List as of 8/19/2021  5:03 PM          (Please note that portions of this note were completed with a voicerecognition program.  Efforts were made to edit the dictations but occasionally words are mis-transcribed.)    Sudha Correia MD  Attending Emergency Medicine Physician       Sudha Correia MD  08/20/21 [address], MD  08/20/21 [address], MD  08/20/21 0126

## 2021-08-19 NOTE — ED NOTES
Report called to Dale d'Paulette at [address] [place] [address]. Updated on recommendations for dysphagia diet, outpatient GI consult, speech therapy, and outpt barium swallow study.      Yulia Armstrong RN  08/19/21 0171

## 2021-08-19 NOTE — ED NOTES
Pt wife and son at bedside- updated on GI recommendations and POC.      Sunday Garner RN  08/19/21 6763

## 2021-08-19 NOTE — ED NOTES
Writer spoke with Dr. Carrera Para- updated that we do not do Esophagrams at [facility]. Dr. Arellano Quale okay to d/c back to [facility] with dysphagia diet. He recommends patient follow-up with GI outpatient and outpatient barium swallow study, also recommends speech therapy.      Sunday Garner RN  08/19/21 9485

## 2021-08-23 PROBLEM — I95.9 SEPSIS ASSOCIATED HYPOTENSION (HCC): Status: ACTIVE | Noted: 2021-01-01

## 2021-08-23 PROBLEM — A41.9 SEPSIS ASSOCIATED HYPOTENSION (HCC): Status: ACTIVE | Noted: 2021-01-01

## 2021-08-23 NOTE — ED PROVIDER NOTES
EMERGENCY DEPARTMENT ENCOUNTER   ATTENDING ATTESTATION     Pt Name: Rosa Isela Blackwood  MRN: [id number]  [place] 1940  Date of evaluation: 8/23/21       Rosa Isela Blackwood is a 78 y.o. male who presents with Altered Mental Status      MDM:   Was here at the hospital for an outpatient swallow eval, there was not an order so it couldn't be done. He went to the restroom, felt lightheaded    Hx of type A aortic dissection repair last month at [facility], wife says he has been confused ever since and has chest pain all the time from post op, this is nothing new. Recent diagnosed with UTI  He has urinary retention today, inserting a rodriguez, suspect UTI is the cause. Suspect sepsis from a UTI    Vitals:   Vitals:    08/23/21 1601 08/23/21 1621 08/23/21 1630   BP: (!) 65/43 117/84 109/72   Pulse: 98 101 102   Resp:  19 22   Temp: 97.9 °F (36.6 °C)     TempSrc: Oral     SpO2: 95% 97% 96%         I personally evaluated and examined the patient in conjunction with the resident and agree with the assessment, treatment plan, and disposition of the patient as recorded by the resident. I performed a history and physical examination of the patient and discussed management with the resident. I reviewed the residents note and agree with the documented findings and plan of care. Any areas of disagreement are noted on the chart. I was personally present for the key portions of any procedures. I have documented in the chart those procedures where I was not present during the key portions. I have personally reviewed all images and agree with the resident's interpretation. I have reviewed the emergency nurses triage note. I agree with the chief complaint, past medical history, past surgical history, allergies, medications, social and family history as documented unless otherwise noted.     Lisa Mukherjee MD  Attending Emergency Physician           Lisa Mukherjee MD  08/23/21 0824

## 2021-08-23 NOTE — PROGRESS NOTES
Medication History completed:    New medications: trazodone, potassium chloride ER tablets, ondansetron, multivitamin, methocarbamol, melatonin, duoneb, heparin, furosemide, ferrous sulfate, atorvastatin, aspirin, amiodarone, Mylanta    Medications discontinued: tamsulosin, sucralfate, simvastatin, sildenafil, finasteride, cephalexin, amlodipine, allopurinol    Changes to dosing:   Latanoprost changed to 1 drop in each eye nightly    Stated allergies: As listed    Other pertinent information: Medications confirmed with facility list Shilo Godoy [facility]).      Thank you,  Halley Suarez, PharmD, BCPS  [phone]
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
## 2021-08-23 NOTE — ED NOTES
Pt is brought to this ER by the [employer] after she was assisting him in the hospital's bathroom. Pt was brought to this ER from a local [facility] for an outpatient study. While waiting for this study, the pt was taken to the bathroom by the house supervisor who noted that the pt was incontinent of urine. House supervisor reported that the pt was assisted up to the toilet with assistance x 1. Pt then reportedly slumped while on the toilet, and he did not even know his name. Pt was assisted back up to the wheelchair with assistance x 2, and the pt was no longer able to bear any weight. Pt was then brought to the ER for evaluation.      Osvaldo Badillo RN  08/23/21 [id number]

## 2021-08-24 PROBLEM — N39.0 UTI (URINARY TRACT INFECTION): Status: ACTIVE | Noted: 2021-01-01

## 2021-08-24 PROBLEM — R79.89 ELEVATED TROPONIN: Status: ACTIVE | Noted: 2021-01-01

## 2021-08-24 PROBLEM — R77.8 ELEVATED TROPONIN: Status: ACTIVE | Noted: 2021-01-01

## 2021-08-24 PROBLEM — R31.9 HEMATURIA: Status: ACTIVE | Noted: 2021-01-01

## 2021-08-24 PROBLEM — E43 SEVERE MALNUTRITION (HCC): Status: ACTIVE | Noted: 2021-01-01

## 2021-08-24 PROBLEM — R41.82 ALTERED MENTAL STATUS: Status: ACTIVE | Noted: 2021-01-01

## 2021-08-24 NOTE — CONSULTS
Urology Consultation    Patient:  Rupesh Pelayo  MRN: [id number]  YOB: 1940    CHIEF COMPLAINT:  Urinary retention    HISTORY OF PRESENT ILLNESS:   The patient is a 78 y.o. male who has multiple medical issues. He has been in rehab after open heart surgery. He has a known large prostate. He had some difficulty voiding and had a rodriguez placed. His urine has been bloody. CT shows a large prostate and bladder stones. Patient's old records, notes and chart reviewed and summarized above. Past Medical History:    Past Medical History:   Diagnosis Date    Acute respiratory failure following trauma and surgery ([address]     Acute respiratory failure following trauma and surgery ([address]     Altered bowel elimination due to intestinal ostomy ([address]     iliostomy    Full dentures     pt said he has partial upper and lower    Full dentures     Gall stones     GERD (gastroesophageal reflux disease)     GI bleed     Hemorrhage of gastrointestinal tract, unspecified     Hemorrhage of gastrointestinal tract, unspecified     Hyperlipidemia     Hypertension     Kidney stones     Kidney stones     Primary localized osteoarthrosis, lower leg     both shoulders, neck, legs.  Primary localized osteoarthrosis, lower leg     Prostate disorder     Transient disorder of initiating or maintaining sleep     Transient disorder of initiating or maintaining sleep     Unspecified disorder of skin and subcutaneous tissue     Wears glasses     Wears glasses        Past Surgical History:    Past Surgical History:   Procedure Laterality Date    ABDOMEN SURGERY      CATARACT REMOVAL WITH IMPLANT Bilateral     COLONOSCOPY      X3    COLOSTOMY Right     COLOSTOMY BAG ON RT.  SIDE    CYSTOSCOPY Right 8/29/2017    CYSTOSCOPY URETERAL STENT INSERTION performed by Brien Valerio MD at [facility], ESOPHAGUS      EYE SURGERY      bettye. eyes, cataracts extracted with iol's    FRACTURE SURGERY      JOINT REPLACEMENT Right 3-23-15    KIDNEY STONE SURGERY Left     KNEE ARTHROSCOPY Right     LITHOTRIPSY Left 06-20-14    w/ cystoscopy C & P    SHOULDER SURGERY Left 9/14/2020    MOLE EXCISION POSTERIOR SHOULDER performed by Guillermo Naqvi MD at [place] [address] , PARTIAL RECTUM\"    TOTAL KNEE ARTHROPLASTY  03/23/2015    Right with biomet and GPS product application    TOTAL KNEE ARTHROPLASTY Right 3/23/2015     Previous  surgery: none     Medications:    Scheduled Meds:   diclofenac sodium  2 g Topical BID    cefTRIAXone (ROCEPHIN) IV  1,000 mg IntraVENous Q24H    amiodarone  200 mg Oral Daily    aspirin  81 mg Oral Daily    atorvastatin  20 mg Oral Daily    ferrous sulfate  325 mg Oral Daily with breakfast    latanoprost  1 drop Both Eyes Nightly    therapeutic multivitamin-minerals  1 tablet Oral Daily    timolol  1 drop Both Eyes Daily    sodium chloride flush  5-40 mL IntraVENous 2 times per day    famotidine (PEPCID) injection  20 mg IntraVENous Daily    heparin (porcine)  5,000 Units Subcutaneous 3 times per day     Continuous Infusions:   sodium chloride      sodium chloride 150 mL/hr at 08/24/21 0346     PRN Meds:.melatonin, sodium chloride flush, sodium chloride, potassium chloride **OR** potassium alternative oral replacement **OR** potassium chloride, magnesium sulfate, polyethylene glycol, acetaminophen **OR** acetaminophen, ondansetron    Allergies:  Sulfa antibiotics and Vimovo [naproxen-esomeprazole]    Social History:    Social History     Socioeconomic History    Marital status:      Spouse name: Not on file    Number of children: Not on file    Years of education: Not on file    Highest education level: Not on file   Occupational History    Not on file   Tobacco Use    Smoking status: Never Smoker    Smokeless tobacco: Never Used   Vaping Use    Vaping Use: Never used   Substance and Sexual Activity    Alcohol use: No     Alcohol/week: 0.0 standard drinks     Comment: occassional    Drug use: No    Sexual activity: Not on file   Other Topics Concern    Not on file   Social History Narrative    Not on file     Social Determinants of Health     Financial Resource Strain:     Difficulty of Paying Living Expenses:    Food Insecurity:     Worried About Running Out of Food in the Last Year:     [address] in the Last Year:    Transportation Needs:     Lack of Transportation (Medical):      Lack of Transportation (Non-Medical):    Physical Activity:     Days of Exercise per Week:     Minutes of Exercise per Session:    Stress:     Feeling of Stress :    Social Connections:     Frequency of Communication with Friends and Family:     Frequency of Social Gatherings with Friends and Family:     Attends [employer] Services:     Active Member of Clubs or Organizations:     Attends Club or Organization Meetings:     Marital Status:    Intimate Partner Violence:     Fear of Current or Ex-Partner:     Emotionally Abused:     Physically Abused:     Sexually Abused:        Family History:    Family History   Problem Relation Age of Onset    Heart Disease Father     Other Mother      Previous Urologic Family history: none    REVIEW OF SYSTEMS:  Constitutional: negative  Eyes: negative  Respiratory: negative  Cardiovascular: negative  Gastrointestinal: negative  Genitourinary: see HPI  Musculoskeletal: negative  Skin: negative   Neurological: negative  Hematological/Lymphatic: negative  Psychological: negative    Physical Exam:    This a 78 y.o. male   Patient Vitals for the past 24 hrs:   BP Temp Temp src Pulse Resp SpO2 Height Weight   08/24/21 1700 -- -- -- 88 19 97 % -- --   08/24/21 1630 106/62 -- -- 85 21 97 % -- --   08/24/21 1600 (!) 116/59 98.6 °F (37 °C) Oral 89 22 96 % -- --   08/24/21 1557 -- -- -- -- -- -- 6' 1\" (1.854 m) --   08/24/21 1530 -- -- -- 90 24 97 % -- -- 08/24/21 1500 (!) 99/53 -- -- 91 22 97 % -- --   08/24/21 1430 96/60 -- -- 87 19 98 % -- --   08/24/21 1400 -- -- -- 84 23 97 % -- --   08/24/21 1330 -- -- -- 85 23 96 % -- --   08/24/21 1300 -- -- -- 85 27 97 % -- --   08/24/21 1230 (!) 101/55 -- -- 86 22 96 % -- --   08/24/21 1200 (!) 97/50 97.6 °F (36.4 °C) Oral 90 27 96 % -- --   08/24/21 1100 (!) 90/53 -- -- 83 17 97 % -- --   08/24/21 1030 106/61 -- -- 84 19 97 % -- --   08/24/21 1000 (!) 108/59 -- -- 84 19 93 % -- --   08/24/21 0930 -- -- -- 84 20 95 % -- --   08/24/21 0900 (!) 100/55 -- -- 85 19 98 % -- --   08/24/21 0830 (!) 118/36 -- -- 88 23 99 % -- --   08/24/21 0800 (!) 117/55 98.6 °F (37 °C) Oral 86 19 98 % -- --   08/24/21 0730 (!) 94/48 -- -- 87 30 98 % -- --   08/24/21 0715 96/69 -- -- 86 21 98 % -- --   08/24/21 0700 (!) 102/49 -- -- 90 21 95 % -- --   08/24/21 0645 (!) 89/50 -- -- 91 19 95 % -- --   08/24/21 0630 (!) 107/59 -- -- 91 22 97 % -- --   08/24/21 0615 115/67 -- -- 88 18 98 % -- --   08/24/21 0600 (!) 109/54 -- -- 90 16 96 % -- --   08/24/21 0545 (!) 136/47 -- -- 89 21 96 % -- --   08/24/21 0530 (!) 118/54 -- -- 87 21 97 % -- --   08/24/21 0515 (!) 111/51 -- -- 92 24 96 % -- --   08/24/21 0500 (!) 116/57 -- -- 90 15 98 % -- --   08/24/21 0445 (!) 98/57 -- -- 95 23 92 % -- --   08/24/21 0430 (!) 133/98 -- -- 92 19 97 % -- --   08/24/21 0415 120/62 -- -- 92 21 97 % -- --   08/24/21 0400 (!) 126/44 -- -- 87 20 95 % -- --   08/24/21 0345 (!) 119/52 -- -- 89 22 96 % -- --   08/24/21 0330 96/74 -- -- 92 21 97 % -- --   08/24/21 0315 (!) 87/49 -- -- 94 (!) 34 94 % -- --   08/24/21 0300 101/62 -- -- 98 30 92 % -- --   08/24/21 0245 (!) 108/57 -- -- 95 20 97 % -- --   08/24/21 0230 (!) 111/42 -- -- 91 20 95 % -- --   08/24/21 0130 133/72 -- -- 96 15 97 % -- --   08/24/21 0115 108/60 -- -- 98 27 96 % -- --   08/24/21 0100 (!) 120/51 -- -- 97 29 94 % -- --   08/24/21 0045 (!) 74/53 -- -- 96 24 95 % -- --   08/24/21 0015 (!) 75/52 -- -- 100 15 98 % -- --   08/24/21 0000 (!) 83/42 96.2 °F (35.7 °C) Axillary 105 29 97 % -- --   08/23/21 2315 (!) 84/42 -- -- 107 20 97 % -- --   08/23/21 2300 -- -- -- 107 16 97 % -- --   08/23/21 2200 129/81 -- -- 104 30 91 % -- --   08/23/21 2130 89/62 -- -- 94 14 97 % -- --   08/23/21 2115 125/81 98.3 °F (36.8 °C) Axillary 100 22 -- -- 190 lb (86.2 kg)   08/23/21 2100 -- -- -- -- -- -- 6' 1\" (1.854 m) 229 lb 15 oz (104.3 kg)   08/23/21 2035 (!) 111/58 -- -- 96 -- 91 % -- --   08/23/21 2000 -- -- -- 93 -- 96 % -- --   08/23/21 1930 97/78 -- -- 90 19 92 % -- --   08/23/21 1915 -- -- -- 96 20 95 % -- --   08/23/21 1745 91/65 -- -- 100 24 90 % -- --     Constitutional: Patient in no acute distress; Neuro: alert and oriented to person place and time. Psych: Mood and affect normal.  Skin: Normal  Lungs: Respiratory effort normal  Cardiovascular:  Normal peripheral pulses  Abdomen: Soft, non-tender, non-distended with no CVA, flank pain, hepatosplenomegaly or hernia. Kidneys normal.  Bladder non-tender and not distended. Lymphatics: no palpable lymphadenopathy  Penis normal and circumcised  Urethral meatus normal  Scrotal exam normal  Testicles normal bilaterally  Epididymis normal bilaterally  No evidence of inguinal hernia  Price in place, urine light red. LABS:  Recent Labs     08/23/21 1650 08/24/21  0538   WBC 22.0* 25.3*   HGB 12.8* 10.6*   HCT 39.6* 33.0*   MCV 86.5 86.5    146*     Recent Labs     08/23/21 1650 08/24/21  0538   * 137   K 4.7 4.9   CL 99 103   CO2 21 25   BUN 31* 33*   CREATININE 1.98* 1.90*     Lab Results   Component Value Date    PSA 1.23 03/22/2017    PSA 1.59 12/23/2014    PSA 1.05 07/23/2013       Additional Lab/culture results:    Urine culture shows [id number], susc pending.        Urinalysis:   Recent Labs     08/23/21  1645   COLORU DARK YELLOW*   PHUR 5.5   WBCUA 20 TO 50   RBCUA TOO NUMEROUS TO COUNT   MUCUS NOT REPORTED   TRICHOMONAS NOT REPORTED   YEAST NOT REPORTED   BACTERIA MODERATE*   SPECGRAV 1.012   LEUKOCYTESUR MOD*   UROBILINOGEN Normal   BILIRUBINUR NEGATIVE        -----------------------------------------------------------------  Imaging Results:    CT images reviewed. Stones in bladder  BPH noted. No hydro. Assessment and Plan   Impression:    Patient Active Problem List   Diagnosis    Hyperlipidemia    Hypertensive heart disease with heart failure ([facility]    Ileostomy status (HCC)    Benign prostatic hyperplasia with lower urinary tract symptoms    Glaucoma of right eye    Chronic gout without tophus    MERRICK (acute kidney injury) ([facility]    Recurrent kidney stones    Skin lesion of left upper extremity    Pigmented skin lesion of suspected malignant nature    Ventral hernia    Sepsis associated hypotension (HCC)    UTI (urinary tract infection)    Hematuria    Altered mental status    Elevated troponin    Severe malnutrition (HCC)       Plan:     Continue rodriguez  Continue Rocephin, will adjust based on culture. Irrigate as needed. Void trial prior to D/C once condition improves.          Piyush Romero MD  5:33 PM 8/24/2021

## 2021-08-24 NOTE — PROGRESS NOTES
Pt's wife stopped to ask to talk to a  regarding her wishes to have the patient discharged to a different facility. [facility] notified & will speak to her.

## 2021-08-24 NOTE — PLAN OF CARE
Nutrition Problem #1: Severe malnutrition  Intervention: Food and/or Nutrient Delivery: Continue Current Diet  Nutritional Goals: Meet 75% of nutrient needs with PO diet and supplement

## 2021-08-24 NOTE — CONSULTS
[address]                 [facility], [address]                                  CONSULTATION    PATIENT NAME: Marques Wheatley             :        1940  MED REC NO:   [id number]                              ROOM:       2016  ACCOUNT NO:   [de-identified]                           ADMIT DATE: 2021  PROVIDER:     Magaly Dillard    CONSULT DATE:  2021    REASON FOR CONSULTATION:  Septic shock, hypoxemia, hypotension, acute  kidney injury. HISTORY OF PRESENT ILLNESS:  The patient is a very pleasant 78-year-old  male. He is a nonsmoker past or present. He is a former   and  for [employer]. He has been  for 59  years. The patient was recently transferred from [facility]  to [facility] after being diagnosed with an ascending aortic dissection. The patient underwent a replacement of the ascending aorta. The repair  reportedly was done with a 30 mm Gelweave graft hemiarch AVR with 25 mm  tissue valve. This was performed on 2021. The patient was  hospitalized for two weeks per wife's report and eventually transferred  to the [address] [place] [address]. The patient returned to the ED because of  confusion, altered mental status, lightheadedness and a near syncopal  episode. Reportedly, this was witnessed here at [facility] as  he was getting a barium swallow study. The patient was brought to the  ED. Course was notable for hypotension. The patient underwent chest  x-ray which revealed a left basilar opacity, could be effusion versus  atelectasis. The patient's CT scan of the chest revealed the features  of operative repair of the ascending aortic dissection. There was a  significant improvement in the associated mediastinal hematoma. There  were some air bronchograms in the left lower lobe. It could be  atelectatic, atelectasis versus infiltrate.   There were some elements of  effusion as well. The patient is feeling much better. He is in a  bladder irrigation system. The patient appears in better spirits and  according to the wife much more lucid from before. PAST MEDICAL HISTORY:  Past medical history is that stated in the  history of present illness. The patient has a history of GERD, history  of GI bleed in the past, hypertension, hyperlipidemia, history of  nephrolithiasis. PAST SURGICAL HISTORY:  As above. There is also history of right total  knee arthroplasty, history of cystoscopies, left shoulder surgery,  dilatation of the esophagus, history of colonoscopies. There is a  history of colostomy. ALLERGIES:  SULFA ANTIBIOTICS and VIMOVO. MEDICATIONS:  He is not on any pulmonary medications per se. SOCIAL HISTORY:  Nonsmoker, nondrinker. REVIEW OF SYSTEMS:  As per HPI, otherwise systems reviewed and negative. PHYSICAL EXAMINATION:  GENERAL:  The patient is an 47-year-old male resting quietly. VITAL SIGNS:  Systolic blood pressures from 96 to 102, heart rate 86 to  90, respirations anywhere from 19 to 22, temperature max 98.6, O2  saturation 90% on 2 liters. His BMI is 25.1. HEENT:  No supraclavicular lymph node enlargement. LUNGS:  Some decreased breath sounds at the bases posteriorly. CARDIOVASCULAR:  Regular rhythm. ABDOMEN:  Soft. EXTREMITIES:  No edema. LABORATORY RESULTS:  BUN and creatinine was 31/1.98, now 33/1. 90. White  count 25.3, hemoglobin 10.6, platelet count 396. Prothrombin time 14.7,  PTT 35.2. Urinalysis, moderate leukocyte esterase, moderate bacteria. Blood cultures x2, negative. Urine culture x1 pending. IMPRESSION:  1. Encephalopathy, resolved. 2.  Sepsis. 3.  History of ascending aortic dissection status post replacement as  well as aortic valve replacement at [facility] 07/09/2021.  4.  Acute kidney injury. 5.  Urinary tract infection. 6.  Hypotension, resolved. PLAN:  1.   Continue the

## 2021-08-24 NOTE — H&P
[facility]     HISTORY AND PHYSICAL EXAMINATION            Date:   8/24/2021  Patientname:  Yaneth Claire  Date of admission:  8/23/2021  4:15 PM  MRN:   [id number]  Account:  [de-identified]  YOB: 1940  PCP:    Sarai Pelayo MD  Room:   2016/2016-01  Code Status:    Full Code    CHIEF COMPLAINT     Chief Complaint   Patient presents with    Altered Mental Status       HISTORY OF PRESENT ILLNESS  (Character, Onset, Location, Duration,  Exacerbating/RelievingFactors, Radiation,   Associated Symptoms, Severity )      The patient is a 78 y.o.  male, with a history of GI bleed, GERD, kidney stones, BPH, CHF, hypertension, ostomy, aortic dissection, hyperlipidemia. Patient presents with altered mental status, lightheadedness and near syncopal episode that occurred today here at the hospital he was getting a barium swallow study. Patient currently resides at [facility] for rehab. Was recently treated with antibiotics for UTI.     HPI   1) Location/Symptom altered mental status, lightheadedness, near syncope  2) Timing/Onset: Sudden onset today  3) Context/Setting: Brought to the ER from barium swallow study at the hospital  4) Quality: Lightheadedness  5) Duration: continuous   6) Modifying Factors: No aggravating or alleviating factors  7) Severity: Severe    PAST MEDICAL HISTORY   Patient  has a past medical history of Acute respiratory failure following trauma and surgery ([address] Acute respiratory failure following trauma and surgery ([address] Altered bowel elimination due to intestinal ostomy ([address] Full dentures, Full dentures, Gall stones, GERD (gastroesophageal reflux disease), GI bleed, Hemorrhage of gastrointestinal tract, unspecified, Hemorrhage of gastrointestinal tract, unspecified, Hyperlipidemia, Hypertension, Kidney stones, Kidney stones, Primary localized osteoarthrosis, lower leg, Primary localized osteoarthrosis, lower leg, Prostate disorder, Transient disorder of initiating or maintaining sleep, Transient disorder of initiating or maintaining sleep, Unspecified disorder of skin and subcutaneous tissue, Wears glasses, and Wears glasses. PAST SURGICAL HISTORY    Patient  has a past surgical history that includes Knee arthroscopy (Right); Tonsillectomy; Cataract removal with implant (Bilateral); Kidney stone surgery (Left); Colonoscopy; total colectomy; colostomy (Right); Lithotripsy (Left, 06-20-14); Abdomen surgery; Dilatation, esophagus; fracture surgery; eye surgery; Total knee arthroplasty (03/23/2015); Total knee arthroplasty (Right, 3/23/2015); joint replacement (Right, 3-23-15); Cystoscopy (Right, 8/29/2017); and shoulder surgery (Left, 9/14/2020). FAMILY HISTORY    Patient family history includes Heart Disease in his father; Other in his mother. SOCIAL HISTORY    Patient  reports that he has never smoked. He has never used smokeless tobacco. He reports that he does not drink alcohol and does not use drugs. HOME MEDICATIONS        Prior to Admission medications    Medication Sig Start Date End Date Taking?  Authorizing Provider   acetaminophen (TYLENOL) 325 MG tablet Take 650 mg by mouth every 8 hours as needed for Pain   Yes Historical Provider, MD   amiodarone (CORDARONE) 200 MG tablet Take 200 mg by mouth daily   Yes Historical Provider, MD   aspirin 81 MG chewable tablet Take 81 mg by mouth daily   Yes Historical Provider, MD   atorvastatin (LIPITOR) 20 MG tablet Take 20 mg by mouth daily   Yes Historical Provider, MD   ferrous sulfate (IRON 325) 325 (65 Fe) MG tablet Take 325 mg by mouth daily (with breakfast)   Yes Historical Provider, MD   furosemide (LASIX) 20 MG tablet Take 20 mg by mouth daily   Yes Historical Provider, MD   Heparin Sodium, Porcine, (HEPARIN, PORCINE,) 5000 UNIT/ML injection Inject 5,000 Units into the skin every 8 hours   Yes Historical Provider, MD   ipratropium-albuterol (DUONEB) 0.5-2.5 (3) MG/3ML SOLN dry.   Eyes:      Extraocular Movements: Extraocular movements intact. Conjunctiva/sclera: Conjunctivae normal.      Pupils: Pupils are equal, round, and reactive to light. Cardiovascular:      Rate and Rhythm: Regular rhythm. Tachycardia present. Pulses: Normal pulses. Heart sounds: Normal heart sounds. Pulmonary:      Effort: Tachypnea present. Breath sounds: Normal breath sounds. No wheezing, rhonchi or rales. Abdominal:      General: Bowel sounds are normal. There is no distension. Palpations: Abdomen is soft. Tenderness: There is no abdominal tenderness. Musculoskeletal:         General: Normal range of motion. Cervical back: Normal range of motion and neck supple. Right lower leg: No edema. Left lower leg: No edema. Skin:     General: Skin is warm and dry. Capillary Refill: Capillary refill takes less than 2 seconds. Coloration: Skin is pale. Neurological:      Mental Status: He is alert. He is confused. GCS: GCS eye subscore is 4. GCS verbal subscore is 4. GCS motor subscore is 6. Psychiatric:         Attention and Perception: He perceives visual hallucinations. Mood and Affect: Affect is inappropriate. Speech: Speech normal.         Behavior: Behavior is slowed. Behavior is cooperative. Cognition and Memory: Cognition is impaired. DIAGNOSTICS      EKG: (as documented in ED note):  Rhythm: sinus tachycardia  Rate: normal  Axis: left  Ectopy: none  Conduction: normal  ST Segments: nonspecific changes  T Waves: flattening in  v1, v2, v3 and v4  Q Waves: I and aVl     Clinical Impression: non-specific EKG    Labs:  CBC:   Recent Labs     08/23/21  1650   WBC 22.0*   HGB 12.8*        BMP:    Recent Labs     08/23/21  1650   *   K 4.7   CL 99   CO2 21   BUN 31*   CREATININE 1.98*   GLUCOSE 107*     S. Calcium:  Recent Labs     08/23/21  1650   CALCIUM 10.1     S.  Ionized Calcium:No results for input(s): IONCA in the last 72 hours. S. Magnesium:No results for input(s): MG in the last 72 hours. S. Phosphorus:No results for input(s): PHOS in the last 72 hours. S. Glucose:No results for input(s): POCGLU in the last 72 hours. Glycosylated hemoglobin A1C: No results found for: LABA1C  Hepatic:   Recent Labs     08/23/21  1650   AST 20   ALT 15   ALKPHOS 111     CARDIAC ENZY:   Recent Labs     08/23/21  1650 08/23/21  1838 08/23/21  2228   TROPHS 79* 77* 64*     INR:   Recent Labs     08/23/21  1810   INR 1.1     BNP:   Recent Labs     08/23/21  1650   PROBNP 1,549*      ABGs: No results for input(s): PH, PCO2, PO2, HCO3, O2SAT in the last 72 hours. Lipids: No results for input(s): CHOL, TRIG, HDL, LDLCALC in the last 72 hours. Invalid input(s): LDL  Pancreatic functions:No results for input(s): LIPASE, AMYLASE in the last 72 hours. Aurora Nava:   Recent Labs     08/23/21  1650   LACTA 2.2     Thyroid functions:   Lab Results   Component Value Date    TSH 5.98 08/23/2021      U/A:  Recent Labs     08/23/21  1645   COLORU DARK YELLOW*   WBCUA 20 TO 50   RBCUA TOO NUMEROUS TO COUNT   MUCUS NOT REPORTED   BACTERIA MODERATE*   SPECGRAV 1.012   LEUKOCYTESUR MOD*   GLUCOSEU NEGATIVE   AMORPHOUS NOT REPORTED       Imaging/Diagonstics:     CT ABDOMEN PELVIS WO CONTRAST Additional Contrast? None    Addendum Date: 8/23/2021    ADDENDUM: Impression should also state there is an increased small left pleural effusion with increased bibasilar atelectatic changes. Result Date: 8/23/2021  EXAMINATION: CT OF THE ABDOMEN AND PELVIS WITHOUT CONTRAST 8/23/2021 4:04 pm TECHNIQUE: CT of the abdomen and pelvis was performed without the administration of intravenous contrast. Multiplanar reformatted images are provided for review. Dose modulation, iterative reconstruction, and/or weight based adjustment of the mA/kV was utilized to reduce the radiation dose to as low as reasonably achievable.  COMPARISON: 07/09/2021 HISTORY: ORDERING SYSTEM PROVIDED HISTORY: concern for septic kidney secondary to nephrolithiasis TECHNOLOGIST PROVIDED HISTORY: concern for septic kidney secondary to nephrolithiasis Decision Support Exception - unselect if not a suspected or confirmed emergency medical condition->Emergency Medical Condition (MA) Reason for Exam: concern for septic kidney secondary to nephrolithiasis Acuity: Unknown Type of Exam: Unknown Relevant Medical/Surgical History: colostomy, colectomy FINDINGS: Lower Chest: Increased small left pleural effusion with increased bibasilar atelectatic changes. Decreased hemopericardium. Prosthetic aortic valve partially imaged. Organs: Evaluation is limited by the absence of contrast.  Redemonstration of innumerable hepatic cysts, grossly unchanged. Gallbladder is grossly unremarkable. Spleen is grossly normal caliber. Pancreas is atrophic. Adrenal glands are normal size. Punctate nonobstructing left nephroliths. Bilateral renal cysts measuring up to 7.3 cm on the right and 9.5 cm on the left. No hydronephrosis with mild left perinephric stranding. GI/Bowel: Postoperative changes from presumed colectomy with a Brielle's pouch and a right lower quadrant ostomy with a similar appearing parastomal hernia. No bowel obstruction. Small lipoma in the gastric antrum. Pelvis: Urinary bladder contains numerous layering calcifications posteriorly as well as an indwelling Price catheter with associated intraluminal gas. Gas within 2 anterosuperior bladder diverticula. Perivesicular stranding appears grossly similar to prior. Enlarged prostate. Peritoneum/Retroperitoneum: No free fluid or free air. Intimal flap redemonstrated in the aorta compatible with the known dissection. Abdominal aorta remains unchanged in caliber measuring up to 2.5 cm. Unchanged ectasia of the common iliac arteries, right greater than left.   Displaced calcifications in the common iliac vessels are also compatible with underlying intimal flaps/dissection. Bones/Soft Tissues: Recent postoperative changes along the anterior abdominal wall and epigastrium. Presumed sequelae of medication injection in the anterior abdominal wall subcutaneous fat. No acute bony findings on a background of osseous demineralization, dextroconvex lumbar spinal curvature, advanced degenerative changes throughout the lumbar spine including sequelae of Baastrup's disease, and in unchanged region of sclerosis in the right acetabulum with some other smaller unchanged sclerotic foci in the pelvis. Indwelling Price catheter with associated intraluminal gas in the bladder which appears thickened and with numerous diverticula. The wall thickening may be sequelae of chronic bladder outlet obstruction in the setting of an enlarged prostate, though as there is perivesicular stranding as well as left perinephric stranding, recommend correlation for superimposed ascending urinary tract infection. Innumerable bladder calcifications and nonobstructing left nephrolithiasis without an obstructing stone. Though suboptimally evaluated in the absence of contrast intimal flaps are redemonstrated in the abdominal aorta and common iliac arteries in the setting of known recent dissection two months prior. Aortic caliber is unchanged and remains within normal limits, and there is unchanged ectasia of the common iliac vessels. Recent postoperative changes along the anterior abdominal wall without any organized fluid collection or soft tissue gas. Other unchanged chronic findings as described above. CT Head WO Contrast    Result Date: 8/23/2021  EXAMINATION: CT OF THE HEAD WITHOUT CONTRAST  8/23/2021 5:37 pm TECHNIQUE: CT of the head was performed without the administration of intravenous contrast. Dose modulation, iterative reconstruction, and/or weight based adjustment of the mA/kV was utilized to reduce the radiation dose to as low as reasonably achievable. COMPARISON: November 12, 2012 HISTORY: ORDERING SYSTEM PROVIDED HISTORY: altered mental status TECHNOLOGIST PROVIDED HISTORY: altered mental status Decision Support Exception - unselect if not a suspected or confirmed emergency medical condition->Emergency Medical Condition (MA) Reason for Exam: altered mental status FINDINGS: BRAIN/VENTRICLES: There is no acute intracranial hemorrhage, mass effect or midline shift. Note is made of cavum septum pellucidum, unchanged from prior study. ORBITS: The visualized portion of the orbits demonstrate no acute abnormality. SINUSES: The visualized paranasal sinuses and mastoid air cells demonstrate no acute abnormality. SOFT TISSUES/SKULL:  No acute abnormality of the visualized skull or soft tissues. No acute intracranial abnormality. CT CHEST WO CONTRAST    Result Date: 8/23/2021  EXAMINATION: CT OF THE CHEST WITHOUT CONTRAST 8/23/2021 5:18 pm TECHNIQUE: CT of the chest was performed without the administration of intravenous contrast. Multiplanar reformatted images are provided for review. Dose modulation, iterative reconstruction, and/or weight based adjustment of the mA/kV was utilized to reduce the radiation dose to as low as reasonably achievable. COMPARISON: 08/23/2021, 07/09/2021 HISTORY: ORDERING SYSTEM PROVIDED HISTORY: hx of dissection TECHNOLOGIST PROVIDED HISTORY: hx of dissection Decision Support Exception - unselect if not a suspected or confirmed emergency medical condition->Emergency Medical Condition (MA) Reason for Exam: hx of dissection Acuity: Unknown Type of Exam: Unknown FINDINGS: Mediastinum: Evaluation is limited by the absence of contrast.  Interval median sternotomy with ascending aortic dissection repair and placement of a prosthetic aortic valve. Vessel evaluation is limited. The ascending aortic caliber just distal to the operative repair spans approximately 4.8 cm.   The proximal descending thoracic aorta spans approximately 4.9 cm, in the distal descending aorta measures approximately 3 cm. The intimal flap is partially visualized within the transverse and descending aorta with the true and false lumen roughly similar in size to one another. Significant improvement in the associated mediastinal hematoma. Small volume residual hemopericardium. No pneumomediastinum or adenopathy. Lungs/pleura: There are some new air bronchograms at the dependent left lower lobe. Background slightly increased atelectatic changes with pulmonary parenchymal evaluation motion degraded. Right upper lobe calcified granuloma. Resolved right and slightly increased left pleural effusions. No pneumothorax. Upper Abdomen: Abdominal findings are reported separately under same-day abdominal CT. Soft Tissues/Bones: No organized fluid collection or soft tissue gas along the healing median sternotomy. There is mild osseous resorption along the sternotomy incision which remains grossly approximated. No appreciable osseous bridging. Callus formation in the region of the manubrium. Otherwise unchanged degenerative findings in the spine and shoulders. Vascular evaluation is limited by the absence of contrast. Interval operative repair of the ascending aortic dissection. Vessel caliber as described above with the ascending aorta just distal to the operative repair measuring up to 4.9 cm. The true and false lumens now all appear somewhat equal in caliber, though assessment is limited in the absence of contrast. Significant improvement in the associated mediastinal hematoma. Only small volume residual hemopericardium. New air bronchograms in the left lower lobe which may be due to developing infection in the appropriate clinical setting. Slightly increased atelectatic changes in the lung bases. Improved right and worsened left pleural effusions.      XR CHEST PORTABLE    Result Date: 8/23/2021  EXAMINATION: ONE XRAY VIEW OF THE CHEST 8/23/2021 4:55 pm COMPARISON: 19 April 2021 HISTORY: ORDERING SYSTEM PROVIDED HISTORY: altered mental status, evaluate for pneumonia TECHNOLOGIST PROVIDED HISTORY: altered mental status, evaluate for pneumonia Reason for Exam: altered mental status, evaluate for pneumonia Acuity: Unknown Type of Exam: Unknown FINDINGS: AP portable view of the chest time stamped at 1725 hours demonstrates overlying cardiac monitoring electrodes. Stable cardiomegaly is noted. Prior median sternotomy is demonstrated. CT. No change in left pleural effusion and left basilar opacity. No vascular congestion or extrapleural air. Surgical clips are present the medial right axilla. Prosthetic aortic valve is noted. No change in left basilar opacity likely related to effusion and atelectasis with focal airspace disease not excluded. ASSESSMENT  and  PLAN     Principal Problem:    Sepsis associated hypotension (HCC)  Active Problems:    Hypertensive heart disease with heart failure (HCC)    Benign prostatic hyperplasia with lower urinary tract symptoms    MERRICK (acute kidney injury) ([facility] [address]    UTI (urinary tract infection)    Hematuria    Altered mental status    Elevated troponin  Resolved Problems:    * No resolved hospital problems. *    Plan:    Urosepsis/MERRICK/hematuria/BPH  -Urinalysis shows 20-50 WBCs, numerous RBCs, moderate leuks  -Gross hematuria, urology consulted, Price placed and irrigated  -Patient started on Rocephin  -Received LR 2 L bolus in the ED  -IV [email or web address]  -Blood pressure range 83//81  -May need pressors, consult critical care  -WBC 22.0, lactic acid 2.2  -Creatinine 1.98, BUN 31, K4.7,   -CT abdomen pelvis shows perivesicular stranding as well as left perinephric stranding, recommend correlation for superimposed ascending urinary tract infection.      Altered mental status  -CT head negative for acute intracranial abnormality  -CT chest shows new air bronchograms in the left lower lobe which may be due to developing improving  Afib- rate controlled        Electronically signed by Jeanna Santiago MD

## 2021-08-24 NOTE — PROGRESS NOTES
SW spoke to pt and wife. Both want SNF in [place]. Pt is from [facility] in [facility]. SW informed couple of the [place] places that accept their insurance. Pt's wife (and Niece) will meet with this worker tomorrow with final decision. Pt will need an order for pt/ot when appropriate as a new pre-cert will be needed.

## 2021-08-24 NOTE — PROGRESS NOTES
Pt calling out for wife, attempted to calm pt but pt insists his pt is \" somewhere in this building.  \" was able to reach pt's wife per phone and pt calms after speaking to wife

## 2021-08-24 NOTE — PROGRESS NOTES
Patient admitted to ICU 16 and monitors placed. Assessment complete. CBI in place. Bed alarm in place for patient safety.

## 2021-08-24 NOTE — PLAN OF CARE
Problem: Falls - Risk of:  Goal: Will remain free from falls  Description: Will remain free from falls  Outcome: Met This Shift     Problem: Skin Integrity:  Goal: Will show no infection signs and symptoms  Description: Will show no infection signs and symptoms  Outcome: Met This Shift     Problem: Urinary Retention:  Goal: Urinary elimination within specified parameters  Description: Urinary elimination within specified parameters  Outcome: Ongoing  Note: Continuous bladder irrigation infusing, no clots noted

## 2021-08-24 NOTE — PROGRESS NOTES
Comprehensive Nutrition Assessment    Type and Reason for Visit:  Positive Nutrition Screen (swallowing difficulty)    Nutrition Recommendations/Plan: Continue diet as ordered. Provide Magic Cup at lunch & dinner. Nutrition Assessment:  Pt was to have outpatient swallow study but there was no order. Pt went to the bathroom where he became lightheaded and unable to stand, taken to ED. Pt had a recent type A aortic dissection repair and had been confused with more difficulty choking when eating and losing weight. Pt had modified barium swallow study done today with recommendation for Easy to Chew with nectar thick liquids. Malnutrition Assessment:  Malnutrition Status:  Severe malnutrition    Context:  Acute Illness     Findings of the 6 clinical characteristics of malnutrition:  Energy Intake:  1 - 75% or less of estimated energy requirements for 7 or more days  Weight Loss:  7 - Greater than 5% over 1 month     Body Fat Loss:  7 - Moderate body fat loss Orbital   Muscle Mass Loss:  7 - Moderate muscle mass loss Temples (temporalis), Clavicles (pectoralis & deltoids), Hand (interosseous)  Fluid Accumulation:  No significant fluid accumulation     Strength:  Not Performed    Estimated Daily Nutrient Needs:  Energy (kcal):  9360-0792 kcals based on 21-23 kcal/kg using amd wt 86 kg; Weight Used for Energy Requirements:  Admission     Protein (g):   gm protein based on 1-1.2 gm/kg using adm wt; Weight Used for Protein Requirements:  Admission          Nutrition Related Findings:  Cachectic appearance.  Edema: +1 BLE's      Wounds:  None       Current Nutrition Therapies:    ADULT DIET; Easy to Chew; No Added Salt (3-4 gm); Mildly Thick (Nectar)  Adult Oral Nutrition Supplement; Frozen Oral Supplement    Anthropometric Measures:  · Height: 6' 1\" (185.4 cm)  · Current Body Weight: 190 lb (86.2 kg)   · Admission Body Weight: 190 lb (86.2 kg)    · Usual Body Weight: 230 lb (104.3 kg)     · Ideal Body Weight: 184 lbs; % Ideal Body Weight 103.3 %   · BMI: 25.1  · BMI Categories: Overweight (BMI 25.0-29. 9)       Nutrition Diagnosis:   · Severe malnutrition related to inadequate protein-energy intake, other (comment) (choking) as evidenced by poor intake prior to admission, weight loss, severe loss of subcutaneous fat, severe muscle loss      Nutrition Interventions:   Food and/or Nutrient Delivery:  Continue Current Diet  Nutrition Education/Counseling:  No recommendation at this time   Coordination of Nutrition Care:  Continue to monitor while inpatient    Goals:  Meet 75% of nutrient needs with PO diet and supplement       Nutrition Monitoring and Evaluation:   Food/Nutrient Intake Outcomes:  Food and Nutrient Intake, Supplement Intake  Physical Signs/Symptoms Outcomes:  Biochemical Data, Chewing or Swallowing, Fluid Status or Edema, Nutrition Focused Physical Findings, Skin, Weight     Discharge Planning: Too soon to determine     Some areas of assessment may be incomplete due to COVID-19 precautions. Isacc Bautista R.D. LHARI.   Clinical Dietitian  Office: [phone]

## 2021-08-24 NOTE — CARE COORDINATION
CASE MANAGEMENT NOTE:    Admission Date:  8/23/2021 Amy Barrett is a 78 y.o.  male    Admitted for : Sepsis associated hypotension ([facility] [address] [A41.9, I95.9]  Urinary tract infection with hematuria, site unspecified [N39.0, R31.9]    Met with:  Spoke with Spouse Noelle via telephone    PCP:  Dr Angelita Aguilar:  Vasyl Dudley Medicare      Is patient alert and oriented at time of discussion:  No    Current Residence/ Living Arrangements:  in nursing home  fro [facility] for rehab. Al[facility] Anaya been there for 2 weeks . 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 LSW working with spouse on new rehab center    [place] of choice and list provided: LSW following with spouse    Is patient currently receiving oral anticoagulation therapy? No    Is the Patient an [facility] with Readmission Risk Score greater than 14%? Yes  If yes, pt needs a follow up appointment made within 7 days. Family Members/Caregivers that pt would like involved in their care:    Yes    If yes, list name here:  Josemanuel Still    Transportation Provider:  Will need transportation to rehab             Discharge Plan:  8/24/21 - [address] - Patient is from [employer] [facility] , Has been there fro 2 weeks recovering for a heart and aorta rupture, wa at [facility]..  Had swallow today dn had Syncopal episode. Spouse wants new rehab facility, LSW following. [place] header. Risk 17%, JOSEFINA needs signed and completed.  .//pf                 Electronically signed by: Roro George RN on 8/24/2021 at 4:21 PM

## 2021-08-24 NOTE — PROGRESS NOTES
medical history significant for paroxysmal A. fib. He takes heparin subcu as anticoagulation at the rehab. Review of Systems:     Review of Systems   Constitutional: Positive for activity change, appetite change and fatigue. Negative for chills and fever. HENT: Negative for hearing loss, tinnitus and trouble swallowing. Eyes: Negative for photophobia, pain and visual disturbance. Respiratory: Negative for apnea, cough, choking, chest tightness, shortness of breath and wheezing. Cardiovascular: Negative for chest pain and palpitations. Gastrointestinal: Negative for abdominal distention, abdominal pain, constipation, diarrhea, nausea and vomiting. Genitourinary: Negative for dysuria, flank pain, frequency and urgency. Musculoskeletal: Negative for arthralgias, back pain, gait problem, myalgias, neck pain and neck stiffness. Neurological: Negative for dizziness, tremors, seizures, syncope, facial asymmetry, speech difficulty, weakness, light-headedness, numbness and headaches. Psychiatric/Behavioral: Negative for agitation, behavioral problems, confusion, decreased concentration, dysphoric mood, hallucinations and sleep disturbance. The patient is not nervous/anxious and is not hyperactive. Medications: Allergies:     Allergies   Allergen Reactions    Sulfa Antibiotics     Vimovo [Naproxen-Esomeprazole]      Pain shoulder         Current Meds:   Scheduled Meds:    sodium chloride  500 mL Intravenous Once    cefTRIAXone (ROCEPHIN) IV  1,000 mg Intravenous Q24H    amiodarone  200 mg Oral Daily    aspirin  81 mg Oral Daily    atorvastatin  20 mg Oral Daily    ferrous sulfate  325 mg Oral Daily with breakfast    latanoprost  1 drop Both Eyes Nightly    therapeutic multivitamin-minerals  1 tablet Oral Daily    timolol  1 drop Both Eyes Daily    sodium chloride flush  5-40 mL Intravenous 2 times per day    famotidine (PEPCID) injection  20 mg Intravenous Daily    heparin 7.3 cm on the right and 9.5 cm on the left. No hydronephrosis with mild left perinephric stranding. GI/Bowel: Postoperative changes from presumed colectomy with a Brielle's pouch and a right lower quadrant ostomy with a similar appearing parastomal hernia. No bowel obstruction. Small lipoma in the gastric antrum. Pelvis: Urinary bladder contains numerous layering calcifications posteriorly as well as an indwelling Price catheter with associated intraluminal gas. Gas within 2 anterosuperior bladder diverticula. Perivesicular stranding appears grossly similar to prior. Enlarged prostate. Peritoneum/Retroperitoneum: No free fluid or free air. Intimal flap redemonstrated in the aorta compatible with the known dissection. Abdominal aorta remains unchanged in caliber measuring up to 2.5 cm. Unchanged ectasia of the common iliac arteries, right greater than left. Displaced calcifications in the common iliac vessels are also compatible with underlying intimal flaps/dissection. Bones/Soft Tissues: Recent postoperative changes along the anterior abdominal wall and epigastrium. Presumed sequelae of medication injection in the anterior abdominal wall subcutaneous fat. No acute bony findings on a background of osseous demineralization, dextroconvex lumbar spinal curvature, advanced degenerative changes throughout the lumbar spine including sequelae of Baastrup's disease, and in unchanged region of sclerosis in the right acetabulum with some other smaller unchanged sclerotic foci in the pelvis. Indwelling Price catheter with associated intraluminal gas in the bladder which appears thickened and with numerous diverticula. The wall thickening may be sequelae of chronic bladder outlet obstruction in the setting of an enlarged prostate, though as there is perivesicular stranding as well as left perinephric stranding, recommend correlation for superimposed ascending urinary tract infection.  Innumerable bladder calcifications and nonobstructing left nephrolithiasis without an obstructing stone. Though suboptimally evaluated in the absence of contrast intimal flaps are redemonstrated in the abdominal aorta and common iliac arteries in the setting of known recent dissection two months prior. Aortic caliber is unchanged and remains within normal limits, and there is unchanged ectasia of the common iliac vessels. Recent postoperative changes along the anterior abdominal wall without any organized fluid collection or soft tissue gas. Other unchanged chronic findings as described above. XR CHEST (2 VW)    Result Date: 8/19/2021  EXAMINATION: TWO XRAY VIEWS OF THE CHEST 8/19/2021 2:37 pm COMPARISON: July 9, 2021 CT chest HISTORY: ORDERING SYSTEM PROVIDED HISTORY: dysphagia TECHNOLOGIST PROVIDED HISTORY: dysphagia Reason for Exam: dysphagia, in rehab facility, hx limited. Acuity: Acute Type of Exam: Initial FINDINGS: Median sternotomy, stable normal cardiopericardial silhouette/moderate tortuosity of the thoracic aorta/prosthetic heart valve New moderate left basilar opacity. Otherwise clear lungs Degenerative changes of the thoracic spine/shoulders     New moderate left basilar opacity likely related moderate pleural effusion/pleural thickening, atelectasis versus infiltrate     CT Head WO Contrast    Result Date: 8/23/2021  EXAMINATION: CT OF THE HEAD WITHOUT CONTRAST  8/23/2021 5:37 pm TECHNIQUE: CT of the head was performed without the administration of intravenous contrast. Dose modulation, iterative reconstruction, and/or weight based adjustment of the mA/kV was utilized to reduce the radiation dose to as low as reasonably achievable.  COMPARISON: November 12, 2012 HISTORY: ORDERING SYSTEM PROVIDED HISTORY: altered mental status TECHNOLOGIST PROVIDED HISTORY: altered mental status Decision Support Exception - unselect if not a suspected or confirmed emergency medical condition->Emergency Medical Condition (MA) Reason for Exam: altered mental status FINDINGS: BRAIN/VENTRICLES: There is no acute intracranial hemorrhage, mass effect or midline shift. Note is made of cavum septum pellucidum, unchanged from prior study. ORBITS: The visualized portion of the orbits demonstrate no acute abnormality. SINUSES: The visualized paranasal sinuses and mastoid air cells demonstrate no acute abnormality. SOFT TISSUES/SKULL:  No acute abnormality of the visualized skull or soft tissues. No acute intracranial abnormality. CT CHEST WO CONTRAST    Result Date: 8/23/2021  EXAMINATION: CT OF THE CHEST WITHOUT CONTRAST 8/23/2021 5:18 pm TECHNIQUE: CT of the chest was performed without the administration of intravenous contrast. Multiplanar reformatted images are provided for review. Dose modulation, iterative reconstruction, and/or weight based adjustment of the mA/kV was utilized to reduce the radiation dose to as low as reasonably achievable. COMPARISON: 08/23/2021, 07/09/2021 HISTORY: ORDERING SYSTEM PROVIDED HISTORY: hx of dissection TECHNOLOGIST PROVIDED HISTORY: hx of dissection Decision Support Exception - unselect if not a suspected or confirmed emergency medical condition->Emergency Medical Condition (MA) Reason for Exam: hx of dissection Acuity: Unknown Type of Exam: Unknown FINDINGS: Mediastinum: Evaluation is limited by the absence of contrast.  Interval median sternotomy with ascending aortic dissection repair and placement of a prosthetic aortic valve. Vessel evaluation is limited. The ascending aortic caliber just distal to the operative repair spans approximately 4.8 cm. The proximal descending thoracic aorta spans approximately 4.9 cm, in the distal descending aorta measures approximately 3 cm. The intimal flap is partially visualized within the transverse and descending aorta with the true and false lumen roughly similar in size to one another. Significant improvement in the associated mediastinal hematoma.   Small AP portable view of the chest time stamped at 1725 hours demonstrates overlying cardiac monitoring electrodes. Stable cardiomegaly is noted. Prior median sternotomy is demonstrated. CT. No change in left pleural effusion and left basilar opacity. No vascular congestion or extrapleural air. Surgical clips are present the medial right axilla. Prosthetic aortic valve is noted. No change in left basilar opacity likely related to effusion and atelectasis with focal airspace disease not excluded. Physical Examination:        Physical Exam  Constitutional:       Appearance: Normal appearance. HENT:      Head: Normocephalic and atraumatic. Mouth/Throat:      Mouth: Mucous membranes are dry. Pharynx: Oropharynx is clear. Eyes:      Extraocular Movements: Extraocular movements intact. Conjunctiva/sclera: Conjunctivae normal.      Pupils: Pupils are equal, round, and reactive to light. Cardiovascular:      Rate and Rhythm: Tachycardia present. Rhythm irregular. Pulses: Normal pulses. Heart sounds: Normal heart sounds. Pulmonary:      Effort: Pulmonary effort is normal.      Breath sounds: Normal breath sounds. Chest:          Comments: Scars from previous surgeries  Abdominal:      General: Abdomen is flat. Palpations: Abdomen is soft. Comments: Urinary bladder not palpable   Musculoskeletal:         General: Normal range of motion. Cervical back: Normal range of motion and neck supple. Skin:     General: Skin is warm and dry. Capillary Refill: Capillary refill takes less than 2 seconds. Neurological:      General: No focal deficit present. Mental Status: He is alert. Psychiatric:         Mood and Affect: Mood normal.         Behavior: Behavior normal.         Thought Content:  Thought content normal.         Judgment: Judgment normal.           Assessment:        Primary Problem  Sepsis associated hypotension [facility])    Active Hospital Problems Diagnosis Date Noted    UTI (urinary tract infection) [N39.0] 08/24/2021    Hematuria [R31.9] 08/24/2021    Altered mental status [R41.82] 08/24/2021    Elevated troponin [R77.8] 08/24/2021    Sepsis associated hypotension (HCC) [A41.9, I95.9] 08/23/2021    MERRICK (acute kidney injury) ([facility] [N17.9] 08/28/2017    Benign prostatic hyperplasia with lower urinary tract symptoms [N40.1] 07/25/2017    Hypertensive heart disease with heart failure ([facility] [I11.0] 05/07/2014       Plan:        UTI  1 g Rocephin IV  Price in place  Follow-up with urine culture  Urology consulted due to BPH and urinary bladder diverticula with wall thickening. Altered mental status  Patient more oriented today  We will continue to monitor  Follow labs    Hypotension  Systolic blood pressure in the 90s  DC Levophed  Continue IV hydration    MERRICK  Trend BUN and creatinine  Continue IV hydration  Monitor electrolytes    BPH  Urology on board  Maintain Price    Paroxysmal A.  Fib  Patient not on any home meds for rate or rhythm control  Heparin subcu for anticoagulation      Cedrick Oliveira MD  8/24/2021  9:21 AM

## 2021-08-24 NOTE — PROCEDURES
INSTRUMENTAL SWALLOW REPORT  MODIFIED BARIUM SWALLOW    NAME: Jerrell Brown   : 1940  MRN: [id number]       Date of Eval: 2021              Referring Diagnosis(es): Referring Diagnosis: dysphagia    Past Medical History:  has a past medical history of Acute respiratory failure following trauma and surgery ([facility] Acute respiratory failure following trauma and surgery ([facility] Altered bowel elimination due to intestinal ostomy ([facility] Full dentures, Full dentures, Gall stones, GERD (gastroesophageal reflux disease), GI bleed, Hemorrhage of gastrointestinal tract, unspecified, Hemorrhage of gastrointestinal tract, unspecified, Hyperlipidemia, Hypertension, Kidney stones, Kidney stones, Primary localized osteoarthrosis, lower leg, Primary localized osteoarthrosis, lower leg, Prostate disorder, Transient disorder of initiating or maintaining sleep, Transient disorder of initiating or maintaining sleep, Unspecified disorder of skin and subcutaneous tissue, Wears glasses, and Wears glasses. Past Surgical History:  has a past surgical history that includes Knee arthroscopy (Right); Tonsillectomy; Cataract removal with implant (Bilateral); Kidney stone surgery (Left); Colonoscopy; total colectomy; colostomy (Right); Lithotripsy (Left, 14); Abdomen surgery; Dilatation, esophagus; fracture surgery; eye surgery; Total knee arthroplasty (2015); Total knee arthroplasty (Right, 3/23/2015); joint replacement (Right, 3-23-15); Cystoscopy (Right, 2017); and shoulder surgery (Left, 2020).     Current Diet Solid Consistency: NPO  Current Diet Liquid Consistency: NPO       Type of Study: Initial MBS  Results of Prior Study: na        Patient Complaints/Reason for Referral:  Jerrell Brown was referred for a MBS to assess the efficiency of his/her swallow function, assess for aspiration, and to make recommendations regarding safe dietary consistencies, effective compensatory strategies, and safe eating environment. Patient complaints: none    Onset of problem:        General Comment  Comments: Pt denies dysphagia hx, RN reports notable weight loss recently. Pt with active UTI  Subjective  Subjective: Pt denies swallowing difficulty    Behavior/Cognition/Vision/Hearing:  Behavior/Cognition: Alert; Cooperative  Vision: Impaired  Vision Exceptions: Wears glasses at all times  Hearing: Within functional limits    Impressions:  Treatment Dx and ICD 10: dysphagia   Patient Position: Lateral and Patient Degrees: 90      Consistencies Administered: Dysphagia Soft and Bite-Sized (Dysphagia III); Reg solid;Pudding teaspoon;Nectar cup; Thin cup    Compensatory Swallowing Strategies Attempted: Alternate solids and liquids;Swallow 2 times per bite/sip; Remain upright for 30-45 minutes after meals;Small bites/sips;Upright as possible for all oral intake  Postural Changes and/or Swallow Maneuvers Trialed: Upright 90 degrees      Oral Phase: Excessive/prolonged mastication observed with regular solid trials, improved with soft and bite sized. BOT residue noted with regular solid    Pharyngeal: Deep penetration of thin liquids observed 2/2 trials via cup. No penetration aspiraiton noted with all mildly thickened trials. Moderate valleculae and pyriform sinus residue observed with all food trials, clearing with double swallow/liquid wash. Dysphagia Outcome Severity Scale: Level 4: Mild moderate dysphagia- Intermittent supervision/cueing. One - two diet consistencies restricted  Penetration-Aspiration Scale (PAS): 3 - Material enters the airway, remains above the vocal folds, and is not ejected from airway    Recommended Diet:  Solid consistency: Dental Soft; Other (comment) (\"easy to chew\" diet)  Liquid consistency: Mildly Thick (Nectar)  Liquid administration via: Cup    Medication administration: PO    Safe Swallow Protocol:  Supervision: Distant  Compensatory Swallowing Strategies: Small bites/sips;Upright as possible for all oral intake;Swallow 2 times per bite/sip; Alternate solids and liquids; Remain upright for 30-45 minutes after meals              Recommendations/Treatment  Requires SLP Intervention: Yes        D/C Recommendations: To be determined  Postural Changes and/or Swallow Maneuvers: Upright 90 degrees      Recommended Exercises:    Therapeutic Interventions: Laryngeal exercises; Tongue base strengthening;Diet tolerance monitoring;Patient/Family education         Education: Images and recommendations were reviewed with pt/RN following this exam.   Patient Education: yes  Patient Education Response: Verbalizes understanding    Prognosis  Prognosis for safe diet advancement: fair  Barriers to reach goals: age  Duration/Frequency of Treatment  Duration/Frequency of Treatment: 3-5x per week      Goals:    Short Term:     Goal 1: Pt will complete BOT/laryngeal strengthening exercises 10x each with returned demo at 100%  Goal 2: Pt will tolerate prescribed diet with no overt s/s of aspiration 100% of the time. Goal 3: Pt will utilize compensatory dysphagia strategies (double swallow, alternate) to decrease aspiration risk with returned demo at 100%                    Oral Preparation / Oral Phase  Oral Phase: Impaired  Oral Phase - Major Contributing Deficits  Poor Mastication: Reg solid; Soft solid (prolonged, though functional)  Lingual / Palatal Residue: Reg solid        Pharyngeal Phase  Pharyngeal Phase: Impaired      Esophageal Phase  Esophageal Screen: WNL        Pain   Patient Currently in Pain: No         Therapy Time:   Individual Concurrent Group Co-treatment   Time In 1115         Time Out 1145         Minutes [address] [place] [address], M.A., CCC-SLP 8/24/2021, 11:57 AM

## 2021-08-24 NOTE — DISCHARGE INSTR - COC
Continuity of Care Form    Patient Name: Javier Brody   :  1940  MRN:  [id number]    Admit date:  2021  Discharge date:  21    Code Status Order: Full Code   Advance Directives:      Admitting Physician:  Darrick Hernandez MD  PCP: Darrick Hernandez MD    Discharging Nurse: [facility] Unit/Room#:   Discharging Unit Phone Number: [phone]    Emergency Contact:   Extended Emergency Contact Information  Primary Emergency Contact: Raul Hernandez  Address: [address], [address] [address] Phone: [phone]  Work Phone: [phone]  Mobile Phone: [phone]  Relation: Spouse  Secondary Emergency Contact: Edd Altman, aDyanna Turner Phone: [phone]  Relation: Child    Past Surgical History:  Past Surgical History:   Procedure Laterality Date    ABDOMEN SURGERY      CATARACT REMOVAL WITH IMPLANT Bilateral     COLONOSCOPY      X3    COLOSTOMY Right     COLOSTOMY BAG ON RT.  SIDE    CYSTOSCOPY Right 2017    CYSTOSCOPY URETERAL STENT INSERTION performed by Donovan [facility] at [facility], ESOPHAGUS      EYE SURGERY      bettye. eyes, cataracts extracted with iol's    FRACTURE SURGERY      JOINT REPLACEMENT Right 3-23-15    KIDNEY STONE SURGERY Left     KNEE ARTHROSCOPY Right     LITHOTRIPSY Left 14    w/ cystoscopy C & P    SHOULDER SURGERY Left 2020    MOLE EXCISION POSTERIOR SHOULDER performed by Donnie Miller MD at [facility] , PARTIAL RECTUM\"    TOTAL KNEE ARTHROPLASTY  2015    Right with biomet and GPS product application    TOTAL KNEE ARTHROPLASTY Right 3/23/2015       Immunization History:   Immunization History   Administered Date(s) Administered    COVID-19, Quarles Peter, PF, 30mcg/0.3mL 2021, 2021    Influenza Vaccine, unspecified formulation 2015, 10/03/2016    Influenza Status:  oriented, alert and forgetful    IV Access:  - None    Nursing Mobility/ADLs:  Walking   Assisted  Transfer  Assisted  Bathing  Assisted  Dressing  Assisted  Toileting  Assisted  Feeding  Assisted  Med Admin  Assisted  Med Delivery   whole    Wound Care Documentation and Therapy:        Elimination:  Continence:   · Bowel: No  · Bladder: No  Urinary Catheter: None   Colostomy/Ileostomy/Ileal Conduit: Yes  Ileostomy Ileostomy RLQ-Stomal Appliance: 1 piece  Ileostomy Ileostomy RLQ-Peristomal Assessment: Clean, Intact  Ileostomy Ileostomy RLQ-Stool Appearance: Soft  Ileostomy Ileostomy RLQ-Stool Color: Brown  Ileostomy Ileostomy RLQ-Stool Amount: Small      Intake/Output Summary (Last 24 hours) at 8/24/2021 1626  Last data filed at 8/24/2021 1438  Gross per 24 hour   Intake 803.73 ml   Output 430 ml   Net 373.73 ml     I/O last 3 completed shifts: In: 803.7 [I.V.:803.7]  Out: 430 [Urine:430]    Safety Concerns: At Risk for Falls and Aspiration Risk    Impairments/Disabilities:      None    Nutrition Therapy:  Current Nutrition Therapy:   - Oral Diet:  General and easy to chew food    Routes of Feeding: Oral  Liquids: Nectar Thick Liquids  Daily Fluid Restriction: no  Last Modified Barium Swallow with Video (Video Swallowing Test): done on  /during admission    Treatments at the Time of Hospital Discharge:   Respiratory Treatments: ***  Oxygen Therapy:  is not on home oxygen therapy.   Ventilator:    - No ventilator support    Rehab Therapies: Physical Therapy and Occupational Therapy  Weight Bearing Status/Restrictions: No weight bearing restirctions  Other Medical Equipment (for information only, NOT a DME order):  walker  Other Treatments: Skilled Nursing Assessment and monitoring, medication Education    Patient's personal belongings (please select all that are sent with patient):  Glasses    RN SIGNATURE:  Electronically signed by Andrea Masters RN on 8/27/21 at 1:00 PM EDT    CASE MANAGEMENT/SOCIAL WORK SECTION    Inpatient Status Date: 8/23/2021    Readmission Risk Assessment Score:  Readmission Risk              Risk of Unplanned Readmission:  17           Discharging to Facility/ Agency   [address] [address]  Phone: [phone]  Fax: [phone]    Dialysis Facility (if applicable)   · Name:  · Address:  · Dialysis Schedule:  · Phone:  · Fax:    / signature: Electronically signed by Boris Cotter RN on 8/24/21 at 4:26 PM EDT    PHYSICIAN SECTION    Prognosis: {Prognosis:7806774954}    Condition at Discharge: 50Fernando Moreira Patient Condition:085012702}    Rehab Potential (if transferring to Rehab): {Prognosis:0981469438}    Recommended Labs or Other Treatments After Discharge: ***    Physician Certification: I certify the above information and transfer of Mancel Alt  is necessary for the continuing treatment of the diagnosis listed and that he requires {Admit to Appropriate Level of Care:59776} for {GREATER/LESS:530241877} 30 days.      Update Admission H&P: [facility]}    PHYSICIAN SIGNATURE:  Electronically signed by Boris Cotter MD on 8/27/21 at 11:27 AM EDT

## 2021-08-24 NOTE — FLOWSHEET NOTE
08/24/21 1056   Encounter Summary   Services provided to: Patient   Referral/Consult From: Rounding   Continue Visiting   (8-24-21)   Complexity of Encounter Low   Length of Encounter 15 minutes   Spiritual/Shinto   Type Ritual   Intervention Anointing   Sacraments   Sacrament of Sick-Anointing Anointed  (Fr Brink 8-24-21)

## 2021-08-24 NOTE — PLAN OF CARE
Problem: Falls - Risk of:  Goal: Will remain free from falls  Description: Will remain free from falls  8/24/2021 1429 by Vinny Jones RN  Note: Patient has remained free from falls at this time   8/24/2021 0404 by Mason Barrientos RN  Outcome: Met This Shift     Problem: Skin Integrity:  Goal: Will show no infection signs and symptoms  Description: Will show no infection signs and symptoms  8/24/2021 0404 by Mason Barrientos RN  Outcome: Met This Shift     Problem: Urinary Retention:  Goal: Urinary elimination within specified parameters  Description: Urinary elimination within specified parameters  8/24/2021 0404 by Mason Barrientos RN  Outcome: Ongoing  Note: Continuous bladder irrigation infusing, no clots noted     Problem: Falls - Risk of:  Goal: Will remain free from falls  Description: Will remain free from falls  8/24/2021 1429 by Vinny Jones RN  Note: Patient has remained free from falls at this time   8/24/2021 0404 by Mason Barrientos RN  Outcome: Met This Shift     Problem: Skin Integrity:  Goal: Will show no infection signs and symptoms  Description: Will show no infection signs and symptoms  8/24/2021 0404 by Mason Barrientos RN  Outcome: Met This Shift     Problem: Falls - Risk of:  Goal: Absence of physical injury  Description: Absence of physical injury  Note: Patient has remained free from any physical injury at this time      Problem: Skin Integrity:  Goal: Absence of new skin breakdown  Description: Absence of new skin breakdown  Note: No signs of new skin breakdown at this time

## 2021-08-24 NOTE — PROGRESS NOTES
Dr Vick Phillips notified of consult, BP 70 systolic, 2L fluid boluses given in ER, pt received Rocephin, pt with CBI infusing, urine output approx 100ml, order received for levophed to keep SBP>90

## 2021-08-25 NOTE — PROGRESS NOTES
Patient's vitals are stable today  Blood cultures grew E. Coli x2, continuing rocephin  Patient improving, WBCs trending downwards today 16.1 from 22.0  MERRICK improving BUN 27 and Cr. 1.46  Diet advanced to easy to chew and nectar    Mental status improved today Alert and oriented x4, recall 3 objects after 5 min, spells WORLD backwards, can name objects, thought process and content intact. Patient cannot recall yesterday, believes that he has not seen his wife since last week but wife was her yesterday afternoon. Patient became acutely agitated, violent last night. Pulled out IVs. Paranoid ideation. Believed the nurse was taunting him. 1-1 sitter ordered. This completely resolved this morning, possible acute delirium. Urology saw patient, recommends voiding trial when patient is stable.     Full note to follow

## 2021-08-25 NOTE — PROGRESS NOTES
[facility]    PROGRESS NOTE             8/25/2021    8:41 AM    Name:   Noemi Frankel  MRN:     [id number]     Acct:      [de-identified]   Room:   2016/2016-01  IP Day:  2  Admit Date:  8/23/2021  4:15 PM    PCP:  Clarisse Brito MD  Code Status:  Full Code    Subjective:     C/C:   Chief Complaint   Patient presents with    Altered Mental Status     Interval History Status: improved. Patient's vitals are stable today  Blood cultures grew E. Coli x2, continuing rocephin  Patient improving, WBCs trending downwards today 16.1 from 22.0  MERRICK improving BUN 27 and Cr. 1.46  Diet advanced to easy to chew and nectar     Mental status improved today Alert and oriented x4, recall 3 objects after 5 min, spells WORLD backwards, can name objects, thought process and content intact. Patient cannot recall yesterday, believes that he has not seen his wife since last week but wife was her yesterday afternoon. Patient became acutely agitated, violent last night. Pulled out IVs. Paranoid ideation. Believed the nurse was taunting him. 1-1 sitter ordered.      This completely resolved this morning, possible acute delirium.         Urology saw patient, recommends voiding trial when patient is stable. Brief History:     Briefly is a 57-year-old patient who had a syncopal episode while at the hospital getting a barium swallow study. Patient had surgery at [facility] for aortic dissection 2 months hemicolectomy for GI bleed recently. He is staying at [facility] for rehab. He was noted to have a UTI and was started on 1g Rocephin IV. CT abdomen pelvis showed non-obstructing urinary calculus, large BPH, urinary bladder with diverticula and bladder wall thickening. He was transferred to the ICU because of severe hypotension. He was started on Levophed and received 2 L of normal saline. He was quickly able to maintain pressure above 90. Blood cultures grew E. coli x2. Past medical history significant for paroxysmal A. Fib. Patient takes heparin subcu for anticoagulation. Review of Systems:     Review of Systems   Constitutional: Positive for activity change, appetite change and fatigue. Negative for chills and fever. HENT: Negative for hearing loss, tinnitus and trouble swallowing. Eyes: Negative for photophobia, pain and visual disturbance. Respiratory: Negative for apnea, cough, choking, chest tightness, shortness of breath and wheezing. Cardiovascular: Negative for chest pain and palpitations. Gastrointestinal: Negative for abdominal distention, abdominal pain, constipation, diarrhea, nausea and vomiting. Genitourinary: Negative for dysuria, flank pain, frequency and urgency. Musculoskeletal: Negative for arthralgias, back pain, gait problem, myalgias, neck pain and neck stiffness. Neurological: Negative for dizziness, tremors, seizures, syncope, facial asymmetry, speech difficulty, weakness, light-headedness, numbness and headaches. Psychiatric/Behavioral: Positive for agitation and sleep disturbance. Negative for behavioral problems, confusion, decreased concentration, dysphoric mood and hallucinations. The patient is not nervous/anxious and is not hyperactive. Medications: Allergies:     Allergies   Allergen Reactions    Sulfa Antibiotics     Vimovo [Naproxen-Esomeprazole]      Pain shoulder         Current Meds:   Scheduled Meds:    diclofenac sodium  2 g Topical BID    cefTRIAXone (ROCEPHIN) IV  1,000 mg IntraVENous Q24H    amiodarone  200 mg Oral Daily    aspirin  81 mg Oral Daily    atorvastatin  20 mg Oral Daily    ferrous sulfate  325 mg Oral Daily with breakfast    latanoprost  1 drop Both Eyes Nightly    therapeutic multivitamin-minerals  1 tablet Oral Daily    timolol  1 drop Both Eyes Daily    sodium chloride flush  5-40 mL IntraVENous 2 times per day    famotidine (PEPCID) injection 20 mg IntraVENous Daily    heparin (porcine)  5,000 Units Subcutaneous 3 times per day     Continuous Infusions:    sodium chloride      sodium chloride 100 mL/hr at 21 0432     PRN Meds: melatonin, sodium chloride flush, sodium chloride, potassium chloride **OR** potassium alternative oral replacement **OR** potassium chloride, magnesium sulfate, polyethylene glycol, acetaminophen **OR** acetaminophen, ondansetron    Data:     Past Medical History:   has a past medical history of Acute respiratory failure following trauma and surgery ([facility] [address] Acute respiratory failure following trauma and surgery ([facility] [address] Altered bowel elimination due to intestinal ostomy ([facility] [address] Full dentures, Full dentures, Gall stones, GERD (gastroesophageal reflux disease), GI bleed, Hemorrhage of gastrointestinal tract, unspecified, Hemorrhage of gastrointestinal tract, unspecified, Hyperlipidemia, Hypertension, Kidney stones, Kidney stones, Primary localized osteoarthrosis, lower leg, Primary localized osteoarthrosis, lower leg, Prostate disorder, Transient disorder of initiating or maintaining sleep, Transient disorder of initiating or maintaining sleep, Unspecified disorder of skin and subcutaneous tissue, Wears glasses, and Wears glasses. Social History:   reports that he has never smoked. He has never used smokeless tobacco. He reports that he does not drink alcohol and does not use drugs. Family History:   Family History   Problem Relation Age of Onset    Heart Disease Father     Other Mother        Vitals:  /75   Pulse 84   Temp 98.7 °F (37.1 °C) (Axillary)   Resp 18   Ht 6' 1\" (1.854 m)   Wt 191 lb 12.8 oz (87 kg)   SpO2 98%   BMI 25.30 kg/m²   Temp (24hrs), Av.6 °F (37 °C), Min:97.6 °F (36.4 °C), Max:99.3 °F (37.4 °C)    No results for input(s): POCGLU in the last 72 hours. I/O(24Hr):     Intake/Output Summary (Last 24 hours) at 2021 0841  Last data filed at 2021 0751  Gross per 24 hour Intake 4253 ml   Output 1425 ml   Net 2828 ml       Labs:    [unfilled]    Lab Results   Component Value Date/Time    SPECIAL NOT REPORTED 08/23/2021 09:11 PM     Lab Results   Component Value Date/Time    CULTURE ESCHERICHIA COLI >[id number] CFU/ML (A) 08/23/2021 09:11 PM       [unfilled]    Radiology:    CT ABDOMEN PELVIS WO CONTRAST Additional Contrast? None    Addendum Date: 8/23/2021    ADDENDUM: Impression should also state there is an increased small left pleural effusion with increased bibasilar atelectatic changes. Result Date: 8/23/2021  EXAMINATION: CT OF THE ABDOMEN AND PELVIS WITHOUT CONTRAST 8/23/2021 4:04 pm TECHNIQUE: CT of the abdomen and pelvis was performed without the administration of intravenous contrast. Multiplanar reformatted images are provided for review. Dose modulation, iterative reconstruction, and/or weight based adjustment of the mA/kV was utilized to reduce the radiation dose to as low as reasonably achievable. COMPARISON: 07/09/2021 HISTORY: ORDERING SYSTEM PROVIDED HISTORY: concern for septic kidney secondary to nephrolithiasis TECHNOLOGIST PROVIDED HISTORY: concern for septic kidney secondary to nephrolithiasis Decision Support Exception - unselect if not a suspected or confirmed emergency medical condition->Emergency Medical Condition (MA) Reason for Exam: concern for septic kidney secondary to nephrolithiasis Acuity: Unknown Type of Exam: Unknown Relevant Medical/Surgical History: colostomy, colectomy FINDINGS: Lower Chest: Increased small left pleural effusion with increased bibasilar atelectatic changes. Decreased hemopericardium. Prosthetic aortic valve partially imaged. Organs: Evaluation is limited by the absence of contrast.  Redemonstration of innumerable hepatic cysts, grossly unchanged. Gallbladder is grossly unremarkable. Spleen is grossly normal caliber. Pancreas is atrophic. Adrenal glands are normal size. Punctate nonobstructing left nephroliths.  Bilateral renal cysts measuring up to 7.3 cm on the right and 9.5 cm on the left. No hydronephrosis with mild left perinephric stranding. GI/Bowel: Postoperative changes from presumed colectomy with a Brielle's pouch and a right lower quadrant ostomy with a similar appearing parastomal hernia. No bowel obstruction. Small lipoma in the gastric antrum. Pelvis: Urinary bladder contains numerous layering calcifications posteriorly as well as an indwelling Price catheter with associated intraluminal gas. Gas within 2 anterosuperior bladder diverticula. Perivesicular stranding appears grossly similar to prior. Enlarged prostate. Peritoneum/Retroperitoneum: No free fluid or free air. Intimal flap redemonstrated in the aorta compatible with the known dissection. Abdominal aorta remains unchanged in caliber measuring up to 2.5 cm. Unchanged ectasia of the common iliac arteries, right greater than left. Displaced calcifications in the common iliac vessels are also compatible with underlying intimal flaps/dissection. Bones/Soft Tissues: Recent postoperative changes along the anterior abdominal wall and epigastrium. Presumed sequelae of medication injection in the anterior abdominal wall subcutaneous fat. No acute bony findings on a background of osseous demineralization, dextroconvex lumbar spinal curvature, advanced degenerative changes throughout the lumbar spine including sequelae of Baastrup's disease, and in unchanged region of sclerosis in the right acetabulum with some other smaller unchanged sclerotic foci in the pelvis. Indwelling Price catheter with associated intraluminal gas in the bladder which appears thickened and with numerous diverticula. The wall thickening may be sequelae of chronic bladder outlet obstruction in the setting of an enlarged prostate, though as there is perivesicular stranding as well as left perinephric stranding, recommend correlation for superimposed ascending urinary tract infection. Innumerable bladder calcifications and nonobstructing left nephrolithiasis without an obstructing stone. Though suboptimally evaluated in the absence of contrast intimal flaps are redemonstrated in the abdominal aorta and common iliac arteries in the setting of known recent dissection two months prior. Aortic caliber is unchanged and remains within normal limits, and there is unchanged ectasia of the common iliac vessels. Recent postoperative changes along the anterior abdominal wall without any organized fluid collection or soft tissue gas. Other unchanged chronic findings as described above. XR CHEST (2 VW)    Result Date: 8/19/2021  EXAMINATION: TWO XRAY VIEWS OF THE CHEST 8/19/2021 2:37 pm COMPARISON: July 9, 2021 CT chest HISTORY: ORDERING SYSTEM PROVIDED HISTORY: dysphagia TECHNOLOGIST PROVIDED HISTORY: dysphagia Reason for Exam: dysphagia, in rehab facility, hx limited. Acuity: Acute Type of Exam: Initial FINDINGS: Median sternotomy, stable normal cardiopericardial silhouette/moderate tortuosity of the thoracic aorta/prosthetic heart valve New moderate left basilar opacity. Otherwise clear lungs Degenerative changes of the thoracic spine/shoulders     New moderate left basilar opacity likely related moderate pleural effusion/pleural thickening, atelectasis versus infiltrate     CT Head WO Contrast    Result Date: 8/23/2021  EXAMINATION: CT OF THE HEAD WITHOUT CONTRAST  8/23/2021 5:37 pm TECHNIQUE: CT of the head was performed without the administration of intravenous contrast. Dose modulation, iterative reconstruction, and/or weight based adjustment of the mA/kV was utilized to reduce the radiation dose to as low as reasonably achievable.  COMPARISON: November 12, 2012 HISTORY: ORDERING SYSTEM PROVIDED HISTORY: altered mental status TECHNOLOGIST PROVIDED HISTORY: altered mental status Decision Support Exception - unselect if not a suspected or confirmed emergency medical condition->Emergency Medical hematoma. Small volume residual hemopericardium. No pneumomediastinum or adenopathy. Lungs/pleura: There are some new air bronchograms at the dependent left lower lobe. Background slightly increased atelectatic changes with pulmonary parenchymal evaluation motion degraded. Right upper lobe calcified granuloma. Resolved right and slightly increased left pleural effusions. No pneumothorax. Upper Abdomen: Abdominal findings are reported separately under same-day abdominal CT. Soft Tissues/Bones: No organized fluid collection or soft tissue gas along the healing median sternotomy. There is mild osseous resorption along the sternotomy incision which remains grossly approximated. No appreciable osseous bridging. Callus formation in the region of the manubrium. Otherwise unchanged degenerative findings in the spine and shoulders. Vascular evaluation is limited by the absence of contrast. Interval operative repair of the ascending aortic dissection. Vessel caliber as described above with the ascending aorta just distal to the operative repair measuring up to 4.9 cm. The true and false lumens now all appear somewhat equal in caliber, though assessment is limited in the absence of contrast. Significant improvement in the associated mediastinal hematoma. Only small volume residual hemopericardium. New air bronchograms in the left lower lobe which may be due to developing infection in the appropriate clinical setting. Slightly increased atelectatic changes in the lung bases. Improved right and worsened left pleural effusions.      XR CHEST PORTABLE    Result Date: 8/25/2021  EXAMINATION: ONE XRAY VIEW OF THE CHEST 8/25/2021 5:47 am COMPARISON: 08/23/2021, 08/19/2021 HISTORY: ORDERING SYSTEM PROVIDED HISTORY: R/O CHF TECHNOLOGIST PROVIDED HISTORY: R/O CHF Reason for Exam: R/O CHF Acuity: Acute Type of Exam: Initial Additional signs and symptoms: R/O CHF Relevant Medical/Surgical History: R/O CHF FINDINGS: The cardiac and mediastinal contours appear unchanged. Basilar opacities and pleural effusions, left greater than right, are again demonstrated without appreciable change. No new airspace disease identified in the interval.  No evidence for pneumothorax. No significant interval change. XR CHEST PORTABLE    Result Date: 8/23/2021  EXAMINATION: ONE XRAY VIEW OF THE CHEST 8/23/2021 4:55 pm COMPARISON: 19 April 2021 HISTORY: ORDERING SYSTEM PROVIDED HISTORY: altered mental status, evaluate for pneumonia TECHNOLOGIST PROVIDED HISTORY: altered mental status, evaluate for pneumonia Reason for Exam: altered mental status, evaluate for pneumonia Acuity: Unknown Type of Exam: Unknown FINDINGS: AP portable view of the chest time stamped at 1725 hours demonstrates overlying cardiac monitoring electrodes. Stable cardiomegaly is noted. Prior median sternotomy is demonstrated. CT. No change in left pleural effusion and left basilar opacity. No vascular congestion or extrapleural air. Surgical clips are present the medial right axilla. Prosthetic aortic valve is noted. No change in left basilar opacity likely related to effusion and atelectasis with focal airspace disease not excluded. FL MODIFIED BARIUM SWALLOW W VIDEO    Result Date: 8/24/2021  EXAMINATION: MODIFIED BARIUM SWALLOW WAS PERFORMED IN CONJUNCTION WITH SPEECH PATHOLOGY SERVICES TECHNIQUE: Fluoroscopic evaluation of the swallowing mechanism was performed with multiple consistency of barium product. FLUOROSCOPY DOSE AND TYPE OR TIME AND EXPOSURES: Fluoroscopic time of 3 minutes and 9 seconds. DAP of 130.2 dGycm2. COMPARISON: None HISTORY: ORDERING SYSTEM PROVIDED HISTORY: Aspiration risk TECHNOLOGIST PROVIDED HISTORY: Aspiration risk Reason for Exam: aspiration risk Acuity: Unknown Type of Exam: Unknown FINDINGS: Multiple swallows were attempted with multiple consistencies in the presence of speech pathology under fluoroscopic evaluation.  Penetration was visualized with thin liquid. There is no evidence for aspiration. Penetration with thin liquid. No evidence for aspiration. Please see separate speech pathology report for full discussion of findings and recommendations. Physical Examination:        Physical Exam  Constitutional:       Appearance: Normal appearance. HENT:      Head: Normocephalic and atraumatic. Mouth/Throat:      Mouth: Mucous membranes are dry. Pharynx: Oropharynx is clear. Eyes:      Extraocular Movements: Extraocular movements intact. Conjunctiva/sclera: Conjunctivae normal.      Pupils: Pupils are equal, round, and reactive to light. Cardiovascular:      Rate and Rhythm: Tachycardia present. Rhythm irregular. Pulses: Normal pulses. Heart sounds: Normal heart sounds. Pulmonary:      Effort: Pulmonary effort is normal.      Breath sounds: Normal breath sounds. Chest:          Comments: Scars from previous surgeries  Abdominal:      General: Abdomen is flat. Palpations: Abdomen is soft. Comments: Urinary bladder not palpable   Musculoskeletal:         General: Normal range of motion. Cervical back: Normal range of motion and neck supple. Skin:     General: Skin is warm and dry. Capillary Refill: Capillary refill takes less than 2 seconds. Neurological:      General: No focal deficit present. Mental Status: He is alert and oriented to person, place, and time. Cranial Nerves: Cranial nerves are intact. Sensory: Sensation is intact. Motor: Weakness present. Comments: 4+/5 in r illiopsoas  4/5 in Left illiopsoas    5/5 throughout   Psychiatric:         Attention and Perception: Attention and perception normal.         Mood and Affect: Mood and affect normal.         Speech: Speech normal.         Behavior: Behavior normal. Behavior is cooperative. Thought Content:  Thought content normal.         Cognition and Memory: Cognition and memory normal.         Judgment: Judgment normal.           Assessment:        Primary Problem  Sepsis associated hypotension ([facility] [address]    Active Hospital Problems    Diagnosis Date Noted    UTI (urinary tract infection) [N39.0] 08/24/2021    Hematuria [R31.9] 08/24/2021    Altered mental status [R41.82] 08/24/2021    Elevated troponin [R77.8] 08/24/2021    Severe malnutrition ([facility] [address] [E43] 08/24/2021    Sepsis associated hypotension ([facility] [address] [A41.9, I95.9] 08/23/2021    MERRICK (acute kidney injury) ([facility] [address] [N17.9] 08/28/2017    Benign prostatic hyperplasia with lower urinary tract symptoms [N40.1] 07/25/2017    Hypertensive heart disease with heart failure ([facility] [address] [I11.0] 05/07/2014       Plan:        Urosepsis  1 g Rocephin IV  Price in place  Urine cultures grew E coli. Blood cultures grew E coli x2  Vitals stable  WBC 16.1     Altered mental status  Patient alert and oriented on examination  Acutely agitated overnight, combatative and paranoid  Consider antipsychotic overnight if persists  No memory of last night  1-1 sitter       Hypotension  Patients BP stable today at 129/75     MERRICK  Trend BUN and creatinine  Improving  BUN 27, Cr 1.46     BPH  Voiding trial when stable as per urology recommendation     Paroxysmal A. Fib  Patient not on any home meds for rate or rhythm control  Heparin subcu for anticoagulation    DVT prophylaxis  Continue Subcu heparin    Diet advanced to easy to chew, nectar    Stepped down to PCU    Danyelle De Souza MD  8/25/2021  8:41 AM     Attending Physician Statement  I have discussed the care of Josue Bashir with the resident team. I have examined the patient myself and taken ros and hpi , including pertinent history and exam findings,  with the resident. I have reviewed the key elements of all parts of the encounter with the resident. I agree with the assessment, plan and orders as documented by the resident.     Principal Problem:    Sepsis associated hypotension (HCC)  Active Problems: Hypertensive heart disease with heart failure (HCC)    Benign prostatic hyperplasia with lower urinary tract symptoms    MERRICK (acute kidney injury) ([facility] [address]    UTI (urinary tract infection)    Hematuria    Altered mental status    Elevated troponin    Severe malnutrition (HCC)  Resolved Problems:    * No resolved hospital problems. *  Urosepsis blood culture growing E. coli-continue with Rocephin    Patient clinically improving  Hematuria with clots, BPH-on continuous bladder irrigation, urology consulted, plan for voiding trial prior to discharge. Hematuria decreasing. Diet per speech recommendation  Acute toxic metabolic encephalopathy secondary to sepsis and ICU delirium-currently has sitter  MERRICK improving  Echo results awaited  Platelets downtrending, will monitor    Full note to follow.       Electronically signed by Marah Arango MD

## 2021-08-25 NOTE — PLAN OF CARE
Problem: Falls - Risk of:  Goal: Will remain free from falls  Outcome: Ongoing  Note: Pt remains free of falls this shift. Approprate safety measures in place. Sitter at bedside. Goal: Absence of physical injury  Outcome: Ongoing     Problem: Urinary Retention:  Goal: Urinary elimination within specified parameters  Outcome: Ongoing  Note: CBI 1475 net output   Goal: Able to perform urinary catheter care  Outcome: Ongoing  Goal: Ability to reestablish a normal urinary elimination pattern will improve - after catheter removal  Outcome: Ongoing  Goal: Absence of postvoid residual urine  Outcome: Ongoing     Problem: Skin Integrity:  Goal: Will show no infection signs and symptoms  Outcome: Ongoing  Note: Pt has no new skin breakdown this shift.    Goal: Absence of new skin breakdown  Outcome: Ongoing     Problem: Mental Status - Impaired:  Goal: Mental status will be restored to baseline  Outcome: Ongoing     Problem: Nutrition  Goal: Optimal nutrition therapy  Outcome: Ongoing

## 2021-08-25 NOTE — PROGRESS NOTES
Pulmonary Progress Note  [facility]      Patient - Gela Coronel,  Age - 78 y.o.    - 1940      Room Number -    MRN -  [id number]   Acct # - [de-identified]  Date of Admission -  2021  4:15 PM        Estela Andrews MD  Primary Care Physician - Willem Porter MD     SUBJECTIVE   Patient is talking in complete sentences without difficulty. No new issues per her his report    OBJECTIVE   VITALS    height is 6' 1\" (1.854 m) and weight is 191 lb 12.8 oz (87 kg). His oral temperature is 98.5 °F (36.9 °C). His blood pressure is 129/75 and his pulse is 84. His respiration is 18 and oxygen saturation is 98%. Body mass index is 25.3 kg/m². Temperature Range: Temp: 98.5 °F (36.9 °C) Temp  Av.5 °F (36.9 °C)  Min: 97.6 °F (36.4 °C)  Max: 99.3 °F (37.4 °C)  BP Range:  Systolic (61BNB), WBD:247 , Min:85 , NZV:321     Diastolic (22TOX), GTB:12, Min:48, Max:117    Pulse Range: Pulse  Av.8  Min: 80  Max: 95  Respiration Range: Resp  Av.1  Min: 15  Max: 28  Current Pulse Ox[de-identified]  SpO2: 98 %  24HR Pulse Ox Range:  SpO2  Av.2 %  Min: 94 %  Max: 98 %  Oxygen Amount and Delivery: O2 Flow Rate (L/min): 1 L/min    Wt Readings from Last 3 Encounters:   21 191 lb 12.8 oz (87 kg)   21 230 lb (104.3 kg)   21 230 lb (104.3 kg)       I/O (24 Hours)    Intake/Output Summary (Last 24 hours) at 2021 1121  Last data filed at 2021 1010  Gross per 24 hour   Intake 4253 ml   Output 3725 ml   Net 528 ml       EXAM     General Appearance  Awake, alert, oriented, in no acute distress  HEENT - normocephalic, atraumatic. Neck - Supple,  trachea midline   Lungs -coarse breath sounds no crackles rales or wheezes  Heart Exam:PMI normal. No lifts, heaves, or thrills. RRR. No murmurs, clicks, gallops, or rubs  Abdomen Exam: Abdomen soft, non-tender.  BS normal.   Extremity Exam: No signs of cyanosis    MEDS  diclofenac sodium  2 g Topical BID    cefTRIAXone (ROCEPHIN) IV  1,000 mg IntraVENous Q24H    amiodarone  200 mg Oral Daily    aspirin  81 mg Oral Daily    atorvastatin  20 mg Oral Daily    ferrous sulfate  325 mg Oral Daily with breakfast    latanoprost  1 drop Both Eyes Nightly    therapeutic multivitamin-minerals  1 tablet Oral Daily    timolol  1 drop Both Eyes Daily    sodium chloride flush  5-40 mL IntraVENous 2 times per day    famotidine (PEPCID) injection  20 mg IntraVENous Daily    heparin (porcine)  5,000 Units Subcutaneous 3 times per day      sodium chloride      sodium chloride 100 mL/hr at 08/25/21 0432     melatonin, sodium chloride flush, sodium chloride, potassium chloride **OR** potassium alternative oral replacement **OR** potassium chloride, magnesium sulfate, polyethylene glycol, acetaminophen **OR** acetaminophen, ondansetron    LABS   CBC   Recent Labs     08/25/21  0432   WBC 16.1*   HGB 9.7*   HCT 30.7*   MCV 87.4   *     BMP:   Lab Results   Component Value Date     08/25/2021    K 4.0 08/25/2021     08/25/2021    CO2 21 08/25/2021    BUN 27 08/25/2021    LABALBU 3.3 08/23/2021    LABALBU 4.3 02/16/2012    CREATININE 1.46 08/25/2021    CALCIUM 8.7 08/25/2021    GFRAA 56 08/25/2021    LABGLOM 46 08/25/2021     ABGs:  Lab Results   Component Value Date    PO2ART 89.4 11/13/2012      Lab Results   Component Value Date    MODE PRVC 11/13/2012     Ionized Calcium:  No results found for: IONCA  Magnesium:    Lab Results   Component Value Date    MG 1.9 08/25/2021     Phosphorus:    Lab Results   Component Value Date    PHOS 3.7 11/19/2012        LIVER PROFILE   Recent Labs     08/23/21  1650   AST 20   ALT 15   BILITOT 0.79   ALKPHOS 111     INR   Recent Labs     08/23/21  1810   INR 1.1     PTT   Lab Results   Component Value Date    APTT 35.2 08/23/2021         RADIOLOGY     (See actual reports for details)    ASSESSMENT/PLAN     Patient Active Problem List   Diagnosis    Hyperlipidemia    Hypertensive heart disease with heart failure (HCC)    Ileostomy status (HCC)    Benign prostatic hyperplasia with lower urinary tract symptoms    Glaucoma of right eye    Chronic gout without tophus    [facility]ICK [facility]) [facility] [address]    Recurrent kidney stones    Skin lesion of left upper extremity    Pigmented skin lesion of suspected malignant nature    Ventral hernia    Sepsis associated hypotension (HCC)    UTI (urinary tract infection)    Hematuria    Altered mental status    Elevated troponin    Severe malnutrition (HCC)     IMPRESSION:  1. Encephalopathy, resolved. 2.  Sepsis. Gram-negative bacteremia, likely E. coli  3. History of ascending aortic dissection status post replacement as  well as aortic valve replacement at [facility] [place] 07/09/2021.  4.  Acute kidney injury. 5.  Urinary tract infection. 6.  Hypotension, resolved. On DVT prophylaxis with Lovenox. On Rocephin. Chest x-ray, left lower lobe atelectatic versus infiltrative changes.     Patient appears to be stable for progressive unit  We will follow with other services      Electronically signed by Mc Alvarez MD on 8/25/2021 at 11:21 AM

## 2021-08-25 NOTE — PROGRESS NOTES
Speech Language Pathology  Speech Language Pathology  [address] [place] [address]    Dysphagia Treatment Note    Date: 8/25/2021  Patients Name: Chad Reese  MRN: [id number]  Diagnosis: dysphagia  Patient Active Problem List   Diagnosis Code    Hyperlipidemia E78.5    Hypertensive heart disease with heart failure ([facility] [address] I11.0    Ileostomy status ([facility] [address] Z93.2    Benign prostatic hyperplasia with lower urinary tract symptoms N40.1    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 9XX0    MERRICK (acute kidney injury) ([facility] [address] N17.9    Recurrent kidney stones N20.0    Skin lesion of left upper extremity L98.9    Pigmented skin lesion of suspected malignant nature L81.9    Ventral hernia K43.9    Sepsis associated hypotension (HCC) A41.9, I95.9    UTI (urinary tract infection) N39.0    Hematuria R31.9    Altered mental status R41.82    Elevated troponin R77.8    Severe malnutrition (HCC) E43       Pain: pt. denies    Dysphagia Treatment  Treatment time: 8970-7269    Subjective: [x] Alert [x] Cooperative     [] Confused     [] Agitated    [] Lethargic    Objective/Assessment:    Pt. Demonstrated no overt s/s aspiration when drinking mildly thick liquids via straw. Pt. Completed Madeleine maneuver x12, simple tongue base strengthening exercises x6, 10 reps. Pt. Was encouraged to continue to practice exercises Cesia on his own. Pt. Verbalized understanding. Plan:  [x] Continue ST services    [] Discharge from ST:        Discharge recommendations: [] Inpatient Rehab   [] [facility]   [] Outpatient Therapy  [] Follow up at trauma clinic   [] Other:         Treatment completed by: Frantz Ozuna A.CCC/SLP

## 2021-08-25 NOTE — PROGRESS NOTES
SW spoke to pt, wife and daughter in law. [facility] Pt was there in the past. Referral was sent and EBONY spoke to [place]. Pt will need to be seen by therapy for pre-cert purposes. PT/OT Orders are still needed. Pt is from another facility and will not need a new 7000. Pt cannot have any restraints or sitter for 24 hours prior to discharge, per Lydia Saha in central admissions.

## 2021-08-25 NOTE — PROGRESS NOTES
Nutrition Note    Spoke to pt during lunch time. He states he likes cold foods and is requesting Ensure Enlive (Chocolate) with trays. This will be provided thickened to nectar thick.     Electronically signed by [address], STAN on 8/25/21 at 1:47 PM EDT    Contact: [phone]

## 2021-08-26 NOTE — PROGRESS NOTES
EBONY spoke to Ted valladares at [facility]. Pre-cert was started. (EBONY is aware there is a PM&R consult. This was discussed with . It was decided that as pt is from SNF it is unlikely insurance will approve ARU level of Care).

## 2021-08-26 NOTE — PROGRESS NOTES
Pulmonary Progress Note  [facility]      Patient - Amy Barrett,  Age - 78 y.o.    - 1940      Room Number - 2095/2095-01   N -  [id number]   [facility]  Date of Admission -  2021  4:15 PM        Jacinta Jalloh MD  Primary Care Physician - Angela Portillo MD     SUBJECTIVE   Patient appears to be in better spirits. No dyspnea. OBJECTIVE   VITALS    height is 6' 1\" (1.854 m) and weight is 186 lb 1.1 oz (84.4 kg). His oral temperature is 97.8 °F (36.6 °C). His blood pressure is 108/63 and his pulse is 81. His respiration is 18 and oxygen saturation is 91%. Body mass index is 24.55 kg/m². Temperature Range: Temp: 97.8 °F (36.6 °C) Temp  Av.8 °F (36.6 °C)  Min: 97.3 °F (36.3 °C)  Max: 98.4 °F (36.9 °C)  BP Range:  Systolic (00SEW), QLR:706 , Min:108 , PYX:219     Diastolic (44PBC), PIL:00, Min:63, Max:84    Pulse Range: Pulse  Av.2  Min: 74  Max: 87  Respiration Range: Resp  Av  Min: 18  Max: 18  Current Pulse Ox[de-identified]  SpO2: 91 %  24HR Pulse Ox Range:  SpO2  Av.5 %  Min: 90 %  Max: 95 %  Oxygen Amount and Delivery: O2 Flow Rate (L/min): 1 L/min    Wt Readings from Last 3 Encounters:   21 186 lb 1.1 oz (84.4 kg)   21 230 lb (104.3 kg)   21 230 lb (104.3 kg)       I/O (24 Hours)    Intake/Output Summary (Last 24 hours) at 2021 1456  Last data filed at 2021 0808  Gross per 24 hour   Intake 2575 ml   Output 2525 ml   Net 50 ml       EXAM     General Appearance  Awake, alert, oriented, in no acute distress  HEENT - normocephalic, atraumatic. Neck - Supple,  trachea midline   Lungs -decreased breath sounds left base  Heart Exam:PMI normal. No lifts, heaves, or thrills. RRR. No murmurs, clicks, gallops, or rubs  Abdomen Exam: Abdomen soft, non-tender.  BS normal.  Extremity Exam: No signs of cyanosis    MEDS      famotidine  20 mg Oral Daily    diclofenac sodium  2 g Topical BID    cefTRIAXone (ROCEPHIN) IV  1,000 mg IntraVENous Q24H    amiodarone  200 mg Oral Daily    aspirin  81 mg Oral Daily    atorvastatin  20 mg Oral Daily    ferrous sulfate  325 mg Oral Daily with breakfast    latanoprost  1 drop Both Eyes Nightly    therapeutic multivitamin-minerals  1 tablet Oral Daily    timolol  1 drop Both Eyes Daily    sodium chloride flush  5-40 mL IntraVENous 2 times per day    heparin (porcine)  5,000 Units Subcutaneous 3 times per day      sodium chloride      sodium chloride 100 mL/hr at 08/25/21 2346     melatonin, sodium chloride flush, sodium chloride, potassium chloride **OR** potassium alternative oral replacement **OR** potassium chloride, magnesium sulfate, polyethylene glycol, acetaminophen **OR** acetaminophen, ondansetron    LABS   CBC   Recent Labs     08/26/21  0533   WBC 9.5   HGB 10.0*   HCT 31.8*   MCV 89.3   *     BMP:   Lab Results   Component Value Date     08/26/2021    K 3.7 08/26/2021     08/26/2021    CO2 22 08/26/2021    BUN 18 08/26/2021    LABALBU 3.3 08/23/2021    LABALBU 4.3 02/16/2012    CREATININE 1.15 08/26/2021    CALCIUM 8.5 08/26/2021    GFRAA >60 08/26/2021    LABGLOM >60 08/26/2021     ABGs:  Lab Results   Component Value Date    PO2ART 89.4 11/13/2012      Lab Results   Component Value Date    MODE PRVC 11/13/2012     Ionized Calcium:  No results found for: IONCA  Magnesium:    Lab Results   Component Value Date    MG 1.7 08/26/2021     Phosphorus:    Lab Results   Component Value Date    PHOS 3.7 11/19/2012        LIVER PROFILE   Recent Labs     08/23/21  1650   AST 20   ALT 15   BILITOT 0.79   ALKPHOS 111     INR   Recent Labs     08/23/21  1810   INR 1.1     PTT   Lab Results   Component Value Date    APTT 35.2 08/23/2021         RADIOLOGY     (See actual reports for details)    ASSESSMENT/PLAN     Patient Active Problem List   Diagnosis    Hyperlipidemia    Hypertensive heart disease with heart failure ([facility] [address]    Ileostomy status ([facility] [address]    Benign prostatic hyperplasia with lower urinary tract symptoms    Glaucoma of right eye    Chronic gout without tophus    MERRICK (acute kidney injury) ([facility] [address]    Recurrent kidney stones    Skin lesion of left upper extremity    Pigmented skin lesion of suspected malignant nature    Ventral hernia    Sepsis associated hypotension (HCC)    UTI (urinary tract infection)    Hematuria    Altered mental status    Elevated troponin    Severe malnutrition (HCC)       Encephalopathy, resolved. 2.  Sepsis. Gram-negative bacteremia, likely E. coli  3.  History of ascending aortic dissection status post replacement as  well as aortic valve replacement at [facility] 07/09/2021. 4.  Acute kidney injury. 5.  Urinary tract infection. 6.  Hypotension, resolved      On Rocephin which should take care of the E. Coli. We will add incentive spirometer. Some questions were answered. He appeared content with the information  Patient appears to be stable.     .Electronically signed by Esperanza Velásquez MD on 8/26/2021 at 2:56 PM

## 2021-08-26 NOTE — PLAN OF CARE
Problem: Falls - Risk of:  Goal: Will remain free from falls  Description: Will remain free from falls  Outcome: Ongoing  Goal: Absence of physical injury  Description: Absence of physical injury  Outcome: Ongoing     Problem: Urinary Retention:  Goal: Urinary elimination within specified parameters  Description: Urinary elimination within specified parameters  Outcome: Ongoing  Goal: Able to perform urinary catheter care  Description: Able to perform urinary catheter care  Outcome: Ongoing  Goal: Ability to reestablish a normal urinary elimination pattern will improve - after catheter removal  Description: Ability to reestablish a normal urinary elimination pattern will improve - after catheter removal  Outcome: Ongoing  Goal: Absence of postvoid residual urine  Description: Absence of postvoid residual urine  Outcome: Ongoing     Problem: Skin Integrity:  Goal: Will show no infection signs and symptoms  Description: Will show no infection signs and symptoms  Outcome: Ongoing  Goal: Absence of new skin breakdown  Description: Absence of new skin breakdown  Outcome: Ongoing     Problem: Mental Status - Impaired:  Goal: Mental status will be restored to baseline  Description: Mental status will be restored to baseline  Outcome: Ongoing     Problem: Nutrition  Goal: Optimal nutrition therapy  Outcome: Ongoing

## 2021-08-26 NOTE — PROGRESS NOTES
well.  Continue with current diet recommendation. Plan:  [x] Continue ST services    [] Discharge from ST:        Discharge recommendations: [] Inpatient Rehab   [] [facility]   [] Outpatient Therapy  [] Follow up at trauma clinic   [] Other:         Treatment completed by:  Ozzie Jon M.A., CCC-SLP

## 2021-08-26 NOTE — PLAN OF CARE
Safety maintained, call light is within reach, bed is low/locked, side rails are up x2, bed alarm remains on  Problem: Falls - Risk of:  Goal: Will remain free from falls  Description: Will remain free from falls  8/26/2021 1835 by Hodan Buckley RN  Outcome: Ongoing  8/26/2021 1551 by Hodan Buckley RN  Outcome: Ongoing  Goal: Absence of physical injury  Description: Absence of physical injury  8/26/2021 1835 by Hodan Buckley RN  Outcome: Ongoing  8/26/2021 1551 by Hodan Buckley RN  Outcome: Ongoing     Problem: Urinary Retention:  Goal: Urinary elimination within specified parameters  Description: Urinary elimination within specified parameters  8/26/2021 1835 by Hodan Buckley RN  Outcome: Ongoing  8/26/2021 1551 by Hodan Buckley RN  Outcome: Ongoing  Goal: Able to perform urinary catheter care  Description: Able to perform urinary catheter care  8/26/2021 1835 by Hodan Buckley RN  Outcome: Ongoing  8/26/2021 1551 by Hodan Buckley RN  Outcome: Ongoing  Goal: Ability to reestablish a normal urinary elimination pattern will improve - after catheter removal  Description: Ability to reestablish a normal urinary elimination pattern will improve - after catheter removal  8/26/2021 1835 by Hodan Buckley RN  Outcome: Ongoing  8/26/2021 1551 by Hodan Buckley RN  Outcome: Ongoing  Goal: Absence of postvoid residual urine  Description: Absence of postvoid residual urine  8/26/2021 1835 by Hodan Buckley RN  Outcome: Ongoing  8/26/2021 1551 by Hodan Buckley RN  Outcome: Ongoing

## 2021-08-26 NOTE — PROGRESS NOTES
[facility]   Occupational Therapy Evaluation  Date: 21  Patient Name: Brandee Torres       Room:   MRN: [id number]  Account: [de-identified]   : 1940  (78 y.o.) Gender: male     Discharge Recommendations:  Further Occupational Therapy is recommended upon facility discharge. Referring Practitioner: Dr. Keysha Strickland   Diagnosis: Sepsis, UTI       Treatment Diagnosis: Impaired self-care status   Past Medical History:  has a past medical history of Acute respiratory failure following trauma and surgery ([facility] [address] Acute respiratory failure following trauma and surgery ([facility] [address] Altered bowel elimination due to intestinal ostomy ([facility] [address] Full dentures, Full dentures, Gall stones, GERD (gastroesophageal reflux disease), GI bleed, Hemorrhage of gastrointestinal tract, unspecified, Hemorrhage of gastrointestinal tract, unspecified, Hyperlipidemia, Hypertension, Kidney stones, Kidney stones, Primary localized osteoarthrosis, lower leg, Primary localized osteoarthrosis, lower leg, Prostate disorder, Transient disorder of initiating or maintaining sleep, Transient disorder of initiating or maintaining sleep, Unspecified disorder of skin and subcutaneous tissue, Wears glasses, and Wears glasses. Past Surgical History:   has a past surgical history that includes Knee arthroscopy (Right); Tonsillectomy; Cataract removal with implant (Bilateral); Kidney stone surgery (Left); Colonoscopy; total colectomy; colostomy (Right); Lithotripsy (Left, 14); Abdomen surgery; Dilatation, esophagus; fracture surgery; eye surgery; Total knee arthroplasty (2015); Total knee arthroplasty (Right, 3/23/2015); joint replacement (Right, 3-23-15); Cystoscopy (Right, 2017); and shoulder surgery (Left, 2020).     Restrictions  Restrictions/Precautions: Fall Risk (Up with assistance, ileostomy, urethral catheter, IV L hand)     Vitals  Temp: 97.8 °F (36.6 °C)  Pulse: 81  Resp: 18  BP: 108/63  Height: 6' 1\" (185.4 cm)  Weight: 186 lb 1.1 oz (84.4 kg)  BMI (Calculated): 24.6  Oxygen Therapy  SpO2: 91 %  Pulse Oximeter Device Mode: Intermittent  Pulse Oximeter Device Location: Finger  O2 Device: None (Room air)  Skin Assessment: Clean, dry, & intact  O2 Flow Rate (L/min): 1 L/min  Level of Consciousness: Alert (0)    Subjective  Subjective: \"25 different people keep coming in here today, what is going on\"  Comments: Pt slightly aggitated with the amount of people coming in his room. Education was provided about the purpose of our evaluation. Pt was pleasant and agreeable remainder of session. Overall Orientation Status: Within Functional Limits  Vision  Vision: Impaired  Vision Exceptions: Wears glasses at all times  Hearing  Hearing: Within functional limits  Social/Functional History  Lives With: Spouse (Resides in nursing home now rehab after vascular surgery )  Type of Home: House (Condo)  Home Layout: One level (Basement, but pt does not go downstairs )  Home Access: Stairs to enter without rails  Entrance Stairs - Number of Steps: 2  (from garage )  Bathroom Shower/Tub: Walk-in shower, Doors, Shower chair without back  Bathroom Toilet: [address] [place] [address] 83: Grab bars in shower, Shower chair  Bathroom Accessibility: Walker accessible, Accessible  Home Equipment: Rolling walker  Receives Help From: Family (2 sons available when needed )  ADL Assistance: Independent  Homemaking Assistance: Independent (Wife mostly does household chores )  Homemaking Responsibilities: Yes (shared with wife )  Ambulation Assistance: Independent  Transfer Assistance: Independent  Active : Yes  Mode of Transportation: [facility]  Occupation: Retired  Type of occupation: - [address] [place] [address] Drive: Read, watch movies, sports, spend time with sons   Additional Comments: The above reports are before the pt's heart surgery on 7/9/2021. Pt used to go to the gym five days a week.  Since his surgery he has needed alot of assistance for all ADLs   Pain Assessment  Pain Assessment: 0-10  Pain Level: 8  Pain Type: Chronic pain  Pain Location: Back  Pain Descriptors: Aching  Pain Frequency: Continuous (pain seemed to worsen when standing )    Objective      Cognition  Overall Cognitive Status: WFL   Perception  Overall Perceptual Status: WFL  Sensation  Overall Sensation Status: WFL   ADL  Feeding: Setup (pt reports having decreased appetite)  Grooming: Setup  UE Bathing: Stand by assistance  LE Bathing: Dependent/Total (Mod x2 to stand to perform justina/buttocks hygiene )  UE Dressing: Stand by assistance  LE Dressing: Dependent/Total (Mod x2 to stand to don pants over hips )  Toileting: Dependent/Total (external catheter )  Additional Comments: The above levels are based on pt report, observation, and clinical reasoning. Pt's lunch tray was picked at upon arrival; pt reported he has had a loss of appetite. He was able to sit at the EOB and doff/don his R sock with SBA and increased time. Pt stood EOB with Mod Ax2. He did report dizziness both in sitting and standing. SBA is recommended for UB ADL tasks 2* to dizziness and weakness. UE Function     LUE Strength  Gross LUE Strength: Exceptions to [facility]  L Hand General: 4+/5  LUE Strength Comment: Weakness at the shoulder, elbow WFL     LUE Tone: Normotonic     LUE AROM (degrees)  LUE AROM : WFL     Left Hand AROM (degrees)  Left Hand AROM: WFL  RUE Strength  Gross RUE Strength: Exceptions to [facility]  R Hand General: 4+/5  RUE Strength Comment: Weakness at the shoulder, elbow WFL      RUE Tone: Normotonic     RUE AROM (degrees)  RUE AROM : WFL     Right Hand AROM (degrees)  Right Hand AROM: WFL    Fine Motor Skills  Coordination  Movements Are Fluid And Coordinated: Yes     Mobility  Supine to Sit: Moderate assistance (x1)  Sit to Supine: Moderate assistance, 2 Person assistance       Balance  Sitting Balance: Stand by assistance  Standing Balance:  Moderate assistance (x2)  Standing Balance  Time: ~1 min  Activity: pt stood EOB  Comment: Mod A x2   Functional Mobility  Functional - Mobility Device: No device  Activity: Other (side stepped x2 to the right )  Assist Level: Maximum assistance (x2 )  Bed mobility  Bridging: Independent  Rolling to Left: Minimal assistance  Supine to Sit: Moderate assistance (x1)  Sit to Supine: Moderate assistance;2 Person assistance  Scooting: Maximal assistance;2 Person assistance  Comment: OT student moved pt's legs to edge of bed; pt reached for OT's hands; education provided to roll to his left side and reach for bed rail with R hand and push up from bed w L arm; Mod A x1 applied under pt's trunk to sit upright once feet were dangling. Mod A x2 was applied to get into bed; one person for feet and the other for trunk. Max A x2 to scoot pt up in bed. Transfers  Sit to stand: Moderate assistance, 2 Person assistance  Stand to sit: Moderate assistance, 2 Person assistance  Functional Activity Tolerance  Functional Activity Tolerance: Tolerates < 10 Min exercise, no significant change in vital signs   Assessment  Performance deficits / Impairments: Decreased functional mobility , Decreased ADL status, Decreased strength, Decreased safe awareness, Decreased endurance, Decreased balance  Treatment Diagnosis: Impaired self-care status   Prognosis: Good  Decision Making: Medium Complexity  REQUIRES OT FOLLOW UP: Yes  Discharge Recommendations: Patient would benefit from continued therapy after discharge  Activity Tolerance: Patient limited by fatigue    Goals  Patient Goals   Patient goals : To return home   Short term goals  Time Frame for Short term goals: By discharge, pt will:  Short term goal 1: complete functional mobility/transfers with Mod A x1 and least restrictive device. Short term goal 2: complete UB bathing/dressing with set-up.   Short term goal 3: complete LB bathing/dressing with Mod A x1  Short term goal 4: complete toileting tasks

## 2021-08-26 NOTE — PROGRESS NOTES
Physical Therapy    Facility/Department: [address] [place] [address] CARE  Initial Assessment    NAME: Surinder Lala  : 1940  MRN: [id number]    Date of Service: 2021    Discharge Recommendations:  Patient would benefit from continued therapy after discharge        Assessment   Body structures, Functions, Activity limitations: Decreased functional mobility ; Decreased strength;Decreased balance;Decreased endurance  Assessment: Pt needs 2 person assistance for all functional activities at this time and has weakness in all extremities. Treatment Diagnosis: impaired strength and mobility  Specific instructions for Next Treatment: ther activities and exs as tolerated  Prognosis: Fair  Decision Making: Medium Complexity  Exam: MMT, ROM, balance and functional mobility testing  Clinical Presentation: Pt was alert, cooperative and motivated, able to sit at edge of bed with assistance . Barriers to Learning: decreased endurance and muscle weakness   REQUIRES PT FOLLOW UP: Yes  Activity Tolerance  Activity Tolerance: Patient limited by fatigue       Patient Diagnosis(es): The encounter diagnosis was Urinary tract infection with hematuria, site unspecified.      has a past medical history of Acute respiratory failure following trauma and surgery ([facility] [address] Acute respiratory failure following trauma and surgery ([facility] [address] Altered bowel elimination due to intestinal ostomy ([facility] [address] Full dentures, Full dentures, Gall stones, GERD (gastroesophageal reflux disease), GI bleed, Hemorrhage of gastrointestinal tract, unspecified, Hemorrhage of gastrointestinal tract, unspecified, Hyperlipidemia, Hypertension, Kidney stones, Kidney stones, Primary localized osteoarthrosis, lower leg, Primary localized osteoarthrosis, lower leg, Prostate disorder, Transient disorder of initiating or maintaining sleep, Transient disorder of initiating or maintaining sleep, Unspecified disorder of skin and subcutaneous tissue, Wears glasses, and Wears glasses. has a past surgical history that includes Knee arthroscopy (Right); Tonsillectomy; Cataract removal with implant (Bilateral); Kidney stone surgery (Left); Colonoscopy; total colectomy; colostomy (Right); Lithotripsy (Left, 06-20-14); Abdomen surgery; Dilatation, esophagus; fracture surgery; eye surgery; Total knee arthroplasty (03/23/2015); Total knee arthroplasty (Right, 3/23/2015); joint replacement (Right, 3-23-15); Cystoscopy (Right, 8/29/2017); and shoulder surgery (Left, 9/14/2020). Restrictions  Restrictions/Precautions  Restrictions/Precautions: Fall Risk (Up with assistance, ileostomy, urethral catheter, IV L hand)  Vision/Hearing  Vision: Impaired  Vision Exceptions: Wears glasses at all times  Hearing: Within functional limits     Subjective  General  Chart Reviewed: Yes  Patient assessed for rehabilitation services?: Yes  Response To Previous Treatment: Not applicable (PT eval done today)  Family / Caregiver Present: No  General Comment  Comments: Pt seen supine in bed with head and leg end of the bed raised. Subjective  Subjective: Pt stated, \" Average,\" when asked how he was feeling . Pain Screening  Patient Currently in Pain: Denies  Vital Signs  Patient Currently in Pain: Denies  Patient Observation  Observations: Pt looks drowsy right now.   Pre Treatment Pain Screening  Pain at present: 0  Scale Used: Numeric Score    Orientation  Orientation  Overall Orientation Status: Within Functional Limits (unable to tell the exact date, but month and year was correct )  Social/Functional History  Social/Functional History  Lives With: Spouse (Resides in nursing home now for rehab after vascular surgery  )  Type of Home: House (Condo)  Home Layout: One level (Basement, but pt does not go downstairs )  Home Access: Stairs to enter without rails  Entrance Stairs - Number of Steps: 2  (from garage )  Bathroom Shower/Tub: Walk-in shower, Doors, Shower chair without back  [employer]: Standard  Bathroom Equipment: Grab bars in shower, Shower chair  Bathroom Accessibility: Walker accessible, Accessible  Home Equipment: Rolling walker  Receives Help From: Family (2 sons available when needed )  ADL Assistance: Independent  Homemaking Assistance: Independent (Wife mostly does household chores )  Homemaking Responsibilities: Yes (shared with wife )  Ambulation Assistance: Independent  Transfer Assistance: Independent  Active : Yes  Mode of Transportation: [facility]  Occupation: Retired  Type of occupation: - [employer] [address]: Read, watch movies, sports, spend time with sons   Additional Comments: The above reports are before the pt's heart surgery on 2021. Pt used to go to the gym five days a week. Since his surgery he has needed alot of assistance for all ADLs   Cognition   Cognition  Overall Cognitive Status: WFL    Objective          AROM RLE (degrees)  RLE AROM: WFL  AROM LLE (degrees)  LLE AROM : WFL  AROM RUE (degrees)  RUE AROM : WFL  AROM LUE (degrees)  LUE AROM : WFL  Strength RLE  Strength RLE: WFL  Comment:  (grossly 4/5)  Strength LLE  Strength LLE: WFL  Comment: grossly 4/5  Strength RUE  Strength RUE: WFL  Comment: grossly 4/5  Strength LUE  Strength LUE: WFL  Comment: grossly 4/5     Sensation  Overall Sensation Status: WFL  Bed mobility  Bridging: Independent  Rolling: Modified Independent (uses bed rail for assistance)  Supine to sit: maximal assistance (head of the bed raised for assistance)  Sit to Supine:  maximal assistance  Pt able to sit for less than a minute due to weakness and helped to lay in supine again.   Scootin Person assistance  Transfers  Sit to Stand:  (unable )  Ambulation  Ambulation?: No  Stairs/Curb  Stairs?: No     Balance  Posture: Fair  Sitting - Static: Fair  Sitting - Dynamic: Fair;-  Standing - Static:  (unable to sit at this time)  Standing - Dynamic:  (unable to sit at this time)        Plan   Plan  Times per week: 5-7  Times per day: Daily  Plan weeks: 2  Specific instructions for Next Treatment: ther activities and exs as tolerated  Current Treatment Recommendations: Strengthening, Balance Training, Functional Mobility Training  Safety Devices  Type of devices:  All fall risk precautions in place, Sitter present, Nurse notified, Call light within reach, Left in bed                                               AM-PAC Score     AM-PAC Inpatient Mobility without Stair Climbing Raw Score : 8 (08/26/21 1318)  AM-PAC Inpatient without Stair Climbing T-Scale Score : 30.65 (08/26/21 1318)  Mobility Inpatient CMS 0-100% Score: 80.91 (08/26/21 1318)  Mobility Inpatient without Stair CMS G-Code Modifier : CM (08/26/21 1318)       Goals  Short term goals  Time Frame for Short term goals: 5 sessions  Short term goal 1: Improve strength in both LE to 5/5  Short term goal 2: Bed mobility with minima assistance  Short term goal 3: Improve sitting balance to good  Short term goal 4: Improve sitting tolerance so pt will be able to sit at EOB for 5 mins  Short term goal 5: Assess standing and gait as pt's conditiin improves and set appropriate goals  Patient Goals   Patient goals : return home       Therapy Time   Individual Concurrent Group Co-treatment   Time In 1100         Time Out [facility], PT

## 2021-08-26 NOTE — FLOWSHEET NOTE
08/26/21 1551   Encounter Summary   Services provided to: Patient   Referral/Consult From: [facility]   (8/26/21V)   Volunteer Visit Yes   Complexity of Encounter Low   Length of Encounter 15 minutes   Spiritual/Taoist   Type Spiritual support   Intervention Communion   Sacraments   Communion Patient received communion PAIN SCALE 5 OF 10.

## 2021-08-26 NOTE — PROGRESS NOTES
[address]    PROGRESS NOTE             8/26/2021    8:50 AM    Name:   Gela Coronel  MRN:     [id number]     Acct:      [de-identified]   Room:   2095/2095-01  IP Day:  3  Admit Date:  8/23/2021  4:15 PM    PCP:  Willem Porter MD  Code Status:  Full Code    Subjective:     C/C:   Chief Complaint   Patient presents with    Altered Mental Status     Interval History Status: significantly improved. Patient is significantly improved overnight. He maintains being alert oriented x4. He did not have an episode of aggression last night and he recalls seeing his wife yesterday. His urine is yellow today, currently decreasing bladder irrigation and planning a voiding trial.    He still does not have an appetite and also did not get much sleep last night    WBC WNL today  BP: 143/84      Brief History:     Briefly is a 33-year-old patient who had a syncopal episode while at the hospital getting a barium swallow study. Patient had surgery at [facility] for aortic dissection 2 months hemicolectomy for GI bleed recently. He is staying at [facility] for rehab. He was noted to have a UTI and was started on 1g Rocephin IV. CT abdomen pelvis showed non-obstructing urinary calculus, large BPH, urinary bladder with diverticula and bladder wall thickening. He was transferred to the ICU because of severe hypotension. He was started on Levophed and received 2 L of normal saline. He was quickly able to maintain pressure above 90. Blood cultures grew E. coli x2. Past medical history significant for paroxysmal A. Fib for which he takes amiodarone. Patient takes heparin subcu for anticoagulation. Patient was transferred out of the ICU last night. Review of Systems:     Review of Systems   Constitutional: Positive for activity change, appetite change and fatigue. Negative for chills and fever.    HENT: Negative for hearing loss, tinnitus and trouble swallowing. Eyes: Negative for photophobia, pain and visual disturbance. Respiratory: Negative for apnea, cough, choking, chest tightness, shortness of breath and wheezing. Cardiovascular: Negative for chest pain and palpitations. Gastrointestinal: Negative for abdominal distention, abdominal pain, constipation, diarrhea, nausea and vomiting. Genitourinary: Negative for dysuria, flank pain, frequency and urgency. Suprapubic tenderness   Musculoskeletal: Negative for arthralgias, back pain, gait problem, myalgias, neck pain and neck stiffness. Neurological: Negative for dizziness, tremors, seizures, syncope, facial asymmetry, speech difficulty, weakness, light-headedness, numbness and headaches. Psychiatric/Behavioral: Positive for sleep disturbance. Negative for agitation, behavioral problems, confusion, decreased concentration, dysphoric mood and hallucinations. The patient is not nervous/anxious and is not hyperactive. Medications: Allergies:     Allergies   Allergen Reactions    Sulfa Antibiotics     Vimovo [Naproxen-Esomeprazole]      Pain shoulder         Current Meds:   Scheduled Meds:    diclofenac sodium  2 g Topical BID    cefTRIAXone (ROCEPHIN) IV  1,000 mg IntraVENous Q24H    amiodarone  200 mg Oral Daily    aspirin  81 mg Oral Daily    atorvastatin  20 mg Oral Daily    ferrous sulfate  325 mg Oral Daily with breakfast    latanoprost  1 drop Both Eyes Nightly    therapeutic multivitamin-minerals  1 tablet Oral Daily    timolol  1 drop Both Eyes Daily    sodium chloride flush  5-40 mL IntraVENous 2 times per day    famotidine (PEPCID) injection  20 mg IntraVENous Daily    heparin (porcine)  5,000 Units Subcutaneous 3 times per day     Continuous Infusions:    sodium chloride      sodium chloride 100 mL/hr at 08/25/21 2346     PRN Meds: melatonin, sodium chloride flush, sodium chloride, potassium chloride **OR** potassium alternative oral replacement **OR** potassium chloride, magnesium sulfate, polyethylene glycol, acetaminophen **OR** acetaminophen, ondansetron    Data:     Past Medical History:   has a past medical history of Acute respiratory failure following [facility] Acute respiratory failure following trauma and surgery [facility] Altered bowel elimination due to intestinal ostomy ([facility] Full dentures, Full dentures, Gall stones, GERD (gastroesophageal reflux disease), GI bleed, Hemorrhage of gastrointestinal tract, unspecified, Hemorrhage of gastrointestinal tract, unspecified, Hyperlipidemia, Hypertension, Kidney stones, Kidney stones, Primary localized osteoarthrosis, lower leg, Primary localized osteoarthrosis, lower leg, Prostate disorder, Transient disorder of initiating or maintaining sleep, Transient disorder of initiating or maintaining sleep, Unspecified disorder of skin and subcutaneous tissue, Wears glasses, and Wears glasses. Social History:   reports that he has never smoked. He has never used smokeless tobacco. He reports that he does not drink alcohol and does not use drugs. Family History:   Family History   Problem Relation Age of Onset    Heart Disease Father     Other Mother        Vitals:  BP (!) 143/84   Pulse 74   Temp 97.7 °F (36.5 °C) (Oral)   Resp 18   Ht 6' 1\" (1.854 m)   Wt 186 lb 1.1 oz (84.4 kg)   SpO2 90%   BMI 24.55 kg/m²   Temp (24hrs), Av °F (36.7 °C), Min:97.3 °F (36.3 °C), Max:98.4 °F (36.9 °C)    No results for input(s): POCGLU in the last 72 hours. I/O(24Hr):     Intake/Output Summary (Last 24 hours) at 2021 0850  Last data filed at 2021 4070  Gross per 24 hour   Intake 2575 ml   Output 4375 ml   Net -1800 ml       Labs:    [unfilled]    Lab Results   Component Value Date/Time    SPECIAL NOT REPORTED 2021 09:11 PM     Lab Results   Component Value Date/Time    CULTURE ESCHERICHIA COLI >022995 CFU/ML (A) 2021 09:11 PM [facility]    Radiology:    CT ABDOMEN PELVIS WO CONTRAST Additional Contrast? None    Addendum Date: 8/23/2021    ADDENDUM: Impression should also state there is an increased small left pleural effusion with increased bibasilar atelectatic changes. Result Date: 8/23/2021  EXAMINATION: CT OF THE ABDOMEN AND PELVIS WITHOUT CONTRAST 8/23/2021 4:04 pm TECHNIQUE: CT of the abdomen and pelvis was performed without the administration of intravenous contrast. Multiplanar reformatted images are provided for review. Dose modulation, iterative reconstruction, and/or weight based adjustment of the mA/kV was utilized to reduce the radiation dose to as low as reasonably achievable. COMPARISON: 07/09/2021 HISTORY: ORDERING SYSTEM PROVIDED HISTORY: concern for septic kidney secondary to nephrolithiasis TECHNOLOGIST PROVIDED HISTORY: concern for septic kidney secondary to nephrolithiasis Decision Support Exception - unselect if not a suspected or confirmed emergency medical condition->Emergency Medical Condition (MA) Reason for Exam: concern for septic kidney secondary to nephrolithiasis Acuity: Unknown Type of Exam: Unknown Relevant Medical/Surgical History: colostomy, colectomy FINDINGS: Lower Chest: Increased small left pleural effusion with increased bibasilar atelectatic changes. Decreased hemopericardium. Prosthetic aortic valve partially imaged. Organs: Evaluation is limited by the absence of contrast.  Redemonstration of innumerable hepatic cysts, grossly unchanged. Gallbladder is grossly unremarkable. Spleen is grossly normal caliber. Pancreas is atrophic. Adrenal glands are normal size. Punctate nonobstructing left nephroliths. Bilateral renal cysts measuring up to 7.3 cm on the right and 9.5 cm on the left. No hydronephrosis with mild left perinephric stranding.  GI/Bowel: Postoperative changes from presumed colectomy with a Brielle's pouch and a right lower quadrant ostomy with a similar appearing parastomal hernia. No bowel obstruction. Small lipoma in the gastric antrum. Pelvis: Urinary bladder contains numerous layering calcifications posteriorly as well as an indwelling Price catheter with associated intraluminal gas. Gas within 2 anterosuperior bladder diverticula. Perivesicular stranding appears grossly similar to prior. Enlarged prostate. Peritoneum/Retroperitoneum: No free fluid or free air. Intimal flap redemonstrated in the aorta compatible with the known dissection. Abdominal aorta remains unchanged in caliber measuring up to 2.5 cm. Unchanged ectasia of the common iliac arteries, right greater than left. Displaced calcifications in the common iliac vessels are also compatible with underlying intimal flaps/dissection. Bones/Soft Tissues: Recent postoperative changes along the anterior abdominal wall and epigastrium. Presumed sequelae of medication injection in the anterior abdominal wall subcutaneous fat. No acute bony findings on a background of osseous demineralization, dextroconvex lumbar spinal curvature, advanced degenerative changes throughout the lumbar spine including sequelae of Baastrup's disease, and in unchanged region of sclerosis in the right acetabulum with some other smaller unchanged sclerotic foci in the pelvis. Indwelling Price catheter with associated intraluminal gas in the bladder which appears thickened and with numerous diverticula. The wall thickening may be sequelae of chronic bladder outlet obstruction in the setting of an enlarged prostate, though as there is perivesicular stranding as well as left perinephric stranding, recommend correlation for superimposed ascending urinary tract infection. Innumerable bladder calcifications and nonobstructing left nephrolithiasis without an obstructing stone.  Though suboptimally evaluated in the absence of contrast intimal flaps are redemonstrated in the abdominal aorta and common iliac arteries in the setting of known recent dissection two months prior. Aortic caliber is unchanged and remains within normal limits, and there is unchanged ectasia of the common iliac vessels. Recent postoperative changes along the anterior abdominal wall without any organized fluid collection or soft tissue gas. Other unchanged chronic findings as described above. XR CHEST (2 VW)    Result Date: 8/19/2021  EXAMINATION: TWO XRAY VIEWS OF THE CHEST 8/19/2021 2:37 pm COMPARISON: July 9, 2021 CT chest HISTORY: ORDERING SYSTEM PROVIDED HISTORY: dysphagia TECHNOLOGIST PROVIDED HISTORY: dysphagia Reason for Exam: dysphagia, in rehab facility, hx limited. Acuity: Acute Type of Exam: Initial FINDINGS: Median sternotomy, stable normal cardiopericardial silhouette/moderate tortuosity of the thoracic aorta/prosthetic heart valve New moderate left basilar opacity. Otherwise clear lungs Degenerative changes of the thoracic spine/shoulders     New moderate left basilar opacity likely related moderate pleural effusion/pleural thickening, atelectasis versus infiltrate     CT Head WO Contrast    Result Date: 8/23/2021  EXAMINATION: CT OF THE HEAD WITHOUT CONTRAST  8/23/2021 5:37 pm TECHNIQUE: CT of the head was performed without the administration of intravenous contrast. Dose modulation, iterative reconstruction, and/or weight based adjustment of the mA/kV was utilized to reduce the radiation dose to as low as reasonably achievable. COMPARISON: November 12, 2012 HISTORY: ORDERING SYSTEM PROVIDED HISTORY: altered mental status TECHNOLOGIST PROVIDED HISTORY: altered mental status Decision Support Exception - unselect if not a suspected or confirmed emergency medical condition->Emergency Medical Condition (MA) Reason for Exam: altered mental status FINDINGS: BRAIN/VENTRICLES: There is no acute intracranial hemorrhage, mass effect or midline shift. Note is made of cavum septum pellucidum, unchanged from prior study.  ORBITS: The visualized portion of the orbits demonstrate no acute abnormality. SINUSES: The visualized paranasal sinuses and mastoid air cells demonstrate no acute abnormality. SOFT TISSUES/SKULL:  No acute abnormality of the visualized skull or soft tissues. No acute intracranial abnormality. CT CHEST WO CONTRAST    Result Date: 8/23/2021  EXAMINATION: CT OF THE CHEST WITHOUT CONTRAST 8/23/2021 5:18 pm TECHNIQUE: CT of the chest was performed without the administration of intravenous contrast. Multiplanar reformatted images are provided for review. Dose modulation, iterative reconstruction, and/or weight based adjustment of the mA/kV was utilized to reduce the radiation dose to as low as reasonably achievable. COMPARISON: 08/23/2021, 07/09/2021 HISTORY: ORDERING SYSTEM PROVIDED HISTORY: hx of dissection TECHNOLOGIST PROVIDED HISTORY: hx of dissection Decision Support Exception - unselect if not a suspected or confirmed emergency medical condition->Emergency Medical Condition (MA) Reason for Exam: hx of dissection Acuity: Unknown Type of Exam: Unknown FINDINGS: Mediastinum: Evaluation is limited by the absence of contrast.  Interval median sternotomy with ascending aortic dissection repair and placement of a prosthetic aortic valve. Vessel evaluation is limited. The ascending aortic caliber just distal to the operative repair spans approximately 4.8 cm. The proximal descending thoracic aorta spans approximately 4.9 cm, in the distal descending aorta measures approximately 3 cm. The intimal flap is partially visualized within the transverse and descending aorta with the true and false lumen roughly similar in size to one another. Significant improvement in the associated mediastinal hematoma. Small volume residual hemopericardium. No pneumomediastinum or adenopathy. Lungs/pleura: There are some new air bronchograms at the dependent left lower lobe.   Background slightly increased atelectatic changes with pulmonary parenchymal evaluation motion degraded. Right upper lobe calcified granuloma. Resolved right and slightly increased left pleural effusions. No pneumothorax. Upper Abdomen: Abdominal findings are reported separately under same-day abdominal CT. Soft Tissues/Bones: No organized fluid collection or soft tissue gas along the healing median sternotomy. There is mild osseous resorption along the sternotomy incision which remains grossly approximated. No appreciable osseous bridging. Callus formation in the region of the manubrium. Otherwise unchanged degenerative findings in the spine and shoulders. Vascular evaluation is limited by the absence of contrast. Interval operative repair of the ascending aortic dissection. Vessel caliber as described above with the ascending aorta just distal to the operative repair measuring up to 4.9 cm. The true and false lumens now all appear somewhat equal in caliber, though assessment is limited in the absence of contrast. Significant improvement in the associated mediastinal hematoma. Only small volume residual hemopericardium. New air bronchograms in the left lower lobe which may be due to developing infection in the appropriate clinical setting. Slightly increased atelectatic changes in the lung bases. Improved right and worsened left pleural effusions. XR CHEST PORTABLE    Result Date: 8/25/2021  EXAMINATION: ONE XRAY VIEW OF THE CHEST 8/25/2021 5:47 am COMPARISON: 08/23/2021, 08/19/2021 HISTORY: ORDERING SYSTEM PROVIDED HISTORY: R/O CHF TECHNOLOGIST PROVIDED HISTORY: R/O CHF Reason for Exam: R/O CHF Acuity: Acute Type of Exam: Initial Additional signs and symptoms: R/O CHF Relevant Medical/Surgical History: R/O CHF FINDINGS: The cardiac and mediastinal contours appear unchanged. Basilar opacities and pleural effusions, left greater than right, are again demonstrated without appreciable change. No new airspace disease identified in the interval.  No evidence for pneumothorax.      No significant interval change. XR CHEST PORTABLE    Result Date: 8/23/2021  EXAMINATION: ONE XRAY VIEW OF THE CHEST 8/23/2021 4:55 pm COMPARISON: 19 April 2021 HISTORY: ORDERING SYSTEM PROVIDED HISTORY: altered mental status, evaluate for pneumonia TECHNOLOGIST PROVIDED HISTORY: altered mental status, evaluate for pneumonia Reason for Exam: altered mental status, evaluate for pneumonia Acuity: Unknown Type of Exam: Unknown FINDINGS: AP portable view of the chest time stamped at 1725 hours demonstrates overlying cardiac monitoring electrodes. Stable cardiomegaly is noted. Prior median sternotomy is demonstrated. CT. No change in left pleural effusion and left basilar opacity. No vascular congestion or extrapleural air. Surgical clips are present the medial right axilla. Prosthetic aortic valve is noted. No change in left basilar opacity likely related to effusion and atelectasis with focal airspace disease not excluded. FL MODIFIED BARIUM SWALLOW W VIDEO    Result Date: 8/24/2021  EXAMINATION: MODIFIED BARIUM SWALLOW WAS PERFORMED IN CONJUNCTION WITH SPEECH PATHOLOGY SERVICES TECHNIQUE: Fluoroscopic evaluation of the swallowing mechanism was performed with multiple consistency of barium product. FLUOROSCOPY DOSE AND TYPE OR TIME AND EXPOSURES: Fluoroscopic time of 3 minutes and 9 seconds. DAP of 130.2 dGycm2. COMPARISON: None HISTORY: ORDERING SYSTEM PROVIDED HISTORY: Aspiration risk TECHNOLOGIST PROVIDED HISTORY: Aspiration risk Reason for Exam: aspiration risk Acuity: Unknown Type of Exam: Unknown FINDINGS: Multiple swallows were attempted with multiple consistencies in the presence of speech pathology under fluoroscopic evaluation. Penetration was visualized with thin liquid. There is no evidence for aspiration. Penetration with thin liquid. No evidence for aspiration. Please see separate speech pathology report for full discussion of findings and recommendations.          Physical Examination: Physical Exam  Constitutional:       Appearance: Normal appearance. HENT:      Head: Normocephalic and atraumatic. Mouth/Throat:      Mouth: Mucous membranes are moist.      Pharynx: Oropharynx is clear. Eyes:      Extraocular Movements: Extraocular movements intact. Conjunctiva/sclera: Conjunctivae normal.      Pupils: Pupils are equal, round, and reactive to light. Cardiovascular:      Rate and Rhythm: Tachycardia present. Rhythm irregular. Pulses: Normal pulses. Heart sounds: Normal heart sounds. Pulmonary:      Effort: Pulmonary effort is normal.      Breath sounds: Normal breath sounds. Chest:          Comments: Scars from previous surgeries  Abdominal:      General: Abdomen is flat. Palpations: Abdomen is soft. Tenderness: There is abdominal tenderness. Comments: Urinary bladder not palpable  Suprapubic tenderness   Musculoskeletal:         General: Normal range of motion. Cervical back: Normal range of motion and neck supple. Skin:     General: Skin is warm and dry. Capillary Refill: Capillary refill takes less than 2 seconds. Neurological:      General: No focal deficit present. Mental Status: He is alert and oriented to person, place, and time. Mental status is at baseline. Comments: Patient with strength 4/5 in the right iliopsoas  4+/5 in the left iliopsoas  5/5 throughout otherwise  No sensory deficit   Psychiatric:         Mood and Affect: Mood normal.         Behavior: Behavior normal.         Thought Content:  Thought content normal.         Judgment: Judgment normal.           Assessment:        Primary Problem  Sepsis associated hypotension [facility])    Active Hospital Problems    Diagnosis Date Noted    UTI (urinary tract infection) [N39.0] 08/24/2021    Hematuria [R31.9] 08/24/2021    Altered mental status [R41.82] 08/24/2021    Elevated troponin [R77.8] 08/24/2021    Severe malnutrition ([facility] [E43] 08/24/2021    Sepsis associated hypotension ([facility] [A41.9, I95.9] 08/23/2021    MERRICK (acute kidney injury) ([facility] [N17.9] 08/28/2017    Benign prostatic hyperplasia with lower urinary tract symptoms [N40.1] 07/25/2017    Hypertensive heart disease with heart failure ([facility] [I11.0] 05/07/2014       Plan:        Urosepsis  Continue 1 g Rocephin IV  Price in place  Urine cultures grew E coli. Blood cultures grew E coli x2  Vitals stable  WBC 9.5 today     Altered mental status  No episodes of altered mental status last night. Patient alert oriented this morning  Recalls seeing wife yesterday  1-1 sitter        Hypotension  /84     MERRICK  Trend BUN and creatinine  resolved  BUN 18, Cr 1.15     BPH  Voiding trial when stable as per urology recommendation     Paroxysmal A. Fib  Continue Amiodarone 200 daily  Heparin subcu for anticoagulation     DVT prophylaxis  Continue Subcu heparin     Diet to easy to chew, nectar. Supplemented with ensure due to poor appetite. Chitra Garcia MD  8/26/2021  8:50 AM   Attending Physician Statement  I have discussed the care of Malissa Odonnell with the resident team. I have examined the patient myself and taken ros and hpi , including pertinent history and exam findings,  with the resident. I have reviewed the key elements of all parts of the encounter with the resident. I agree with the assessment, plan and orders as documented by the resident. Principal Problem:    Sepsis associated hypotension (HCC)  Active Problems:    Hypertensive heart disease with heart failure (HCC)    Benign prostatic hyperplasia with lower urinary tract symptoms    MERRICK (acute kidney injury) ([facility]    UTI (urinary tract infection)    Hematuria    Altered mental status    Elevated troponin    Severe malnutrition (HCC)  Resolved Problems:    * No resolved hospital problems.  *    No further episodes of delirium since yesterday  Alert OX3 today, d/c sitter  Leukocytosis and Merrick resolved  Urine clear, likely voiding trial later today- will f/upwith Urology  Follow up on ECHO  Discharge planning    Electronically signed by Moises Lara MD

## 2021-08-26 NOTE — PROGRESS NOTES
Physician Progress Note      PATIENT:               Meet Scott  CSN #:                  [id number]  :                       1940  ADMIT DATE:       2021 4:15 PM  DISCH DATE:  RESPONDING  PROVIDER #:        ILIANA DILLON          QUERY TEXT:    Pt admitted with sepsis due to UTI and has CHF documented. If possible, please   document in progress notes and discharge summary further specificity   regarding the type and acuity of CHF:    The medical record reflects the following:  Risk Factors: htn, bioprosthetic aortic valve with aortic regurgitation per   2021 ECHO in care everywhere; Repair of aortic dissection 2021 with   residual small volume hemopericardium on  CT chest  Clinical Indicators: CHF in setting of above;   CXR: No change in left   basilar opacity likely related to effusion and atelectasis with focal airspace   disease not excluded.  CT chest:  significantly improved mediastinal   hematoma with only small volume residual hemopericardium, improved right and   worsened left pleural effusion; pro-BNP 1459   ECHO in [facility]:  EF 70%, severe   aortic regurgitation; Bioprosthetic AVR with small leak near ring at the   right coronary cusp  Treatment: diagnostic testing as above; careful and close monitoring of fluid   balance; Lasix on hold and patient receiving IVFs for MERRICK; Levophed gtt for BP   support, baby ASA, amiodarone and Lipitor ordered to be continued  Options provided:  -- Chronic HFpEF  -- Other - I will add my own diagnosis  -- Disagree - Not applicable / Not valid  -- Disagree - Clinically unable to determine / Unknown  -- Refer to Clinical Documentation Reviewer    PROVIDER RESPONSE TEXT:    This patient has chronic HFpEF.     Query created by: Noe Coulter on 2021 2:33 PM      Electronically signed by:  Charlee Torres 2021 7:11 AM

## 2021-08-27 NOTE — PROGRESS NOTES
(Right, 8/29/2017); and shoulder surgery (Left, 9/14/2020). Restrictions  Restrictions/Precautions  Restrictions/Precautions: Fall Risk (Up with assistance, ileostomy, urethral catheter)     Subjective   General  Chart Reviewed: Yes  Response To Previous Treatment: Patient with no complaints from previous session. Family / Caregiver Present: Yes (Pt's wife)  Subjective  Subjective: Pt reported: no pain, slightly lightheaded after supine->sit  Pain Screening  Patient Currently in Pain: Denies  Vital Signs  Patient Currently in Pain: Denies       Orientation  Orientation  Overall Orientation Status: Within Functional Limits    Objective   Bed mobility  Rolling to Left: Contact guard assistance  Rolling to Right: Stand by assistance  Supine to Sit: Moderate assistance  Sit to Supine:  Moderate assistance  Scooting: Maximal assistance;2 Person assistance  Comment: Max Ax2 to scoot up in bed  Transfers  Sit to Stand: Minimal Assistance  Stand to sit: Minimal Assistance  Comment: pt used one hand on the bed and one on a RW  Ambulation  Ambulation?: No  Stairs/Curb  Stairs?: No     Balance  Posture: Fair  Sitting - Static: Fair;+  Sitting - Dynamic: Fair  Standing - Static: Fair  Standing - Dynamic: Fair  Exercises  Heelslides: BLE x10  Hip Abduction: BLE x10  Knee Long Arc Quad: BLE x10  Ankle Pumps: BLE x10 (repeat each hour)  Other exercises  Other exercises?: Yes  Other exercises 1: Sit<->stand: 6x  Other exercises 2: Static standing tolerance: 10\" - 50\"  Other exercises 3: Standing weightshifting L<->R with RW: 10x each way  Other exercises 4: Standing marches with RW: BLE x10        Goals  Short term goals  Time Frame for Short term goals: 5 sessions  Short term goal 1: Improve strength in both LE to 5/5  Short term goal 2: Bed mobility with minima assistance  Short term goal 3: Improve sitting balance to good  Short term goal 4: Improve sitting tolerance so pt will be able to sit at EOB for 5 mins  Short term goal 5: Assess standing and gait as pt's conditiin improves and set appropriate goals  Patient Goals   Patient goals : return home    Plan    Plan  Times per week: 5-7  Times per day: Daily  Plan weeks: 2  Specific instructions for Next Treatment: ther activities and exs as tolerated  Current Treatment Recommendations: Strengthening, Balance Training, Functional Mobility Training  Safety Devices  Type of devices:  All fall risk precautions in place, Call light within reach, Left in bed, Bed alarm in place, Gait belt, Patient at risk for falls  Restraints  Initially in place: No (all four bed rails up, ok with pt)     Therapy Time   08/27/21 1021   PT Individual Minutes   Time In 1021   Time Out [facility]

## 2021-08-27 NOTE — CONSULTS
Consult noted. Per records, patient was at a SNF for rehab prior to admission and plan is to discharge to a SNF closer to home. Social work note states that after discussion with family, precert was started for [facility]. If discharge plan changes, would be happy to evaluate this patient.

## 2021-08-27 NOTE — PLAN OF CARE
Patient remained stable and safe this shift. He has been alert and oriented to person and place only.

## 2021-08-27 NOTE — PROGRESS NOTES
Attending Physician Statement  I have discussed the care of Ag Gama with the resident team. I have examined the patient myself and taken ros and hpi , including pertinent history and exam findings,  with the resident. I have reviewed the key elements of all parts of the encounter with the resident. I agree with the assessment, plan and orders as documented by the resident. Principal Problem:    Sepsis associated hypotension (HCC)  Active Problems:    Hypertensive heart disease with heart failure (HCC)    Benign prostatic hyperplasia with lower urinary tract symptoms    MERRICK (acute kidney injury) ([facility] [address]    UTI (urinary tract infection)    Hematuria    Altered mental status    Elevated troponin    Severe malnutrition (HCC)  Resolved Problems:    * No resolved hospital problems. *    Doing well  Voiding trial today, urine clear  Day 4 of rocephin today  Potential discharge later today pending PM&R review and voiding trial  Full note to follow.       Electronically signed by Cherylene Patten, MD

## 2021-08-27 NOTE — PROGRESS NOTES
CBI clamped at this time - urine is clear and free of clots. Patient Is resting comfortably in bed at this time. Forgetful at times.

## 2021-08-27 NOTE — PROGRESS NOTES
[address] [facility]    OCCUPATIONAL THERAPY MISSED TREATMENT NOTE   INPATIENT   Date: 21  Patient Name: Javier Brody       Room:   MRN: [id number]   Account #: [de-identified]    : 1940  (78 y.o.)  Gender: male   Referring Practitioner: Dr. Radha Hernandez   Diagnosis: Sepsis, UTI             REASON FOR MISSED TREATMENT:  Patient at testing and/or off the floor  [id number] -   Testing. Will continue to follow.       Yesica Keys JUDY

## 2021-08-27 NOTE — DISCHARGE SUMMARY
[address]    Discharge Summary     Patient ID: Sergey Lezama  :  1940   MRN: [id number]     ACCOUNT:  [de-identified]   Patient's PCP: Melvi Crump MD  Admit Date: 2021   Discharge Date: 2021   Length of Stay: 4  Code Status:  Full Code  Admitting Physician: Melvi Crump MD  Discharge Physician: Jagdeep Hoffman MD     Active Discharge Diagnoses:       Primary Problem  Sepsis associated hypotension [facility])      [facility]    Diagnosis Date Noted    UTI (urinary tract infection) [N39.0] 2021    Hematuria [R31.9] 2021    Altered mental status [R41.82] 2021    Elevated troponin [R77.8] 2021    Severe malnutrition ([address] [E43] 2021    Sepsis associated hypotension ([address] [A41.9, I95.9] 2021    MERRICK (acute kidney injury) ([address] [N17.9] 2017    Benign prostatic hyperplasia with lower urinary tract symptoms [N40.1] 2017    Hypertensive heart disease with heart failure ([address] [I11.0] 2014       Admission Condition:  serious     Discharged Condition: good    Hospital Stay:       Hospital Course:  Sergey Lezama is a 78 y.o. male who was admitted for the management of   Sepsis associated hypotension ([address] , presented to ER with Altered Mental Status. The patient had a syncopal episode while he was at the hospital getting a barium swallow. Patient's urinalysis showed UTI. CT abdomen pelvis showed a nonobstructive urinary calculus, large BPH, urinary bladder diverticuli and pleural thickening. Blood cultures grew E. coli x2. He has been in the ICU started on pressors for hypotension given IV Rocephin. He was quickly stabilized. He was transferred out of the ICU to the progressive care unit then to [facility] as his condition improved.   He did have some episodes of acute delirium that quickly resolved but were characterized acute depression, violence, and paranoia. Patient was stabilized and ready for discharge, PICC line was put in place and the patient was discharged to a SNF. Significant therapeutic interventions: IV antibiotics for E. coli sepsis. PICC line in place.     Significant Diagnostic Studies:   Labs / Micro:  CBC:   Lab Results   Component Value Date    WBC 6.8 08/27/2021    RBC 3.60 08/27/2021    RBC 4.59 02/16/2012    HGB 10.2 08/27/2021    HCT 30.7 08/27/2021    MCV 85.3 08/27/2021    MCH 28.2 08/27/2021    MCHC 33.1 08/27/2021    RDW 15.2 08/27/2021     08/27/2021     02/16/2012     BMP:    Lab Results   Component Value Date    GLUCOSE 80 08/27/2021    GLUCOSE 100 02/16/2012     08/27/2021    K 3.4 08/27/2021     08/27/2021    CO2 24 08/27/2021    ANIONGAP 9 08/27/2021    BUN 13 08/27/2021    CREATININE 1.04 08/27/2021    BUNCRER NOT REPORTED 08/27/2021    CALCIUM 8.3 08/27/2021    LABGLOM >60 08/27/2021    GFRAA >60 08/27/2021    GFR      08/27/2021    GFR NOT REPORTED 08/27/2021     U/A:    Lab Results   Component Value Date    COLORU DARK YELLOW 08/23/2021    TURBIDITY CLOUDY 08/23/2021    SPECGRAV 1.012 08/23/2021    HGBUR LARGE 08/23/2021    PHUR 5.5 08/23/2021    PROTEINU 1+ 08/23/2021    GLUCOSEU NEGATIVE 08/23/2021    GLUCOSEU NEGATIVE 02/16/2012    KETUA NEGATIVE 08/23/2021    BILIRUBINUR NEGATIVE 08/23/2021    BILIRUBINUR NEGATIVE 02/16/2012    UROBILINOGEN Normal 08/23/2021    NITRU NEGATIVE 08/23/2021    LEUKOCYTESUR MOD 08/23/2021         blood culture: positive for E. coli and urine culture: positive for E. coli    Radiology:    Echocardiogram Limited 2D    Result Date: 8/27/2021  [address] [facility] Transthoracic Echocardiography Report (TTE)  Patient Name [address]Fernando N Celestino Loja  Date of Study               08/24/2021               Issac Bones   Date of      1940  Gender                      Male  Birth   Age          78 year(s)  Race                           Room Number 2061        Height:                     73 inch, 185.42 cm   Corporate ID [id number]    Weight:                     190 pounds, 86.2 kg  #   Patient Acct [de-identified]   BSA:          2.11 m^2      BMI:      25.07  #                                                              kg/m^2   MR #         [id number]      Sonographer                 Ivonne Swann   Accession #  [id number]  Interpreting Physician      [address] [facility]   Fellow                   Referring Nurse                           Practitioner   Interpreting             Referring Physician         Porfirio Garcia *  Fellow  Additional Comments Technically difficult study. Type of Study   TTE procedure:2D Echocardiogram, Limited Echo. Procedure Date Date: 08/24/2021 Start: 08:49 AM Study Location: [address] [place] [address] Technical Quality: Limited visualization due to poor acoustical window. Indications:Septic shock. History / Tech. Comments: HLD, HTN Patient Status: Inpatient Height: 73 inches Weight: 190 pounds BSA: 2.11 m^2 BMI: 25.07 kg/m^2 Rhythm: Within normal limits HR: 84 bpm BP: 115/49 mmHg CONCLUSIONS Summary Extremely challenging Echo, not all walls seen, poor windows, poor endocardial definition. Recommend repeat limited with Definity. Within above limitations, LVEF appears mildly reduced with EF 40%. Thickened mitral valve leaflets. Signature ----------------------------------------------------------------------------  Electronically signed by Ivonne Swann(Sonographer) on 08/27/2021 07:43  AM ---------------------------------------------------------------------------- ----------------------------------------------------------------------------  Electronically signed by Art BarrazaInterpreting physician) on 08/25/2021  07:59 AM ---------------------------------------------------------------------------- FINDINGS Left Atrium Left atrium is normal in size.  Left Ventricle Extremely challenging Echo, not all walls seen, poor windows, poor endocardial definition. Recommend repeat limited with Definity. Within above limitations, LVEF appears mildly reduced with EF 40%. Right Atrium Right atrium was not well visualized. Right Ventricle Right ventricle was not well visualized. Mitral Valve Thickened mitral valve leaflets. Aortic Valve Aortic valve not well visualized. Tricuspid Valve Tricuspid valve was not well visualized. Pulmonic Valve Pulmonic valve was not well visualized. Pericardial Effusion No significant pericardial effusion is seen. Pleural Effusion No pleural effusion seen. CT ABDOMEN PELVIS WO CONTRAST Additional Contrast? None    Addendum Date: 8/23/2021    ADDENDUM: Impression should also state there is an increased small left pleural effusion with increased bibasilar atelectatic changes. Result Date: 8/23/2021  EXAMINATION: CT OF THE ABDOMEN AND PELVIS WITHOUT CONTRAST 8/23/2021 4:04 pm TECHNIQUE: CT of the abdomen and pelvis was performed without the administration of intravenous contrast. Multiplanar reformatted images are provided for review. Dose modulation, iterative reconstruction, and/or weight based adjustment of the mA/kV was utilized to reduce the radiation dose to as low as reasonably achievable. COMPARISON: 07/09/2021 HISTORY: ORDERING SYSTEM PROVIDED HISTORY: concern for septic kidney secondary to nephrolithiasis TECHNOLOGIST PROVIDED HISTORY: concern for septic kidney secondary to nephrolithiasis Decision Support Exception - unselect if not a suspected or confirmed emergency medical condition->Emergency Medical Condition (MA) Reason for Exam: concern for septic kidney secondary to nephrolithiasis Acuity: Unknown Type of Exam: Unknown Relevant Medical/Surgical History: colostomy, colectomy FINDINGS: Lower Chest: Increased small left pleural effusion with increased bibasilar atelectatic changes. Decreased hemopericardium. Prosthetic aortic valve partially imaged.  Organs: Evaluation is limited by the absence of contrast. Redemonstration of innumerable hepatic cysts, grossly unchanged. Gallbladder is grossly unremarkable. Spleen is grossly normal caliber. Pancreas is atrophic. Adrenal glands are normal size. Punctate nonobstructing left nephroliths. Bilateral renal cysts measuring up to 7.3 cm on the right and 9.5 cm on the left. No hydronephrosis with mild left perinephric stranding. GI/Bowel: Postoperative changes from presumed colectomy with a Brielle's pouch and a right lower quadrant ostomy with a similar appearing parastomal hernia. No bowel obstruction. Small lipoma in the gastric antrum. Pelvis: Urinary bladder contains numerous layering calcifications posteriorly as well as an indwelling Price catheter with associated intraluminal gas. Gas within 2 anterosuperior bladder diverticula. Perivesicular stranding appears grossly similar to prior. Enlarged prostate. Peritoneum/Retroperitoneum: No free fluid or free air. Intimal flap redemonstrated in the aorta compatible with the known dissection. Abdominal aorta remains unchanged in caliber measuring up to 2.5 cm. Unchanged ectasia of the common iliac arteries, right greater than left. Displaced calcifications in the common iliac vessels are also compatible with underlying intimal flaps/dissection. Bones/Soft Tissues: Recent postoperative changes along the anterior abdominal wall and epigastrium. Presumed sequelae of medication injection in the anterior abdominal wall subcutaneous fat. No acute bony findings on a background of osseous demineralization, dextroconvex lumbar spinal curvature, advanced degenerative changes throughout the lumbar spine including sequelae of Baastrup's disease, and in unchanged region of sclerosis in the right acetabulum with some other smaller unchanged sclerotic foci in the pelvis. Indwelling Price catheter with associated intraluminal gas in the bladder which appears thickened and with numerous diverticula.   The wall thickening may be sequelae of chronic bladder outlet obstruction in the setting of an enlarged prostate, though as there is perivesicular stranding as well as left perinephric stranding, recommend correlation for superimposed ascending urinary tract infection. Innumerable bladder calcifications and nonobstructing left nephrolithiasis without an obstructing stone. Though suboptimally evaluated in the absence of contrast intimal flaps are redemonstrated in the abdominal aorta and common iliac arteries in the setting of known recent dissection two months prior. Aortic caliber is unchanged and remains within normal limits, and there is unchanged ectasia of the common iliac vessels. Recent postoperative changes along the anterior abdominal wall without any organized fluid collection or soft tissue gas. Other unchanged chronic findings as described above. XR CHEST (2 VW)    Result Date: 8/19/2021  EXAMINATION: TWO XRAY VIEWS OF THE CHEST 8/19/2021 2:37 pm COMPARISON: July 9, 2021 CT chest HISTORY: ORDERING SYSTEM PROVIDED HISTORY: dysphagia TECHNOLOGIST PROVIDED HISTORY: dysphagia Reason for Exam: dysphagia, in rehab facility, hx limited. Acuity: Acute Type of Exam: Initial FINDINGS: Median sternotomy, stable normal cardiopericardial silhouette/moderate tortuosity of the thoracic aorta/prosthetic heart valve New moderate left basilar opacity. Otherwise clear lungs Degenerative changes of the thoracic spine/shoulders     New moderate left basilar opacity likely related moderate pleural effusion/pleural thickening, atelectasis versus infiltrate     CT Head WO Contrast    Result Date: 8/23/2021  EXAMINATION: CT OF THE HEAD WITHOUT CONTRAST  8/23/2021 5:37 pm TECHNIQUE: CT of the head was performed without the administration of intravenous contrast. Dose modulation, iterative reconstruction, and/or weight based adjustment of the mA/kV was utilized to reduce the radiation dose to as low as reasonably achievable.  COMPARISON: November 12, 2012 HISTORY: ORDERING SYSTEM PROVIDED HISTORY: altered mental status TECHNOLOGIST PROVIDED HISTORY: altered mental status Decision Support Exception - unselect if not a suspected or confirmed emergency medical condition->Emergency Medical Condition (MA) Reason for Exam: altered mental status FINDINGS: BRAIN/VENTRICLES: There is no acute intracranial hemorrhage, mass effect or midline shift. Note is made of cavum septum pellucidum, unchanged from prior study. ORBITS: The visualized portion of the orbits demonstrate no acute abnormality. SINUSES: The visualized paranasal sinuses and mastoid air cells demonstrate no acute abnormality. SOFT TISSUES/SKULL:  No acute abnormality of the visualized skull or soft tissues. No acute intracranial abnormality. CT CHEST WO CONTRAST    Result Date: 8/23/2021  EXAMINATION: CT OF THE CHEST WITHOUT CONTRAST 8/23/2021 5:18 pm TECHNIQUE: CT of the chest was performed without the administration of intravenous contrast. Multiplanar reformatted images are provided for review. Dose modulation, iterative reconstruction, and/or weight based adjustment of the mA/kV was utilized to reduce the radiation dose to as low as reasonably achievable. COMPARISON: 08/23/2021, 07/09/2021 HISTORY: ORDERING SYSTEM PROVIDED HISTORY: hx of dissection TECHNOLOGIST PROVIDED HISTORY: hx of dissection Decision Support Exception - unselect if not a suspected or confirmed emergency medical condition->Emergency Medical Condition (MA) Reason for Exam: hx of dissection Acuity: Unknown Type of Exam: Unknown FINDINGS: Mediastinum: Evaluation is limited by the absence of contrast.  Interval median sternotomy with ascending aortic dissection repair and placement of a prosthetic aortic valve. Vessel evaluation is limited. The ascending aortic caliber just distal to the operative repair spans approximately 4.8 cm.   The proximal descending thoracic aorta spans approximately 4.9 cm, in the distal descending aorta measures approximately 3 cm. The intimal flap is partially visualized within the transverse and descending aorta with the true and false lumen roughly similar in size to one another. Significant improvement in the associated mediastinal hematoma. Small volume residual hemopericardium. No pneumomediastinum or adenopathy. Lungs/pleura: There are some new air bronchograms at the dependent left lower lobe. Background slightly increased atelectatic changes with pulmonary parenchymal evaluation motion degraded. Right upper lobe calcified granuloma. Resolved right and slightly increased left pleural effusions. No pneumothorax. Upper Abdomen: Abdominal findings are reported separately under same-day abdominal CT. Soft Tissues/Bones: No organized fluid collection or soft tissue gas along the healing median sternotomy. There is mild osseous resorption along the sternotomy incision which remains grossly approximated. No appreciable osseous bridging. Callus formation in the region of the manubrium. Otherwise unchanged degenerative findings in the spine and shoulders. Vascular evaluation is limited by the absence of contrast. Interval operative repair of the ascending aortic dissection. Vessel caliber as described above with the ascending aorta just distal to the operative repair measuring up to 4.9 cm. The true and false lumens now all appear somewhat equal in caliber, though assessment is limited in the absence of contrast. Significant improvement in the associated mediastinal hematoma. Only small volume residual hemopericardium. New air bronchograms in the left lower lobe which may be due to developing infection in the appropriate clinical setting. Slightly increased atelectatic changes in the lung bases. Improved right and worsened left pleural effusions.      XR CHEST PORTABLE    Result Date: 8/25/2021  EXAMINATION: ONE XRAY VIEW OF THE CHEST 8/25/2021 5:47 am COMPARISON: 08/23/2021, 08/19/2021 HISTORY: ORDERING SYSTEM PROVIDED HISTORY: R/O CHF TECHNOLOGIST PROVIDED HISTORY: R/O CHF Reason for Exam: R/O CHF Acuity: Acute Type of Exam: Initial Additional signs and symptoms: R/O CHF Relevant Medical/Surgical History: R/O CHF FINDINGS: The cardiac and mediastinal contours appear unchanged. Basilar opacities and pleural effusions, left greater than right, are again demonstrated without appreciable change. No new airspace disease identified in the interval.  No evidence for pneumothorax. No significant interval change. XR CHEST PORTABLE    Result Date: 8/23/2021  EXAMINATION: ONE XRAY VIEW OF THE CHEST 8/23/2021 4:55 pm COMPARISON: 19 April 2021 HISTORY: ORDERING SYSTEM PROVIDED HISTORY: altered mental status, evaluate for pneumonia TECHNOLOGIST PROVIDED HISTORY: altered mental status, evaluate for pneumonia Reason for Exam: altered mental status, evaluate for pneumonia Acuity: Unknown Type of Exam: Unknown FINDINGS: AP portable view of the chest time stamped at 1725 hours demonstrates overlying cardiac monitoring electrodes. Stable cardiomegaly is noted. Prior median sternotomy is demonstrated. CT. No change in left pleural effusion and left basilar opacity. No vascular congestion or extrapleural air. Surgical clips are present the medial right axilla. Prosthetic aortic valve is noted. No change in left basilar opacity likely related to effusion and atelectasis with focal airspace disease not excluded. FL MODIFIED BARIUM SWALLOW W VIDEO    Result Date: 8/24/2021  EXAMINATION: MODIFIED BARIUM SWALLOW WAS PERFORMED IN CONJUNCTION WITH SPEECH PATHOLOGY SERVICES TECHNIQUE: Fluoroscopic evaluation of the swallowing mechanism was performed with multiple consistency of barium product. FLUOROSCOPY DOSE AND TYPE OR TIME AND EXPOSURES: Fluoroscopic time of 3 minutes and 9 seconds. DAP of 130.2 dGycm2.  COMPARISON: None HISTORY: ORDERING SYSTEM PROVIDED HISTORY: Aspiration risk TECHNOLOGIST PROVIDED HISTORY: Aspiration risk Reason for Exam: aspiration risk Acuity: Unknown Type of Exam: Unknown FINDINGS: Multiple swallows were attempted with multiple consistencies in the presence of speech pathology under fluoroscopic evaluation. Penetration was visualized with thin liquid. There is no evidence for aspiration. Penetration with thin liquid. No evidence for aspiration. Please see separate speech pathology report for full discussion of findings and recommendations. Consultations:    Consults:     Final Specialist Recommendations/Findings:   IP CONSULT TO INTERNAL MEDICINE  IP CONSULT TO UROLOGY  IP CONSULT TO CRITICAL CARE  IP CONSULT TO PHYSICAL MEDICINE REHAB      The patient was seen and examined on day of discharge and this discharge summary is in conjunction with any daily progress note from day of discharge. Discharge plan:       [facility]    Physician Follow Up:     No follow-up provider specified. Requiring Further Evaluation/Follow Up POST HOSPITALIZATION/Incidental Findings: Follow-up on aortic surgery, and hemicolectomy. Follow-up with the urologist for urological complications including BPH and urinary cysts. Diet: cardiac diet    Activity: As tolerated    Instructions to Patient: Maintain rehab.     Discharge Medications:      Medication List        START taking these medications      cefTRIAXone  infusion  Commonly known as: ROCEPHIN  Infuse 2,000 mg intravenously every 24 hours for 10 days Compound per protocol            CONTINUE taking these medications      aluminum & magnesium hydroxide-simethicone 400-400-40 MG/5ML Susp  Commonly known as: MYLANTA     amiodarone 200 MG tablet  Commonly known as: CORDARONE     aspirin 81 MG chewable tablet     atorvastatin 20 MG tablet  Commonly known as: LIPITOR     ferrous sulfate 325 (65 Fe) MG tablet  Commonly known as: IRON 325     furosemide 20 MG tablet  Commonly known as: LASIX     heparin (porcine) 5000 UNIT/ML injection     ipratropium-albuterol 0.5-2.5 (3) MG/3ML Soln nebulizer solution  Commonly known as: DUONEB     latanoprost 0.005 % ophthalmic solution  Commonly known as: XALATAN     melatonin 3 MG Tabs tablet     methocarbamol 750 MG tablet  Commonly known as: ROBAXIN     ondansetron 4 MG tablet  Commonly known as: ZOFRAN     potassium chloride 20 MEQ extended release tablet  Commonly known as: KLOR-CON M     therapeutic multivitamin-minerals tablet     timolol 0.5 % ophthalmic solution  Commonly known as: TIMOPTIC     traZODone 150 MG tablet  Commonly known as: DESYREL            STOP taking these medications      acetaminophen 325 MG tablet  Commonly known as: TYLENOL               Where to Get Your Medications        You can get these medications from any pharmacy    Bring a paper prescription for each of these medications  cefTRIAXone  infusion         Electronically signed by   Uriel Tesfaye MD  8/27/2021  4:29 PM      Thank you Dr. Nadja Sosa MD for the opportunity to be involved in this patient's care. Attending Physician Statement  I have discussed the care of Milly Katie and I have examined the patient myselft and taken ros and hpi , including pertinent history and exam findings,  with the resident. I have reviewed the key elements of all parts of the encounter with the resident. I agree with the assessment, plan and orders as documented by the resident. I spent approx 35 mins in direct patient care as above and discussing discharge with patient, reviewing medications and counseling for discharge .     Electronically signed by Boris Cotter MD

## 2021-08-27 NOTE — PROGRESS NOTES
Pulmonary Progress Note  [facility]      Patient - Emily Liz,  Age - 78 y.o.    - 1940      Room Number - -01   N -  [id number]   [facility]  Date of Admission -  2021  4:15 PM    France Merida MD  Primary Care Physician - Andrés Hannah MD     SUBJECTIVE   Patient breathing much better. No issues at this time. Tolerating physical therapy well. Very good listener to Lai Castro from physical therapy    O2 saturations 95-96% on room air    OBJECTIVE   VITALS    height is 6' 1\" (1.854 m) and weight is 186 lb 1.1 oz (84.4 kg). His oral temperature is 98.3 °F (36.8 °C). His blood pressure is 130/82 and his pulse is 97. His respiration is 20 and oxygen saturation is 96%. Body mass index is 24.55 kg/m². Temperature Range: Temp: 98.3 °F (36.8 °C) Temp  Av.3 °F (36.8 °C)  Min: 97.8 °F (36.6 °C)  Max: 98.6 °F (37 °C)  BP Range:  Systolic (12AUG), ROMAN:675 , Min:108 , CCA:351     Diastolic (96SWN), AD, Min:63, Max:91    Pulse Range: Pulse  Av.8  Min: 77  Max: 97  Respiration Range: Resp  Av.8  Min: 16  Max: 20  Current Pulse Ox[de-identified]  SpO2: 96 %  24HR Pulse Ox Range:  SpO2  Av.3 %  Min: 91 %  Max: 96 %  Oxygen Amount and Delivery: O2 Flow Rate (L/min): 1 L/min    Wt Readings from Last 3 Encounters:   21 186 lb 1.1 oz (84.4 kg)   21 230 lb (104.3 kg)   21 230 lb (104.3 kg)       I/O (24 Hours)    Intake/Output Summary (Last 24 hours) at 2021 1045  Last data filed at 2021 7443  Gross per 24 hour   Intake 240 ml   Output 2200 ml   Net -1960 ml       EXAM     General Appearance  Awake, alert, oriented, in no acute distress  HEENT - normocephalic, atraumatic. Neck - Supple,  trachea midline   Lungs -worse breath sounds no crackles rales or wheezes posteriorly  Heart Exam:PMI normal. No lifts, heaves, or thrills. RRR.  No murmurs, clicks, gallops, or rubs  Abdomen Exam: Abdomen soft, non-tender. BS normal.   Extremity Exam: No signs of cyanosis    MEDS      cefTRIAXone (ROCEPHIN) IV  2,000 mg IntraVENous Q24H    famotidine  20 mg Oral Daily    diclofenac sodium  2 g Topical BID    amiodarone  200 mg Oral Daily    aspirin  81 mg Oral Daily    atorvastatin  20 mg Oral Daily    ferrous sulfate  325 mg Oral Daily with breakfast    latanoprost  1 drop Both Eyes Nightly    therapeutic multivitamin-minerals  1 tablet Oral Daily    timolol  1 drop Both Eyes Daily    sodium chloride flush  5-40 mL IntraVENous 2 times per day    heparin (porcine)  5,000 Units Subcutaneous 3 times per day      sodium chloride      sodium chloride 100 mL/hr at 08/27/21 0609     melatonin, sodium chloride flush, sodium chloride, potassium chloride **OR** potassium alternative oral replacement **OR** potassium chloride, magnesium sulfate, polyethylene glycol, acetaminophen **OR** acetaminophen, ondansetron    LABS   CBC   Recent Labs     08/27/21  0653   WBC 6.8   HGB 10.2*   HCT 30.7*   MCV 85.3   *     BMP:   Lab Results   Component Value Date     08/27/2021    K 3.4 08/27/2021     08/27/2021    CO2 24 08/27/2021    BUN 13 08/27/2021    LABALBU 3.3 08/23/2021    LABALBU 4.3 02/16/2012    CREATININE 1.04 08/27/2021    CALCIUM 8.3 08/27/2021    GFRAA >60 08/27/2021    LABGLOM >60 08/27/2021     ABGs:  Lab Results   Component Value Date    PO2ART 89.4 11/13/2012      Lab Results   Component Value Date    MODE PRVC 11/13/2012     Ionized Calcium:  No results found for: IONCA  Magnesium:    Lab Results   Component Value Date    MG 1.7 08/27/2021     Phosphorus:    Lab Results   Component Value Date    PHOS 3.7 11/19/2012        LIVER PROFILE No results for input(s): AST, ALT, LIPASE, BILIDIR, BILITOT, ALKPHOS in the last 72 hours. Invalid input(s): AMYLASE,  ALB  INR No results for input(s): INR in the last 72 hours.   PTT   Lab Results   Component Value Date    APTT 35.2 08/23/2021     RADIOLOGY     (See actual reports for details)    ASSESSMENT/PLAN     Patient Active Problem List   Diagnosis    Hyperlipidemia    [facility]pertensive heart disease with heart failure [facility]    Ileostomy status ([facility]    Benign prostatic hyperplasia with lower urinary tract symptoms    Glaucoma of right eye    Chronic gout without tophus    MERRICK (acute kidney injury) ([facility]    Recurrent kidney stones    Skin lesion of left upper extremity    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  E. coli gram-negative bacteremia with sepsis. 2.  Likely urinary tract infection  3. History of ascending aortic dissection status post replacement as well as aortic valve replacement at [facility] [place] July 9, 2021  4 resolved acute kidney injury. Creatinine now 1.04. Was 1.98    Encouraging incentive spirometry. Rocephin. Discharge planning per other services.     Electronically signed by Kyle Winter MD on 8/27/2021 at 10:45 AM

## 2021-08-27 NOTE — PROGRESS NOTES
[address]    PROGRESS NOTE             8/27/2021    1:34 PM    Name:   Mecca Stark  MRN:     [id number]     Acct:      [de-identified]   Room:   2061/2061-01  IP Day:  4  Admit Date:  8/23/2021  4:15 PM    PCP:  Paulina Grove MD  Code Status:  Full Code    Subjective:     C/C:   Chief Complaint   Patient presents with    Altered Mental Status     Interval History Status: not changed. Patient seen and examined at bedside today. More agitated. Difficulty in understanding current plan. Patient would like to go to SNF closer to home to continue his rehab. Blood cultures grow E. coli sensitive to Rocephin, will continue at discharge to SNF    Cardiac echo was limited, estimated EF 40%. Unable to visualize walls. If necessary repeat with Definity. Brief History:     Briefly this is an 38-year-old patient had a syncopal episode, possible getting a barium swallow. Patient had surgery for aortic dissection section 2 months and had a hemicolectomy for GI bleed recently. He is staying at [facility] for rehab. CT abdomen pelvis showed a nonobstructing urinary calculus, large BPH, urinary bladder diverticuli and pleural thickening. He was transferred to the ICU for severe hypotension. He recovered and was transferred out of the ICU. Blood cultures grew E. coli sensitive to TMP-SMX. He was taking IV Rocephin for sepsis secondary to UTI. Past medical history significant for paroxysmal A. fib for which he takes amiodarone. He takes amiodarone for anticoagulation. He has had episodes of acute delirium in which she gets aggressive and at times violent. These usually happen overnight per recently happened during the morning. Review of Systems:     Review of Systems   Constitutional: Positive for activity change, appetite change and fatigue. Negative for chills and fever.    HENT: Negative for hearing loss, tinnitus and trouble swallowing. Eyes: Negative for photophobia, pain and visual disturbance. Respiratory: Negative for apnea, cough, choking, chest tightness, shortness of breath and wheezing. Cardiovascular: Negative for chest pain and palpitations. Gastrointestinal: Negative for abdominal distention, abdominal pain, constipation, diarrhea, nausea and vomiting. Genitourinary: Negative for dysuria, flank pain, frequency and urgency. Suprapubic tenderness   Musculoskeletal: Negative for arthralgias, back pain, gait problem, myalgias, neck pain and neck stiffness. Neurological: Negative for dizziness, tremors, seizures, syncope, facial asymmetry, speech difficulty, weakness, light-headedness, numbness and headaches. Psychiatric/Behavioral: Positive for sleep disturbance. Negative for agitation, behavioral problems, confusion, decreased concentration, dysphoric mood and hallucinations. The patient is not nervous/anxious and is not hyperactive. Medications: Allergies:     Allergies   Allergen Reactions    Sulfa Antibiotics     Vimovo [Naproxen-Esomeprazole]      Pain shoulder         Current Meds:   Scheduled Meds:    cefTRIAXone (ROCEPHIN) IV  2,000 mg IntraVENous Q24H    famotidine  20 mg Oral Daily    diclofenac sodium  2 g Topical BID    amiodarone  200 mg Oral Daily    aspirin  81 mg Oral Daily    atorvastatin  20 mg Oral Daily    ferrous sulfate  325 mg Oral Daily with breakfast    latanoprost  1 drop Both Eyes Nightly    therapeutic multivitamin-minerals  1 tablet Oral Daily    timolol  1 drop Both Eyes Daily    sodium chloride flush  5-40 mL IntraVENous 2 times per day    heparin (porcine)  5,000 Units Subcutaneous 3 times per day     Continuous Infusions:    sodium chloride      sodium chloride 100 mL/hr at 08/27/21 0609     PRN Meds: melatonin, sodium chloride flush, sodium chloride, potassium chloride **OR** potassium alternative oral replacement **OR** potassium chloride, magnesium sulfate, polyethylene glycol, acetaminophen **OR** acetaminophen, ondansetron    Data:     Past Medical History:   has a past medical history of Acute respiratory failure following trauma and surgery ([facility] Acute respiratory failure following trauma and surgery ([facility] Altered bowel elimination due to intestinal ostomy ([facility] Full dentures, Full dentures, Gall stones, GERD (gastroesophageal reflux disease), GI bleed, Hemorrhage of gastrointestinal tract, unspecified, Hemorrhage of gastrointestinal tract, unspecified, Hyperlipidemia, Hypertension, Kidney stones, Kidney stones, Primary localized osteoarthrosis, lower leg, Primary localized osteoarthrosis, lower leg, Prostate disorder, Transient disorder of initiating or maintaining sleep, Transient disorder of initiating or maintaining sleep, Unspecified disorder of skin and subcutaneous tissue, Wears glasses, and Wears glasses. Social History:   reports that he has never smoked. He has never used smokeless tobacco. He reports that he does not drink alcohol and does not use drugs. Family History:   Family History   Problem Relation Age of Onset    Heart Disease Father     Other Mother        Vitals:  /75   Pulse 75   Temp 97.7 °F (36.5 °C) (Oral)   Resp 18   Ht 6' 1\" (1.854 m)   Wt 186 lb 1.1 oz (84.4 kg)   SpO2 95%   BMI 24.55 kg/m²   Temp (24hrs), Av.2 °F (36.8 °C), Min:97.7 °F (36.5 °C), Max:98.6 °F (37 °C)    No results for input(s): POCGLU in the last 72 hours. I/O(24Hr):     Intake/Output Summary (Last 24 hours) at 2021 1334  Last data filed at 2021 1150  Gross per 24 hour   Intake 240 ml   Output 2475 ml   Net -2235 ml       Labs:    [unfilled]    Lab Results   Component Value Date/Time    SPECIAL NOT REPORTED 2021 09:11 PM     Lab Results   Component Value Date/Time    CULTURE ESCHERICHIA COLI >[id number] CFU/ML (A) 2021 09:11 PM       [facility]    Radiology:    Echocardiogram Limited 2D    Result Date: 8/27/2021  [address] [facility] Transthoracic Echocardiography Report (TTE)  Patient Name Liya Gaston  Date of Study               08/24/2021               [place]   Date of      1940  Gender                      Male  Birth   Age          78 year(s)  Race                           Room Number  2061        Height:                     73 inch, 185.42 cm   Corporate ID [id number]    Weight:                     190 pounds, 86.2 kg  #   Patient Acct [de-identified]   BSA:          2.11 m^2      BMI:      25.07  #                                                              kg/m^2   MR #         [id number]      Sonographer                 Ivonne Swann   Accession #  [id number]  Interpreting Physician      [id number] [facility]   Fellow                   Referring Nurse                           Practitioner   Interpreting             Referring Physician         Jonathan Grover *  Fellow  Additional Comments Technically difficult study. Type of Study   TTE procedure:2D Echocardiogram, Limited Echo. Procedure Date Date: 08/24/2021 Start: 08:49 AM Study Location: [address] [place] [address] Technical Quality: Limited visualization due to poor acoustical window. Indications:Septic shock. History / Tech. Comments: HLD, HTN Patient Status: Inpatient Height: 73 inches Weight: 190 pounds BSA: 2.11 m^2 BMI: 25.07 kg/m^2 Rhythm: Within normal limits HR: 84 bpm BP: 115/49 mmHg CONCLUSIONS Summary Extremely challenging Echo, not all walls seen, poor windows, poor endocardial definition. Recommend repeat limited with Definity. Within above limitations, LVEF appears mildly reduced with EF 40%. Thickened mitral valve leaflets.  Signature ----------------------------------------------------------------------------  Electronically signed by Ivonne Swann(Sonographer) on 08/27/2021 07:43  AM ---------------------------------------------------------------------------- ----------------------------------------------------------------------------  Electronically signed by Shelton Barraza(Interpreting physician) on 08/25/2021  07:59 AM ---------------------------------------------------------------------------- FINDINGS Left Atrium Left atrium is normal in size. Left Ventricle Extremely challenging Echo, not all walls seen, poor windows, poor endocardial definition. Recommend repeat limited with Definity. Within above limitations, LVEF appears mildly reduced with EF 40%. Right Atrium Right atrium was not well visualized. Right Ventricle Right ventricle was not well visualized. Mitral Valve Thickened mitral valve leaflets. Aortic Valve Aortic valve not well visualized. Tricuspid Valve Tricuspid valve was not well visualized. Pulmonic Valve Pulmonic valve was not well visualized. Pericardial Effusion No significant pericardial effusion is seen. Pleural Effusion No pleural effusion seen. CT ABDOMEN PELVIS WO CONTRAST Additional Contrast? None    Addendum Date: 8/23/2021    ADDENDUM: Impression should also state there is an increased small left pleural effusion with increased bibasilar atelectatic changes. Result Date: 8/23/2021  EXAMINATION: CT OF THE ABDOMEN AND PELVIS WITHOUT CONTRAST 8/23/2021 4:04 pm TECHNIQUE: CT of the abdomen and pelvis was performed without the administration of intravenous contrast. Multiplanar reformatted images are provided for review. Dose modulation, iterative reconstruction, and/or weight based adjustment of the mA/kV was utilized to reduce the radiation dose to as low as reasonably achievable.  COMPARISON: 07/09/2021 HISTORY: ORDERING SYSTEM PROVIDED HISTORY: concern for septic kidney secondary to nephrolithiasis TECHNOLOGIST PROVIDED HISTORY: concern for septic kidney secondary to nephrolithiasis Decision Support Exception - unselect if not a suspected or confirmed emergency medical condition->Emergency Medical Condition (MA) Reason for Exam: concern for septic kidney secondary to nephrolithiasis Acuity: Unknown Type of Exam: Unknown Relevant Medical/Surgical History: colostomy, colectomy FINDINGS: Lower Chest: Increased small left pleural effusion with increased bibasilar atelectatic changes. Decreased hemopericardium. Prosthetic aortic valve partially imaged. Organs: Evaluation is limited by the absence of contrast.  Redemonstration of innumerable hepatic cysts, grossly unchanged. Gallbladder is grossly unremarkable. Spleen is grossly normal caliber. Pancreas is atrophic. Adrenal glands are normal size. Punctate nonobstructing left nephroliths. Bilateral renal cysts measuring up to 7.3 cm on the right and 9.5 cm on the left. No hydronephrosis with mild left perinephric stranding. GI/Bowel: Postoperative changes from presumed colectomy with a Brielle's pouch and a right lower quadrant ostomy with a similar appearing parastomal hernia. No bowel obstruction. Small lipoma in the gastric antrum. Pelvis: Urinary bladder contains numerous layering calcifications posteriorly as well as an indwelling Price catheter with associated intraluminal gas. Gas within 2 anterosuperior bladder diverticula. Perivesicular stranding appears grossly similar to prior. Enlarged prostate. Peritoneum/Retroperitoneum: No free fluid or free air. Intimal flap redemonstrated in the aorta compatible with the known dissection. Abdominal aorta remains unchanged in caliber measuring up to 2.5 cm. Unchanged ectasia of the common iliac arteries, right greater than left. Displaced calcifications in the common iliac vessels are also compatible with underlying intimal flaps/dissection. Bones/Soft Tissues: Recent postoperative changes along the anterior abdominal wall and epigastrium. Presumed sequelae of medication injection in the anterior abdominal wall subcutaneous fat.   No acute bony findings on a background of osseous demineralization, dextroconvex lumbar spinal curvature, advanced degenerative changes throughout the lumbar spine including sequelae of Baastrup's disease, and in unchanged region of sclerosis in the right acetabulum with some other smaller unchanged sclerotic foci in the pelvis. Indwelling Price catheter with associated intraluminal gas in the bladder which appears thickened and with numerous diverticula. The wall thickening may be sequelae of chronic bladder outlet obstruction in the setting of an enlarged prostate, though as there is perivesicular stranding as well as left perinephric stranding, recommend correlation for superimposed ascending urinary tract infection. Innumerable bladder calcifications and nonobstructing left nephrolithiasis without an obstructing stone. Though suboptimally evaluated in the absence of contrast intimal flaps are redemonstrated in the abdominal aorta and common iliac arteries in the setting of known recent dissection two months prior. Aortic caliber is unchanged and remains within normal limits, and there is unchanged ectasia of the common iliac vessels. Recent postoperative changes along the anterior abdominal wall without any organized fluid collection or soft tissue gas. Other unchanged chronic findings as described above. XR CHEST (2 VW)    Result Date: 8/19/2021  EXAMINATION: TWO XRAY VIEWS OF THE CHEST 8/19/2021 2:37 pm COMPARISON: July 9, 2021 CT chest HISTORY: ORDERING SYSTEM PROVIDED HISTORY: dysphagia TECHNOLOGIST PROVIDED HISTORY: dysphagia Reason for Exam: dysphagia, in rehab facility, hx limited. Acuity: Acute Type of Exam: Initial FINDINGS: Median sternotomy, stable normal cardiopericardial silhouette/moderate tortuosity of the thoracic aorta/prosthetic heart valve New moderate left basilar opacity.   Otherwise clear lungs Degenerative changes of the thoracic spine/shoulders     New moderate left basilar opacity likely related moderate pleural effusion/pleural thickening, atelectasis versus infiltrate     CT Head WO Contrast    Result Date: 8/23/2021  EXAMINATION: CT OF THE HEAD WITHOUT CONTRAST  8/23/2021 5:37 pm TECHNIQUE: CT of the head was performed without the administration of intravenous contrast. Dose modulation, iterative reconstruction, and/or weight based adjustment of the mA/kV was utilized to reduce the radiation dose to as low as reasonably achievable. COMPARISON: November 12, 2012 HISTORY: ORDERING SYSTEM PROVIDED HISTORY: altered mental status TECHNOLOGIST PROVIDED HISTORY: altered mental status Decision Support Exception - unselect if not a suspected or confirmed emergency medical condition->Emergency Medical Condition (MA) Reason for Exam: altered mental status FINDINGS: BRAIN/VENTRICLES: There is no acute intracranial hemorrhage, mass effect or midline shift. Note is made of cavum septum pellucidum, unchanged from prior study. ORBITS: The visualized portion of the orbits demonstrate no acute abnormality. SINUSES: The visualized paranasal sinuses and mastoid air cells demonstrate no acute abnormality. SOFT TISSUES/SKULL:  No acute abnormality of the visualized skull or soft tissues. No acute intracranial abnormality. CT CHEST WO CONTRAST    Result Date: 8/23/2021  EXAMINATION: CT OF THE CHEST WITHOUT CONTRAST 8/23/2021 5:18 pm TECHNIQUE: CT of the chest was performed without the administration of intravenous contrast. Multiplanar reformatted images are provided for review. Dose modulation, iterative reconstruction, and/or weight based adjustment of the mA/kV was utilized to reduce the radiation dose to as low as reasonably achievable.  COMPARISON: 08/23/2021, 07/09/2021 HISTORY: ORDERING SYSTEM PROVIDED HISTORY: hx of dissection TECHNOLOGIST PROVIDED HISTORY: hx of dissection Decision Support Exception - unselect if not a suspected or confirmed emergency medical condition->Emergency Medical Condition (MA) Reason for Exam: hx of dissection Acuity: Unknown Type of Exam: Unknown FINDINGS: Mediastinum: Evaluation is limited by the absence of contrast.  Interval median sternotomy with ascending aortic dissection repair and placement of a prosthetic aortic valve. Vessel evaluation is limited. The ascending aortic caliber just distal to the operative repair spans approximately 4.8 cm. The proximal descending thoracic aorta spans approximately 4.9 cm, in the distal descending aorta measures approximately 3 cm. The intimal flap is partially visualized within the transverse and descending aorta with the true and false lumen roughly similar in size to one another. Significant improvement in the associated mediastinal hematoma. Small volume residual hemopericardium. No pneumomediastinum or adenopathy. Lungs/pleura: There are some new air bronchograms at the dependent left lower lobe. Background slightly increased atelectatic changes with pulmonary parenchymal evaluation motion degraded. Right upper lobe calcified granuloma. Resolved right and slightly increased left pleural effusions. No pneumothorax. Upper Abdomen: Abdominal findings are reported separately under same-day abdominal CT. Soft Tissues/Bones: No organized fluid collection or soft tissue gas along the healing median sternotomy. There is mild osseous resorption along the sternotomy incision which remains grossly approximated. No appreciable osseous bridging. Callus formation in the region of the manubrium. Otherwise unchanged degenerative findings in the spine and shoulders. Vascular evaluation is limited by the absence of contrast. Interval operative repair of the ascending aortic dissection. Vessel caliber as described above with the ascending aorta just distal to the operative repair measuring up to 4.9 cm. The true and false lumens now all appear somewhat equal in caliber, though assessment is limited in the absence of contrast. Significant improvement in the associated mediastinal hematoma. Only small volume residual hemopericardium. New air bronchograms in the left lower lobe which may be due to developing infection in the appropriate clinical setting. Slightly increased atelectatic changes in the lung bases. Improved right and worsened left pleural effusions. XR CHEST PORTABLE    Result Date: 8/25/2021  EXAMINATION: ONE XRAY VIEW OF THE CHEST 8/25/2021 5:47 am COMPARISON: 08/23/2021, 08/19/2021 HISTORY: ORDERING SYSTEM PROVIDED HISTORY: R/O CHF TECHNOLOGIST PROVIDED HISTORY: R/O CHF Reason for Exam: R/O CHF Acuity: Acute Type of Exam: Initial Additional signs and symptoms: R/O CHF Relevant Medical/Surgical History: R/O CHF FINDINGS: The cardiac and mediastinal contours appear unchanged. Basilar opacities and pleural effusions, left greater than right, are again demonstrated without appreciable change. No new airspace disease identified in the interval.  No evidence for pneumothorax. No significant interval change. XR CHEST PORTABLE    Result Date: 8/23/2021  EXAMINATION: ONE XRAY VIEW OF THE CHEST 8/23/2021 4:55 pm COMPARISON: 19 April 2021 HISTORY: ORDERING SYSTEM PROVIDED HISTORY: altered mental status, evaluate for pneumonia TECHNOLOGIST PROVIDED HISTORY: altered mental status, evaluate for pneumonia Reason for Exam: altered mental status, evaluate for pneumonia Acuity: Unknown Type of Exam: Unknown FINDINGS: AP portable view of the chest time stamped at 1725 hours demonstrates overlying cardiac monitoring electrodes. Stable cardiomegaly is noted. Prior median sternotomy is demonstrated. CT. No change in left pleural effusion and left basilar opacity. No vascular congestion or extrapleural air. Surgical clips are present the medial right axilla. Prosthetic aortic valve is noted. No change in left basilar opacity likely related to effusion and atelectasis with focal airspace disease not excluded.      FL MODIFIED BARIUM SWALLOW W VIDEO    Result Date: 8/24/2021  EXAMINATION: MODIFIED BARIUM SWALLOW WAS PERFORMED IN CONJUNCTION WITH SPEECH PATHOLOGY SERVICES TECHNIQUE: Fluoroscopic evaluation of the swallowing mechanism was performed with multiple consistency of barium product. FLUOROSCOPY DOSE AND TYPE OR TIME AND EXPOSURES: Fluoroscopic time of 3 minutes and 9 seconds. DAP of 130.2 dGycm2. COMPARISON: None HISTORY: ORDERING SYSTEM PROVIDED HISTORY: Aspiration risk TECHNOLOGIST PROVIDED HISTORY: Aspiration risk Reason for Exam: aspiration risk Acuity: Unknown Type of Exam: Unknown FINDINGS: Multiple swallows were attempted with multiple consistencies in the presence of speech pathology under fluoroscopic evaluation. Penetration was visualized with thin liquid. There is no evidence for aspiration. Penetration with thin liquid. No evidence for aspiration. Please see separate speech pathology report for full discussion of findings and recommendations. Physical Examination:        Physical Exam  Constitutional:       Appearance: Normal appearance. HENT:      Head: Normocephalic and atraumatic. Mouth/Throat:      Mouth: Mucous membranes are moist.      Pharynx: Oropharynx is clear. Eyes:      Extraocular Movements: Extraocular movements intact. Conjunctiva/sclera: Conjunctivae normal.      Pupils: Pupils are equal, round, and reactive to light. Cardiovascular:      Rate and Rhythm: Tachycardia present. Rhythm irregular. Pulses: Normal pulses. Heart sounds: Normal heart sounds. Pulmonary:      Effort: Pulmonary effort is normal.      Breath sounds: Examination of the right-lower field reveals decreased breath sounds. Examination of the left-lower field reveals decreased breath sounds. Decreased breath sounds present. Chest:          Comments: Scars from previous surgeries  Abdominal:      General: Abdomen is flat. Palpations: Abdomen is soft. Tenderness: There is abdominal tenderness.       Comments: Urinary bladder not palpable  Suprapubic tenderness   Musculoskeletal: General: Normal range of motion. Cervical back: Normal range of motion and neck supple. Skin:     General: Skin is warm and dry. Capillary Refill: Capillary refill takes less than 2 seconds. Neurological:      General: No focal deficit present. Mental Status: He is alert and oriented to person, place, and time. Mental status is at baseline. Comments: Patient with strength 4/5 in the right iliopsoas  4+/5 in the left iliopsoas  5/5 throughout otherwise  No sensory deficit   Psychiatric:         Mood and Affect: Mood normal.         Behavior: Behavior normal.         Thought Content: Thought content normal.         Judgment: Judgment normal.           Assessment:        Primary Problem  Sepsis associated hypotension ([address]    Active Hospital Problems    Diagnosis Date Noted    UTI (urinary tract infection) [N39.0] 08/24/2021    Hematuria [R31.9] 08/24/2021    Altered mental status [R41.82] 08/24/2021    Elevated troponin [R77.8] 08/24/2021    Severe malnutrition ([address] [E43] 08/24/2021    Sepsis associated hypotension ([address] [A41.9, I95.9] 08/23/2021    MERRICK (acute kidney injury) ([address] [N17.9] 08/28/2017    Benign prostatic hyperplasia with lower urinary tract symptoms [N40.1] 07/25/2017    Hypertensive heart disease with heart failure ([address] [I11.0] 05/07/2014       Plan:        Sepsis secondary to UTI  Rocephin increased to 2 g IV daily  Price in place  Urine cultures grew E coli. Blood cultures grew E coli x2, sensitivities show sensitive to Rocephin, will continue at discharge to [facility]   vitals stable       Altered mental status  More aggressive today  1-1 sitter     BPH  Voiding trial when stable as per urology recommendation     Paroxysmal A. Fib  Continue Amiodarone 200 daily  Heparin subcu for anticoagulation     DVT prophylaxis  Continue Subcu heparin     Diet to easy to chew, nectar.  Supplemented with ensure due to poor appetite    Discharge if patient tolerates voiding trial.

## 2021-08-27 NOTE — PROGRESS NOTES
Spoke with Mi Mehta, [facility] SW, to determine if PMR consult is needed as pt/family is planning on SNF per all notes. Mi Mehta states she will notify writer after discussing with team.    Spoke with Mi Mehta who states per team pt is planning on SNF d/c and PMR consult is not needed. Writer requested notification if plan changes.

## 2021-08-27 NOTE — CARE COORDINATION
Social work got auth from [employer]. Faxing sheryl to snf. Will do hens. Need Rx for any controlled meds ordered at discharge. Report: [phone] and central intake is [phone]. Fax: [phone]. Estuardo valdez    SOCIAL WORK; left message for wife who may be in room, headed there next to let pt/wife know about the time for 8:45 pm and the auth granted by insurance for [facility]. Called son and reached him to advise of same. Faxed to snf. Jhoan to transport.  Estuardo valdez

## 2021-08-27 NOTE — PROGRESS NOTES
Speech Language Pathology  Speech Language Pathology  [facility]    Dysphagia Treatment Note    Date: 8/27/2021  Patients Name: Shelly Wu  MRN: [id number]  Diagnosis: dysphagia  Patient Active Problem List   Diagnosis Code    Hyperlipidemia E78.5    Hypertensive heart disease with heart failure ([facility] I11.0    Ileostomy status ([facility] Z93.2    Benign prostatic hyperplasia with lower urinary tract symptoms N40.1    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 9XX0    MERRICK (acute kidney injury) ([facility] N17.9    Recurrent kidney stones N20.0    Skin lesion of left upper extremity L98.9    Pigmented skin lesion of suspected malignant nature L81.9    Ventral hernia K43.9    Sepsis associated hypotension (HCC) A41.9, I95.9    UTI (urinary tract infection) N39.0    Hematuria R31.9    Altered mental status R41.82    Elevated troponin R77.8    Severe malnutrition (HCC) E43       Pain: pt. denies    Dysphagia Treatment  Treatment time: 1705-2117    Subjective: [x] Alert [x] Cooperative     [x] Confused     [] Agitated    [] Lethargic    Objective/Assessment:      Pt. Unable to follow directions for Madeleine maneuver despite max cues. Pt. Able to complete simple tongue base strengthening exercises x6, 10 reps. Pt. was encouraged to continue to practice exercises Cesia on his own. Pt. Verbalized understanding. Pt. Very tangential, needed frequent redirection back to task. Plan:  [x] Continue ST services    [] Discharge from :        Discharge recommendations: [] Inpatient Rehab   [] [facility]   [] Outpatient Therapy  [] Follow up at trauma clinic   [] Other:         Treatment completed by: Jey Kuhn A.CCC/SLP

## 2021-09-16 NOTE — ED PROVIDER NOTES
EMERGENCY DEPARTMENT ENCOUNTER   ATTENDING ATTESTATION     Pt Name: Jerrell Brown  MRN: [id number]  [place] 1940  Date of evaluation: 9/16/21       Jerrell Brown is a 78 y.o. male who presents with Fall      MDM: 78-year-old male presents for reported fall.   Patient currently living at nursing facility, per nursing home staff patient was found lying on the ground, unsure amount of time patient was on the ground patient reports roughly 45 minutes, patient reports he does remember falling but does not remember what he was trying to do when he fell currently denying any complaints, on initial exam patient in no acute distress, initial blood pressure noted to be mildly low, patient noted to be tachypneic initial sats were in the high 80s improved when patient was placed on supplemental O2 via nasal cannula, patient did reportedly has positive for Covid in the last week or so, patient also with history of type a aortic dissection which was surgically repaired as well as prior admissions for urosepsis, patient currently alert and at x2, disoriented to place, ecchymosis noted to the forehead, healing ecchymosis over the abdomen as well, no focal neuro deficits, given initial low blood pressure and need for O2 and intermittent tachycardia, will check septic work-up, patient will be started on broad-spectrum antibiotics, will give IV fluids     Abs reviewed patient was found to be Covid positive, also found to have urinary tract infection was started on antibiotics    Patient was given dose of Decadron for his hypoxia and Covid infection, will admit for further treatment          Vitals:   Vitals:    09/17/21 0100 09/17/21 0104 09/17/21 0110 09/17/21 0145   BP: 127/87   126/73   Pulse: 93  80 91   Resp: 22 19 26 22   Temp:       TempSrc:       SpO2:  99% 99% 97%   Weight:       Height:             I personally evaluated and examined the patient in conjunction with the resident and agree with the assessment, treatment plan, and disposition of the patient as recorded by the resident. I performed a history and physical examination of the patient and discussed management with the resident. I reviewed the residents note and agree with the documented findings and plan of care. Any areas of disagreement are noted on the chart. I was personally present for the key portions of any procedures. I have documented in the chart those procedures where I was not present during the key portions. I have personally reviewed all images and agree with the resident's interpretation. I have reviewed the emergency nurses triage note. I agree with the chief complaint, past medical history, past surgical history, allergies, medications, social and family history as documented unless otherwise noted.     Kelly [place] DO  Attending Emergency Physician          Kelly [place] DO  09/17/21 9603

## 2021-09-17 NOTE — ED NOTES
Report given to Rupinder Nash RN from ER. Report method in person   The following was reviewed with receiving RN:   Current vital signs:  /87   Pulse 80   Temp 98.9 °F (37.2 °C) (Oral)   Resp 26   Ht 6' 1\" (1.854 m)   Wt 190 lb 0.6 oz (86.2 kg)   SpO2 99%   BMI 25.07 kg/m²                MEWS Score: 4     Any medication or safety alerts were reviewed. Any pending diagnostics and notifications were also reviewed, as well as any safety concerns or issues, abnormal labs, abnormal imaging, and abnormal assessment findings. Questions were answered.             Star Flores RN  09/17/21 4174

## 2021-09-17 NOTE — ED NOTES
Pt daughter Hemant Mitchell updated on pt condition and admission.       Jacquetta Crigler, RN  09/16/21 4320

## 2021-09-17 NOTE — ED NOTES
RN spoke to Dr Ivon Childs about positive blood cultures new orders placed.       Amy Al RN  09/17/21 2592

## 2021-09-17 NOTE — ED NOTES
Pt continually removing IVs, electrodes, and BP cuffs. Redirecting without improvement.      Jing Gann RN  09/17/21 2310

## 2021-09-17 NOTE — ED PROVIDER NOTES
[facility] ED  Emergency Department Encounter  Emergency Medicine Resident     Pt Name: Javier Brody  MRN: [id number]  [place] 1940  Date of evaluation: 9/16/21  PCP:  Darrick Hernandez MD    [address] [place] [address]       Chief Complaint   Patient presents with    Fall       HISTORY [place]  (Location/Symptom, Timing/Onset, Context/Setting, Quality, Duration, Modifying Factors,Severity.)      Javier Brody is a 78 y.o. male with past medical history of aortic dissection, septic shock, hypertension, hyperlipidemia who presents emergency department by EMS for fall. Patient stays at [facility] and was found down on the floor today at facility. Patient was on the ground for an unknown duration of time. Per facility and EMS patient has been acting confused since that time. Patient does take aspirin but otherwise no anticoagulation therapy. Patient does have history of type aortic dissection in July 2021 which was treated by surgery at [facility].  Patient had recent hospitalization 8/23/2021 for sepsis secondary to urinary tract infection. Patient currently denies any complaints. He is oriented to self and time but believes he is at [facility].  Patient has received both doses of his COVID-19 vaccination however EMS reported he did test positive for Covid at his facility on 9/7/2021.     PAST MEDICAL / SURGICAL / SOCIAL / FAMILY HISTORY      has a past medical history of Acute respiratory failure following trauma and surgery ([address] Acute respiratory failure following trauma and surgery ([address] Altered bowel elimination due to intestinal ostomy ([address] Full dentures, Full dentures, Gall stones, GERD (gastroesophageal reflux disease), GI bleed, Hemorrhage of gastrointestinal tract, unspecified, Hemorrhage of gastrointestinal tract, unspecified, Hyperlipidemia, Hypertension, Kidney stones, Kidney stones, Primary localized osteoarthrosis, lower leg, Primary localized osteoarthrosis, lower leg, Prostate disorder, Transient disorder of initiating or maintaining sleep, Transient disorder of initiating or maintaining sleep, Unspecified disorder of skin and subcutaneous tissue, Wears glasses, and Wears glasses. has a past surgical history that includes Knee arthroscopy (Right); Tonsillectomy; Cataract removal with implant (Bilateral); Kidney stone surgery (Left); Colonoscopy; total colectomy; colostomy (Right); Lithotripsy (Left, 06-20-14); Abdomen surgery; Dilatation, esophagus; fracture surgery; eye surgery; Total knee arthroplasty (03/23/2015); Total knee arthroplasty (Right, 3/23/2015); joint replacement (Right, 3-23-15); Cystoscopy (Right, 8/29/2017); and shoulder surgery (Left, 9/14/2020). Social History     Socioeconomic History    Marital status:      Spouse name: Not on file    Number of children: Not on file    Years of education: Not on file    Highest education level: Not on file   Occupational History    Not on file   Tobacco Use    Smoking status: Never Smoker    Smokeless tobacco: Never Used   Vaping Use    Vaping Use: Never used   Substance and Sexual Activity    Alcohol use: No     Alcohol/week: 0.0 standard drinks     Comment: occassional    Drug use: No    Sexual activity: Not on file   Other Topics Concern    Not on file   Social History Narrative    Not on file     Social Determinants of Health     Financial Resource Strain:     Difficulty of Paying Living Expenses:    Food Insecurity:     Worried About Running Out of Food in the Last Year:     Ran Out of Food in the Last Year:    Transportation Needs:     Lack of Transportation (Medical):      Lack of Transportation (Non-Medical):    Physical Activity:     Days of Exercise per Week:     Minutes of Exercise per Session:    Stress:     Feeling of Stress :    Social Connections:     Frequency of Communication with Friends and Family:     Frequency of Social Gatherings with Friends and Family:     Attends Hindu Services:     Active Member of Clubs or Organizations:     Attends Club or Organization Meetings:     Marital Status:    Intimate Partner Violence:     Fear of Current or Ex-Partner:     Emotionally Abused:     Physically Abused:     Sexually Abused:        Family History   Problem Relation Age of Onset    Heart Disease Father     Other Mother        Allergies:  Sulfa antibiotics and Vimovo [naproxen-esomeprazole]    Home Medications:  Prior to Admission medications    Medication Sig Start Date End Date Taking? Authorizing Provider   ascorbic acid (VITAMIN C) 500 MG tablet Take 500 mg by mouth daily   Yes Historical Provider, MD   azithromycin (ZITHROMAX) 250 MG tablet Take 250 mg by mouth daily For 4 days, 9/16-9/20   Yes Historical Provider, MD   divalproex (DEPAKOTE) 125 MG DR tablet Take 125 mg by mouth 2 times daily   Yes Historical Provider, MD   methylPREDNISolone (MEDROL DOSEPACK) 4 MG tablet Take 4 mg by mouth See Admin Instructions Take by mouth.    Yes Historical Provider, MD   zinc sulfate (ZINCATE) 220 (50 Zn) MG capsule Take 50 mg by mouth daily   Yes Historical Provider, MD   amiodarone (CORDARONE) 200 MG tablet Take 200 mg by mouth daily    Historical Provider, MD   aspirin 81 MG chewable tablet Take 81 mg by mouth daily    Historical Provider, MD   atorvastatin (LIPITOR) 20 MG tablet Take 20 mg by mouth daily    Historical Provider, MD   ferrous sulfate (IRON 325) 325 (65 Fe) MG tablet Take 325 mg by mouth daily (with breakfast)    Historical Provider, MD   furosemide (LASIX) 20 MG tablet Take 20 mg by mouth daily    Historical Provider, MD   ipratropium-albuterol (DUONEB) 0.5-2.5 (3) MG/3ML SOLN nebulizer solution Inhale 1 vial into the lungs every 6 hours as needed for Shortness of Breath    Historical Provider, MD   aluminum & magnesium hydroxide-simethicone (MYLANTA) 400-400-40 MG/5ML SUSP Take 15 mLs by mouth every 6 hours as needed (heartburn)    Historical Provider, MD   melatonin 3 MG TABS tablet Take 3 mg by mouth nightly as needed (sleep)    Historical Provider, MD   methocarbamol (ROBAXIN) 750 MG tablet Take 750 mg by mouth every 8 hours as needed (muscle spasms)     Historical Provider, MD   potassium chloride (KLOR-CON M) 20 MEQ extended release tablet Take 20 mEq by mouth daily    Historical Provider, MD   Multiple Vitamins-Minerals (THERAPEUTIC MULTIVITAMIN-MINERALS) tablet Take 1 tablet by mouth daily    Historical Provider, MD   traZODone (DESYREL) 150 MG tablet Take 150 mg by mouth nightly    Historical Provider, MD   ondansetron (ZOFRAN) 4 MG tablet Take 4 mg by mouth every 6 hours as needed for Nausea or Vomiting    Historical Provider, MD   timolol (TIMOPTIC) 0.5 % ophthalmic solution Place 1 drop into both eyes daily  2/2/21   Historical Provider, MD   latanoprost (XALATAN) 0.005 % ophthalmic solution Place 1 drop into both eyes nightly  5/11/16   Historical Provider, MD       REVIEW OF SYSTEMS    (2-9 systems for level 4, 10 or more for level 5)      Review of Systems   Constitutional: Negative for chills and fever. Respiratory: Negative for cough and shortness of breath. Cardiovascular: Negative for chest pain. Gastrointestinal: Negative for abdominal pain, nausea and vomiting. Genitourinary: Negative for dysuria. Musculoskeletal: Negative for back pain, myalgias and neck pain. Skin: Negative for color change and rash. Neurological: Negative for weakness, numbness and headaches. PHYSICAL EXAM   (up to 7 for level 4, 8 or more for level 5)     INITIAL VITALS:    height is 6' 1\" (1.854 m) and weight is 190 lb 0.6 oz (86.2 kg). His oral temperature is 98.9 °F (37.2 °C). His blood pressure is 109/81 and his pulse is 86. His respiration is 21 and oxygen saturation is 94%.      Physical Exam  Constitutional:       Comments: Patient is awake, oriented to self and time, slightly disoriented to place as he believes that we are at [facility].  Currently no acute distress. He is tachypneic as well as hypoxic at 88% on room air   HENT:      Head:      Comments: Patient has frontal hematoma present, no raccoon eyes, no santiago sign     Right Ear: Tympanic membrane, ear canal and external ear normal.      Left Ear: Tympanic membrane, ear canal and external ear normal.      Ears:      Comments: No hemotympanum     Nose: Nose normal.      Mouth/Throat:      Mouth: Mucous membranes are moist.      Pharynx: Oropharynx is clear. No oropharyngeal exudate. Eyes:      Pupils: Pupils are equal, round, and reactive to light. Cardiovascular:      Rate and Rhythm: Regular rhythm. Tachycardia present. Pulses: Normal pulses. Pulmonary:      Effort: Pulmonary effort is normal. No respiratory distress. Breath sounds: Normal breath sounds. No wheezing. Abdominal:      General: Abdomen is flat. Palpations: Abdomen is soft. Comments: Right lower quadrant colostomy bag in place appears to have liquid, dark stool. Patient does have abdominal bruising present left lower quadrant. No abdominal tenderness to palpation   Musculoskeletal:         General: No tenderness. Right lower leg: No edema. Left lower leg: No edema. Skin:     General: Skin is warm and dry. Findings: Bruising (Left lower abdomen) present. Neurological:      Comments: Answers questions appropriately, GCS 15. Oriented to self and time, disoriented to place.          DIFFERENTIAL  DIAGNOSIS     PLAN (LABS / IMAGING / EKG):  Orders Placed This Encounter   Procedures    Culture, Blood 1    Culture, Blood 2    Culture, Urine    COVID-19, Rapid    CT HEAD WO CONTRAST    CT CERVICAL SPINE WO CONTRAST    CT CHEST ABDOMEN PELVIS WO CONTRAST    Comprehensive Metabolic Panel w/ Reflex to MG    Lactate, Sepsis    APTT    Protime-INR    Urinalysis    Brain Natriuretic Peptide    CBC Auto Differential    CK    Myoglobin, Serum    Troponin    Microscopic Urinalysis    Troponin    BLOOD GAS, ARTERIAL    Lactic Acid    Troponin    Telemetry monitoring - continuous duration    Inpatient consult to Pulmonology    EKG 12 Lead    EKG 12 Lead    PATIENT STATUS (FROM ED OR OR/PROCEDURAL) Inpatient       MEDICATIONS ORDERED:  Orders Placed This Encounter   Medications    0.9 % sodium chloride bolus    cefTRIAXone (ROCEPHIN) 1000 mg IVPB in 50 mL D5W minibag     Order Specific Question:   Antimicrobial Indications     Answer:   Sepsis of Unknown Etiology    dexamethasone (PF) (DECADRON) injection 6 mg    0.9 % sodium chloride bolus       DDX: Sepsis, COVID-19, pneumonia, urinary tract infection, rhabdomyolysis, intracranial hemorrhage, epidural hematoma, subdural hematoma    Initial MDM/Plan: 78 y.o. male who presents via EMS to concerns of fall at nursing home. Patient is significant medical history of type a aortic dissection in July 2021 as well as sepsis in August 2021. Unknown downtime. Patient seen and examined, answers questions appropriately, currently no acute distress. Patient hypotensive with systolic blood pressure 89, he is tachypneic in the 30s, tachycardic as well as hypoxic on room air at 88%. Placed on 3 L nasal cannula and oxygen saturation improved to 94%. However he is speaking in full sentences and is in no respiratory distress. Patient currently has no complaints. Right-sided frontal hematoma noted no signs of basilar skull fracture on exam otherwise. Patient does have right-sided colostomy bag draining dark and, liquid stool. Abdominal bruising noted on exam.  No tenderness over bony prominences. Code sepsis was called, plan for CT head to evaluate for intracranial injury, CT chest/abdomen/pelvis evaluate for injury versus infectious process. Anticipate admission.     DIAGNOSTIC RESULTS / EMERGENCY DEPARTMENT COURSE / MDM     LABS:  Labs Reviewed   COVID-19, RAPID - Abnormal; Notable TROPONIN         RADIOLOGY:  CT HEAD WO CONTRAST    Result Date: 9/16/2021  EXAMINATION: CT OF THE HEAD WITHOUT CONTRAST  9/16/2021 10:28 pm TECHNIQUE: CT of the head was performed without the administration of intravenous contrast. Dose modulation, iterative reconstruction, and/or weight based adjustment of the mA/kV was utilized to reduce the radiation dose to as low as reasonably achievable. COMPARISON: 08/23/2021 HISTORY: ORDERING SYSTEM PROVIDED HISTORY: fall TECHNOLOGIST PROVIDED HISTORY: fall Decision Support Exception - unselect if not a suspected or confirmed emergency medical condition->Emergency Medical Condition (MA) Reason for Exam: fall, bruise to forehead Acuity: Acute Type of Exam: Initial FINDINGS: BRAIN/VENTRICLES: There is no acute intracranial hemorrhage, mass effect or midline shift. No abnormal extra-axial fluid collection. No evidence of recent territorial infarct. There are nonspecific areas of hypoattenuation in the periventricular white matter and centrum semiovale that are likely related to chronic small vessel ischemic disease. Stable asymmetric prominence of the left ventricle compared to the right. There is no evidence of hydrocephalus. There is intracranial atherosclerosis. ORBITS: The visualized portion of the orbits demonstrate no acute abnormality. SINUSES: The visualized paranasal sinuses and mastoid air cells demonstrate no acute abnormality. SOFT TISSUES/SKULL:  Small right frontal soft tissue contusion. No underlying calvarial fracture. There is cerumen in the bilateral external auditory canals. No acute intracranial abnormality. Small right frontal soft tissue contusion. CT CERVICAL SPINE WO CONTRAST    Result Date: 9/16/2021  EXAMINATION: CT OF THE CERVICAL SPINE WITHOUT CONTRAST 9/16/2021 4:29 pm TECHNIQUE: CT of the cervical spine was performed without the administration of intravenous contrast. Multiplanar reformatted images are provided for review.  Dose modulation, iterative reconstruction, and/or weight based adjustment of the mA/kV was utilized to reduce the radiation dose to as low as reasonably achievable. COMPARISON: CT scan dated November 12, 2012 HISTORY: ORDERING SYSTEM PROVIDED HISTORY: found down TECHNOLOGIST PROVIDED HISTORY: found down Decision Support Exception - unselect if not a suspected or confirmed emergency medical condition->Emergency Medical Condition (MA) Reason for Exam: Fall, found down Acuity: Acute Type of Exam: Initial FINDINGS: BONES/ALIGNMENT: There is no acute fracture or traumatic malalignment. DEGENERATIVE CHANGES: No significant degenerative changes. SOFT TISSUES: There is no prevertebral soft tissue swelling. Calcification is present within the ligamentum nuchae. Images through the upper chest include the transverse portion of the aorta demonstrate a portion of the patient's known type A aortic dissection. .  Correlation with the scheduled CT scan of the chest abdomen pelvis recommended. 1. No evidence of an acute fracture or traumatic malalignment involving the cervical spine 2. Images through the upper chest partially demonstrate the patient's known type A aortic dissection. Patient is scheduled to have a CT scan of the chest abdomen and pelvis. 3.  Critical results were called by Dr. Frederick Pate to Dr. Shweta De La Fuente on 9/16/2021 at 11 p.m. hours. CT CHEST ABDOMEN PELVIS WO CONTRAST    1. Limited evaluation of the aorta without intravenous contrast.  Similar appearance of an extensive Noam type B aortic dissection. Unchanged postoperative appearance of the ascending aorta status post aortic valve replacement and ascending aortic dissection repair. The splanchnic arteries are not not well evaluated. 2. Unchanged small left pleural effusion. 3. Gas in the urinary bladder is suggestive of cystitis. Correlate with urinalysis. Unchanged layering bladder calculi.  4. Left lower quadrant and midline abdominal wall consistent with cystitis, most likely impression is urosepsis. Patient reassessed and remains normotensive. Maintaining oxygen saturation mid 90s on 3 L nasal cannula. Patient receiving second liter of fluids, admitted to medicine for urosepsis as well as hypoxia in the setting of Covid infection. ABG was obtained, patient has decreased oxygen saturation of PO2 65 on 3 L nasal cannula, will place on BiPAP. PROCEDURES:  None    CONSULTS:  IP CONSULT TO PULMONOLOGY    CRITICAL CARE:  Please see attending note    FINAL IMPRESSION      1. Septicemia ([address]    2. Urinary tract infection in male    3. Hypoxia    4. COVID-19          DISPOSITION / [address] Admitted 09/17/2021 12:33:01 AM        PATIENTREFERRED TO:  No follow-up provider specified.     DISCHARGE MEDICATIONS:  New Prescriptions    No medications on file       Marylen Crown, DO  EmergencyMedicine Resident    (Please note that portions of this note were completed with a voice recognition program.  Efforts were made to edit the dictations but occasionally words are mis-transcribed.)        Marylen Crown, DO  Resident  09/17/21 1600 [place] DO Thom  Resident  09/17/21 4274

## 2021-09-17 NOTE — ED NOTES
RN spoke to patient's daughter Dhruv Wilson and updated on patient's condition     Ana Westbrook, ELIDIA  09/17/21 5587

## 2021-09-17 NOTE — H&P
History and Physical Service  [facility]    HISTORY AND PHYSICAL EXAMINATION            Date:   9/17/2021  Patient name:  Shelly Wu  MRN:   [id number]  Account:  [de-identified]  YOB: 1940  PCP:    Sera Martinez MD  Code Status:    Full Code    Chief Complaint:   unwell    History Obtained From:     Patient ,medical record ,nursing staff    History of Present Illnes       The patient is a 78 y.o. Non- / non  male who presents   Patient brought to ED by EMS after being found down at [facility]. Patient was positive for Covid on 9/7/2021. Patient hypoxic on arrival required 2 L nasal cannula. Eventually switched to high flow nasal cannula. SPO2 97% CT scan redemonstrates known aortic dissection, no signs of pneumonia. Pulmonology consulted. Patient given Decadron in the ED. Urinalysis shows infection. Patient started on Rocephin. CT head negative for acute intracranial abnormality. Patient has MERRICK with creatinine 2.46, K 5.1. Patient given 2 L normal saline bolus in the ED. Recheck BMP in the morning. Patient hemodynamically stable. Admit to progressive unit    Past Medical History:   Diagnosis Date    Acute respiratory failure following trauma and surgery ([address]     Acute respiratory failure following trauma and surgery ([address]     Altered bowel elimination due to intestinal ostomy ([address]     iliostomy    Full dentures     pt said he has partial upper and lower    Full dentures     Gall stones     GERD (gastroesophageal reflux disease)     GI bleed     Hemorrhage of gastrointestinal tract, unspecified     Hemorrhage of gastrointestinal tract, unspecified     Hyperlipidemia     Hypertension     Kidney stones     Kidney stones     Primary localized osteoarthrosis, lower leg     both shoulders, neck, legs.     Primary localized osteoarthrosis, lower leg     Prostate disorder     Transient disorder of initiating or maintaining sleep     Transient disorder of initiating or maintaining sleep     Unspecified disorder of skin and subcutaneous tissue     Wears glasses     Wears glasses         Past Surgical History:     Past Surgical History:   Procedure Laterality Date    ABDOMEN SURGERY      CATARACT REMOVAL WITH IMPLANT Bilateral     COLONOSCOPY      X3    COLOSTOMY Right     COLOSTOMY BAG ON RT. SIDE    CYSTOSCOPY Right 8/29/2017    CYSTOSCOPY URETERAL STENT INSERTION performed by Edwin Martinez MD at [facility], ESOPHAGUS      EYE SURGERY      bettye. eyes, cataracts extracted with iol's    FRACTURE SURGERY      JOINT REPLACEMENT Right 3-23-15    KIDNEY STONE SURGERY Left     KNEE ARTHROSCOPY Right     LITHOTRIPSY Left 06-20-14    w/ cystoscopy C & P    SHOULDER SURGERY Left 9/14/2020    MOLE EXCISION POSTERIOR SHOULDER performed by Roger Nolen MD at [facility] , PARTIAL RECTUM\"    TOTAL KNEE ARTHROPLASTY  03/23/2015    Right with biomet and GPS product application    TOTAL KNEE ARTHROPLASTY Right 3/23/2015        Medications Prior to Admission:     Prior to Admission medications    Medication Sig Start Date End Date Taking? Authorizing Provider   ascorbic acid (VITAMIN C) 500 MG tablet Take 500 mg by mouth 2 times daily    Yes Historical Provider, MD   azithromycin (ZITHROMAX) 250 MG tablet Take 250 mg by mouth daily For 4 days, 9/16-9/20 9/16/21 9/20/21 Yes Historical Provider, MD   divalproex (DEPAKOTE) 125 MG DR tablet Take 125 mg by mouth 2 times daily   Yes Historical Provider, MD   methylPREDNISolone (MEDROL DOSEPACK) 4 MG tablet Take 4 mg by mouth See Admin Instructions Take by mouth.    Yes Historical Provider, MD   zinc sulfate (ZINCATE) 220 (50 Zn) MG capsule Take 50 mg by mouth daily   Yes Historical Provider, MD   amiodarone (CORDARONE) 200 MG tablet Take 200 mg by mouth daily   Yes Historical of Onset    Heart Disease Father     Other Mother        Review of Systems:     Positive and Negative as described in HPI. CONSTITUTIONAL:  Fever chills  HEENT:  negative for vision, hearing changes, runny nose, throat pain  RESPIRATORY:  Unwell dyspnea  . CARDIOVASCULAR:  negative for chest pain, palpitations. GASTROINTESTINAL:  Loss of apptite    GENITOURINARY: dysuria  INTEGUMENT:  negative for rash, skin lesions, easy bruising   HEMATOLOGIC/LYMPHATIC:  negative for swelling/edema   ALLERGIC/IMMUNOLOGIC:  negative for urticaria , itching  ENDOCRINE:  negative increase in drinking, increase in urination, hot or cold intolerance  MUSCULOSKELETAL:  negative joint pains, muscle aches, swelling of joints  NEUROLOGICAL:  negative for headaches, dizziness, lightheadedness, numbness, pain, tingling extremities  BEHAVIOR/PSYCH:  negative for depression, anxiety    Physical Exam:   /75   Pulse 92   Temp 98.9 °F (37.2 °C) (Oral)   Resp 27   Ht 6' 1\" (1.854 m)   Wt 190 lb 0.6 oz (86.2 kg)   SpO2 96%   BMI 25.07 kg/m²   No results for input(s): POCGLU in the last 72 hours. Elderly man    General Appearance:  unwell  Mental status: oriented to person, place, and time with normal affect  Head:  normocephalic, atraumatic. Eye: no icterus, redness, pupils equal and reactive, extraocular eye movements intact, conjunctiva clear  Ear: normal external ear, no discharge, hearing intact  Nose:  no drainage noted  Mouth: mucous membranes moist  Neck: supple, no carotid bruits, thyroid not palpable  Lungs: basal creakles    Cardiovascular: normal rate, regular rhythm, no murmur, gallop, rub.   Abdomen: Soft, nontender, nondistended, normal bowel sounds, no hepatomegaly or splenomegaly  Neurologic: There are no new focal motor or sensory deficits, normal muscle tone and bulk, no abnormal sensation, normal speech, cranial nerves II through XII grossly intact  Skin: No gross lesions, rashes, bruising or bleeding on exposed skin area  Extremities:  peripheral pulses palpable, no pedal edema or calf pain with palpation  Psych:flat affectd    Investigations:      Laboratory Testing:  Recent Results (from the past 24 hour(s))   EKG 12 Lead    Collection Time: 09/16/21  8:09 PM   Result Value Ref Range    Ventricular Rate 97 BPM    Atrial Rate 97 BPM    P-R Interval 188 ms    QRS Duration 90 ms    Q-T Interval 352 ms    QTc Calculation (Bazett) 447 ms    P Axis 57 degrees    R Axis 55 degrees    T Axis 49 degrees   Culture, Blood 2    Collection Time: 09/16/21  8:38 PM    Specimen: Blood   Result Value Ref Range    Specimen Description . BLOOD     Special Requests LT AC GREEN ONLY     Culture (A)      POSITIVE Blood Culture Results called to and read back by: RAMSES AT [id number] ON 9/17/21    Culture DIRECT GRAM STAIN FROM BOTTLE: GRAM NEGATIVE RODS    COVID-19, Rapid    Collection Time: 09/16/21  9:01 PM    Specimen: Nasopharyngeal Swab   Result Value Ref Range    Specimen Description . NASOPHARYNGEAL SWAB     SARS-CoV-2, Rapid DETECTED (A) Not Detected   Culture, Blood 1    Collection Time: 09/16/21  9:28 PM    Specimen: Blood   Result Value Ref Range    Specimen Description . BLOOD  RIGHT AC, NO VOLUMES LISTED     Special Requests NOT REPORTED     Culture (A)      POSITIVE Blood Culture Results called to and read back by: RAMSES AT 1255 ON 9/17/21    Culture DIRECT GRAM STAIN FROM BOTTLE: GRAM NEGATIVE RODS     Culture (A)      Escherichia coli Detected: CTX-M ESBL Organism Methodology- Polymerase Chain Reaction (PCR)   Comprehensive Metabolic Panel w/ Reflex to MG    Collection Time: 09/16/21  9:29 PM   Result Value Ref Range    Glucose 105 (H) 70 - 99 mg/dL    BUN 68 (H) 8 - 23 mg/dL    CREATININE 2.46 (H) 0.70 - 1.20 mg/dL    Bun/Cre Ratio NOT REPORTED 9 - 20    Calcium 10.1 8.6 - 10.4 mg/dL    Sodium 140 135 - 144 mmol/L    Potassium 5.1 3.7 - 5.3 mmol/L    Chloride 101 98 - 107 mmol/L    CO2 23 20 - 31 mmol/L    Anion Gap 16 9 - 17 mmol/L    Alkaline Phosphatase 101 40 - 129 U/L    ALT 16 5 - 41 U/L    AST 15 <40 U/L    Total Bilirubin 0.34 0.3 - 1.2 mg/dL    Total Protein 7.6 6.4 - 8.3 g/dL    Albumin 3.3 (L) 3.5 - 5.2 g/dL    Albumin/Globulin Ratio NOT REPORTED 1.0 - 2.5    GFR Non- 25 (L) >60 mL/min    GFR  31 (L) >60 mL/min    GFR Comment          GFR Staging NOT REPORTED    Lactate, Sepsis    Collection Time: 09/16/21  9:29 PM   Result Value Ref Range    Lactic Acid, Sepsis 2.4 (H) 0.5 - 1.9 mmol/L    Lactic Acid, Sepsis, Whole Blood NOT REPORTED 0.5 - 1.9 mmol/L   APTT    Collection Time: 09/16/21  9:29 PM   Result Value Ref Range    PTT 32.5 24.0 - 36.0 sec   Protime-INR    Collection Time: 09/16/21  9:29 PM   Result Value Ref Range    Protime 14.4 11.8 - 14.6 sec    INR 1.1    Brain Natriuretic Peptide    Collection Time: 09/16/21  9:29 PM   Result Value Ref Range    Pro-BNP 1,719 (H) <300 pg/mL    BNP Interpretation Pro-BNP Reference Range:    CBC Auto Differential    Collection Time: 09/16/21  9:29 PM   Result Value Ref Range    WBC 11.3 (H) 3.5 - 11.0 k/uL    RBC 5.18 4.5 - 5.9 m/uL    Hemoglobin 14.0 13.5 - 17.5 g/dL    Hematocrit 43.9 41 - 53 %    MCV 84.8 80 - 100 fL    MCH 27.0 26 - 34 pg    MCHC 31.8 31 - 37 g/dL    RDW 17.7 (H) 11.5 - 14.9 %    Platelets 235 664 - 609 k/uL    MPV 8.4 6.0 - 12.0 fL    NRBC Automated NOT REPORTED per 100 WBC    Differential Type NOT REPORTED     Immature Granulocytes NOT REPORTED 0 %    Absolute Immature Granulocyte NOT REPORTED 0.00 - 0.30 k/uL    WBC Morphology NOT REPORTED     RBC Morphology NOT REPORTED     Platelet Estimate NOT REPORTED     Seg Neutrophils 87 (H) 36 - 66 %    Lymphocytes 6 (L) 24 - 44 %    Monocytes 5 1 - 7 %    Eosinophils % 0 0 - 4 %    Basophils 0 0 - 2 %    Bands 2 0 - 10 %    Segs Absolute 9.82 (H) 1.3 - 9.1 k/uL    Absolute Lymph # 0.68 (L) 1.0 - 4.8 k/uL    Absolute Mono # 0.57 0.1 - 1.3 k/uL    Absolute Eos # 0.00 0.0 - 0.4 k/uL Basophils Absolute 0.00 0.0 - 0.2 k/uL    Absolute Bands # 0.23 0.0 - 1.0 k/uL    Morphology ANISOCYTOSIS PRESENT    CK    Collection Time: 09/16/21  9:29 PM   Result Value Ref Range    Total CK 24 (L) 39 - 308 U/L   Myoglobin, Serum    Collection Time: 09/16/21  9:29 PM   Result Value Ref Range    Myoglobin 116 (H) 28 - 72 ng/mL   Troponin    Collection Time: 09/16/21  9:29 PM   Result Value Ref Range    Troponin, High Sensitivity 53 (HH) 0 - 22 ng/L    Troponin T NOT REPORTED <0.03 ng/mL    Troponin Interp NOT REPORTED    Urinalysis    Collection Time: 09/16/21 10:12 PM   Result Value Ref Range    Color, UA RED (A) YELLOW    Turbidity UA TURBID (A) CLEAR    Glucose, Ur NEGATIVE NEGATIVE    Bilirubin Urine NEGATIVE NEGATIVE    Ketones, Urine TRACE (A) NEGATIVE    Specific Gravity, UA 1.017 1.000 - 1.030    Urine Hgb LARGE (A) NEGATIVE    pH, UA 5.5 5.0 - 8.0    Protein, UA 2+ (A) NEGATIVE    Urobilinogen, Urine Normal Normal    Nitrite, Urine NEGATIVE NEGATIVE    Leukocyte Esterase, Urine LARGE (A) NEGATIVE    Urinalysis Comments NOT REPORTED    Microscopic Urinalysis    Collection Time: 09/16/21 10:12 PM   Result Value Ref Range    -          WBC, UA TOO NUMEROUS TO COUNT /HPF    RBC, UA 20 TO 50 /HPF    Casts UA NOT REPORTED /LPF    Crystals, UA NOT REPORTED None /HPF    Epithelial Cells UA 2 TO 5 /HPF    Renal Epithelial, UA NOT REPORTED 0 /HPF    Bacteria, UA MANY (A) None    Mucus, UA NOT REPORTED None    Trichomonas, UA NOT REPORTED None    Amorphous, UA NOT REPORTED None    Other Observations UA NOT REPORTED NOT REQ.     Yeast, UA NOT REPORTED None   EKG 12 Lead    Collection Time: 09/16/21 11:15 PM   Result Value Ref Range    Ventricular Rate 91 BPM    Atrial Rate 91 BPM    P-R Interval 198 ms    QRS Duration 88 ms    Q-T Interval 378 ms    QTc Calculation (Bazett) 464 ms    P Axis 83 degrees    R Axis 32 degrees    T Axis 42 degrees   BLOOD GAS, ARTERIAL    Collection Time: 09/17/21 12:35 AM   Result Value Ref Range    pH, Arterial 7.411 7.350 - 7.450    pCO2, Arterial 37.5 35.0 - 45.0 mmHg    pO2, Arterial 64.8 (L) 80.0 - 100.0 mmHg    HCO3, Arterial 23.8 22.0 - 26.0 mmol/L    Positive Base Excess, Art NOT REPORTED 0.0 - 2.0 mmol/L    Negative Base Excess, Art 0.8 0.0 - 2.0 mmol/L    O2 Sat, Arterial 91.6 (L) 95 - 98 %    Total Hb NOT REPORTED 12.0 - 16.0 g/dl    Oxyhemoglobin NOT REPORTED 95.0 - 98.0 %    Carboxyhemoglobin 1.1 0 - 5 %    Methemoglobin 0.3 0.0 - 1.9 %    Pt Temp 37.0     pH, Art, Temp Adj NOT REPORTED 7.350 - 7.450    pCO2, Art, Temp Adj NOT REPORTED 35.0 - 45.0    pO2, Art, Temp Adj NOT REPORTED 80.0 - 100.0 mmHg    O2 Device/Flow/% Cannula     Respiratory Rate 23     Dewayne Test PASS     Sample Site Right Radial Artery     Pt.  Position SEMI-FOWLERS     Mode NOT REPORTED     Set Rate NOT REPORTED     Total Rate NOT REPORTED     VT NOT REPORTED     FIO2 NOT REPORTED     Peep/Cpap NOT REPORTED     PSV NOT REPORTED     Text for Respiratory RESULTS TO MD     NOTIFICATION NOT REPORTED     NOTIFICATION TIME NOT REPORTED    Basic Metabolic Panel w/ Reflex to MG    Collection Time: 09/17/21  9:27 AM   Result Value Ref Range    Glucose 125 (H) 70 - 99 mg/dL    BUN 60 (H) 8 - 23 mg/dL    CREATININE 1.81 (H) 0.70 - 1.20 mg/dL    Bun/Cre Ratio NOT REPORTED 9 - 20    Calcium 9.6 8.6 - 10.4 mg/dL    Sodium 142 135 - 144 mmol/L    Potassium 5.4 (H) 3.7 - 5.3 mmol/L    Chloride 107 98 - 107 mmol/L    CO2 21 20 - 31 mmol/L    Anion Gap 14 9 - 17 mmol/L    GFR Non-African American 36 (L) >60 mL/min    GFR  44 (L) >60 mL/min    GFR Comment          GFR Staging NOT REPORTED    CBC    Collection Time: 09/17/21  9:27 AM   Result Value Ref Range    WBC 8.6 3.5 - 11.0 k/uL    RBC 4.70 4.5 - 5.9 m/uL    Hemoglobin 12.8 (L) 13.5 - 17.5 g/dL    Hematocrit 39.7 (L) 41 - 53 %    MCV 84.5 80 - 100 fL    MCH 27.2 26 - 34 pg    MCHC 32.2 31 - 37 g/dL    RDW 17.0 (H) 11.5 - 14.9 %    Platelets 265 (L) 651 - 450 k/uL MPV 8.5 6.0 - 12.0 fL    NRBC Automated NOT REPORTED per 100 WBC   Magnesium    Collection Time: 09/17/21  9:27 AM   Result Value Ref Range    Magnesium 2.1 1.6 - 2.6 mg/dL       Recent Labs     09/17/21 0927 09/16/21 2129 09/16/21 2129   HGB 12.8*   < > 14.0   HCT 39.7*   < > 43.9   WBC 8.6   < > 11.3*   MCV 84.5   < > 84.8      < > 140   K 5.4*   < > 5.1      < > 101   CO2 21   < > 23   BUN 60*   < > 68*   CREATININE 1.81*   < > 2.46*   GLUCOSE 125*   < > 105*   INR  --   --  1.1   PROTIME  --   --  14.4   APTT  --   --  32.5   AST  --   --  15   ALT  --   --  16   LABALBU  --   --  3.3*    < > = values in this interval not displayed.        Hematology:  Recent Labs     09/16/21 2129 09/17/21 0927   WBC 11.3* 8.6   RBC 5.18 4.70   HGB 14.0 12.8*   HCT 43.9 39.7*   MCV 84.8 84.5   MCH 27.0 27.2   MCHC 31.8 32.2   RDW 17.7* 17.0*    132*   MPV 8.4 8.5   INR 1.1  --      Chemistry:  Recent Labs     09/16/21 0051 09/16/21 2129 09/17/21 0927   NA  --  140 142   K  --  5.1 5.4*   CL  --  101 107   CO2  --  23 21   GLUCOSE  --  105* 125*   BUN  --  68* 60*   CREATININE  --  2.46* 1.81*   MG  --   --  2.1   ANIONGAP  --  16 14   LABGLOM  --  25* 36*   GFRAA  --  31* 44*   CALCIUM  --  10.1 9.6   PROBNP  --  1,719*  --    TROPONINT NOT REPORTED NOT REPORTED  --    CKTOTAL  --  24*  --    MYOGLOBIN  --  116*  --      Recent Labs     09/16/21 2129   PROT 7.6   LABALBU 3.3*   AST 15   ALT 16   ALKPHOS 101   BILITOT 0.34       Imaging/Diagnostics:       Echocardiogram Limited 2D    Result Date: 8/27/2021  [facility] Transthoracic Echocardiography Report (TTE)  Patient Name Yadiel Loja  Date of Study               08/24/2021               Abbi Anaya   Date of      1940  Gender                      Male  Birth   Age          78 year(s)  Race                           Room Number  2061        Height:                     73 inch, 185.42 cm   Corporate ID [id number] mildly reduced with EF 40%. Right Atrium Right atrium was not well visualized. Right Ventricle Right ventricle was not well visualized. Mitral Valve Thickened mitral valve leaflets. Aortic Valve Aortic valve not well visualized. Tricuspid Valve Tricuspid valve was not well visualized. Pulmonic Valve Pulmonic valve was not well visualized. Pericardial Effusion No significant pericardial effusion is seen. Pleural Effusion No pleural effusion seen. CT ABDOMEN PELVIS WO CONTRAST Additional Contrast? None    Addendum Date: 8/23/2021    ADDENDUM: Impression should also state there is an increased small left pleural effusion with increased bibasilar atelectatic changes. Result Date: 8/23/2021  EXAMINATION: CT OF THE ABDOMEN AND PELVIS WITHOUT CONTRAST 8/23/2021 4:04 pm TECHNIQUE: CT of the abdomen and pelvis was performed without the administration of intravenous contrast. Multiplanar reformatted images are provided for review. Dose modulation, iterative reconstruction, and/or weight based adjustment of the mA/kV was utilized to reduce the radiation dose to as low as reasonably achievable. COMPARISON: 07/09/2021 HISTORY: ORDERING SYSTEM PROVIDED HISTORY: concern for septic kidney secondary to nephrolithiasis TECHNOLOGIST PROVIDED HISTORY: concern for septic kidney secondary to nephrolithiasis Decision Support Exception - unselect if not a suspected or confirmed emergency medical condition->Emergency Medical Condition (MA) Reason for Exam: concern for septic kidney secondary to nephrolithiasis Acuity: Unknown Type of Exam: Unknown Relevant Medical/Surgical History: colostomy, colectomy FINDINGS: Lower Chest: Increased small left pleural effusion with increased bibasilar atelectatic changes. Decreased hemopericardium. Prosthetic aortic valve partially imaged. Organs: Evaluation is limited by the absence of contrast.  Redemonstration of innumerable hepatic cysts, grossly unchanged.   Gallbladder is grossly unremarkable. Spleen is grossly normal caliber. Pancreas is atrophic. Adrenal glands are normal size. Punctate nonobstructing left nephroliths. Bilateral renal cysts measuring up to 7.3 cm on the right and 9.5 cm on the left. No hydronephrosis with mild left perinephric stranding. GI/Bowel: Postoperative changes from presumed colectomy with a Brielle's pouch and a right lower quadrant ostomy with a similar appearing parastomal hernia. No bowel obstruction. Small lipoma in the gastric antrum. Pelvis: Urinary bladder contains numerous layering calcifications posteriorly as well as an indwelling Price catheter with associated intraluminal gas. Gas within 2 anterosuperior bladder diverticula. Perivesicular stranding appears grossly similar to prior. Enlarged prostate. Peritoneum/Retroperitoneum: No free fluid or free air. Intimal flap redemonstrated in the aorta compatible with the known dissection. Abdominal aorta remains unchanged in caliber measuring up to 2.5 cm. Unchanged ectasia of the common iliac arteries, right greater than left. Displaced calcifications in the common iliac vessels are also compatible with underlying intimal flaps/dissection. Bones/Soft Tissues: Recent postoperative changes along the anterior abdominal wall and epigastrium. Presumed sequelae of medication injection in the anterior abdominal wall subcutaneous fat. No acute bony findings on a background of osseous demineralization, dextroconvex lumbar spinal curvature, advanced degenerative changes throughout the lumbar spine including sequelae of Baastrup's disease, and in unchanged region of sclerosis in the right acetabulum with some other smaller unchanged sclerotic foci in the pelvis. Indwelling Price catheter with associated intraluminal gas in the bladder which appears thickened and with numerous diverticula.   The wall thickening may be sequelae of chronic bladder outlet obstruction in the setting of an enlarged prostate, though as there is perivesicular stranding as well as left perinephric stranding, recommend correlation for superimposed ascending urinary tract infection. Innumerable bladder calcifications and nonobstructing left nephrolithiasis without an obstructing stone. Though suboptimally evaluated in the absence of contrast intimal flaps are redemonstrated in the abdominal aorta and common iliac arteries in the setting of known recent dissection two months prior. Aortic caliber is unchanged and remains within normal limits, and there is unchanged ectasia of the common iliac vessels. Recent postoperative changes along the anterior abdominal wall without any organized fluid collection or soft tissue gas. Other unchanged chronic findings as described above. XR CHEST (2 VW)    Result Date: 8/19/2021  EXAMINATION: TWO XRAY VIEWS OF THE CHEST 8/19/2021 2:37 pm COMPARISON: July 9, 2021 CT chest HISTORY: ORDERING SYSTEM PROVIDED HISTORY: dysphagia TECHNOLOGIST PROVIDED HISTORY: dysphagia Reason for Exam: dysphagia, in rehab facility, hx limited. Acuity: Acute Type of Exam: Initial FINDINGS: Median sternotomy, stable normal cardiopericardial silhouette/moderate tortuosity of the thoracic aorta/prosthetic heart valve New moderate left basilar opacity. Otherwise clear lungs Degenerative changes of the thoracic spine/shoulders     New moderate left basilar opacity likely related moderate pleural effusion/pleural thickening, atelectasis versus infiltrate     CT HEAD WO CONTRAST    Result Date: 9/16/2021  EXAMINATION: CT OF THE HEAD WITHOUT CONTRAST  9/16/2021 10:28 pm TECHNIQUE: CT of the head was performed without the administration of intravenous contrast. Dose modulation, iterative reconstruction, and/or weight based adjustment of the mA/kV was utilized to reduce the radiation dose to as low as reasonably achievable.  COMPARISON: 08/23/2021 HISTORY: ORDERING SYSTEM PROVIDED HISTORY: fall TECHNOLOGIST PROVIDED HISTORY: fall Decision Support Exception - unselect if not a suspected or confirmed emergency medical condition->Emergency Medical Condition (MA) Reason for Exam: fall, bruise to forehead Acuity: Acute Type of Exam: Initial FINDINGS: BRAIN/VENTRICLES: There is no acute intracranial hemorrhage, mass effect or midline shift. No abnormal extra-axial fluid collection. No evidence of recent territorial infarct. There are nonspecific areas of hypoattenuation in the periventricular white matter and centrum semiovale that are likely related to chronic small vessel ischemic disease. Stable asymmetric prominence of the left ventricle compared to the right. There is no evidence of hydrocephalus. There is intracranial atherosclerosis. ORBITS: The visualized portion of the orbits demonstrate no acute abnormality. SINUSES: The visualized paranasal sinuses and mastoid air cells demonstrate no acute abnormality. SOFT TISSUES/SKULL:  Small right frontal soft tissue contusion. No underlying calvarial fracture. There is cerumen in the bilateral external auditory canals. No acute intracranial abnormality. Small right frontal soft tissue contusion. CT Head WO Contrast    Result Date: 8/23/2021  EXAMINATION: CT OF THE HEAD WITHOUT CONTRAST  8/23/2021 5:37 pm TECHNIQUE: CT of the head was performed without the administration of intravenous contrast. Dose modulation, iterative reconstruction, and/or weight based adjustment of the mA/kV was utilized to reduce the radiation dose to as low as reasonably achievable. COMPARISON: November 12, 2012 HISTORY: ORDERING SYSTEM PROVIDED HISTORY: altered mental status TECHNOLOGIST PROVIDED HISTORY: altered mental status Decision Support Exception - unselect if not a suspected or confirmed emergency medical condition->Emergency Medical Condition (MA) Reason for Exam: altered mental status FINDINGS: BRAIN/VENTRICLES: There is no acute intracranial hemorrhage, mass effect or midline shift.   Note is made of cavum septum pellucidum, unchanged from prior study. ORBITS: The visualized portion of the orbits demonstrate no acute abnormality. SINUSES: The visualized paranasal sinuses and mastoid air cells demonstrate no acute abnormality. SOFT TISSUES/SKULL:  No acute abnormality of the visualized skull or soft tissues. No acute intracranial abnormality. CT CHEST WO CONTRAST    Result Date: 8/23/2021  EXAMINATION: CT OF THE CHEST WITHOUT CONTRAST 8/23/2021 5:18 pm TECHNIQUE: CT of the chest was performed without the administration of intravenous contrast. Multiplanar reformatted images are provided for review. Dose modulation, iterative reconstruction, and/or weight based adjustment of the mA/kV was utilized to reduce the radiation dose to as low as reasonably achievable. COMPARISON: 08/23/2021, 07/09/2021 HISTORY: ORDERING SYSTEM PROVIDED HISTORY: hx of dissection TECHNOLOGIST PROVIDED HISTORY: hx of dissection Decision Support Exception - unselect if not a suspected or confirmed emergency medical condition->Emergency Medical Condition (MA) Reason for Exam: hx of dissection Acuity: Unknown Type of Exam: Unknown FINDINGS: Mediastinum: Evaluation is limited by the absence of contrast.  Interval median sternotomy with ascending aortic dissection repair and placement of a prosthetic aortic valve. Vessel evaluation is limited. The ascending aortic caliber just distal to the operative repair spans approximately 4.8 cm. The proximal descending thoracic aorta spans approximately 4.9 cm, in the distal descending aorta measures approximately 3 cm. The intimal flap is partially visualized within the transverse and descending aorta with the true and false lumen roughly similar in size to one another. Significant improvement in the associated mediastinal hematoma. Small volume residual hemopericardium. No pneumomediastinum or adenopathy. Lungs/pleura:  There are some new air bronchograms at the dependent left lower TECHNOLOGIST PROVIDED HISTORY: found down Decision Support Exception - unselect if not a suspected or confirmed emergency medical condition->Emergency Medical Condition (MA) Reason for Exam: Fall, found down Acuity: Acute Type of Exam: Initial FINDINGS: BONES/ALIGNMENT: There is no acute fracture or traumatic malalignment. DEGENERATIVE CHANGES: No significant degenerative changes. SOFT TISSUES: There is no prevertebral soft tissue swelling. Calcification is present within the ligamentum nuchae. Images through the upper chest include the transverse portion of the aorta demonstrate a portion of the patient's known type A aortic dissection. .  Correlation with the scheduled CT scan of the chest abdomen pelvis recommended. 1. No evidence of an acute fracture or traumatic malalignment involving the cervical spine 2. Images through the upper chest partially demonstrate the patient's known type A aortic dissection. Patient is scheduled to have a CT scan of the chest abdomen and pelvis. 3.  Critical results were called by Dr. Petra Smith to Dr. Tammy Brown on 9/16/2021 at 11 p.m. hours. IR FLUORO GUIDED CVA DEVICE PLMT/REPLACE/REMOVAL    Result Date: 8/27/2021  PROCEDURE: ULTRASOUND GUIDED VASCULAR ACCESS. Midline placement 8/27/2021. HISTORY: ORDERING SYSTEM PROVIDED HISTORY: Sepsis, discharge on IV antibiotic TECHNOLOGIST PROVIDED HISTORY: Sepsis, discharge on IV antibiotic Lumen?->Single Lumen SEDATION: None FLUOROSCOPY DOSE AND TYPE OR TIME AND EXPOSURES: None TECHNIQUE AND FINDINGS: Informed consent was obtained after a detailed explanation of the procedure including risks, benefits, and alternatives. Universal protocol was observed. The right arm was prepped and draped in sterile fashion using maximum sterile barrier technique. Local anesthesia was achieved with lidocaine. A micropuncture needle was used to access the right brachial vein using ultrasound guidance.   An ultrasound image demonstrating patency of the vein with needle tip located within it. An image was obtained and stored in PACs. A 0.018 guidewire was used to place a peel-a-way sheath and a 4.5 Hungarian 15 cm single-lumen midline catheter was placed with its tip directed centrally. Ultrasound images verify appropriate catheter positioning. The catheter flushed easily and there was a good blood return. The catheter was secured to the skin. The patient tolerated the procedure well and there were no immediate complications. EBL: Less than 3 ml     Successful ultrasound guided midline placement     XR CHEST PORTABLE    Result Date: 8/25/2021  EXAMINATION: ONE XRAY VIEW OF THE CHEST 8/25/2021 5:47 am COMPARISON: 08/23/2021, 08/19/2021 HISTORY: ORDERING SYSTEM PROVIDED HISTORY: R/O CHF TECHNOLOGIST PROVIDED HISTORY: R/O CHF Reason for Exam: R/O CHF Acuity: Acute Type of Exam: Initial Additional signs and symptoms: R/O CHF Relevant Medical/Surgical History: R/O CHF FINDINGS: The cardiac and mediastinal contours appear unchanged. Basilar opacities and pleural effusions, left greater than right, are again demonstrated without appreciable change. No new airspace disease identified in the interval.  No evidence for pneumothorax. No significant interval change. XR CHEST PORTABLE    Result Date: 8/23/2021  EXAMINATION: ONE XRAY VIEW OF THE CHEST 8/23/2021 4:55 pm COMPARISON: 19 April 2021 HISTORY: ORDERING SYSTEM PROVIDED HISTORY: altered mental status, evaluate for pneumonia TECHNOLOGIST PROVIDED HISTORY: altered mental status, evaluate for pneumonia Reason for Exam: altered mental status, evaluate for pneumonia Acuity: Unknown Type of Exam: Unknown FINDINGS: AP portable view of the chest time stamped at 1725 hours demonstrates overlying cardiac monitoring electrodes. Stable cardiomegaly is noted. Prior median sternotomy is demonstrated. CT. No change in left pleural effusion and left basilar opacity. No vascular congestion or extrapleural air. Surgical clips are present the medial right axilla. Prosthetic aortic valve is noted. No change in left basilar opacity likely related to effusion and atelectasis with focal airspace disease not excluded. CT CHEST ABDOMEN PELVIS WO CONTRAST    Result Date: 9/17/2021  EXAMINATION: CT OF THE CHEST, ABDOMEN, AND PELVIS WITHOUT CONTRAST 9/16/2021 10:32 pm TECHNIQUE: CT of the chest, abdomen and pelvis was performed without the administration of intravenous contrast. Multiplanar reformatted images are provided for review. Dose modulation, iterative reconstruction, and/or weight based adjustment of the mA/kV was utilized to reduce the radiation dose to as low as reasonably achievable. COMPARISON: 08/23/2021 and 07/09/2021 HISTORY: ORDERING SYSTEM PROVIDED HISTORY: found down, abdominal bruising, hypoxic TECHNOLOGIST PROVIDED HISTORY: found down, abdominal bruising, hypoxic Reason for Exam: found down, abdominal bruising, hypoxic Acuity: Acute Type of Exam: Initial Relevant Medical/Surgical History: colectomy, colostomy FINDINGS: Chest: Mediastinum: Suboptimal evaluation of the aorta without intravenous contrast. Stable postoperative findings status post aortic valve replacement and ascending aortic dissection repair. Grossly stable appearance of the persistent Noam type B aortic dissection within the limitations of a noncontrast exam.  No evidence of acute intramural hematoma. 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 The heart and pericardium are without acute abnormality. No mediastinal adenopathy. Coronary artery calcifications are noted. Trace pericardial effusion appears similar to the prior. Lungs/pleura: Small left pleural effusion, similar to the prior study. Motion limited evaluation of the lung bases. There is bilateral dependent atelectasis. No evidence of focal consolidation. No pneumothorax. Soft Tissues/Bones: No acute bony abnormality. Abdomen/Pelvis: Organs:  The solid organs are incompletely evaluated without intravenous contrast.  Unchanged hepatic and renal cysts. A mildly complex 1.8 cm right lower pole renal cyst is indeterminate. The solid organs are without acute noncontrast findings. The gallbladder is present without radiopaque cholelithiasis. GI/Bowel: No mechanical bowel obstruction. Stable postoperative findings status post subtotal colectomy with a right lower quadrant ostomy. Pelvis: Several bladder calcifications are again seen. The prostate is enlarged. Gas in the urinary bladder is suggestive of cystitis. Small fat-containing right inguinal hernia. Peritoneum/Retroperitoneum: Moderate atherosclerotic plaque. The known type B aortic dissection extends into the right common iliac artery, similar to the prior exam. Bones/Soft Tissues: There is a left lower quadrant and midline soft tissue contusion. No acute bony abnormality. 1. Limited evaluation of the aorta without intravenous contrast.  Similar appearance of an extensive Noam type B aortic dissection. Unchanged postoperative appearance of the ascending aorta status post aortic valve replacement and ascending aortic dissection repair. The splanchnic arteries are not well evaluated. 2. Unchanged small left pleural effusion. 3. Gas in the urinary bladder is suggestive of cystitis. Correlate with urinalysis. Unchanged layering bladder calculi. 4. Left lower quadrant and midline abdominal wall soft tissue contusion. No additional noncontrast evidence of acute traumatic injury in the chest, abdomen or pelvis. 5. Indeterminate 1.8 cm right lower pole cystic lesion. If clinically appropriate, outpatient renal protocol MRI can be obtained to better characterize.      FL MODIFIED BARIUM SWALLOW W VIDEO    Result Date: 8/24/2021  EXAMINATION: MODIFIED BARIUM SWALLOW WAS PERFORMED IN CONJUNCTION WITH SPEECH PATHOLOGY SERVICES TECHNIQUE: Fluoroscopic evaluation of the swallowing mechanism was performed with multiple consistency of barium product. FLUOROSCOPY DOSE AND TYPE OR TIME AND EXPOSURES: Fluoroscopic time of 3 minutes and 9 seconds. DAP of 130.2 dGycm2. COMPARISON: None HISTORY: ORDERING SYSTEM PROVIDED HISTORY: Aspiration risk TECHNOLOGIST PROVIDED HISTORY: Aspiration risk Reason for Exam: aspiration risk Acuity: Unknown Type of Exam: Unknown FINDINGS: Multiple swallows were attempted with multiple consistencies in the presence of speech pathology under fluoroscopic evaluation. Penetration was visualized with thin liquid. There is no evidence for aspiration. Penetration with thin liquid. No evidence for aspiration. Please see separate speech pathology report for full discussion of findings and recommendations.           Current Facility-Administered Medications   Medication Dose Route Frequency Provider Last Rate Last Admin    aluminum & magnesium hydroxide-simethicone (MAALOX) 200-200-20 MG/5ML suspension 30 mL  30 mL Oral Q6H PRN Victor Hugo Pickles, APRN - CNP        amiodarone (CORDARONE) tablet 200 mg  200 mg Oral Daily Victor Hugo Pickles, APRN - CNP   200 mg at 09/17/21 0957    ascorbic acid (VITAMIN C) tablet 500 mg  500 mg Oral Daily Victor Hugo Pickles, APRN - CNP   500 mg at 09/17/21 0955    aspirin chewable tablet 81 mg  81 mg Oral Daily Victor Hugo Pickles, APRN - CNP        atorvastatin (LIPITOR) tablet 20 mg  20 mg Oral Daily Victor Hugo Pickles, APRN - CNP   20 mg at 09/17/21 0955    divalproex (DEPAKOTE SPRINKLE) capsule 125 mg  125 mg Oral BID Victor Hugo Pickles, APRN - CNP   125 mg at 09/17/21 0955    ferrous sulfate (IRON 325) tablet 325 mg  325 mg Oral Daily with breakfast Victor Hugo Pickles, APRN - CNP   325 mg at 09/17/21 0955    [Held by provider] furosemide (LASIX) tablet 20 mg  20 mg Oral Daily Victor Hugo Pickles, APRN - CNP        latanoprost (XALATAN) 0.005 % ophthalmic solution 1 drop  1 drop Both Eyes Nightly Victor Hugo Pickles, APRN - CNP        melatonin tablet 3 mg  3 mg Oral Nightly PRN Victor Hugo Pickles, APRN - CNP        therapeutic multivitamin-minerals 1 tablet  1 tablet Oral Daily MOISES Horta CNP   1 tablet at 09/17/21 0959    timolol (TIMOPTIC) 0.5 % ophthalmic solution 1 drop  1 drop Both Eyes Daily MOISES Horta CNP        zinc sulfate (ZINCATE) capsule 50 mg  50 mg Oral Daily MOISES Horta CNP   50 mg at 09/17/21 0955    sodium chloride flush 0.9 % injection 5-40 mL  5-40 mL IntraVENous 2 times per day MOISES Horta CNP   5 mL at 09/17/21 0959    sodium chloride flush 0.9 % injection 10 mL  10 mL IntraVENous PRN MOISES Horta CNP        0.9 % sodium chloride infusion  25 mL IntraVENous PRN MOISES Horta CNP        ondansetron (ZOFRAN-ODT) disintegrating tablet 4 mg  4 mg Oral Q8H PRN MOISES Horta CNP        Or    ondansetron (ZOFRAN) injection 4 mg  4 mg IntraVENous Q6H PRN MOISES Horta CNP        polyethylene glycol (GLYCOLAX) packet 17 g  17 g Oral Daily PRN MOISES Horta CNP        acetaminophen (TYLENOL) tablet 650 mg  650 mg Oral Q6H PRN MOISES Horta CNP        Or    acetaminophen (TYLENOL) suppository 650 mg  650 mg Rectal Q6H PRN MOISES Horta CNP        heparin (porcine) injection 5,000 Units  5,000 Units SubCUTAneous 3 times per day MOISES Horta CNP   5,000 Units at 09/17/21 1450    aspirin EC tablet 81 mg  81 mg Oral Daily MOISES Horta CNP   81 mg at 09/17/21 0955    meropenem (MERREM) 1,000 mg in sodium chloride 0.9 % 100 mL IVPB (mini-bag)  1,000 mg IntraVENous Q12H Lesley Noguera MD         Current Outpatient Medications   Medication Sig Dispense Refill    ascorbic acid (VITAMIN C) 500 MG tablet Take 500 mg by mouth 2 times daily       azithromycin (ZITHROMAX) 250 MG tablet Take 250 mg by mouth daily For 4 days, 9/16-9/20      divalproex (DEPAKOTE) 125 MG DR tablet Take 125 mg by mouth 2 times daily      methylPREDNISolone (MEDROL DOSEPACK) 4 MG tablet Take 4 mg by mouth See Admin Instructions Take by mouth.       zinc sulfate (ZINCATE) 220 (86 Zn) MG capsule Take 50 mg by mouth daily      amiodarone (CORDARONE) 200 MG tablet Take 200 mg by mouth daily      aspirin 81 MG chewable tablet Take 81 mg by mouth daily      atorvastatin (LIPITOR) 20 MG tablet Take 20 mg by mouth daily      ferrous sulfate (IRON 325) 325 (65 Fe) MG tablet Take 325 mg by mouth daily (with breakfast)      furosemide (LASIX) 20 MG tablet Take 20 mg by mouth daily      ipratropium-albuterol (DUONEB) 0.5-2.5 (3) MG/3ML SOLN nebulizer solution Inhale 1 vial into the lungs every 6 hours as needed for Shortness of Breath      aluminum & magnesium hydroxide-simethicone (MYLANTA) 400-400-40 MG/5ML SUSP Take 15 mLs by mouth every 6 hours as needed (heartburn)      methocarbamol (ROBAXIN) 750 MG tablet Take 750 mg by mouth every 8 hours as needed (muscle spasms)       potassium chloride (KLOR-CON M) 20 MEQ extended release tablet Take 20 mEq by mouth daily      Multiple Vitamins-Minerals (THERAPEUTIC MULTIVITAMIN-MINERALS) tablet Take 1 tablet by mouth daily      traZODone (DESYREL) 150 MG tablet Take 150 mg by mouth nightly      ondansetron (ZOFRAN) 4 MG tablet Take 4 mg by mouth every 6 hours as needed for Nausea or Vomiting      timolol (TIMOPTIC) 0.5 % ophthalmic solution Place 1 drop into both eyes daily       latanoprost (XALATAN) 0.005 % ophthalmic solution Place 1 drop into both eyes nightly   0     Impressions :      1. Active Problems:    UTI (urinary tract infection)  Resolved Problems:    * No resolved hospital problems.  *        2.  has a past medical history of Acute respiratory failure following trauma and surgery ([address] Acute respiratory failure following trauma and surgery ([address] Altered bowel elimination due to intestinal ostomy ([address] Full dentures, Full dentures, Gall stones, GERD (gastroesophageal reflux disease), GI bleed, Hemorrhage of gastrointestinal tract, unspecified, Hemorrhage of gastrointestinal tract, unspecified, Hyperlipidemia, Hypertension, Kidney stones, Kidney stones, Primary localized osteoarthrosis, lower leg, Primary localized osteoarthrosis, lower leg, Prostate disorder, Transient disorder of initiating or maintaining sleep, Transient disorder of initiating or maintaining sleep, Unspecified disorder of skin and subcutaneous tissue, Wears glasses, and Wears glasses. Plans:   Patient brought to ED by EMS after being found down at [facility]. Patient was positive for Covid on 9/7/2021. Patient hypoxic on arrival required 2 L nasal cannula. Eventually switched to high flow nasal cannula. SPO2 97% CT scan redemonstrates known aortic dissection, no signs of pneumonia. Pulmonology consulted. Patient given Decadron in the ED. Urinalysis shows infection. Patient started on Rocephin. CT head negative for acute intracranial abnormality. Patient has MERRICK with creatinine 2.46, K 5.1. Patient given 2 L normal saline bolus in the ED. Recheck BMP in the morning. Patient hemodynamically stable.   Admit to progressive unit    Gram negative sepsis likely from uti  Iv meropenem  Admit to step down icu        John Hu MD  9/17/2021  5:31 PM

## 2021-09-17 NOTE — ED NOTES
Patient keeps removing high flow oxygen and brief.  RN and PCT cleaned patient up and reapplied high flow NC  To patient     Chris Barillas RN  09/17/21 [id number]

## 2021-09-17 NOTE — PROGRESS NOTES
Patient brought to ED by EMS after being found down at [facility]. Patient was positive for Covid on 9/7/2021. Patient hypoxic on arrival required 2 L nasal cannula. Eventually switched to high flow nasal cannula. SPO2 97% CT scan redemonstrates known aortic dissection, no signs of pneumonia. Pulmonology consulted. Patient given Decadron in the ED. Urinalysis shows infection. Patient started on Rocephin. CT head negative for acute intracranial abnormality. Patient has MERRICK with creatinine 2.46, K 5.1. Patient given 2 L normal saline bolus in the ED. Recheck BMP in the morning. Patient hemodynamically stable. Admit to progressive unit.

## 2021-09-17 NOTE — ED NOTES
Report given to Kortney  from ED. Report method in person   The following was reviewed with receiving RN:   Current vital signs:  /78   Pulse 99   Temp 98.9 °F (37.2 °C) (Oral)   Resp 24   Ht 6' 1\" (1.854 m)   Wt 190 lb 0.6 oz (86.2 kg)   SpO2 94%   BMI 25.07 kg/m²                MEWS Score: 4     Any medication or safety alerts were reviewed. Any pending diagnostics and notifications were also reviewed, as well as any safety concerns or issues, abnormal labs, abnormal imaging, and abnormal assessment findings. Questions were answered.             Lisa Hope RN  09/17/21 [id number]

## 2021-09-17 NOTE — PROGRESS NOTES
Medication History completed:    New medications: none    Medications discontinued: melatonin, heparin injection    Changes to dosing:   Vitamin C changed to 500 mg twice daily    Stated allergies: As listed    Other pertinent information: Medications confirmed with facility MAR.      Thank you,  Irene Robb, PharmD, BCPS  [phone]

## 2021-09-17 NOTE — CONSULTS
[facility]IT[facility]ARE SPECIAL[facility]   CONSULT NOTE:      DATE OF CONSULT 9/17/2021    REASON FOR CONSULTATION:  Tachypnea, hypoxemia      PCP Minerva Barrett MD     CHIEF COMPLAINT: Tachypnea and hypoxemia    HISTORY OF PRESENT ILLNESS:   Is a pleasant 77-year-old white male who was at an ECF. We had seen him last month when he was hospitalized at [facility]. He had gone to an ECF. He was found lying on the ground yesterday. Perhaps as long as 45 minutes. At any rate, the patient was brought to the ER here and was found to be tachypneic saturation was in the high 80s. The patient was alert initially but disoriented. He had a bruise on his right forehead. During his work-up, he was found to be Covid positive despite being vaccinated. [facility] in [facility]. He was initially just on nasal cannula and then switched over to high flow but became particularly agitated. When I saw him, we switched him back to nasal cannula he was not agitated the time and his saturation remained around 96%. He had a CT of the chest which showed known aortic dissection and a small left lower lobe effusion. Patient's UA was markedly abnormal and he was given Rocephin. However, 2 out of 2 blood cultures were positive for ESBL. He was then switched over to meropenem. The ER he was also given 2 L of normal saline. He is a non-smoker we had seen him last time, he was admitted for similar confusion altered mental status felt to be septic. He had an ascending aortic dissection and he had fair as well as aortic valve replacement at [facility] June 9, 2021. ALLERGIES:  Allergies   Allergen Reactions    Sulfa Antibiotics     Vimovo [Naproxen-Esomeprazole]      Pain shoulder         HOME MEDICATIONS:  Not in a hospital admission.       PAST MEDICAL HISTORY:  Past Medical History:   Diagnosis Date    Acute respiratory failure following trauma and surgery ([address]     [facility] failure [facility]     Altered bowel elimination due to intestinal ostomy ([facility]     iliostomy    Full dentures     pt said he has partial upper and lower    Full dentures     Gall stones     GERD (gastroesophageal reflux disease)     GI bleed     Hemorrhage of gastrointestinal tract, unspecified     Hemorrhage of gastrointestinal tract, unspecified     Hyperlipidemia     Hypertension     Kidney stones     Kidney stones     Primary localized osteoarthrosis, lower leg     both shoulders, neck, legs.  Primary localized osteoarthrosis, lower leg     Prostate disorder     Transient disorder of initiating or maintaining sleep     Transient disorder of initiating or maintaining sleep     Unspecified disorder of skin and subcutaneous tissue     Wears glasses     Wears glasses        PAST SURGICAL HISTORY:  Past Surgical History:   Procedure Laterality Date    ABDOMEN SURGERY      CATARACT REMOVAL WITH IMPLANT Bilateral     COLONOSCOPY      X3    COLOSTOMY Right     COLOSTOMY BAG ON RT.  SIDE    CYSTOSCOPY Right 8/29/2017    CYSTOSCOPY URETERAL STENT INSERTION performed by Mark Anthony Mills MD at [facility], ESOPHAGUS      EYE SURGERY      bettye. eyes, cataracts extracted with iol's    FRACTURE SURGERY      JOINT REPLACEMENT Right 3-23-15    KIDNEY STONE SURGERY Left     KNEE ARTHROSCOPY Right     LITHOTRIPSY Left 06-20-14    w/ cystoscopy C & P    SHOULDER SURGERY Left 9/14/2020    MOLE EXCISION POSTERIOR SHOULDER performed by Carmenza Marinelli MD at [facility] , PARTIAL RECTUM\"    TOTAL KNEE ARTHROPLASTY  03/23/2015    Right with biomet and GPS product application    TOTAL KNEE ARTHROPLASTY Right 3/23/2015          SOCIAL HISTORY:  Social History     Socioeconomic History    Marital status:      Spouse name: Not on file    Number of children: Not on file    Years of education: Not on file    Highest education level: Not on file   Occupational History    Not on file   Tobacco Use    Smoking status: Never Smoker    Smokeless tobacco: Never Used   Vaping Use    Vaping Use: Never used   Substance and Sexual Activity    Alcohol use: No     Alcohol/week: 0.0 standard drinks     Comment: occassional    Drug use: No    Sexual activity: Not on file   Other Topics Concern    Not on file   Social History Narrative    Not on file     Social Determinants of Health     Financial Resource Strain:     Difficulty of Paying Living Expenses:    Food Insecurity:     Worried About Running Out of Food in the Last Year:     Ran Out of Food in the Last Year:    Transportation Needs:     Lack of Transportation (Medical):  Lack of Transportation (Non-Medical):    Physical Activity:     Days of Exercise per Week:     Minutes of Exercise per Session:    Stress:     Feeling of Stress :    Social Connections:     Frequency of Communication with Friends and Family:     Frequency of Social Gatherings with Friends and Family:     Attends Adventism Services:     Active Member of Clubs or Organizations:     Attends Club or Organization Meetings:     Marital Status:    Intimate Partner Violence:     Fear of Current or Ex-Partner:     Emotionally Abused:     Physically Abused:     Sexually Abused:        FAMILY HISTORY:  Family History   Problem Relation Age of Onset    Heart Disease Father     Other Mother        REVIEW OF SYSTEMS:  All other systems reviewed and are negative. PHYSICAL EXAM:  Vital Signs Blood pressure 113/75, pulse 92, temperature 98.9 °F (37.2 °C), temperature source Oral, resp. rate 27, height 6' 1\" (1.854 m), weight 190 lb 0.6 oz (86.2 kg), SpO2 96 %.   Oxygen Amount and Delivery: O2 Flow Rate (L/min): 36 L/min    Admission Weight Weight: 190 lb 0.6 oz (86.2 kg)    General Appearance   Alert gentleman no acute respiratory distress, actually very domicile  Head  Normocephalic, without obvious abnormality, atraumatic    Eyes  conjunctivae/corneas clear. PERRL, EOM's intact. Fundi benign. ENT oral cavity is clear  Neck  no adenopathy, no carotid bruit, no JVD, supple, symmetrical, trachea midline and thyroid not enlarged, symmetric, no tenderness/mass/nodules  Lungs diminished but no tenderness to palpation of dullness percussion  Heart: regular rate and rhythm, S1, S2 normal, no murmur, click, rub or gallop  Abdomen  soft, non-tender; bowel sounds normal; no masses,  no organomegaly  Extremities  No significant peripheral edema  Skin  Skin color, texture, turgor normal. No rashes or lesions  Neurologic: Alert and oriented X 3, normal strength and tone.          Imaging      Lab Review  CBC     Lab Results   Component Value Date    WBC 8.6 09/17/2021    RBC 4.70 09/17/2021    RBC 4.59 02/16/2012    HGB 12.8 09/17/2021    HCT 39.7 09/17/2021     09/17/2021     02/16/2012    MCV 84.5 09/17/2021    MCH 27.2 09/17/2021    MCHC 32.2 09/17/2021    RDW 17.0 09/17/2021    NRBC 1 11/14/2012    LYMPHOPCT 6 09/16/2021    MONOPCT 5 09/16/2021    MYELOPCT 1 11/14/2012    BASOPCT 0 09/16/2021    MONOSABS 0.57 09/16/2021    LYMPHSABS 0.68 09/16/2021    EOSABS 0.00 09/16/2021    BASOSABS 0.00 09/16/2021    DIFFTYPE NOT REPORTED 09/16/2021       BMP   Lab Results   Component Value Date     09/17/2021    K 5.4 09/17/2021     09/17/2021    CO2 21 09/17/2021    BUN 60 09/17/2021    CREATININE 1.81 09/17/2021    GLUCOSE 125 09/17/2021    GLUCOSE 100 02/16/2012    CALCIUM 9.6 09/17/2021       LFTS  Lab Results   Component Value Date    ALKPHOS 101 09/16/2021    ALT 16 09/16/2021    AST 15 09/16/2021    PROT 7.6 09/16/2021    BILITOT 0.34 09/16/2021    LABALBU 3.3 09/16/2021    LABALBU 4.3 02/16/2012       INR  Recent Labs     09/16/21 2129   PROTIME 14.4   INR 1.1       APTT  Recent Labs     09/16/21 2129   APTT 32.5       Lactic Acid  Lab Results Component Value Date    LACTA 1.0 08/24/2021    LACTA 2.2 08/23/2021        PRO-BNP   Recent Labs     09/16/21 2129   PROBNP 1,719*           ABGs:   Lab Results   Component Value Date    PHART 7.411 09/17/2021    PO2ART 64.8 09/17/2021    JIJ9TXS 37.5 09/17/2021       Lab Results   Component Value Date    MODE NOT REPORTED 09/17/2021         Impression    Covid infection  Acute encephalopathy  ESBL bacteremia  Acute hypoxic respiratory failure  Left pleural effusion  Acute on chronic kidney injury secondary dehydration    Plan:      Even though he has a Covid infection, it is not the cause of his encephalopathy or his syncope. His CT shows no evidence of pulmonary embolism or pneumonia. At this point, his main issue is ESBL bacteremia and he has been started appropriately on Merrem and he has been given appropriate fluid boluses. His lactic acid level was elevated and we need to repeat that. His left pleural effusion is not causing him any significant issues. I would wean his oxygen down and take him off the high flow because that is causing him to be more agitated. His oxygen saturations should kept above 88%. I would continue his IV fluid hydration. proBNP is elevated but I do feel he is actually hypovolemic  We will check his inflammatory markers tomorrow. Do not feel that he needs the ICU at this time. Stepdown unit should be adequate. His CODE STATUS should be addressed, currently he is a full code.

## 2021-09-18 PROBLEM — E43 SEVERE MALNUTRITION (HCC): Status: ACTIVE | Noted: 2021-01-01

## 2021-09-18 NOTE — DISCHARGE INSTR - COC
Continuity of Care Form    Patient Name: Garett Dial   :  1940  MRN:  [id number]    Admit date:  2021  Discharge date:  ***    Code Status Order: Full Code   Advance Directives:     Admitting Physician:  Cathi Schuler MD  PCP: Tere Terry MD    Discharging Nurse: [facility] Unit/Room#: /-95  Discharging Unit Phone Number: ***    Emergency Contact:   Extended Emergency Contact Information  Primary Emergency Contact: Edwin Cordova  Address: [address], [address] [facility] [address] Phone: [phone]  Work Phone: [phone]  Mobile Phone: [phone]  Relation: Spouse  Secondary Emergency Contact: Melony Mustafa, Dayanna Turner Phone: [phone]  Relation: Child    Past Surgical History:  Past Surgical History:   Procedure Laterality Date    ABDOMEN SURGERY      CATARACT REMOVAL WITH IMPLANT Bilateral     COLONOSCOPY      X3    COLOSTOMY Right     COLOSTOMY BAG ON RT.  SIDE    CYSTOSCOPY Right 2017    CYSTOSCOPY URETERAL STENT INSERTION performed by Leonora Pope MD at [facility], ESOPHAGUS      EYE SURGERY      bettye. eyes, cataracts extracted with iol's    FRACTURE SURGERY      JOINT REPLACEMENT Right 3-23-15    KIDNEY STONE SURGERY Left     KNEE ARTHROSCOPY Right     LITHOTRIPSY Left 14    w/ cystoscopy C & P    SHOULDER SURGERY Left 2020    MOLE EXCISION POSTERIOR SHOULDER performed by [facility]ia Pruitt [facility] , PARTIAL RECTUM\"    TOTAL KNEE ARTHROPLASTY  2015    Right with biomet and GPS product application    TOTAL KNEE ARTHROPLASTY Right 3/23/2015       Immunization History:   Immunization History   Administered Date(s) Administered    COVID-19, Quarles Peter, PF, 30mcg/0.3mL 2021, 2021    Influenza Vaccine, unspecified formulation 2015, 10/03/2016    Influenza Virus Vaccine 11/12/2014    Influenza, High Dose (Fluzone 65 yrs and older) 10/03/2016, 10/24/2017, 11/24/2017, 09/21/2018    Influenza, Daly Hamburger, IM, PF (6 mo and older Fluzone, Flulaval, Fluarix, and 3 yrs and older Afluria) 10/16/2019    Influenza, Quadv, adjuvanted, 65 yrs +, IM, PF (Fluad) 09/28/2020    Pneumococcal Conjugate 13-valent (Mxmltfe10) 08/11/2015    Pneumococcal Conjugate 7-valent (Prevnar7) 01/01/2007    Pneumococcal Polysaccharide (Tewmgsbie09) 03/21/2017    Zoster Live (Zostavax) 10/03/2012       Active Problems:  Patient Active Problem List   Diagnosis Code    Hyperlipidemia E78.5    Hypertensive heart disease with heart failure (HCC) I11.0    Ileostomy status (HCC) Z93.2    Benign prostatic hyperplasia with lower urinary tract symptoms N40.1    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    MERRICK (acute kidney injury) ([address] N17.9    Recurrent kidney stones N20.0    Skin lesion of left upper extremity L98.9    Pigmented skin lesion of suspected malignant nature L81.9    Ventral hernia K43.9    Sepsis associated hypotension (HCC) A41.9, I95.9    UTI (urinary tract infection) N39.0    Hematuria R31.9    Altered mental status R41.82    Elevated troponin R77.8    Severe malnutrition (HCC) E43       Isolation/Infection:   Isolation          Droplet Plus        Patient Infection Status     Infection Onset Added Last Indicated Last Indicated By Review Planned Expiration Resolved Resolved By    COVID-19 09/16/21 09/16/21 09/16/21 COVID-19, Rapid 09/23/21 09/30/21      Resolved    COVID-19 Rule Out 09/16/21 09/16/21 09/16/21 COVID-19, Rapid (Ordered)   09/16/21 Rule-Out Test Resulted    COVID-19 Rule Out 09/10/20 09/10/20 09/10/20 Covid-19 Ambulatory (Ordered)   09/12/20 Rule-Out Test Resulted          Nurse Assessment:  Last Vital Signs: /74   Pulse 89   Temp 97.3 °F (36.3 °C) (Axillary)   Resp 16   Ht 6' 1\" (1.854 m)   Wt 182 lb 1.6 oz (82.6 kg)   SpO2 98% BMI 24.03 kg/m²     Last documented pain score (0-10 scale): Pain Level: 0  Last Weight:   Wt Readings from Last 1 Encounters:   21 182 lb 1.6 oz (82.6 kg)     Mental Status:  {IP PT MENTAL STATUS:}    IV Access:  { JOSEFINA IV ACCESS:084710936}    Nursing Mobility/ADLs:  Walking   {CHP DME JFUI:866371458}  Transfer  {CHP DME AUDR:639586188}  Bathing  {CHP DME PJRI:602569675}  Dressing  {CHP DME SRYE:095063307}  Toileting  {CHP DME QLNJ:120954232}  Feeding  {[facility] DME KXI}  Med Admin  {P DME NVPN:085242284}  Med Delivery   { JOSEFINA MED Delivery:521066137}    Wound Care Documentation and Therapy:        Elimination:  Continence:   · Bowel: {YES / PP:66188}  · Bladder: {YES / TQ:40817}  Urinary Catheter: {Urinary Catheter:706762357}   Colostomy/Ileostomy/Ileal Conduit: {YES / C}  Ileostomy Ileostomy RLQ-Stomal Appliance: 1 piece  Ileostomy Ileostomy RLQ-Stoma  Assessment: Moist, Red  Ileostomy Ileostomy RLQ-Peristomal Assessment: Clean, Intact  Ileostomy Ileostomy RLQ-Stool Color: Brown    Date of Last BM: ***    Intake/Output Summary (Last 24 hours) at 2021 1423  Last data filed at 2021 0942  Gross per 24 hour   Intake --   Output 100 ml   Net -100 ml     No intake/output data recorded.     Safety Concerns:     [address] Safety Concerns:567407031}    Impairments/Disabilities:      [address] Impairments/Disabilities:708851762}    Nutrition Therapy:  Current Nutrition Therapy:   [address] [place] Diet List:527709879}    Routes of Feeding: {[facility] DME Other Feedings:321774332}  Liquids: {Slp liquid thickness:78625}  Daily Fluid Restriction: {CHP DME Yes amt example:180581287}  Last Modified Barium Swallow with Video (Video Swallowing Test): {Done Not Done DULO:542786783}    Treatments at the Time of Hospital Discharge:   Respiratory Treatments: ***  Oxygen Therapy:  {Therapy; copd oxygen:54430}  Ventilator:    {MH CC Vent SQ:334892455}    Rehab Therapies: {THERAPEUTIC INTERVENTION:4387041230}  Weight Bearing Status/Restrictions: [address] More[address] CC Weight Bearin}  Other Medical Equipment (for information only, NOT a DME order):  {EQUIPMENT:873653150}  Other Treatments: ***    Patient's personal belongings (please select all that are sent with patient):  {CHP DME Belongings:046157083}    RN SIGNATURE:  {Esignature:441122377}    CASE MANAGEMENT/SOCIAL WORK SECTION    Inpatient Status Date: ***    Readmission Risk Assessment Score:  Readmission Risk              Risk of Unplanned Readmission:  22           Discharging to Facility/ Agency   [facility]  [address]   Phone [phone]  Fax [phone]     / signature: Electronically signed by Laura Dominique RN on 21 at 2:23 PM EDT    PHYSICIAN SECTION    Prognosis: {Prognosis:6592245496}    Condition at Discharge: [address] Patient Condition:069859633}    Rehab Potential (if transferring to Rehab): {Prognosis:9982015265}    Recommended Labs or Other Treatments After Discharge: ***    Physician Certification: I certify the above information and transfer of Yeimy Pfeiffer  is necessary for the continuing treatment of the diagnosis listed and that he requires {Admit to Appropriate Level of Care:61778} for {GREATER/LESS:303809482} 30 days.      Update Admission H&P: {CHP DME Changes in WULIV:630802928}    PHYSICIAN SIGNATURE:  {Esignature:613253642}

## 2021-09-18 NOTE — PLAN OF CARE
Nutrition Problem #1: Severe malnutrition  Intervention: Food and/or Nutrient Delivery: Modify Current Diet, Start Oral Nutrition Supplement  Nutritional Goals: PO intake % of meals and supplements

## 2021-09-18 NOTE — PROGRESS NOTES
Wife Luis Enrique Moeller called for update. Update and plan of care reviewed. All questions were answered.

## 2021-09-18 NOTE — PROGRESS NOTES
[facility] PULMONARY, CRITICAL CARE & SLEEP  MD Toni Suggs MD Roland Bobo MD Marland Hutching MD Janna Saba MD  [address] [place] [address]                                      Critical Care Pulmonary Progress Note    Patient - Sergey Lezama   Age - 78 y.o.   - 1940  MRN - [id number]  Acct # - [de-identified]  Date of Admission - 2021  7:45 PM    Consulting Service/Physician:   Consulting:    [unfilled]    Primary Care Physician: Melvi Crump MD    SUBJECTIVE:     Chief Complaint:   Chief Complaint   Patient presents with   Anna Araujo     Subjective:  Patient seen and examined. After having started on meropenem, he had been having increasing delirium and altered mentation. When I entered the room, he was complaining of seeing things that were not there and he found it concerning. His blood pressure has remained stable. He has been afebrile. His oxygen has been weaned down to 3 L nasal cannula. No additional overnight events or acute issues that require my attention. VITALS  /74   Pulse 89   Temp 97.3 °F (36.3 °C) (Axillary)   Resp 16   Ht 6' 1\" (1.854 m)   Wt 182 lb 1.6 oz (82.6 kg)   SpO2 98%   BMI 24.03 kg/m²   Wt Readings from Last 3 Encounters:   21 182 lb 1.6 oz (82.6 kg)   21 186 lb 1.1 oz (84.4 kg)   21 230 lb (104.3 kg)     I/O (24 Hours)    Intake/Output Summary (Last 24 hours) at 2021 1502  Last data filed at 2021 1455  Gross per 24 hour   Intake --   Output 400 ml   Net -400 ml     Ventilator:   Settings  FiO2 : 66 %  Exam:   Physical Exam  Constitutional:       General: He is awake. He is not in acute distress. Appearance: Normal appearance. He is ill-appearing. He is not diaphoretic. Interventions: Nasal cannula in place. Comments: Patient appears encephalopathic   HENT:      Head: Normocephalic and atraumatic.       Right Ear: External ear normal.      Left Ear: External ear normal.      Nose: Nose normal.      Mouth/Throat:      Mouth: Mucous membranes are moist.      Pharynx: No oropharyngeal exudate. Eyes:      General: No scleral icterus. Extraocular Movements: Extraocular movements intact. Conjunctiva/sclera: Conjunctivae normal.   Neck:      Thyroid: No thyroid mass or thyromegaly. Trachea: Trachea and phonation normal.   Cardiovascular:      Rate and Rhythm: Normal rate and regular rhythm. Heart sounds: Normal heart sounds. No murmur heard. No gallop. Pulmonary:      Effort: Pulmonary effort is normal. No accessory muscle usage or respiratory distress. Breath sounds: Normal air entry. Decreased breath sounds and rales present. No wheezing or rhonchi. Abdominal:      General: Bowel sounds are normal. There is no distension. Palpations: Abdomen is soft. Tenderness: There is no abdominal tenderness. Musculoskeletal:      Cervical back: Neck supple. Right lower leg: No edema. Left lower leg: No edema. Skin:     General: Skin is warm. Capillary Refill: Capillary refill takes less than 2 seconds. Neurological:      Mental Status: He is disoriented and confused. GCS: GCS eye subscore is 4. GCS verbal subscore is 5. GCS motor subscore is 6. Cranial Nerves: Cranial nerves are intact. Sensory: Sensation is intact. Motor: Weakness present. Deep Tendon Reflexes: Reflexes are normal and symmetric. Reflexes normal.   Psychiatric:         Attention and Perception: He perceives visual hallucinations. Speech: Speech is slurred. Behavior: Behavior is slowed. Cognition and Memory: He exhibits impaired recent memory and impaired remote memory.       Comments: Patient admits to me that he is seeing \"red things\"       Infusions:      sodium chloride      sodium chloride 75 mL/hr at 09/17/21 2010     Meds:     Current Facility-Administered Medications:     aluminum & magnesium hydroxide-simethicone (MAALOX) 229-289-66 MG/5ML suspension 30 mL, 30 mL, Oral, Q6H PRN, MOISES Blanca CNP    amiodarone (CORDARONE) tablet 200 mg, 200 mg, Oral, Daily, MOISES Blanca - CNP, 200 mg at 09/18/21 0857    ascorbic acid (VITAMIN C) tablet 500 mg, 500 mg, Oral, Daily, MOISES Blanca - CNP, 500 mg at 09/18/21 0857    atorvastatin (LIPITOR) tablet 20 mg, 20 mg, Oral, Daily, MOISES Blanca - CNP, 20 mg at 09/18/21 0857    divalproex (DEPAKOTE SPRINKLE) capsule 125 mg, 125 mg, Oral, BID, MOISES Blanca - CNP, 125 mg at 09/18/21 2446    ferrous sulfate (IRON 325) tablet 325 mg, 325 mg, Oral, Daily with breakfast, MOISES Blanca - CNP, 325 mg at 09/18/21 0858    [Held by provider] furosemide (LASIX) tablet 20 mg, 20 mg, Oral, Daily, MOISES Blanca CNP    latanoprost (XALATAN) 0.005 % ophthalmic solution 1 drop, 1 drop, Both Eyes, Nightly, MOISES Blanca CNP    melatonin tablet 3 mg, 3 mg, Oral, Nightly PRN, MOISES Blanca - CNP, 3 mg at 09/18/21 0225    therapeutic multivitamin-minerals 1 tablet, 1 tablet, Oral, Daily, MOISES Blanca - CNP, 1 tablet at 09/18/21 0900    timolol (TIMOPTIC) 0.5 % ophthalmic solution 1 drop, 1 drop, Both Eyes, Daily, MOISES Blanca CNP    zinc sulfate (ZINCATE) capsule 50 mg, 50 mg, Oral, Daily, MOISES Blanca - CNP, 50 mg at 09/18/21 0858    sodium chloride flush 0.9 % injection 5-40 mL, 5-40 mL, IntraVENous, 2 times per day, MOISES Blanca - CNP, 5 mL at 09/17/21 0959    sodium chloride flush 0.9 % injection 10 mL, 10 mL, IntraVENous, PRN, MOISES Blanca CNP    0.9 % sodium chloride infusion, 25 mL, IntraVENous, PRN, MOISES Blanca CNP    ondansetron (ZOFRAN-ODT) disintegrating tablet 4 mg, 4 mg, Oral, Q8H PRN **OR** ondansetron (ZOFRAN) injection 4 mg, 4 mg, IntraVENous, Q6H PRN, MOISES Blanca CNP    polyethylene glycol (GLYCOLAX) packet 17 g, 17 g, Oral, Daily PRN, MOISES Blanca CNP    acetaminophen (TYLENOL) tablet 650 mg, 650 mg, Oral, Q6H PRN **OR** acetaminophen (TYLENOL) suppository 650 mg, 650 mg, Rectal, Q6H PRN, Marcelo Berumen, APRN - CNP    heparin (porcine) injection 5,000 Units, 5,000 Units, SubCUTAneous, 3 times per day, Daun Berumen, APRN - CNP, 5,000 Units at 09/18/21 0622    aspirin EC tablet 81 mg, 81 mg, Oral, Daily, Daun Berumen, APRN - CNP, 81 mg at 09/18/21 0857    [COMPLETED] meropenem (MERREM) 1,000 mg in sodium chloride 0.9 % 100 mL IVPB (mini-bag), 1,000 mg, IntraVENous, Once, Stopped at 09/17/21 1456 **FOLLOWED BY** meropenem (MERREM) 1,000 mg in sodium chloride 0.9 % 100 mL IVPB (mini-bag), 1,000 mg, IntraVENous, Q12H, Medardo Garner MD, Stopped at 09/18/21 1330    0.9 % sodium chloride infusion, , IntraVENous, Continuous, Jami Ghosh MD, Last Rate: 75 mL/hr at 09/17/21 2010, New Bag at 09/17/21 2010    Lab Results:     [unfilled]  Lab Results   Component Value Date    WBC 8.6 09/17/2021    HGB 12.8 (L) 09/17/2021    HCT 39.7 (L) 09/17/2021    MCV 84.5 09/17/2021     (L) 09/17/2021     Lab Results   Component Value Date    CALCIUM 9.6 09/17/2021     09/17/2021    K 5.4 (H) 09/17/2021    CO2 21 09/17/2021     09/17/2021    BUN 60 (H) 09/17/2021    CREATININE 1.81 (H) 09/17/2021     No components found for: ABGSAMPLETYP, ABGBODYTEMP, ABGPHCORRFOR, [id number], ABGPHCORRFOOR, ABGPH, ABGPCO2, ABGPO2, ABGBASEEXCES, ABGBASEDEFIC, ABGHCO3, BBMY1STM, ABGENDTIDALC, ABGALLENSTES, ABGSPO2, ABGSAMEPLESIT, [id number], ABGOXYGENSOU  Lab Results   Component Value Date    INR 1.1 09/16/2021    PROTIME 14.4 09/16/2021       Radiology:   CT HEAD WO CONTRAST    Result Date: 9/16/2021  EXAMINATION: CT OF THE HEAD WITHOUT CONTRAST  9/16/2021 10:28 pm TECHNIQUE: CT of the head was performed without the administration of intravenous contrast. Dose modulation, iterative reconstruction, and/or weight based adjustment of the mA/kV was utilized to reduce the radiation dose to as low as reasonably achievable. COMPARISON: 08/23/2021 HISTORY: ORDERING SYSTEM PROVIDED HISTORY: fall TECHNOLOGIST PROVIDED HISTORY: fall Decision Support Exception - unselect if not a suspected or confirmed emergency medical condition->Emergency Medical Condition (MA) Reason for Exam: fall, bruise to forehead Acuity: Acute Type of Exam: Initial FINDINGS: BRAIN/VENTRICLES: There is no acute intracranial hemorrhage, mass effect or midline shift. No abnormal extra-axial fluid collection. No evidence of recent territorial infarct. There are nonspecific areas of hypoattenuation in the periventricular white matter and centrum semiovale that are likely related to chronic small vessel ischemic disease. Stable asymmetric prominence of the left ventricle compared to the right. There is no evidence of hydrocephalus. There is intracranial atherosclerosis. ORBITS: The visualized portion of the orbits demonstrate no acute abnormality. SINUSES: The visualized paranasal sinuses and mastoid air cells demonstrate no acute abnormality. SOFT TISSUES/SKULL:  Small right frontal soft tissue contusion. No underlying calvarial fracture. There is cerumen in the bilateral external auditory canals. No acute intracranial abnormality. Small right frontal soft tissue contusion. CT CERVICAL SPINE WO CONTRAST    Result Date: 9/16/2021  EXAMINATION: CT OF THE CERVICAL SPINE WITHOUT CONTRAST 9/16/2021 4:29 pm TECHNIQUE: CT of the cervical spine was performed without the administration of intravenous contrast. Multiplanar reformatted images are provided for review. Dose modulation, iterative reconstruction, and/or weight based adjustment of the mA/kV was utilized to reduce the radiation dose to as low as reasonably achievable.  COMPARISON: CT scan dated November 12, 2012 HISTORY: ORDERING SYSTEM PROVIDED HISTORY: found down TECHNOLOGIST PROVIDED HISTORY: found down Decision Support Exception - unselect if not a suspected or confirmed emergency medical condition->Emergency Medical Condition (MA) Reason for Exam: Fall, found down Acuity: Acute Type of Exam: Initial FINDINGS: BONES/ALIGNMENT: There is no acute fracture or traumatic malalignment. DEGENERATIVE CHANGES: No significant degenerative changes. SOFT TISSUES: There is no prevertebral soft tissue swelling. Calcification is present within the ligamentum nuchae. Images through the upper chest include the transverse portion of the aorta demonstrate a portion of the patient's known type A aortic dissection. .  Correlation with the scheduled CT scan of the chest abdomen pelvis recommended. 1. No evidence of an acute fracture or traumatic malalignment involving the cervical spine 2. Images through the upper chest partially demonstrate the patient's known type A aortic dissection. Patient is scheduled to have a CT scan of the chest abdomen and pelvis. 3.  Critical results were called by Dr. Aroldo Pedro to Dr. Kasie Ramirez on 9/16/2021 at 11 p.m. hours. CT CHEST ABDOMEN PELVIS WO CONTRAST    Result Date: 9/18/2021  EXAMINATION: CT OF THE CHEST, ABDOMEN, AND PELVIS WITHOUT CONTRAST 9/16/2021 10:32 pm TECHNIQUE: CT of the chest, abdomen and pelvis was performed without the administration of intravenous contrast. Multiplanar reformatted images are provided for review. Dose modulation, iterative reconstruction, and/or weight based adjustment of the mA/kV was utilized to reduce the radiation dose to as low as reasonably achievable.  COMPARISON: 08/23/2021 and 07/09/2021 HISTORY: ORDERING SYSTEM PROVIDED HISTORY: found down, abdominal bruising, hypoxic TECHNOLOGIST PROVIDED HISTORY: found down, abdominal bruising, hypoxic Reason for Exam: found down, abdominal bruising, hypoxic Acuity: Acute Type of Exam: Initial Relevant Medical/Surgical History: colectomy, colostomy FINDINGS: Chest: Mediastinum: Suboptimal evaluation of the aorta without intravenous contrast. Stable postoperative findings status post aortic valve replacement and ascending aortic dissection repair. Grossly stable appearance of the persistent [place] type B aortic dissection within the limitations of a noncontrast exam.  No evidence of acute intramural hematoma. 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 The heart and pericardium are without acute abnormality. No mediastinal adenopathy. Coronary artery calcifications are noted. Trace pericardial effusion appears similar to the prior. Lungs/pleura: Small left pleural effusion, similar to the prior study. Motion limited evaluation of the lung bases. There is bilateral dependent atelectasis. No evidence of focal consolidation. No pneumothorax. Soft Tissues/Bones: No acute bony abnormality. Abdomen/Pelvis: Organs: The solid organs are incompletely evaluated without intravenous contrast.  Unchanged hepatic and renal cysts. A mildly complex 1.8 cm right lower pole renal cyst is indeterminate. The solid organs are without acute noncontrast findings. The gallbladder is present without radiopaque cholelithiasis. GI/Bowel: No mechanical bowel obstruction. Stable postoperative findings status post subtotal colectomy with a right lower quadrant ostomy. Pelvis: Several bladder calcifications are again seen. The prostate is enlarged. Gas in the urinary bladder is suggestive of cystitis. Small fat-containing right inguinal hernia. Peritoneum/Retroperitoneum: Moderate atherosclerotic plaque. The known type B aortic dissection extends into the right common iliac artery, similar to the prior exam. Bones/Soft Tissues: There is a left lower quadrant and midline soft tissue contusion. No acute bony abnormality. 1. Limited evaluation of the aorta without intravenous contrast.  Similar appearance of an extensive [place] type B aortic dissection. Unchanged postoperative appearance of the ascending aorta status post aortic valve replacement and ascending aortic dissection repair.   The splanchnic arteries are not well evaluated. 2. Unchanged small left pleural effusion. 3. Gas in the urinary bladder is suggestive of cystitis. Correlate with urinalysis. Unchanged layering bladder calculi. 4. Left lower quadrant and midline abdominal wall soft tissue contusion. No additional noncontrast evidence of acute traumatic injury in the chest, abdomen or pelvis. 5. Indeterminate 1.8 cm right lower pole cystic lesion. If clinically appropriate, outpatient renal protocol MRI can be obtained to better characterize. My reading of film: Review of chest imaging shows no real significant findings that I would find consistent with COVID-19 pneumonia. There is a small left-sided pleural effusion which is fairly unimpressive. The residual images evaluating the patient's aortic graft and surgical valve replacement appear to be patent. ASSESSMENT:   Active Problems:    UTI (urinary tract infection)  Resolved Problems:    * No resolved hospital problems. *      1. COVID-19 infection-tested positive on 9/16/2021 from a respiratory standpoint this is pretty mild disease  2. Acute hypoxemic respiratory failure-currently stable on nasal cannula  3. Small left-sided pleural effusion  4. Sepsis secondary to ESBL positive E. coli bacteremia and probable pyelonephritis versus infected cyst  5. Visual hallucinations-suspect secondary to meropenem  6. Acute delirium superimposed on metabolic encephalopathy  7. Hyperkalemia  8. Acute kidney injury-improving  9. NSTEMI-mild, likely the consequence of acute kidney injury than true myocardial injury  10. Thrombocytopenia  11. Lactic acidosis-resolved  12. Paroxysmal atrial fibrillation  13. Recent aortic dissection complicated by acute aortic valve insufficiency status post graft and surgical valve replacement  14. Recent partial colectomy with ileostomy formation  15. GERD  16. Hyperlipidemia  17. Hypertension  18. BPH  19.  History of GI bleed status post partial colectomy with ileostomy formation  20. Functional decline  21. Patient was vaccinated against [id number]  12. Patient is a never smoker  21. No history of intrinsic lung disease  24. CODE STATUS-full code      PLAN:   1. My suspicion is that this patient's delirium is the direct consequence of meropenem. Unfortunately this patient has an ESBL positive organism and will need appropriate antibiotic coverage. Infectious diseases already on consult. 2. I do not think that this patient's acute respiratory insufficiency is the consequence of COVID-19. I do not think Decadron is indicated at this time. I do not think that he needs to start remdesivir. Should his oxygen requirements worsen we can always start him on Decadron  3. The primary focus of his care at this point should be related to the sepsis related to ESBL positive E. coli bacteremia. 4. Wean oxygen as tolerated to maintain saturation greater than 90%  5. Recommend a goals of care discussion with the family    Critical Care Time: 0 minutes    If you have any questions, please feel free to contact me directly. Mercedes Cosby MD, [address] [place] [address]  [phone] --[phone] (office/answering service)  [phone] (fax)      Electronically signed by Susan Murillo MD on 09/18/21     This progress note was completed using a voice transcription system. Every effort was made to ensure accuracy. However, inadvertent computerized transcription errors may be present.     [phone] (office/answering service)

## 2021-09-18 NOTE — PLAN OF CARE
Problem: Airway Clearance - Ineffective  Goal: Achieve or maintain patent airway  Outcome: Ongoing     Problem: Gas Exchange - Impaired  Goal: Absence of hypoxia  Outcome: Ongoing  Goal: Promote optimal lung function  Outcome: Ongoing     Problem: Breathing Pattern - Ineffective  Goal: Ability to achieve and maintain a regular respiratory rate  Outcome: Ongoing     Problem:  Body Temperature -  Risk of, Imbalanced  Goal: Ability to maintain a body temperature within defined limits  Outcome: Ongoing  Goal: Will regain or maintain usual level of consciousness  Outcome: Ongoing  Goal: Complications related to the disease process, condition or treatment will be avoided or minimized  Outcome: Ongoing     Problem: Isolation Precautions - Risk of Spread of Infection  Goal: Prevent transmission of infection  Outcome: Ongoing     Problem: Nutrition Deficits  Goal: Optimize nutritional status  Outcome: Ongoing     Problem: Risk for Fluid Volume Deficit  Goal: Maintain normal heart rhythm  Outcome: Ongoing  Goal: Maintain absence of muscle cramping  Outcome: Ongoing  Goal: Maintain normal serum potassium, sodium, calcium, phosphorus, and pH  Outcome: Ongoing     Problem: Loneliness or Risk for Loneliness  Goal: Demonstrate positive use of time alone when socialization is not possible  Outcome: Ongoing     Problem: Fatigue  Goal: Verbalize increase energy and improved vitality  Outcome: Ongoing     Problem: Patient Education: Go to Patient Education Activity  Goal: Patient/Family Education  Outcome: Ongoing     Problem: Nutrition  Goal: Optimal nutrition therapy  Outcome: Ongoing

## 2021-09-18 NOTE — PROGRESS NOTES
disorder     Transient disorder of initiating or maintaining sleep     Transient disorder of initiating or maintaining sleep     Unspecified disorder of skin and subcutaneous tissue     Wears glasses     Wears glasses         Past Surgical History:     Past Surgical History:   Procedure Laterality Date    ABDOMEN SURGERY      CATARACT REMOVAL WITH IMPLANT Bilateral     COLONOSCOPY      X3    COLOSTOMY Right     COLOSTOMY BAG ON RT. SIDE    CYSTOSCOPY Right 8/29/2017    CYSTOSCOPY URETERAL STENT INSERTION performed by Yvette Almanza MD at [facility], ESOPHAGUS      EYE SURGERY      bettye. eyes, cataracts extracted with iol's    FRACTURE SURGERY      JOINT REPLACEMENT Right 3-23-15    KIDNEY STONE SURGERY Left     KNEE ARTHROSCOPY Right     LITHOTRIPSY Left 06-20-14    w/ cystoscopy C & P    SHOULDER SURGERY Left 9/14/2020    MOLE EXCISION POSTERIOR SHOULDER [place] Aleks Reynaga [place] [facility] , PARTIAL RECTUM\"    TOTAL KNEE ARTHROPLASTY  03/23/2015    Right with biomet and GPS product application    TOTAL KNEE ARTHROPLASTY Right 3/23/2015        Medications Prior to Admission:     Prior to Admission medications    Medication Sig Start Date End Date Taking? Authorizing Provider   ascorbic acid (VITAMIN C) 500 MG tablet Take 500 mg by mouth 2 times daily    Yes Historical Provider, MD   azithromycin (ZITHROMAX) 250 MG tablet Take 250 mg by mouth daily For 4 days, 9/16-9/20 9/16/21 9/20/21 Yes Historical Provider, MD   divalproex (DEPAKOTE) 125 MG DR tablet Take 125 mg by mouth 2 times daily   Yes Historical Provider, MD   methylPREDNISolone (MEDROL DOSEPACK) 4 MG tablet Take 4 mg by mouth See Admin Instructions Take by mouth.    Yes Historical Provider, MD   zinc sulfate (ZINCATE) 220 (50 Zn) MG capsule Take 50 mg by mouth daily   Yes Historical Provider, MD   amiodarone (CORDARONE) 200 MG tablet Take 200 mg by mouth daily   Yes Historical Provider, MD   aspirin 81 MG chewable tablet Take 81 mg by mouth daily   Yes Historical Provider, MD   atorvastatin (LIPITOR) 20 MG tablet Take 20 mg by mouth daily   Yes Historical Provider, MD   ferrous sulfate (IRON 325) 325 (65 Fe) MG tablet Take 325 mg by mouth daily (with breakfast)   Yes Historical Provider, MD   furosemide (LASIX) 20 MG tablet Take 20 mg by mouth daily   Yes Historical Provider, MD   ipratropium-albuterol (DUONEB) 0.5-2.5 (3) MG/3ML SOLN nebulizer solution Inhale 1 vial into the lungs every 6 hours as needed for Shortness of Breath   Yes Historical Provider, MD   aluminum & magnesium hydroxide-simethicone (MYLANTA) 400-400-40 MG/5ML SUSP Take 15 mLs by mouth every 6 hours as needed (heartburn)   Yes Historical Provider, MD   methocarbamol (ROBAXIN) 750 MG tablet Take 750 mg by mouth every 8 hours as needed (muscle spasms)    Yes Historical Provider, MD   potassium chloride (KLOR-CON M) 20 MEQ extended release tablet Take 20 mEq by mouth daily   Yes Historical Provider, MD   Multiple Vitamins-Minerals (THERAPEUTIC MULTIVITAMIN-MINERALS) tablet Take 1 tablet by mouth daily   Yes Historical Provider, MD   traZODone (DESYREL) 150 MG tablet Take 150 mg by mouth nightly   Yes Historical Provider, MD   ondansetron (ZOFRAN) 4 MG tablet Take 4 mg by mouth every 6 hours as needed for Nausea or Vomiting   Yes Historical Provider, MD   timolol (TIMOPTIC) 0.5 % ophthalmic solution Place 1 drop into both eyes daily  2/2/21  Yes Historical Provider, MD   latanoprost (XALATAN) 0.005 % ophthalmic solution Place 1 drop into both eyes nightly  5/11/16  Yes Historical Provider, MD        Allergies:     Sulfa antibiotics and Vimovo [naproxen-esomeprazole]    Social History:     Tobacco:    reports that he has never smoked. He has never used smokeless tobacco.  Alcohol:      reports no history of alcohol use. Drug Use:  reports no history of drug use.     Family History:     Family History   Problem Relation Age of Onset    Heart Disease Father     Other Mother        Review of Systems:     Patient confused agitated unable to get review of system    Physical Exam:   /74   Pulse 89   Temp 97.3 °F (36.3 °C) (Axillary)   Resp 16   Ht 6' 1\" (1.854 m)   Wt 182 lb 1.6 oz (82.6 kg)   SpO2 98%   BMI 24.03 kg/m²   No results for input(s): POCGLU in the last 72 hours. Elderly man  Thoracotomy scar healing noted colostomy in place noted  General Appearance:  unwell  Mental status: Confused not oriented to time place and person  Head:  normocephalic, atraumatic. Eye: no icterus, redness, pupils equal and reactive, extraocular eye movements intact, conjunctiva clear  Ear: normal external ear, no discharge, hearing intact  Nose:  no drainage noted  Mouth: mucous membranes moist  Neck: supple, no carotid bruits, thyroid not palpable  Lungs: basal creakles    Cardiovascular: normal rate, regular rhythm, no murmur, gallop, rub.   Abdomen: Soft, nontender, nondistended, normal bowel sounds, no hepatomegaly or splenomegaly  Neurologic: There are no new focal motor or sensory deficits, normal muscle tone and bulk, no abnormal sensation, normal speech, cranial nerves II through XII grossly intact  Skin: No gross lesions, rashes, bruising or bleeding on exposed skin area  Extremities:  peripheral pulses palpable, no pedal edema or calf pain with palpation  Psych: Agitated disoriented    Investigations:      Laboratory Testing:  Recent Results (from the past 24 hour(s))   Lactic Acid    Collection Time: 09/17/21  8:30 PM   Result Value Ref Range    Lactic Acid 0.9 0.5 - 2.2 mmol/L       Recent Labs     09/17/21 0927 09/16/21 2129 09/16/21 2129   HGB 12.8*   < > 14.0   HCT 39.7*   < > 43.9   WBC 8.6   < > 11.3*   MCV 84.5   < > 84.8      < > 140   K 5.4*   < > 5.1      < > 101   CO2 21   < > 23   BUN 60*   < > 68*   CREATININE 1.81*   < > 2.46*   GLUCOSE 125*   < > 105*   INR  --   -- 1. 1   PROTIME  --   --  14.4   APTT  --   --  32.5   AST  --   --  15   ALT  --   --  16   LABALBU  --   --  3.3*    < > = values in this interval not displayed.        Hematology:  Recent Labs     09/16/21 2129 09/17/21 0927   WBC 11.3* 8.6   RBC 5.18 4.70   HGB 14.0 12.8*   HCT 43.9 39.7*   MCV 84.8 84.5   MCH 27.0 27.2   MCHC 31.8 32.2   RDW 17.7* 17.0*    132*   MPV 8.4 8.5   INR 1.1  --      Chemistry:  Recent Labs     09/16/21 0051 09/16/21 2129 09/17/21 0927   NA  --  140 142   K  --  5.1 5.4*   CL  --  101 107   CO2  --  23 21   GLUCOSE  --  105* 125*   BUN  --  68* 60*   CREATININE  --  2.46* 1.81*   MG  --   --  2.1   ANIONGAP  --  16 14   LABGLOM  --  25* 36*   GFRAA  --  31* 44*   CALCIUM  --  10.1 9.6   PROBNP  --  1,719*  --    TROPONINT NOT REPORTED NOT REPORTED  --    CKTOTAL  --  24*  --    MYOGLOBIN  --  116*  --      Recent Labs     09/16/21 2129   PROT 7.6   LABALBU 3.3*   AST 15   ALT 16   ALKPHOS 101   BILITOT 0.34       Imaging/Diagnostics:       Echocardiogram Limited 2D    Result Date: 8/27/2021  [address] [place] [address] Transthoracic Echocardiography Report (TTE)  Patient Name Yadiel Loja  Date of Study               08/24/2021               Issac Bones   Date of      1940  Gender                      Male  Birth   Age          78 year(s)  Race                           Room Number  2061        Height:                     73 inch, 185.42 cm   Corporate ID [id number]    Weight:                     190 pounds, 86.2 kg  #   Patient Acct [de-identified]   BSA:          2.11 m^2      BMI:      25.07  #                                                              kg/m^2   MR #         [id number]      Sonographer                 Ivonne Swann   Accession #  [id number]  Interpreting Physician      [address] [place] [address]   Fellow                   Referring Nurse                           Practitioner   Interpreting             Referring Physician         Pablo Trujillo *  Fellow  Additional Comments Technically difficult study. Type of Study   TTE procedure:2D Echocardiogram, Limited Echo. Procedure Date Date: 08/24/2021 Start: 08:49 AM Study Location: [address] [place] [address] Technical Quality: Limited visualization due to poor acoustical window. Indications:Septic shock. History / Tech. Comments: HLD, HTN Patient Status: Inpatient Height: 73 inches Weight: 190 pounds BSA: 2.11 m^2 BMI: 25.07 kg/m^2 Rhythm: Within normal limits HR: 84 bpm BP: 115/49 mmHg CONCLUSIONS Summary Extremely challenging Echo, not all walls seen, poor windows, poor endocardial definition. Recommend repeat limited with Definity. Within above limitations, LVEF appears mildly reduced with EF 40%. Thickened mitral valve leaflets. Signature ----------------------------------------------------------------------------  Electronically signed by Ivonne Swann(Sonographer) on 08/27/2021 07:43  AM ---------------------------------------------------------------------------- ----------------------------------------------------------------------------  Electronically signed by Shelton Barraza(Interpreting physician) on 08/25/2021  07:59 AM ---------------------------------------------------------------------------- FINDINGS Left Atrium Left atrium is normal in size. Left Ventricle Extremely challenging Echo, not all walls seen, poor windows, poor endocardial definition. Recommend repeat limited with Definity. Within above limitations, LVEF appears mildly reduced with EF 40%. Right Atrium Right atrium was not well visualized. Right Ventricle Right ventricle was not well visualized. Mitral Valve Thickened mitral valve leaflets. Aortic Valve Aortic valve not well visualized. Tricuspid Valve Tricuspid valve was not well visualized. Pulmonic Valve Pulmonic valve was not well visualized. Pericardial Effusion No significant pericardial effusion is seen. Pleural Effusion No pleural effusion seen.     CT ABDOMEN PELVIS WO CONTRAST Additional Contrast? None    Addendum Date: 8/23/2021    ADDENDUM: Impression should also state there is an increased small left pleural effusion with increased bibasilar atelectatic changes. Result Date: 8/23/2021  EXAMINATION: CT OF THE ABDOMEN AND PELVIS WITHOUT CONTRAST 8/23/2021 4:04 pm TECHNIQUE: CT of the abdomen and pelvis was performed without the administration of intravenous contrast. Multiplanar reformatted images are provided for review. Dose modulation, iterative reconstruction, and/or weight based adjustment of the mA/kV was utilized to reduce the radiation dose to as low as reasonably achievable. COMPARISON: 07/09/2021 HISTORY: ORDERING SYSTEM PROVIDED HISTORY: concern for septic kidney secondary to nephrolithiasis TECHNOLOGIST PROVIDED HISTORY: concern for septic kidney secondary to nephrolithiasis Decision Support Exception - unselect if not a suspected or confirmed emergency medical condition->Emergency Medical Condition (MA) Reason for Exam: concern for septic kidney secondary to nephrolithiasis Acuity: Unknown Type of Exam: Unknown Relevant Medical/Surgical History: colostomy, colectomy FINDINGS: Lower Chest: Increased small left pleural effusion with increased bibasilar atelectatic changes. Decreased hemopericardium. Prosthetic aortic valve partially imaged. Organs: Evaluation is limited by the absence of contrast.  Redemonstration of innumerable hepatic cysts, grossly unchanged. Gallbladder is grossly unremarkable. Spleen is grossly normal caliber. Pancreas is atrophic. Adrenal glands are normal size. Punctate nonobstructing left nephroliths. Bilateral renal cysts measuring up to 7.3 cm on the right and 9.5 cm on the left. No hydronephrosis with mild left perinephric stranding. GI/Bowel: Postoperative changes from presumed colectomy with a Brielle's pouch and a right lower quadrant ostomy with a similar appearing parastomal hernia. No bowel obstruction. Small lipoma in the gastric antrum.  Pelvis: Urinary bladder contains numerous layering calcifications posteriorly as well as an indwelling Price catheter with associated intraluminal gas. Gas within 2 anterosuperior bladder diverticula. Perivesicular stranding appears grossly similar to prior. Enlarged prostate. Peritoneum/Retroperitoneum: No free fluid or free air. Intimal flap redemonstrated in the aorta compatible with the known dissection. Abdominal aorta remains unchanged in caliber measuring up to 2.5 cm. Unchanged ectasia of the common iliac arteries, right greater than left. Displaced calcifications in the common iliac vessels are also compatible with underlying intimal flaps/dissection. Bones/Soft Tissues: Recent postoperative changes along the anterior abdominal wall and epigastrium. Presumed sequelae of medication injection in the anterior abdominal wall subcutaneous fat. No acute bony findings on a background of osseous demineralization, dextroconvex lumbar spinal curvature, advanced degenerative changes throughout the lumbar spine including sequelae of Baastrup's disease, and in unchanged region of sclerosis in the right acetabulum with some other smaller unchanged sclerotic foci in the pelvis. Indwelling Price catheter with associated intraluminal gas in the bladder which appears thickened and with numerous diverticula. The wall thickening may be sequelae of chronic bladder outlet obstruction in the setting of an enlarged prostate, though as there is perivesicular stranding as well as left perinephric stranding, recommend correlation for superimposed ascending urinary tract infection. Innumerable bladder calcifications and nonobstructing left nephrolithiasis without an obstructing stone. Though suboptimally evaluated in the absence of contrast intimal flaps are redemonstrated in the abdominal aorta and common iliac arteries in the setting of known recent dissection two months prior.   Aortic caliber is unchanged and remains within likely related to chronic small vessel ischemic disease. Stable asymmetric prominence of the left ventricle compared to the right. There is no evidence of hydrocephalus. There is intracranial atherosclerosis. ORBITS: The visualized portion of the orbits demonstrate no acute abnormality. SINUSES: The visualized paranasal sinuses and mastoid air cells demonstrate no acute abnormality. SOFT TISSUES/SKULL:  Small right frontal soft tissue contusion. No underlying calvarial fracture. There is cerumen in the bilateral external auditory canals. No acute intracranial abnormality. Small right frontal soft tissue contusion. CT Head WO Contrast    Result Date: 8/23/2021  EXAMINATION: CT OF THE HEAD WITHOUT CONTRAST  8/23/2021 5:37 pm TECHNIQUE: CT of the head was performed without the administration of intravenous contrast. Dose modulation, iterative reconstruction, and/or weight based adjustment of the mA/kV was utilized to reduce the radiation dose to as low as reasonably achievable. COMPARISON: November 12, 2012 HISTORY: ORDERING SYSTEM PROVIDED HISTORY: altered mental status TECHNOLOGIST PROVIDED HISTORY: altered mental status Decision Support Exception - unselect if not a suspected or confirmed emergency medical condition->Emergency Medical Condition (MA) Reason for Exam: altered mental status FINDINGS: BRAIN/VENTRICLES: There is no acute intracranial hemorrhage, mass effect or midline shift. Note is made of cavum septum pellucidum, unchanged from prior study. ORBITS: The visualized portion of the orbits demonstrate no acute abnormality. SINUSES: The visualized paranasal sinuses and mastoid air cells demonstrate no acute abnormality. SOFT TISSUES/SKULL:  No acute abnormality of the visualized skull or soft tissues. No acute intracranial abnormality.      CT CHEST WO CONTRAST    Result Date: 8/23/2021  EXAMINATION: CT OF THE CHEST WITHOUT CONTRAST 8/23/2021 5:18 pm TECHNIQUE: CT of the chest was performed approximated. No appreciable osseous bridging. Callus formation in the region of the manubrium. Otherwise unchanged degenerative findings in the spine and shoulders. Vascular evaluation is limited by the absence of contrast. Interval operative repair of the ascending aortic dissection. Vessel caliber as described above with the ascending aorta just distal to the operative repair measuring up to 4.9 cm. The true and false lumens now all appear somewhat equal in caliber, though assessment is limited in the absence of contrast. Significant improvement in the associated mediastinal hematoma. Only small volume residual hemopericardium. New air bronchograms in the left lower lobe which may be due to developing infection in the appropriate clinical setting. Slightly increased atelectatic changes in the lung bases. Improved right and worsened left pleural effusions. CT CERVICAL SPINE WO CONTRAST    Result Date: 9/16/2021  EXAMINATION: CT OF THE CERVICAL SPINE WITHOUT CONTRAST 9/16/2021 4:29 pm TECHNIQUE: CT of the cervical spine was performed without the administration of intravenous contrast. Multiplanar reformatted images are provided for review. Dose modulation, iterative reconstruction, and/or weight based adjustment of the mA/kV was utilized to reduce the radiation dose to as low as reasonably achievable. COMPARISON: CT scan dated November 12, 2012 HISTORY: ORDERING SYSTEM PROVIDED HISTORY: found down TECHNOLOGIST PROVIDED HISTORY: found down Decision Support Exception - unselect if not a suspected or confirmed emergency medical condition->Emergency Medical Condition (MA) Reason for Exam: Fall, found down Acuity: Acute Type of Exam: Initial FINDINGS: BONES/ALIGNMENT: There is no acute fracture or traumatic malalignment. DEGENERATIVE CHANGES: No significant degenerative changes. SOFT TISSUES: There is no prevertebral soft tissue swelling. Calcification is present within the ligamentum nuchae.   Images through the upper chest include the transverse portion of the aorta demonstrate a portion of the patient's known type A aortic dissection. .  Correlation with the scheduled CT scan of the chest abdomen pelvis recommended. 1. No evidence of an acute fracture or traumatic malalignment involving the cervical spine 2. Images through the upper chest partially demonstrate the patient's known type A aortic dissection. Patient is scheduled to have a CT scan of the chest abdomen and pelvis. 3.  Critical results were called by Dr. Ike Woods to Dr. Katia Burkett on 9/16/2021 at 11 p.m. hours. IR FLUORO GUIDED CVA DEVICE PLMT/REPLACE/REMOVAL    Result Date: 8/27/2021  PROCEDURE: ULTRASOUND GUIDED VASCULAR ACCESS. Midline placement 8/27/2021. HISTORY: ORDERING SYSTEM PROVIDED HISTORY: Sepsis, discharge on IV antibiotic TECHNOLOGIST PROVIDED HISTORY: Sepsis, discharge on IV antibiotic Lumen?->Single Lumen SEDATION: None FLUOROSCOPY DOSE AND TYPE OR TIME AND EXPOSURES: None TECHNIQUE AND FINDINGS: Informed consent was obtained after a detailed explanation of the procedure including risks, benefits, and alternatives. Universal protocol was observed. The right arm was prepped and draped in sterile fashion using maximum sterile barrier technique. Local anesthesia was achieved with lidocaine. A micropuncture needle was used to access the right brachial vein using ultrasound guidance. An ultrasound image demonstrating patency of the vein with needle tip located within it. An image was obtained and stored in PACs. A 0.018 guidewire was used to place a peel-a-way sheath and a 4.5 Wolof 15 cm single-lumen midline catheter was placed with its tip directed centrally. Ultrasound images verify appropriate catheter positioning. The catheter flushed easily and there was a good blood return. The catheter was secured to the skin. The patient tolerated the procedure well and there were no immediate complications.  EBL: Less than 3 ml     Successful ultrasound guided midline placement     XR CHEST PORTABLE    Result Date: 8/25/2021  EXAMINATION: ONE XRAY VIEW OF THE CHEST 8/25/2021 5:47 am COMPARISON: 08/23/2021, 08/19/2021 HISTORY: ORDERING SYSTEM PROVIDED HISTORY: R/O CHF TECHNOLOGIST PROVIDED HISTORY: R/O CHF Reason for Exam: R/O CHF Acuity: Acute Type of Exam: Initial Additional signs and symptoms: R/O CHF Relevant Medical/Surgical History: R/O CHF FINDINGS: The cardiac and mediastinal contours appear unchanged. Basilar opacities and pleural effusions, left greater than right, are again demonstrated without appreciable change. No new airspace disease identified in the interval.  No evidence for pneumothorax. No significant interval change. XR CHEST PORTABLE    Result Date: 8/23/2021  EXAMINATION: ONE XRAY VIEW OF THE CHEST 8/23/2021 4:55 pm COMPARISON: 19 April 2021 HISTORY: ORDERING SYSTEM PROVIDED HISTORY: altered mental status, evaluate for pneumonia TECHNOLOGIST PROVIDED HISTORY: altered mental status, evaluate for pneumonia Reason for Exam: altered mental status, evaluate for pneumonia Acuity: Unknown Type of Exam: Unknown FINDINGS: AP portable view of the chest time stamped at 1725 hours demonstrates overlying cardiac monitoring electrodes. Stable cardiomegaly is noted. Prior median sternotomy is demonstrated. CT. No change in left pleural effusion and left basilar opacity. No vascular congestion or extrapleural air. Surgical clips are present the medial right axilla. Prosthetic aortic valve is noted. No change in left basilar opacity likely related to effusion and atelectasis with focal airspace disease not excluded.      CT CHEST ABDOMEN PELVIS WO CONTRAST    Result Date: 9/17/2021  EXAMINATION: CT OF THE CHEST, ABDOMEN, AND PELVIS WITHOUT CONTRAST 9/16/2021 10:32 pm TECHNIQUE: CT of the chest, abdomen and pelvis was performed without the administration of intravenous contrast. Multiplanar reformatted images are provided for review. Dose modulation, iterative reconstruction, and/or weight based adjustment of the mA/kV was utilized to reduce the radiation dose to as low as reasonably achievable. COMPARISON: 08/23/2021 and 07/09/2021 HISTORY: ORDERING SYSTEM PROVIDED HISTORY: found down, abdominal bruising, hypoxic TECHNOLOGIST PROVIDED HISTORY: found down, abdominal bruising, hypoxic Reason for Exam: found down, abdominal bruising, hypoxic Acuity: Acute Type of Exam: Initial Relevant Medical/Surgical History: colectomy, colostomy FINDINGS: Chest: Mediastinum: Suboptimal evaluation of the aorta without intravenous contrast. Stable postoperative findings status post aortic valve replacement and ascending aortic dissection repair. Grossly stable appearance of the persistent [place] type B aortic dissection within the limitations of a noncontrast exam.  No evidence of acute intramural hematoma. 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 The heart and pericardium are without acute abnormality. No mediastinal adenopathy. Coronary artery calcifications are noted. Trace pericardial effusion appears similar to the prior. Lungs/pleura: Small left pleural effusion, similar to the prior study. Motion limited evaluation of the lung bases. There is bilateral dependent atelectasis. No evidence of focal consolidation. No pneumothorax. Soft Tissues/Bones: No acute bony abnormality. Abdomen/Pelvis: Organs: The solid organs are incompletely evaluated without intravenous contrast.  Unchanged hepatic and renal cysts. A mildly complex 1.8 cm right lower pole renal cyst is indeterminate. The solid organs are without acute noncontrast findings. The gallbladder is present without radiopaque cholelithiasis. GI/Bowel: No mechanical bowel obstruction. Stable postoperative findings status post subtotal colectomy with a right lower quadrant ostomy. Pelvis: Several bladder calcifications are again seen. The prostate is enlarged.   Gas in the urinary bladder is suggestive of cystitis. Small fat-containing right inguinal hernia. Peritoneum/Retroperitoneum: Moderate atherosclerotic plaque. The known type B aortic dissection extends into the right common iliac artery, similar to the prior exam. Bones/Soft Tissues: There is a left lower quadrant and midline soft tissue contusion. No acute bony abnormality. 1. Limited evaluation of the aorta without intravenous contrast.  Similar appearance of an extensive [place] type B aortic dissection. Unchanged postoperative appearance of the ascending aorta status post aortic valve replacement and ascending aortic dissection repair. The splanchnic arteries are not well evaluated. 2. Unchanged small left pleural effusion. 3. Gas in the urinary bladder is suggestive of cystitis. Correlate with urinalysis. Unchanged layering bladder calculi. 4. Left lower quadrant and midline abdominal wall soft tissue contusion. No additional noncontrast evidence of acute traumatic injury in the chest, abdomen or pelvis. 5. Indeterminate 1.8 cm right lower pole cystic lesion. If clinically appropriate, outpatient renal protocol MRI can be obtained to better characterize. [place] MODIFIED BARIUM SWALLOW W VIDEO    Result Date: 8/24/2021  EXAMINATION: MODIFIED BARIUM SWALLOW WAS PERFORMED IN CONJUNCTION WITH SPEECH PATHOLOGY SERVICES TECHNIQUE: Fluoroscopic evaluation of the swallowing mechanism was performed with multiple consistency of barium product. FLUOROSCOPY DOSE AND TYPE OR TIME AND EXPOSURES: Fluoroscopic time of 3 minutes and 9 seconds. DAP of 130.2 dGycm2. COMPARISON: None HISTORY: ORDERING SYSTEM PROVIDED HISTORY: Aspiration risk TECHNOLOGIST PROVIDED HISTORY: Aspiration risk Reason for Exam: aspiration risk Acuity: Unknown Type of Exam: Unknown FINDINGS: Multiple swallows were attempted with multiple consistencies in the presence of speech pathology under fluoroscopic evaluation.  Penetration was visualized with thin liquid. There is no evidence for aspiration. Penetration with thin liquid. No evidence for aspiration. Please see separate speech pathology report for full discussion of findings and recommendations.           Current Facility-Administered Medications   Medication Dose Route Frequency Provider Last Rate Last Admin    aluminum & magnesium hydroxide-simethicone (MAALOX) 200-200-20 MG/5ML suspension 30 mL  30 mL Oral Q6H PRN Victor Hugo Pickles, APRN - CNP        amiodarone (CORDARONE) tablet 200 mg  200 mg Oral Daily Victor Hugo Pickles, APRN - CNP   200 mg at 09/18/21 0857    ascorbic acid (VITAMIN C) tablet 500 mg  500 mg Oral Daily Victor Hugo Pickles, APRN - CNP   500 mg at 09/18/21 0857    atorvastatin (LIPITOR) tablet 20 mg  20 mg Oral Daily Victor Hugo Pickles, APRN - CNP   20 mg at 09/18/21 0857    divalproex (DEPAKOTE SPRINKLE) capsule 125 mg  125 mg Oral BID Victor Hugo Pickles, APRN - CNP   125 mg at 09/18/21 8160    ferrous sulfate (IRON 325) tablet 325 mg  325 mg Oral Daily with breakfast Victor Hugo Pickles, APRN - CNP   325 mg at 09/18/21 0858    [Held by provider] furosemide (LASIX) tablet 20 mg  20 mg Oral Daily Victor Hugo Pickles, APRN - CNP        latanoprost (XALATAN) 0.005 % ophthalmic solution 1 drop  1 drop Both Eyes Nightly Victor Hugo Pickles, APRN - CNP        melatonin tablet 3 mg  3 mg Oral Nightly PRN Victor Hugo Pickles, APRN - CNP   3 mg at 09/18/21 1201    therapeutic multivitamin-minerals 1 tablet  1 tablet Oral Daily Victor Hugo Pickles, APRN - CNP   1 tablet at 09/18/21 0900    timolol (TIMOPTIC) 0.5 % ophthalmic solution 1 drop  1 drop Both Eyes Daily Victor Hugo Pickles, APRN - CNP        zinc sulfate (ZINCATE) capsule 50 mg  50 mg Oral Daily Victor Hugo Pickles, APRN - CNP   50 mg at 09/18/21 0858    sodium chloride flush 0.9 % injection 5-40 mL  5-40 mL IntraVENous 2 times per day Victor Hugo Pickles, APRN - CNP   5 mL at 09/17/21 0959    sodium chloride flush 0.9 % injection 10 mL  10 mL IntraVENous PRN Victor Hugo Pickles, APRN - CNP        0.9 % sodium chloride infusion  25 mL IntraVENous PRN [place] Brianne, APRN - CNP        ondansetron (ZOFRAN-ODT) disintegrating tablet 4 mg  4 mg Oral Q8H PRN [place] Brianne, APRN - CNP        Or    ondansetron [facility]) injection 4 mg  4 mg IntraVENous Q6H PRN [place] Brianne, APRN - CNP        polyethylene glycol (GLYCOLAX) packet 17 g  17 g Oral Daily PRN Kumar Brianne, APRN - CNP        acetaminophen (TYLENOL) tablet 650 mg  650 mg Oral Q6H PRN Kumar Brianne, APRN - CNP        Or    acetaminophen (TYLENOL) suppository 650 mg  650 mg Rectal Q6H PRN [place] Brianne, APRN - CNP        heparin (porcine) injection 5,000 Units  5,000 Units SubCUTAneous 3 times per day [facility], APRN - CNP   5,000 Units at 09/18/21 0622    aspirin EC tablet 81 mg  81 mg Oral Daily [place] Brianne, APRN - CNP   81 mg at 09/18/21 0857    meropenem (MERREM) 1,000 mg in sodium chloride 0.9 % 100 mL IVPB (mini-bag)  1,000 mg IntraVENous Q12H Lesley Noguera MD   Stopped at 09/18/21 1330    0.9 % sodium chloride infusion   IntraVENous Continuous Walt Habermann, MD 75 mL/hr at 09/17/21 2010 New Bag at 09/17/21 2010     Impressions :      1. Active Problems:    UTI (urinary tract infection)  Resolved Problems:    * No resolved hospital problems.  *        2.  has a past medical history of Acute respiratory failure following trauma and surgery ([facility] [address] Acute respiratory failure following trauma and surgery ([facility] [address] Altered bowel elimination due to intestinal ostomy ([facility] [address] Full dentures, Full dentures, Gall stones, GERD (gastroesophageal reflux disease), GI bleed, Hemorrhage of gastrointestinal tract, unspecified, Hemorrhage of gastrointestinal tract, unspecified, Hyperlipidemia, Hypertension, Kidney stones, Kidney stones, Primary localized osteoarthrosis, lower leg, Primary localized osteoarthrosis, lower leg, Prostate disorder, Transient disorder of initiating or maintaining sleep, Transient disorder of initiating or maintaining sleep, Unspecified disorder of skin and subcutaneous tissue, Wears glasses, and Wears glasses. Cc 35 mins  Plans:   Patient brought to ED by EMS after being found down at [facility]. Patient was positive for Covid on 9/7/2021. Patient hypoxic on arrival required 2 L nasal cannula. Eventually switched to high flow nasal cannula. SPO2 97% CT scan redemonstrates known aortic dissection, no signs of pneumonia. Pulmonology consulted. Patient given Decadron in the ED. Urinalysis shows infection. Patient started on Rocephin. CT head negative for acute intracranial abnormality. Patient has MERRICK with creatinine 2.46, K 5.1. Patient given 2 L normal saline bolus in the ED. Recheck BMP in the morning. Patient hemodynamically stable.   Admit to progressive unit    Gram negative sepsis likely from uti  Iv meropenem  Delirium ICU psychosis  Metabolic encephalopathy likely secondary to gram-negative sepsis IV meropenem continued  Overall prognosis is guarded for his age comorbid conditions        Gerhardt Lovings, MD  9/18/2021  3:21 PM

## 2021-09-18 NOTE — PROGRESS NOTES
Writer spoke with Dr. Dayana Padilla, physician does not believe the hallucinations are from the antibiotic merrem. No changes were made. Dr. Chen Oliveira notified that patient's hallucinations have increased and asked if an ABG would be necessary. No new orders received.

## 2021-09-18 NOTE — CONSULTS
[facility] -   Infectious diseases evaluation  admission date 9/16/2021    reason for consultation:   UTI  Bacteremia  COVID-19 infection    Impression :   Current:  · UTI  · ESBL producing E. coli bacteremia secondary to above  · COVID-19 infection  · Acute hypoxic respiratory failure  · Left pleural effusion  · Acute on chronic renal failure  · Encephalopathy  · History of aortic valve replacement June 2021  · Aortic dissection  · Sulfa allergy    Recommendations   · Continue IV meropenem  · Decadron  · Not a candidate for remdesivir  · Follow CBC and renal function  · Echocardiogram  · Inflammatory markers  · Continue supportive care  · Droplet plus isolation      History of Present Illness:   Initial history:  Jerrell Brown is a 78y.o.-year-old male was brought to the hospital from [facility] after he was found laying on the ground. At the ER he was tachypneic and hypoxic, disoriented. .  Urinalysis was suggestive of UTI  He was placed on oxygen per nasal cannula then he was placed on high flow oxygen. Blood culture grew ESBL producing E. Coli  The patient is confused    COVID-19 came back positive  The patient received Covid 19 vaccine    Interval changes  9/18/2021   He is on 3 L of oxygen per nasal cannula. Patient Vitals for the past 8 hrs:   BP Temp Temp src Pulse Resp SpO2 Height   09/18/21 1217 -- -- -- -- -- -- 6' 1\" (1.854 m)   09/18/21 0800 99/66 98.1 °F (36.7 °C) Axillary 94 16 90 % --             I have personally reviewed the past medical history, past surgical history, medications, social history, and family history, and I haveupdated the database accordingly.       Allergies:   Sulfa antibiotics and Vimovo [naproxen-esomeprazole]     Review of Systems:     Review of Systems  Confused, agitated, unable to provide  Physical Examination :       Physical Exam  Remainder of examination deferred due to excessive risk conferred by physical examination during a global pandemic due to coronavirus 19. In a setting where local and national supplies of personal protective equipment are depleted  Past Medical History:     Past Medical History:   Diagnosis Date    Acute respiratory failure following trauma and surgery ([address]     Acute respiratory failure following trauma and surgery ([address]     Altered bowel elimination due to intestinal ostomy ([address]     iliostomy    Full dentures     pt said he has partial upper and lower    Full dentures     Gall stones     GERD (gastroesophageal reflux disease)     GI bleed     Hemorrhage of gastrointestinal tract, unspecified     Hemorrhage of gastrointestinal tract, unspecified     Hyperlipidemia     Hypertension     Kidney stones     Kidney stones     Primary localized osteoarthrosis, lower leg     both shoulders, neck, legs.  Primary localized osteoarthrosis, lower leg     Prostate disorder     Transient disorder of initiating or maintaining sleep     Transient disorder of initiating or maintaining sleep     Unspecified disorder of skin and subcutaneous tissue     Wears glasses     Wears glasses        Past Surgical  History:     Past Surgical History:   Procedure Laterality Date    ABDOMEN SURGERY      CATARACT REMOVAL WITH IMPLANT Bilateral     COLONOSCOPY      X3    COLOSTOMY Right     COLOSTOMY BAG ON RT.  SIDE    CYSTOSCOPY Right 8/29/2017    CYSTOSCOPY URETERAL STENT INSERTION performed by Mikala Harvey MD at [facility], ESOPHAGUS      EYE SURGERY      bettye. eyes, cataracts extracted with iol's    FRACTURE SURGERY      JOINT REPLACEMENT Right 3-23-15    KIDNEY STONE SURGERY Left     KNEE ARTHROSCOPY Right     LITHOTRIPSY Left 06-20-14    w/ cystoscopy C & P    SHOULDER SURGERY Left 9/14/2020    MOLE EXCISION POSTERIOR SHOULDER performed by Javier Mullen MD at [facility] , PARTIAL RECTUM\"    TOTAL KNEE ARTHROPLASTY  03/23/2015    Right with biomet and GPS product application    TOTAL KNEE ARTHROPLASTY Right 3/23/2015       Medications:      amiodarone  200 mg Oral Daily    ascorbic acid  500 mg Oral Daily    atorvastatin  20 mg Oral Daily    divalproex  125 mg Oral BID    ferrous sulfate  325 mg Oral Daily with breakfast    [Held by provider] furosemide  20 mg Oral Daily    latanoprost  1 drop Both Eyes Nightly    therapeutic multivitamin-minerals  1 tablet Oral Daily    timolol  1 drop Both Eyes Daily    zinc sulfate  50 mg Oral Daily    sodium chloride flush  5-40 mL IntraVENous 2 times per day    heparin (porcine)  5,000 Units SubCUTAneous 3 times per day    aspirin  81 mg Oral Daily    meropenem  1,000 mg IntraVENous Q12H       Social History:     Social History     Socioeconomic History    Marital status:      Spouse name: Not on file    Number of children: Not on file    Years of education: Not on file    Highest education level: Not on file   Occupational History    Not on file   Tobacco Use    Smoking status: Never Smoker    Smokeless tobacco: Never Used   Vaping Use    Vaping Use: Never used   Substance and Sexual Activity    Alcohol use: No     Alcohol/week: 0.0 standard drinks     Comment: occassional    Drug use: No    Sexual activity: Not on file   Other Topics Concern    Not on file   Social History Narrative    Not on file     Social Determinants of Health     Financial Resource Strain:     Difficulty of Paying Living Expenses:    Food Insecurity:     Worried About Running Out of Food in the Last Year:     Ran Out of Food in the Last Year:    Transportation Needs:     Lack of Transportation (Medical):      Lack of Transportation (Non-Medical):    Physical Activity:     Days of Exercise per Week:     Minutes of Exercise per Session:    Stress:     Feeling of Stress :    Social Connections:     Frequency of Communication with Friends and Family:     Frequency of Social Gatherings with Friends and Family:     Attends [employer] Services:     Active Member of Clubs or Organizations:     Attends Club or Organization Meetings:     Marital Status:    Intimate Partner Violence:     Fear of Current or Ex-Partner:     Emotionally Abused:     Physically Abused:     Sexually Abused:        Family History:     Family History   Problem Relation Age of Onset    Heart Disease Father     Other Mother       Medical Decision Making:   I have independently reviewed/ordered the following labs:    CBC with Differential:   Recent Labs     09/16/21 2129 09/17/21 0927   WBC 11.3* 8.6   HGB 14.0 12.8*   HCT 43.9 39.7*    132*   LYMPHOPCT 6*  --    MONOPCT 5  --      BMP:  Recent Labs     09/16/21 2129 09/17/21 0927    142   K 5.1 5.4*    107   CO2 23 21   BUN 68* 60*   CREATININE 2.46* 1.81*   MG  --  2.1     Hepatic Function Panel:   Recent Labs     09/16/21 2129   PROT 7.6   LABALBU 3.3*   BILITOT 0.34   ALKPHOS 101   ALT 16   AST 15     No results for input(s): RPR in the last 72 hours. No results for input(s): HIV in the last 72 hours. No results for input(s): BC in the last 72 hours. Lab Results   Component Value Date    CREATININE 1.81 09/17/2021    GLUCOSE 125 09/17/2021    GLUCOSE 100 02/16/2012       Detailed results: Thank you for allowing us to participate in the care of this patient. Please call with questions. This note is created with the assistance of a speech recognition program.  While intending to generate adocument that actually reflects the content of the visit, the document can still have some errors including those of syntax and sound a like substitutions which may escape proof reading. It such instances, actual meaningcan be extrapolated by contextual diversion.     Karan Costa MD  Office: [phone]  [facility] / office [phone]

## 2021-09-18 NOTE — PROGRESS NOTES
----- Message from Samira Britt MD sent at 10/17/2019  8:47 PM CDT -----  Please let patient know mammogram was normal. Thanks!   Comprehensive Nutrition Assessment    Type and Reason for Visit:  Initial, Positive Nutrition Screen (Difficulty chewing or swallowing, missing teeth/poor dentition)    Nutrition Recommendations/Plan:  Modify diet and add oral nutrition supplements. Suggest consult to SLP for swallow evaluation. Nutrition Assessment:  Pt admitted with UTI, Septicemia. COVID-19 was diagnosed 9/7. Delerium reported. Nurse states pt has been eating approximately 50% of meals. Pt had swallow study done on 8/24 which indicated need for Easy to Chew foods and mildly thick liquids. It appears that diet had progressed to thin liquids at [facility] and this hospital admission diet order was regular cardiac . However, out of an abundance of caution, mildly thick liquids have been ordered, diet texture changed to Easy to Chew, and diet liberalized to 3-4 gm Na to allow for additional food choices. Mildy thick Ensure Enlive added x 1 and Magic Cups x 2 daily.     Malnutrition Assessment:  Malnutrition Status:  Severe malnutrition    Context:  Acute Illness     Findings of the 6 clinical characteristics of malnutrition:  Energy Intake:   (Within past month, intake has been less than 75% for 7 or more days)  Weight Loss:  7 - Greater than 7.5% over 3 months     Body Fat Loss:  7 - Moderate body fat loss (Fat and muscle loss based on assessment 8/24/21 with further wt loss noted since that time) Orbital   Muscle Mass Loss:  7 - Moderate muscle mass loss Temples (temporalis), Clavicles (pectoralis & deltoids)  Fluid Accumulation:  No significant fluid accumulation Extremities   Strength:  Not Performed    Estimated Daily Nutrient Needs:  Energy (kcal):  7728-5504 based on [place] with 1.2-1.3 factor; Weight Used for Energy Requirements:  Admission     Protein (g):   based on 1.2-1.3 gm per kg; Weight Used for Protein Requirements:  Admission          Nutrition Related Findings:  Hx: Cardiac surgery 6/9/21; GERD, esophageal

## 2021-09-18 NOTE — PROGRESS NOTES
Dr. Mamie Cortes would like ID to be notified of patient's hallucinations and being on Merrem. ID has been perfect served several times. Will attempt to call and discuss antibiotic treatment.

## 2021-09-18 NOTE — CARE COORDINATION
CASE MANAGEMENT NOTE:    Admission Date:  9/16/2021 Iman Claudio is a 78 y.o.  male    Admitted for : UTI (urinary tract infection) [N39.0]  Septicemia ([address] [A41.9]  Hypoxia [R09.02]  Urinary tract infection in male [N39.0]  COVID-19 [U07.1] Afebrile, on 3L NC. Called and spoke with pt's spouse, Paul Bae, over the phone. PCP:  Dr. Meche Lopez:  [facility]      Current Residence/ Living Arrangements:  in [address] [place] [address]. Paul Bae states the plan is for him to return. Does patient go to outpatient dialysis: No  If yes, location and chair time: n/a    Is patient currently receiving oral anticoagulation therapy? No       Discharge Plan:  LSW to follow for return to [facility].                  Electronically signed by: Aditi Yan RN on 9/18/2021 at 2:18 PM

## 2021-09-19 NOTE — CARE COORDINATION
DISCHARGE PLANNING NOTE:    COVID POSITIVE. Afebrile, on 3L NC. Plan remains for patient to be discharged back to [facility]. LSW to follow.     Electronically signed by Chloe Alcocer RN on 9/19/2021 at 4:28 PM

## 2021-09-19 NOTE — PLAN OF CARE
Problem: Airway Clearance - Ineffective  Goal: Achieve or maintain patent airway  9/19/2021 1156 by Dylan Ortiz RN  Outcome: Ongoing  9/19/2021 0423 by Apple Carrillo RN  Outcome: Ongoing     Problem: Gas Exchange - Impaired  Goal: Absence of hypoxia  9/19/2021 1156 by Dylan Ortiz RN  Outcome: Ongoing  9/19/2021 0423 by Apple Carrillo RN  Outcome: Ongoing  Goal: Promote optimal lung function  9/19/2021 1156 by Dylan Ortiz RN  Outcome: Ongoing  9/19/2021 0423 by Apple Carrillo RN  Outcome: Ongoing     Problem: Breathing Pattern - Ineffective  Goal: Ability to achieve and maintain a regular respiratory rate  9/19/2021 1156 by Dylan Ortiz RN  Outcome: Ongoing  9/19/2021 0423 by Apple Carrillo RN  Outcome: Ongoing     Problem:  Body Temperature -  Risk of, Imbalanced  Goal: Ability to maintain a body temperature within defined limits  9/19/2021 1156 by Dylan Ortiz RN  Outcome: Ongoing  9/19/2021 0423 by Apple Carrillo RN  Outcome: Ongoing  Goal: Will regain or maintain usual level of consciousness  9/19/2021 1156 by Dylan Ortiz RN  Outcome: Ongoing  9/19/2021 0423 by Apple Carrillo RN  Outcome: Ongoing  Goal: Complications related to the disease process, condition or treatment will be avoided or minimized  9/19/2021 1156 by Dylan Ortiz RN  Outcome: Ongoing  9/19/2021 0423 by Apple Carrillo RN  Outcome: Ongoing     Problem: Isolation Precautions - Risk of Spread of Infection  Goal: Prevent transmission of infection  9/19/2021 1156 by Dylan Ortiz RN  Outcome: Ongoing  9/19/2021 0423 by Apple Carrillo RN  Outcome: Ongoing     Problem: Nutrition Deficits  Goal: Optimize nutritional status  9/19/2021 1156 by Dylan Ortiz RN  Outcome: Ongoing  9/19/2021 0423 by Apple Carrillo RN  Outcome: Ongoing     Problem: Risk for Fluid Volume Deficit  Goal: Maintain normal heart rhythm  9/19/2021 1156 by Dylan Ortiz RN  Outcome: Ongoing  9/19/2021 0423 by Apple Carrillo RN  Outcome: Ongoing  Goal: Maintain absence of muscle cramping  9/19/2021 1156 by Adriana Lorenzo RN  Outcome: Ongoing  9/19/2021 0423 by Abbi Hu RN  Outcome: Ongoing  Goal: Maintain normal serum potassium, sodium, calcium, phosphorus, and pH  9/19/2021 1156 by Adriana Lorenzo RN  Outcome: Ongoing  9/19/2021 0423 by Abbi Hu RN  Outcome: Ongoing     Problem: Loneliness or Risk for Loneliness  Goal: Demonstrate positive use of time alone when socialization is not possible  9/19/2021 1156 by Adriana Lorenzo RN  Outcome: Ongoing  9/19/2021 0423 by Abbi Hu RN  Outcome: Ongoing     Problem: Fatigue  Goal: Verbalize increase energy and improved vitality  9/19/2021 1156 by Adriana Lorenzo RN  Outcome: Ongoing  9/19/2021 0423 by Abbi Hu RN  Outcome: Ongoing     Problem: Patient Education: Go to Patient Education Activity  Goal: Patient/Family Education  9/19/2021 1156 by Adriana Lorenzo RN  Outcome: Ongoing  9/19/2021 0423 by Abbi Hu RN  Outcome: Ongoing     Problem: Nutrition  Goal: Optimal nutrition therapy  9/19/2021 1156 by Adriana Lorenzo RN  Outcome: Ongoing  9/19/2021 0423 by Abbi Hu RN  Outcome: Ongoing     Problem: Falls - Risk of:  Goal: Will remain free from falls  Description: Will remain free from falls  9/19/2021 1156 by Adriana Lorenzo RN  Outcome: Ongoing  9/19/2021 0423 by Abbi Hu RN  Outcome: Ongoing  Note: Patient remains free of falls and injuries throughout shift. Bed remains in the lowest position, wheels locked, call light and bedside table are within reach.    Goal: Absence of physical injury  Description: Absence of physical injury  9/19/2021 1156 by Adriana Lorenzo RN  Outcome: Ongoing  9/19/2021 0423 by Abbi Hu RN  Outcome: Ongoing     Problem: Skin Integrity:  Goal: Will show no infection signs and symptoms  Description: Will show no infection signs and symptoms  9/19/2021 1156 by Adriana Lorenzo RN  Outcome: Ongoing  9/19/2021 0423 by Abbi Hu RN  Outcome: Ongoing  Goal: Absence of new skin breakdown  Description: Absence of new skin breakdown  9/19/2021 1156 by Mack Doll RN  Outcome: Ongoing  9/19/2021 0423 by Colt Hubbard RN  Outcome: Ongoing  Note: Assess the overall condition of the skin. Check on bony prominences such as the sacrum, trochanters, scapulae, elbows, heels, inner and outer malleolus, inner and outer knees, back of head). Reinforce the importance of turning, mobility, and ambulation.

## 2021-09-19 NOTE — PROGRESS NOTES
Writer speaks with patient's wife Vivian House to give update. All questions answered, no concerns at this time.

## 2021-09-19 NOTE — PLAN OF CARE
Vince Bradford RN  Outcome: Ongoing  Goal: Maintain absence of muscle cramping  9/19/2021 0423 by Becki Dominguez RN  Outcome: Ongoing  9/18/2021 1826 by Vince Bradford RN  Outcome: Ongoing  Goal: Maintain normal serum potassium, sodium, calcium, phosphorus, and pH  9/19/2021 0423 by Becki Dominguez RN  Outcome: Ongoing  9/18/2021 1826 by Vince Bradford RN  Outcome: Ongoing     Problem: Loneliness or Risk for Loneliness  Goal: Demonstrate positive use of time alone when socialization is not possible  9/19/2021 0423 by Becki Dominguez RN  Outcome: Ongoing  9/18/2021 1826 by Vince Bradford RN  Outcome: Ongoing     Problem: Fatigue  Goal: Verbalize increase energy and improved vitality  9/19/2021 0423 by Becki Dominguez RN  Outcome: Ongoing  9/18/2021 1826 by Vince Bradford RN  Outcome: Ongoing     Problem: Patient Education: Go to Patient Education Activity  Goal: Patient/Family Education  9/19/2021 0423 by Becki Dominguez RN  Outcome: Ongoing  9/18/2021 1826 by Vince Bradford RN  Outcome: Ongoing     Problem: Nutrition  Goal: Optimal nutrition therapy  9/19/2021 0423 by Becki Dominguez RN  Outcome: Ongoing  9/18/2021 1826 by Vince Bradford RN  Outcome: Ongoing     Problem: Falls - Risk of:  Goal: Will remain free from falls  Description: Will remain free from falls  Outcome: Ongoing  Note: Patient remains free of falls and injuries throughout shift. Bed remains in the lowest position, wheels locked, call light and bedside table are within reach. Goal: Absence of physical injury  Description: Absence of physical injury  Outcome: Ongoing     Problem: Skin Integrity:  Goal: Will show no infection signs and symptoms  Description: Will show no infection signs and symptoms  Outcome: Ongoing  Goal: Absence of new skin breakdown  Description: Absence of new skin breakdown  Outcome: Ongoing  Note: Assess the overall condition of the skin.  Check on bony prominences such as the sacrum, trochanters, scapulae, elbows, heels, inner and outer malleolus, inner and outer knees, back of head). Reinforce the importance of turning, mobility, and ambulation.

## 2021-09-19 NOTE — PROGRESS NOTES
[facility] PULMONARY, CRITICAL CARE & SLEEP  MD Ann Waite MD Angelina Self MD Marijane Cord MD Van Ferraris MD  [address] [place] [address]                                      Critical Care Pulmonary Progress Note    Patient - Ag Gama   Age - 78 y.o.   - 1940  MRN - [id number]  Acct # - [de-identified]  Date of Admission - 2021  7:45 PM    Consulting Service/Physician:   Consulting:    [unfilled]    Primary Care Physician: Jori Huggins MD    SUBJECTIVE:     Chief Complaint:   Chief Complaint   Patient presents with   Cammy Newell     Subjective:  Patient seen and examined. Compared to the last time I examined the patient his hallucinations are resolved and his mentation is mildly improved but he is still encephalopathic. He recognizes he is in the hospital but believes he is at the [facility] clinic. He remains hemodynamically stable. No documented fevers in the last 24 hours. No significant overnight events or acute issues that require my attention. VITALS  /73   Pulse 97   Temp 98.1 °F (36.7 °C) (Axillary)   Resp 20   Ht 6' 1\" (1.854 m)   Wt 189 lb 6 oz (85.9 kg)   SpO2 93%   BMI 24.99 kg/m²   Wt Readings from Last 3 Encounters:   21 189 lb 6 oz (85.9 kg)   21 186 lb 1.1 oz (84.4 kg)   21 230 lb (104.3 kg)     I/O (24 Hours)    Intake/Output Summary (Last 24 hours) at 2021 1921  Last data filed at 2021 1853  Gross per 24 hour   Intake 1150 ml   Output 800 ml   Net 350 ml     Ventilator:   Settings  FiO2 : 66 %  Exam:   Physical Exam  Constitutional:       General: He is awake. He is not in acute distress. Appearance: Normal appearance. He is ill-appearing. He is not diaphoretic. Interventions: Nasal cannula in place. Comments: Patient appears encephalopathic   HENT:      Head: Normocephalic and atraumatic.       Right Ear: External ear normal.      Left Ear: External ear normal. Nose: Nose normal.      Mouth/Throat:      Mouth: Mucous membranes are moist.      Pharynx: No oropharyngeal exudate. Eyes:      General: No scleral icterus. Extraocular Movements: Extraocular movements intact. Conjunctiva/sclera: Conjunctivae normal.   Neck:      Thyroid: No thyroid mass or thyromegaly. Trachea: Trachea and phonation normal.   Cardiovascular:      Rate and Rhythm: Normal rate and regular rhythm. Heart sounds: Normal heart sounds. No murmur heard. No gallop. Pulmonary:      Effort: Pulmonary effort is normal. No accessory muscle usage or respiratory distress. Breath sounds: Normal air entry. Decreased breath sounds and rales present. No wheezing or rhonchi. Abdominal:      General: Bowel sounds are normal. There is no distension. Palpations: Abdomen is soft. Tenderness: There is no abdominal tenderness. Musculoskeletal:      Cervical back: Neck supple. Right lower leg: No edema. Left lower leg: No edema. Skin:     General: Skin is warm. Capillary Refill: Capillary refill takes less than 2 seconds. Neurological:      Mental Status: He is disoriented and confused. GCS: GCS eye subscore is 4. GCS verbal subscore is 5. GCS motor subscore is 6. Cranial Nerves: Cranial nerves are intact. Sensory: Sensation is intact. Motor: Weakness present. Deep Tendon Reflexes: Reflexes are normal and symmetric. Reflexes normal.   Psychiatric:         Attention and Perception: He does not perceive visual hallucinations. Speech: Speech is not slurred. Behavior: Behavior is slowed. Cognition and Memory: He exhibits impaired recent memory and impaired remote memory.       Comments: Patient denies hallucinations at this time       Infusions:      sodium chloride      sodium chloride 75 mL/hr at 09/17/21 2010     Meds:     Current Facility-Administered Medications:     dexamethasone (DECADRON) tablet 6 mg, 6 mg, Oral, Daily, Rina Harkins MD, 6 mg at 09/19/21 1729    aluminum & magnesium hydroxide-simethicone (MAALOX) 200-200-20 MG/5ML suspension 30 mL, 30 mL, Oral, Q6H PRN, MOISES uDarte CNP    amiodarone (CORDARONE) tablet 200 mg, 200 mg, Oral, Daily, MOISES Duarte CNP, 200 mg at 09/19/21 0847    ascorbic acid (VITAMIN C) tablet 500 mg, 500 mg, Oral, Daily, MOISES Duarte CNP, 500 mg at 09/19/21 0847    atorvastatin (LIPITOR) tablet 20 mg, 20 mg, Oral, Daily, MOISES Duarte CNP, 20 mg at 09/19/21 0847    divalproex (DEPAKOTE SPRINKLE) capsule 125 mg, 125 mg, Oral, BID, MOISES Duarte CNP, 125 mg at 09/19/21 1729    ferrous sulfate (IRON 325) tablet 325 mg, 325 mg, Oral, Daily with breakfast, MOISES Duarte CNP, 325 mg at 09/19/21 0847    [Held by provider] furosemide (LASIX) tablet 20 mg, 20 mg, Oral, Daily, MOISES Duarte CNP    latanoprost (XALATAN) 0.005 % ophthalmic solution 1 drop, 1 drop, Both Eyes, Nightly, MOISES Duarte CNP    melatonin tablet 3 mg, 3 mg, Oral, Nightly PRN, MOISES Duarte CNP, 3 mg at 09/18/21 2223    therapeutic multivitamin-minerals 1 tablet, 1 tablet, Oral, Daily, MOISES Duarte CNP, 1 tablet at 09/19/21 0847    timolol (TIMOPTIC) 0.5 % ophthalmic solution 1 drop, 1 drop, Both Eyes, Daily, MOISES Duarte CNP    zinc sulfate (ZINCATE) capsule 50 mg, 50 mg, Oral, Daily, MOISES Duarte CNP, 50 mg at 09/19/21 0847    sodium chloride flush 0.9 % injection 5-40 mL, 5-40 mL, IntraVENous, 2 times per day, MOISES Duarte CNP, 5 mL at 09/17/21 0959    sodium chloride flush 0.9 % injection 10 mL, 10 mL, IntraVENous, PRN, MOISES Duarte CNP    0.9 % sodium chloride infusion, 25 mL, IntraVENous, PRN, MOISES Duarte CNP    ondansetron (ZOFRAN-ODT) disintegrating tablet 4 mg, 4 mg, Oral, Q8H PRN **OR** ondansetron (ZOFRAN) injection 4 mg, 4 mg, IntraVENous, Q6H PRN, MOISES Duarte CNP, 4 mg at 09/19/21 1025    polyethylene glycol (GLYCOLAX) from a respiratory standpoint this is pretty mild disease  2. Acute hypoxemic respiratory failure-currently stable on nasal cannula  3. Small left-sided pleural effusion  4. Sepsis secondary to ESBL positive E. coli bacteremia and probable pyelonephritis versus infected cyst  5. Visual hallucinations-these appear to have resolved, suspect secondary to meropenem  6. Acute delirium superimposed on metabolic encephalopathy  7. Hyperkalemia  8. Acute kidney injury-improving  9. NSTEMI-mild, likely the consequence of acute kidney injury than true myocardial injury  10. Thrombocytopenia  11. Lactic acidosis-resolved  12. Paroxysmal atrial fibrillation  13. Recent aortic dissection complicated by acute aortic valve insufficiency status post graft and surgical valve replacement  14. Recent partial colectomy with ileostomy formation  15. GERD  16. Hyperlipidemia  17. Hypertension  18. BPH  19. History of GI bleed status post partial colectomy with ileostomy formation  20. Functional decline  21. Patient was vaccinated against YZPIY-20  26. Patient is a never smoker  21. No history of intrinsic lung disease  24. CODE STATUS-full code      PLAN:   1. Decadron is reasonable although I do not think that this patient is having a great deal of respiratory symptoms from his COVID-19.  2. Instead, I think he may be aspirating. We will check a chest x-ray tomorrow morning. 3. Patient's hallucinations that he was experiencing yesterday appear to have resolved. Limited treatment options given a ESBL positive organism. Agree with continuing meropenem. Appreciate infectious disease input. 4. Wean oxygen as tolerated maintain saturation greater than 90%  5. Swallow study when the patient is improved    Critical Care Time: 0 minutes    If you have any questions, please feel free to contact me directly.     Huan Holt MD, [facility]  [phone] --(Cell)  [phone] (office/answering service)  [phone] (fax)      Electronically signed by Maryam Plummer MD on 09/19/21     This progress note was completed using a voice transcription system. Every effort was made to ensure accuracy. However, inadvertent computerized transcription errors may be present.     [phone] (office/answering service)

## 2021-09-19 NOTE — PROGRESS NOTES
[facility] -   Infectious diseases evaluation  admission date 9/16/2021    reason for consultation:   UTI  Bacteremia  COVID-19 infection    Impression :   Current:  · UTI  · ESBL producing E. coli bacteremia secondary to above  · COVID-19 infection  · Acute hypoxic respiratory failure  · Left pleural effusion  · Acute on chronic renal failure  · Encephalopathy  · History of aortic valve replacement June 2021  · Aortic dissection  · Sulfa allergy    Recommendations   · Continue IV meropenem day 3  · Decadron  · Not a candidate for remdesivir  · Follow CBC and renal function  · Echocardiogram pending  · Follow inflammatory markers and chest x-ray  · Continue supportive care  · Droplet plus isolation      History of Present Illness:   Initial history:  Jerrell Brown is a 78y.o.-year-old male was brought to the hospital from [facility] after he was found laying on the ground. At the ER he was tachypneic and hypoxic, disoriented. .  Urinalysis was suggestive of UTI  He was placed on oxygen per nasal cannula then he was placed on high flow oxygen. Blood culture grew ESBL producing E. Coli  The patient is confused    COVID-19 came back positive  The patient received Covid 19 vaccine    Interval changes  9/19/2021   He remains on 3 L of oxygen by nasal cannula, pleasant, episodes of confusion, complaining of burning with urination, no Price catheter, poor appetite, occasional cough, no new complaints. WBC 10.7  Patient Vitals for the past 8 hrs:   BP Temp Temp src Pulse Resp SpO2   09/19/21 1412 108/73 98.1 °F (36.7 °C) Axillary 97 20 93 %   09/19/21 0835 122/82 97.6 °F (36.4 °C) Axillary 93 18 95 %             I have personally reviewed the past medical history, past surgical history, medications, social history, and family history, and I haveupdated the database accordingly.       Allergies:   Sulfa antibiotics and Vimovo [naproxen-esomeprazole]     Review of Systems: Review of Systems  Confused, agitated, unable to provide  Physical Examination :       Physical Exam  Remainder of examination deferred due to excessive risk conferred by physical examination during a global pandemic due to coronavirus 19. In a setting where local and national supplies of personal protective equipment are depleted  Past Medical History:     Past Medical History:   Diagnosis Date    Acute respiratory failure following trauma and surgery ([address]     Acute respiratory failure following trauma and surgery ([address]     Altered bowel elimination due to intestinal ostomy ([address]     iliostomy    Full dentures     pt said he has partial upper and lower    Full dentures     Gall stones     GERD (gastroesophageal reflux disease)     GI bleed     Hemorrhage of gastrointestinal tract, unspecified     Hemorrhage of gastrointestinal tract, unspecified     Hyperlipidemia     Hypertension     Kidney stones     Kidney stones     Primary localized osteoarthrosis, lower leg     both shoulders, neck, legs.  Primary localized osteoarthrosis, lower leg     Prostate disorder     Transient disorder of initiating or maintaining sleep     Transient disorder of initiating or maintaining sleep     Unspecified disorder of skin and subcutaneous tissue     Wears glasses     Wears glasses        Past Surgical  History:     Past Surgical History:   Procedure Laterality Date    ABDOMEN SURGERY      CATARACT REMOVAL WITH IMPLANT Bilateral     COLONOSCOPY      X3    COLOSTOMY Right     COLOSTOMY BAG ON RT.  SIDE    CYSTOSCOPY Right 8/29/2017    CYSTOSCOPY URETERAL STENT INSERTION performed by Marylen Platts, MD at [facility], ESOPHAGUS      EYE SURGERY      bettye. eyes, cataracts extracted with iol's    FRACTURE SURGERY      JOINT REPLACEMENT Right 3-23-15    KIDNEY STONE SURGERY Left     KNEE ARTHROSCOPY Right     LITHOTRIPSY Left 06-20-14    w/ cystoscopy C & P    SHOULDER SURGERY Left 9/14/2020    MOLE EXCISION POSTERIOR SHOULDER performed by Garrett Russell MD at [facility] , PARTIAL RECTUM\"    TOTAL KNEE ARTHROPLASTY  03/23/2015    Right with biomet and GPS product application    TOTAL KNEE ARTHROPLASTY Right 3/23/2015       Medications:      amiodarone  200 mg Oral Daily    ascorbic acid  500 mg Oral Daily    atorvastatin  20 mg Oral Daily    divalproex  125 mg Oral BID    ferrous sulfate  325 mg Oral Daily with breakfast    [Held by provider] furosemide  20 mg Oral Daily    latanoprost  1 drop Both Eyes Nightly    therapeutic multivitamin-minerals  1 tablet Oral Daily    timolol  1 drop Both Eyes Daily    zinc sulfate  50 mg Oral Daily    sodium chloride flush  5-40 mL IntraVENous 2 times per day    heparin (porcine)  5,000 Units SubCUTAneous 3 times per day    aspirin  81 mg Oral Daily    meropenem  1,000 mg IntraVENous Q12H       Social History:     Social History     Socioeconomic History    Marital status:      Spouse name: Not on file    Number of children: Not on file    Years of education: Not on file    Highest education level: Not on file   Occupational History    Not on file   Tobacco Use    Smoking status: Never Smoker    Smokeless tobacco: Never Used   Vaping Use    Vaping Use: Never used   Substance and Sexual Activity    Alcohol use: No     Alcohol/week: 0.0 standard drinks     Comment: occassional    Drug use: No    Sexual activity: Not on file   Other Topics Concern    Not on file   Social History Narrative    Not on file     Social Determinants of Health     Financial Resource Strain:     Difficulty of Paying Living Expenses:    Food Insecurity:     Worried About Running Out of Food in the Last Year:     Ran Out of Food in the Last Year:    Transportation Needs:     Lack of Transportation (Medical):      Lack of Transportation (Non-Medical):    Physical Activity:     Days of Exercise per Week:     Minutes of Exercise per Session:    Stress:     Feeling of Stress :    Social Connections:     Frequency of Communication with Friends and Family:     Frequency of Social Gatherings with Friends and Family:     Attends Sikh Services:     Active Member of Clubs or Organizations:     Attends Club or Organization Meetings:     Marital Status:    Intimate Partner Violence:     Fear of Current or Ex-Partner:     Emotionally Abused:     Physically Abused:     Sexually Abused:        Family History:     Family History   Problem Relation Age of Onset    Heart Disease Father     Other Mother       Medical Decision Making:   I have independently reviewed/ordered the following labs:    CBC with Differential:   Recent Labs     09/16/21 2129 09/16/21 2129 09/17/21 0927 09/19/21 0429   WBC 11.3*   < > 8.6 10.7   HGB 14.0   < > 12.8* 12.8*   HCT 43.9   < > 39.7* 40.1*      < > 132* 137*   LYMPHOPCT 6*  --   --   --    MONOPCT 5  --   --   --     < > = values in this interval not displayed. BMP:  Recent Labs     09/17/21 0927 09/19/21 0429    145*   K 5.4* 4.0    112*   CO2 21 23   BUN 60* 51*   CREATININE 1.81* 1.15   MG 2.1 2.1     Hepatic Function Panel:   Recent Labs     09/16/21 2129   PROT 7.6   LABALBU 3.3*   BILITOT 0.34   ALKPHOS 101   ALT 16   AST 15     No results for input(s): RPR in the last 72 hours. No results for input(s): HIV in the last 72 hours. No results for input(s): BC in the last 72 hours. Lab Results   Component Value Date    CREATININE 1.15 09/19/2021    GLUCOSE 88 09/19/2021    GLUCOSE 100 02/16/2012       Detailed results: Thank you for allowing us to participate in the care of this patient. Please call with questions.     This note is created with the assistance of a speech recognition program.  While intending to generate adocument that actually reflects the content of the visit, the document can still have some errors including those of syntax and sound a like substitutions which may escape proof reading. It such instances, actual meaningcan be extrapolated by contextual diversion.     Tracie Baker MD  Office: [phone]  Perfect serve / office [phone]

## 2021-09-19 NOTE — PROGRESS NOTES
Physical Therapy    Facility/Department: [facility]  Initial Assessment    NAME: Sydni Sanchez  : 1940  MRN: [id number]    Date of Service: 2021    Discharge Recommendations:  Patient would benefit from continued therapy after discharge   PT Equipment Recommendations  Equipment Needed:  (TBD)    Assessment   Body structures, Functions, Activity limitations: Decreased functional mobility ; Decreased safe awareness;Decreased strength;Decreased endurance;Decreased cognition  Assessment: Pt fatigues rapidly w/ activity- pt nods off and needs reawakened often to answer questions. Poor tolerance to activity. Patient does have history of type aortic dissection in 2021 which was treated by surgery at [facility].  Treatment Diagnosis: impaired mobility due to debilitation and (+) COVID 19 infection  Specific instructions for Next Treatment: continue rolling and bed mobility, instruct in strengthening exercises and energy conservation techniques, further assess transfers when medically ready- MONITOR O2 SATS w/ TREATMENT  Prognosis: Guarded  Decision Making: Medium Complexity  History: pt admitted through the ER due to a fall at the nursing home. Pt found down on the floor. Exam: ROM, MMT and bed mobility assessment  Clinical Presentation: bedside evaluation completed, max x 1 for rolling either direction, dep x 2 for scooting, FALL RISK, (+) COVID 19, O2 at 3 L- monitor O2 sats during treatment, will need mobility assessed when medically ready, colostomy bag, hx recent aortic disection surgery  PT Education: Goals;PT Role;Plan of Care  Barriers to Learning: cognition  REQUIRES PT FOLLOW UP: Yes  Activity Tolerance  Activity Tolerance: Patient limited by fatigue;Patient limited by cognitive status; Patient limited by endurance  Activity Tolerance: Patient does have history of type aortic dissection in 2021 which was treated by surgery at [facility].       Patient Diagnosis(es): The primary encounter diagnosis was Septicemia ([facility] [address] Diagnoses of Urinary tract infection in male, Hypoxia, and COVID-19 were also pertinent to this visit. has a past medical history of Acute respiratory failure following trauma and surgery ([address] Acute respiratory failure following trauma and surgery ([facility] [address] Altered bowel elimination due to intestinal ostomy ([facility] [address] Full dentures, Full dentures, Gall stones, GERD (gastroesophageal reflux disease), GI bleed, Hemorrhage of gastrointestinal tract, unspecified, Hemorrhage of gastrointestinal tract, unspecified, Hyperlipidemia, Hypertension, Kidney stones, Kidney stones, Primary localized osteoarthrosis, lower leg, Primary localized osteoarthrosis, lower leg, Prostate disorder, Transient disorder of initiating or maintaining sleep, Transient disorder of initiating or maintaining sleep, Unspecified disorder of skin and subcutaneous tissue, Wears glasses, and Wears glasses. has a past surgical history that includes Knee arthroscopy (Right); Tonsillectomy; Cataract removal with implant (Bilateral); Kidney stone surgery (Left); Colonoscopy; total colectomy; colostomy (Right); Lithotripsy (Left, 06-20-14); Abdomen surgery; Dilatation, esophagus; fracture surgery; eye surgery; Total knee arthroplasty (03/23/2015); Total knee arthroplasty (Right, 3/23/2015); joint replacement (Right, 3-23-15); Cystoscopy (Right, 8/29/2017); and shoulder surgery (Left, 9/14/2020).     Restrictions  Restrictions/Precautions  Restrictions/Precautions: Fall Risk, Isolation, Cardiac (peripheral IV right forearm, O2 at 3 L, colostomy, DROPLET PLUS (+) COVID 19, troponins 53 on 9-, 1:1 supervision)  Required Braces or Orthoses?: No  Implants present? : Metal implants (right TKR, aortic valve replacement)  Position Activity Restriction  Other position/activity restrictions: up w/ assist,  Patient does have history of type aortic dissection in July 2021 which was treated by surgery at [address] [place] [address] Avenue: Impaired  Vision Exceptions: Wears glasses at all times  Hearing: Exceptions to [facility]  Hearing Exceptions: Hard of hearing/hearing concerns     Subjective  General  Patient assessed for rehabilitation services?: Yes  Additional Pertinent Hx: Patient does have history of type aortic dissection in July 2021 which was treated by surgery at [facility].  Response To Previous Treatment: Not applicable  Family / Caregiver Present: No  Referring Practitioner: ADELFO Batres CNP  Referral Date : 09/17/21  Diagnosis: UTI, septicemia, hypoxia  Follows Commands: Impaired (confused)  Other (Comment): OK per nurse Gladys to proceed w/ PT evaluation  General Comment  Comments: per chart, pt found down at the nursing facility for unknown period of time. CT chest shows known type A  aortic  dissection. CT scan of the head shows no acute intracranial abnormality but right frontal soft tissue contusion. Subjective  Subjective: pt is confused and unable to provide much information. Pain Screening  Patient Currently in Pain: Denies  Vital Signs  Patient Currently in Pain: Denies       Orientation  Orientation  Overall Orientation Status: Impaired  Orientation Level: Oriented to person;Disoriented to place; Disoriented to time;Disoriented to situation  Social/Functional History  Social/Functional History  Lives With: Spouse (Resides in nursing home now for rehab after vascular surgery)  Type of Home: House (Condo)  Home Layout: One level (Basement, but pt does not go downstairs)  Home Access: Stairs to enter without rails  Entrance Stairs - Number of Steps: 2 (from garage )  Entrance Stairs - Rails: None  Bathroom Shower/Tub: Walk-in shower, Doors, Shower chair without back  Bathroom Toilet: Standard  Bathroom Equipment: Grab bars in shower, Shower chair  Bathroom Accessibility: Walker accessible  Home Equipment: Rolling walker  Receives Help From: Family (2 sons available when needed)  ADL Assistance: Independent  Homemaking Assistance: Needs assistance (Wife mostly does household chores)  Homemaking Responsibilities: No (Wife mostly does household chores)  Ambulation Assistance: Independent  Transfer Assistance: Independent  Active : Yes  Mode of Transportation: SUV  Occupation: Retired  Type of occupation: - [employer]: Read, watch movies, sports, spend time with sons  IADL Comments: pt unable to state what level of assist at nursing home- states that he does not get OOB  Additional Comments: pt is a poor historian and unable to provide any meaningful history. No family present. Above information taken from PT evaluation dated 8-. pt currently resides in nursing home now for rehab after vascular surgery. The above reports are before the pt's heart surgery on 7/9/2021. Pt used to go to the gym five days a week.  Since his surgery he has needed alot of assistance for all ADLs  Cognition        Objective     Observation/Palpation  Observation: peripheral IV right forearm, O2 at 3 L, colostomy, DROPLET PLUS (+) COVID 19, troponins 53 on 9-, 1:1 supervision  Scar: anterior right TKR incision    AROM RLE (degrees)  RLE AROM: Exceptions  R SLR: 0-30  R Hip Flexion 0-125: 0-50  R Hip ABduction 0-45: 0-20  R Hip ADduction 0-10: neutral  R Knee Flexion 0-145: 0-60  R Knee Extension 0: 0  R Ankle Dorsiflexion 0-20: WFL  R Ankle Plantar Flexion 0-45: WFL  AROM LLE (degrees)  L SLR: 0-30  L Hip Flexion 0-125: 0-70  L Hip ABduction 0-45: 0-25  L Hip ADduction 0-10: neutral  L Knee Flexion 0-145: 0-70  L Knee Extension 0: 0  L Ankle Dorsiflexion 0-20: WFL  L Ankle Plantar Flexion 0-45: WFL  AROM RUE (degrees)  RUE General AROM: see OT for UE assessment  AROM LUE (degrees)  LUE General AROM: see OT for UE assessment  Strength RLE  Strength RLE: Exception  R Hip Flexion: 2+/5  R Hip ABduction: 3-/5  R Hip ADduction: 3-/5  R Knee Flexion: 3-/5  R Knee Extension: 3-/5  R Ankle Dorsiflexion: 3+/5  R Ankle Plantar flexion: 3+/5  Strength LLE  L Hip Flexion: 2+/5  L Hip ABduction: 3+/5  L Hip ADduction: 3+/5  L Knee Flexion: 3-/5  L Knee Extension: 3-/5  L Ankle Dorsiflexion: 3+/5  L Ankle Plantar Flexion: 3+/5  Strength RUE  Comment: see OT for UE assessment  Strength LUE  Comment: see OT for UE assessment     Sensation  Overall Sensation Status:  (unable to assess at this time)  Bed mobility  Rolling to Left: Maximum assistance  Rolling to Right: Maximum assistance  Scootin Person assistance;Dependent/Total  Comment: pt's gown soiled w/ stool (colostomy bag opened at bottom) and urine. Therapist assisted PCT to change gown and bed linens and apply new brief. Pt fatigues rapidly w/ activity- pt nods off and needs reawakened often to answer questions. Transfers  Comment: deferred at this time- poor tolerance to bed activity  Ambulation  Ambulation?: No (deferred at this time)     Balance  Comments: deferred at this time        Plan   Plan  Times per week: 3-5 treatments/ week  Times per day:  (3-5 treatments/ week)  Specific instructions for Next Treatment: continue rolling and bed mobility, instruct in strengthening exercises and energy conservation techniques, further assess transfers when medically ready- MONITOR O2 SATS w/ TREATMENT  Current Treatment Recommendations: Strengthening, Patient/Caregiver Education & Training, ROM, Positioning  Safety Devices  Type of devices:  All fall risk precautions in place, Sitter present, Patient at risk for falls, Bed alarm in place, Left in bed, Nurse notified (nurse Volney List)    G-Code       OutComes Score                                                  AM-PAC Score  AM-PAC Inpatient Mobility Raw Score : 7 (21)  AM-PAC Inpatient T-Scale Score : 26.42 (21)  Mobility Inpatient CMS 0-100% Score: 92.36 (21)  Mobility Inpatient CMS G-Code Modifier : CM (21)          Goals  Short term goals  Time Frame for Short term goals: 3-5 treatments/ week  Short term goal 1: pt to tolerate 1/2 hour of therapuetic exercise and activity keeping O2 sats above 90%  Short term goal 2: pt to follow directions accurately 50% of the time to complete LE strengthening exercises (cardiac precautions) and energy conservation techniques  Short term goal 3: pt to demonstrate rolling in bed either direction w/ min x 1 for position change  Short term goal 4: further assess dangling and transfers when pt medicaly ready and able to participate  Patient Goals   Patient goals : unable to state       Therapy Time   Individual Concurrent Group Co-treatment   Time In 1342         Time Out 1408         Minutes 26         Timed Code Treatment Minutes: [address] [facility], PT

## 2021-09-20 NOTE — PROGRESS NOTES
Called and left message for patients wife for an update.      Electronically signed by Nolan Segal RN on 9/20/21 at 6:38 PM EDT

## 2021-09-20 NOTE — PROGRESS NOTES
Patient starting to get increasingly agitated and restless. Patient pulling at IV and telemetry leads. This RN spoke with Dr. Jefferson Mejia regarding patient's agitation. New orders received.

## 2021-09-20 NOTE — PLAN OF CARE
Problem: Airway Clearance - Ineffective  Goal: Achieve or maintain patent airway  9/20/2021 1451 by Danielle Lopez RN  Outcome: Ongoing  Note: Respiratory assessment as charted. 9/20/2021 0434 by Misty Gil RN  Outcome: Ongoing     Problem: Gas Exchange - Impaired  Goal: Absence of hypoxia  9/20/2021 1451 by Danielle Lopez RN  Outcome: Ongoing  9/20/2021 0434 by Misty Gil RN  Outcome: Ongoing  Goal: Promote optimal lung function  9/20/2021 1451 by Danielle Lopez RN  Outcome: Ongoing  9/20/2021 0434 by Misty Gil RN  Outcome: Ongoing     Problem: Breathing Pattern - Ineffective  Goal: Ability to achieve and maintain a regular respiratory rate  9/20/2021 1451 by Danielle Lopez RN  Outcome: Ongoing  9/20/2021 0434 by Misty Gil RN  Outcome: Ongoing     Problem:  Body Temperature -  Risk of, Imbalanced  Goal: Ability to maintain a body temperature within defined limits  9/20/2021 1451 by Danielle Lopez RN  Outcome: Ongoing  9/20/2021 0434 by Misty Gil RN  Outcome: Ongoing  Goal: Will regain or maintain usual level of consciousness  9/20/2021 1451 by Danielle Lopez RN  Outcome: Ongoing  9/20/2021 0434 by Misty Gil RN  Outcome: Ongoing  Goal: Complications related to the disease process, condition or treatment will be avoided or minimized  9/20/2021 1451 by Danielle Lopez RN  Outcome: Ongoing  9/20/2021 0434 by Misty Gil RN  Outcome: Ongoing     Problem: Isolation Precautions - Risk of Spread of Infection  Goal: Prevent transmission of infection  9/20/2021 1451 by Danielle Lopez RN  Outcome: Ongoing  9/20/2021 0434 by Misty Gil RN  Outcome: Ongoing     Problem: Nutrition Deficits  Goal: Optimize nutritional status  9/20/2021 1451 by Danielle Lopez RN  Outcome: Ongoing  9/20/2021 0434 by Misty Gil RN  Outcome: Ongoing     Problem: Risk for Fluid Volume Deficit  Goal: Maintain normal heart rhythm  9/20/2021 1451 by Danielle Lopez RN  Outcome: Ongoing  9/20/2021 0434 by Agueda Newman reach. Side rails up x2. Bed in lowest position. Patient safety is maintain.    9/20/2021 0434 by Watson Gao RN  Outcome: Ongoing  Goal: Absence of new skin breakdown  Description: Absence of new skin breakdown  9/20/2021 1451 by Annelle Duverney, RN  Outcome: Ongoing  9/20/2021 0434 by Watson Gao RN  Outcome: Ongoing     Problem: Musculor/Skeletal Functional Status  Goal: Highest potential functional level  9/20/2021 1451 by Annelle Duverney, RN  Outcome: Ongoing  9/20/2021 0434 by Watson Gao RN  Outcome: Ongoing  Goal: Absence of falls  9/20/2021 1451 by Annelle Duverney, RN  Outcome: Ongoing  9/20/2021 0434 by Watson Gao RN  Outcome: Ongoing

## 2021-09-20 NOTE — PLAN OF CARE
Problem: Airway Clearance - Ineffective  Goal: Achieve or maintain patent airway  Outcome: Ongoing     Problem: Gas Exchange - Impaired  Goal: Absence of hypoxia  Outcome: Ongoing  Goal: Promote optimal lung function  Outcome: Ongoing     Problem: Breathing Pattern - Ineffective  Goal: Ability to achieve and maintain a regular respiratory rate  Outcome: Ongoing     Problem:  Body Temperature -  Risk of, Imbalanced  Goal: Ability to maintain a body temperature within defined limits  Outcome: Ongoing  Goal: Will regain or maintain usual level of consciousness  Outcome: Ongoing  Goal: Complications related to the disease process, condition or treatment will be avoided or minimized  Outcome: Ongoing     Problem: Isolation Precautions - Risk of Spread of Infection  Goal: Prevent transmission of infection  Outcome: Ongoing     Problem: Nutrition Deficits  Goal: Optimize nutritional status  Outcome: Ongoing     Problem: Risk for Fluid Volume Deficit  Goal: Maintain normal heart rhythm  Outcome: Ongoing  Goal: Maintain absence of muscle cramping  Outcome: Ongoing  Goal: Maintain normal serum potassium, sodium, calcium, phosphorus, and pH  Outcome: Ongoing     Problem: Loneliness or Risk for Loneliness  Goal: Demonstrate positive use of time alone when socialization is not possible  Outcome: Ongoing     Problem: Fatigue  Goal: Verbalize increase energy and improved vitality  Outcome: Ongoing     Problem: Patient Education: Go to Patient Education Activity  Goal: Patient/Family Education  Outcome: Ongoing     Problem: Nutrition  Goal: Optimal nutrition therapy  Outcome: Ongoing     Problem: Falls - Risk of:  Goal: Will remain free from falls  Description: Will remain free from falls  Outcome: Ongoing  Goal: Absence of physical injury  Description: Absence of physical injury  Outcome: Ongoing     Problem: Skin Integrity:  Goal: Will show no infection signs and symptoms  Description: Will show no infection signs and symptoms  Outcome: Ongoing  Goal: Absence of new skin breakdown  Description: Absence of new skin breakdown  Outcome: Ongoing     Problem: Musculor/Skeletal Functional Status  Goal: Highest potential functional level  Outcome: Ongoing  Goal: Absence of falls  Outcome: Ongoing

## 2021-09-20 NOTE — PROGRESS NOTES
Patient complaining of chest pain, states it's 5/10 pain scale. Writer perfect-served Dr. Tam Pope. Awaiting response back.      Electronically signed by Dominga Parnell RN on 9/20/21 at 12:44 PM EDT

## 2021-09-20 NOTE — PROGRESS NOTES
9/14/2020). Restrictions  Restrictions/Precautions: Fall Risk, Isolation, Cardiac (peripheral IV right forearm, O2 at 3 L, colostomy, DROPLET PLUS (+) COVID 19, troponins 53 on 9-, 1:1 supervision)  Implants present? : Metal implants (right TKR, aortic valve replacement)  Other position/activity restrictions: up w/ assist,  Patient does have history of type aortic dissection in July 2021 which was treated by surgery at [facility].  Required Braces or Orthoses?: No     Vitals  Temp: 98 °F (36.7 °C)  Pulse: 88  Resp: 18  BP: 116/79  Height: 6' 1\" (185.4 cm)  Weight: 189 lb 6 oz (85.9 kg)  BMI (Calculated): 25  Oxygen Therapy  SpO2: 97 %  Pulse Oximeter Device Mode: Intermittent  Pulse Oximeter Device Location: Toe  O2 Device: Nasal cannula  FiO2 : 66 %  O2 Flow Rate (L/min): 4 L/min  Blood Gas  Performed?: Yes  Dewayne's Test #1: Collateral flow confirmed  Site #1: Right Radial  Site Prepped #1: Yes  Number of Attempts #1: 1  Pressure Held #1: Yes  Complications #1: None  Post-procedure #1: Standard  Specimen Status #1: Point of care  How Tolerated?: Tolerated well  Level of Consciousness: Alert (0)    Subjective  Subjective: \"Two usually, but sometimes one. ..6 hours, I mean 6 feet\" patient reports that he ambulates with staff assist at nursing, typically with 1-2 people.      Vision  Vision: Impaired  Vision Exceptions: Wears glasses at all times  Hearing  Hearing: Exceptions to [facility]  Hearing Exceptions: Hard of hearing/hearing concerns, No hearing aid  Social/Functional History  Lives With: Spouse (Resides in nursing home now for rehab after vascular surgery)  Type of Home: House (Condo)  Home Layout: One level (Basement, but pt does not go downstairs)  Home Access: Stairs to enter without rails  Entrance Stairs - Number of Steps: 2 (from garage )  Entrance Stairs - Rails: None  Bathroom Shower/Tub: Walk-in shower, Doors, Shower chair without back  Bathroom Toilet: [address]: [facility] bars in shower, Shower chair  Bathroom Accessibility: Walker accessible  Home Equipment: Rolling walker  Receives Help From: Family (2 sons available when needed)  ADL Assistance: Independent  Homemaking Assistance: Needs assistance (Wife mostly does household chores)  Homemaking Responsibilities: No (Wife mostly does household chores)  Ambulation Assistance: Independent  Transfer Assistance: Independent  Active : Yes  Mode of Transportation: [facility]  Occupation: Retired  Type of occupation: - [address]: Read, watch movies, sports, spend time with sons  IADL Comments: pt unable to state what level of assist at nursing home- states that he does not get OOB  Additional Comments: pt is a poor historian and unable to provide any meaningful history. No family present. Above information taken from PT evaluation dated 8-. pt currently resides in nursing home now for rehab after vascular surgery. The above reports are before the pt's heart surgery on 7/9/2021. Pt used to go to the gym five days a week. Since his surgery he has needed alot of assistance for all ADLs       Objective  Vision - Basic Assessment  Prior Vision: Wears glasses all the time   Cognition  Overall Cognitive Status: Impaired  Arousal/Alertness: Delayed responses to stimuli  Attention Span: Attends with cues to redirect  Memory: Decreased short term memory, Decreased recall of recent events  Following Commands:  Follows one step commands with repetition  Safety Judgement: Decreased awareness of need for safety  Awareness of Errors: Decreased awareness of errors  Insights: Decreased awareness of deficits  Sequencing and Organization: Assistance required to implement solutions, Assistance required to generate solutions, Assistance required to identify errors made   Perception  Overall Perceptual Status: Impaired  Initiation: Cues to initiate tasks  Motor Planning: Cues to use objects appropriately  Perseveration: Perseverates during conversation  Sensation  Overall Sensation Status:  (unable to assess at this time)   ADL  Feeding: Moderate assistance (per 1:1)  Grooming: Moderate assistance  UE Bathing: Moderate assistance  LE Bathing: Dependent/Total  UE Dressing: Maximum assistance  LE Dressing: Dependent/Total  Toileting: Dependent/Total  Additional Comments: ADL scores based on skilled observations and clinical reasoning unless otherwise noted. 1:1 present for information as well. Patient requires significant assist due to confusion and weakness. UE Function           LUE Strength  Gross LUE Strength: WFL  L Hand General: 4-/5  LUE Strength Comment: Grossly 4-/5     LUE Tone: Normotonic     LUE AROM (degrees)  LUE AROM : WFL     Left Hand AROM (degrees)  Left Hand AROM: WFL  RUE Strength  Gross RUE Strength: WFL  R Hand General: 4-/5  RUE Strength Comment: Grossly 4-/5      RUE Tone: Normotonic     RUE AROM (degrees)  RUE AROM : WFL     Right Hand AROM (degrees)  Right Hand AROM: WFL    Fine Motor Skills  Coordination  Movements Are Fluid And Coordinated: No  Coordination and Movement description: Left UE, Right UE, Decreased speed, Decreased accuracy                           Mobility  Supine to Sit: Moderate assistance  Sit to Supine: Moderate assistance       Balance  Sitting Balance: Contact guard assistance  Standing Balance: Unable to assess(comment) (due to cognitive status and weakness)        Bed mobility  Supine to Sit: Moderate assistance  Sit to Supine: Moderate assistance        Functional Activity Tolerance  Functional Activity Tolerance:  Tolerates < 10 Min exercise, no significant change in vital signs  Additional Comments: oxygen saturation monitored throughout, in the 90s   Assessment  Performance deficits / Impairments: Decreased functional mobility , Decreased ADL status, Decreased strength, Decreased safe awareness, Decreased cognition, Decreased endurance, Decreased balance, Decreased high-level IADLs, Decreased fine motor control  Treatment Diagnosis: Impaired self-care status. Prognosis: Good  Decision Making: Medium Complexity  REQUIRES OT FOLLOW UP: Yes  Discharge Recommendations: Patient would benefit from continued therapy after discharge  Activity Tolerance: Patient Tolerated treatment well         Functional Outcome Measures  AM-PAC Daily Activity Inpatient   How much help for putting on and taking off regular lower body clothing?: Total  How much help for Bathing?: A Lot  How much help for Toileting?: Total  How much help for putting on and taking off regular upper body clothing?: A Little  How much help for taking care of personal grooming?: A Little  How much help for eating meals?: A Lot  AM-[facility] Inpatient Daily Activity Raw Score: 12  AM-PAC Inpatient ADL T-Scale Score : 30.6  ADL Inpatient CMS 0-100% Score: 66.57  ADL Inpatient CMS G-Code Modifier : CL       Goals  Patient Goals   Patient goals : To feel better  Short term goals  Time Frame for Short term goals: By discharge  Short term goal 1: Patient will actively participate in 15+ minutes of self-care to the best of patient's ability with Minimal verbal cues for attention to task. Short term goal 2: Patient will actively participate in 15-25 minutes of therapeutic exercise/functional activities to promote increased independence with self-care and mobility. Short term goal 3: Patient will perform self-feeding with SBA and Fair attention to task. Short term goal 4: Patient will perform grooming and upper body bathing/dressing with Minimal assist and Fair attention to task. Short term goal 5: Patient will perform bed mobility with SBA to sit EOB for 10+ minutes unsupported while engaging in functional activity of choice. Short term goal 6: Patient will perform sit to stand transfer with Minimal assist x 2, Clotejal Bowen and Fair safety to transfer to bedside commode/chair.     Plan  Safety Devices  Safety Devices in place: Yes  Type of devices:  All fall risk precautions in place, Bed alarm in place, Patient at risk for falls, Left in bed, Nurse notified (1:1 present)     Plan  Times per week: 3-5  Times per day: Daily  Current Treatment Recommendations: Self-Care / ADL, Strengthening, Balance Training, Functional Mobility Training, Endurance Training, Safety Education & Training, Patient/Caregiver Education & Training, Equipment Evaluation, Education, & procurement          OT Individual Minutes  Time In: 1109  Time Out: 0001  Minutes: 21    Electronically signed by Bee Linder OT on 9/20/21 at 4:00 PM EDT

## 2021-09-20 NOTE — PROGRESS NOTES
Physical Therapy  [address]   Physical Therapy Progress Note    Date: 21  Patient Name: Noemi Frankel       Room: /-09  MRN: [id number]   Account: [de-identified]   : 1940  (78 y.o.) Gender: male     Referring Practitioner: MOISES Ridley CNP  Diagnosis: UTI, COVID-19  Past Medical History:  has a past medical history of Acute respiratory failure following trauma and surgery ([facility] [address] Acute respiratory failure following trauma and surgery ([facility] [address] Altered bowel elimination due to intestinal ostomy ([facility] [address] Full dentures, Full dentures, Gall stones, GERD (gastroesophageal reflux disease), GI bleed, Hemorrhage of gastrointestinal tract, unspecified, Hemorrhage of gastrointestinal tract, unspecified, Hyperlipidemia, Hypertension, Kidney stones, Kidney stones, Primary localized osteoarthrosis, lower leg, Primary localized osteoarthrosis, lower leg, Prostate disorder, Transient disorder of initiating or maintaining sleep, Transient disorder of initiating or maintaining sleep, Unspecified disorder of skin and subcutaneous tissue, Wears glasses, and Wears glasses. Past Surgical History:   has a past surgical history that includes Knee arthroscopy (Right); Tonsillectomy; Cataract removal with implant (Bilateral); Kidney stone surgery (Left); Colonoscopy; total colectomy; colostomy (Right); Lithotripsy (Left, 14); Abdomen surgery; Dilatation, esophagus; fracture surgery; eye surgery; Total knee arthroplasty (2015); Total knee arthroplasty (Right, 3/23/2015); joint replacement (Right, 3-23-15); Cystoscopy (Right, 2017); and shoulder surgery (Left, 2020).   Additional Pertinent Hx: Patient does have history of type aortic dissection in 2021 which was treated by surgery at [facility].    Overall Orientation Status: Impaired  Orientation Level: Oriented to person  Restrictions/Precautions  Restrictions/Precautions: Fall Risk;Isolation;Cardiac Therapeutic Intervention  Body structures, Functions, Activity limitations: Decreased functional mobility ; Decreased safe awareness;Decreased strength;Decreased endurance;Decreased cognition  Treatment Diagnosis: impaired mobility due to debilitation and (+) COVID 19 infection  Prognosis: Guarded  History: pt admitted through the ER due to a fall at the nursing home. Pt found down on the floor.   Barriers to Learning: cognition  REQUIRES PT FOLLOW UP: Yes  Discharge Recommendations: Patient would benefit from continued therapy after discharge    Goals  Short term goals  Time Frame for Short term goals: 3-5 treatments/ week  Short term goal 1: pt to tolerate 1/2 hour of therapuetic exercise and activity keeping O2 sats above 90%  Short term goal 2: pt to follow directions accurately 50% of the time to complete LE strengthening exercises (cardiac precautions) and energy conservation techniques  Short term goal 3: pt to demonstrate rolling in bed either direction w/ min x 1 for position change  Short term goal 4: further assess dangling and transfers when pt medicaly ready and able to participate       09/20/21 1556   PT Individual Minutes   Time In 7995   Time Out 1320   Minutes 23       Electronically signed by Rochelle De Los Santos PTA on 9/20/21 at 4:16 PM EDT

## 2021-09-20 NOTE — PROGRESS NOTES
Patient denies chest pain at this time.      Electronically signed by Jeana Schmitz RN on 9/20/21 at 12:54 PM EDT

## 2021-09-20 NOTE — PROGRESS NOTES
History and Physical Service  [facility]    HISTORY AND PHYSICAL EXAMINATION            Date:   9/19/2021  Patient name:  Emily Liz  MRN:   [id number]  Account:  [de-identified]  YOB: 1940  PCP:    Andrés Hannah MD  Code Status:    Full Code    Chief Complaint:   unwell    History Obtained From:     Patient ,medical record ,nursing staff    History of Present Illnes       The patient is a 78 y.o. Non- / non  male who presents   Patient brought to ED by EMS after being found down at [facility]. Patient was positive for Covid on 9/7/2021. Patient hypoxic on arrival required 2 L nasal cannula. Eventually switched to high flow nasal cannula. SPO2 97% CT scan redemonstrates known aortic dissection, no signs of pneumonia. Pulmonology consulted. Patient given Decadron in the ED. Urinalysis shows infection. Patient started on Rocephin. CT head negative for acute intracranial abnormality. Patient has MERRICK with creatinine 2.46, K 5.1. Patient given 2 L normal saline bolus in the ED. Recheck BMP in the morning. Patient hemodynamically stable. Admit to progressive unit      More confused  Agitated  Delirium      Past Medical History:   Diagnosis Date    Acute respiratory failure following trauma and surgery ([address]     Acute respiratory failure following trauma and surgery ([address]     Altered bowel elimination due to intestinal ostomy ([address]     iliostomy    Full dentures     pt said he has partial upper and lower    Full dentures     Gall stones     GERD (gastroesophageal reflux disease)     GI bleed     Hemorrhage of gastrointestinal tract, unspecified     Hemorrhage of gastrointestinal tract, unspecified     Hyperlipidemia     Hypertension     Kidney stones     Kidney stones     Primary localized osteoarthrosis, lower leg     both shoulders, neck, legs.     Primary localized osteoarthrosis, lower leg     Prostate disorder     Transient disorder of initiating or maintaining sleep     Transient disorder of initiating or maintaining sleep     Unspecified disorder of skin and subcutaneous tissue     Wears glasses     Wears glasses         Past Surgical History:     Past Surgical History:   Procedure Laterality Date    ABDOMEN SURGERY      CATARACT REMOVAL WITH IMPLANT Bilateral     COLONOSCOPY      X3    COLOSTOMY Right     COLOSTOMY BAG ON RT. SIDE    CYSTOSCOPY Right 8/29/2017    CYSTOSCOPY URETERAL STENT INSERTION performed by Gris Goodwin MD at [facility], ESOPHAGUS      EYE SURGERY      bettye. eyes, cataracts extracted with iol's    FRACTURE SURGERY      JOINT REPLACEMENT Right 3-23-15    KIDNEY STONE SURGERY Left     KNEE ARTHROSCOPY Right     LITHOTRIPSY Left 06-20-14    w/ cystoscopy C & P    SHOULDER SURGERY Left 9/14/2020    MOLE EXCISION POSTERIOR SHOULDER performed [place] Grimaldo [place] [facility] , PARTIAL RECTUM\"    TOTAL KNEE ARTHROPLASTY  03/23/2015    Right with biomet and GPS product application    TOTAL KNEE ARTHROPLASTY Right 3/23/2015        Medications Prior to Admission:     Prior to Admission medications    Medication Sig Start Date End Date Taking? Authorizing Provider   ascorbic acid (VITAMIN C) 500 MG tablet Take 500 mg by mouth 2 times daily    Yes Historical Provider, MD   azithromycin (ZITHROMAX) 250 MG tablet Take 250 mg by mouth daily For 4 days, 9/16-9/20 9/16/21 9/20/21 Yes Historical Provider, MD   divalproex (DEPAKOTE) 125 MG DR tablet Take 125 mg by mouth 2 times daily   Yes Historical Provider, MD   methylPREDNISolone (MEDROL DOSEPACK) 4 MG tablet Take 4 mg by mouth See Admin Instructions Take by mouth.    Yes Historical Provider, MD   zinc sulfate (ZINCATE) 220 (50 Zn) MG capsule Take 50 mg by mouth daily   Yes Historical Provider, MD   amiodarone (CORDARONE) 200 MG tablet Take 200 mg by mouth daily   Yes Historical Provider, MD   aspirin 81 MG chewable tablet Take 81 mg by mouth daily   Yes Historical Provider, MD   atorvastatin (LIPITOR) 20 MG tablet Take 20 mg by mouth daily   Yes Historical Provider, MD   ferrous sulfate (IRON 325) 325 (65 Fe) MG tablet Take 325 mg by mouth daily (with breakfast)   Yes Historical Provider, MD   furosemide (LASIX) 20 MG tablet Take 20 mg by mouth daily   Yes Historical Provider, MD   ipratropium-albuterol (DUONEB) 0.5-2.5 (3) MG/3ML SOLN nebulizer solution Inhale 1 vial into the lungs every 6 hours as needed for Shortness of Breath   Yes Historical Provider, MD   aluminum & magnesium hydroxide-simethicone (MYLANTA) 400-400-40 MG/5ML SUSP Take 15 mLs by mouth every 6 hours as needed (heartburn)   Yes Historical Provider, MD   methocarbamol (ROBAXIN) 750 MG tablet Take 750 mg by mouth every 8 hours as needed (muscle spasms)    Yes Historical Provider, MD   potassium chloride (KLOR-CON M) 20 MEQ extended release tablet Take 20 mEq by mouth daily   Yes Historical Provider, MD   Multiple Vitamins-Minerals (THERAPEUTIC MULTIVITAMIN-MINERALS) tablet Take 1 tablet by mouth daily   Yes Historical Provider, MD   traZODone (DESYREL) 150 MG tablet Take 150 mg by mouth nightly   Yes Historical Provider, MD   ondansetron (ZOFRAN) 4 MG tablet Take 4 mg by mouth every 6 hours as needed for Nausea or Vomiting   Yes Historical Provider, MD   timolol (TIMOPTIC) 0.5 % ophthalmic solution Place 1 drop into both eyes daily  2/2/21  Yes Historical Provider, MD   latanoprost (XALATAN) 0.005 % ophthalmic solution Place 1 drop into both eyes nightly  5/11/16  Yes Historical Provider, MD        Allergies:     Sulfa antibiotics and Vimovo [naproxen-esomeprazole]    Social History:     Tobacco:    reports that he has never smoked. He has never used smokeless tobacco.  Alcohol:      reports no history of alcohol use. Drug Use:  reports no history of drug use.     Family History:     Family History   Problem Relation Age of Onset    Heart Disease Father     Other Mother        Review of Systems:     Patient confused agitated unable to get review of system    Physical Exam:   BP (!) 95/59   Pulse 88   Temp 97.9 °F (36.6 °C) (Axillary)   Resp 20   Ht 6' 1\" (1.854 m)   Wt 189 lb 6 oz (85.9 kg)   SpO2 93%   BMI 24.99 kg/m²   No results for input(s): POCGLU in the last 72 hours. Elderly man  Thoracotomy scar healing noted colostomy in place noted  General Appearance:  unwell  Mental status: Confused not oriented to time place and person  Head:  normocephalic, atraumatic. Eye: no icterus, redness, pupils equal and reactive, extraocular eye movements intact, conjunctiva clear  Ear: normal external ear, no discharge, hearing intact  Nose:  no drainage noted  Mouth: mucous membranes moist  Neck: supple, no carotid bruits, thyroid not palpable  Lungs: basal creakles    Cardiovascular: normal rate, regular rhythm, no murmur, gallop, rub.   Abdomen: Soft, nontender, nondistended, normal bowel sounds, no hepatomegaly or splenomegaly  Neurologic: There are no new focal motor or sensory deficits, normal muscle tone and bulk, no abnormal sensation, normal speech, cranial nerves II through XII grossly intact  Skin: No gross lesions, rashes, bruising or bleeding on exposed skin area  Extremities:  peripheral pulses palpable, no pedal edema or calf pain with palpation  Psych: Agitated disoriented    Investigations:      Laboratory Testing:  Recent Results (from the past 24 hour(s))   Basic Metabolic Panel w/ Reflex to MG    Collection Time: 09/19/21  4:29 AM   Result Value Ref Range    Glucose 88 70 - 99 mg/dL    BUN 51 (H) 8 - 23 mg/dL    CREATININE 1.15 0.70 - 1.20 mg/dL    Bun/Cre Ratio NOT REPORTED 9 - 20    Calcium 9.8 8.6 - 10.4 mg/dL    Sodium 145 (H) 135 - 144 mmol/L    Potassium 4.0 3.7 - 5.3 mmol/L    Chloride 112 (H) 98 - 107 mmol/L    CO2 23 20 - 31 mmol/L    Anion Gap 10 9 - 17 mmol/L    GFR 2D    Result Date: 8/27/2021  [facility] [facility] Transthoracic Echocardiography Report (TTE)  Patient Name Iliana Antoine  Date of Study               08/24/2021               Angelo Brown   Date of      1940  Gender                      Male  Birth   Age          78 year(s)  Race                           Room Number  2061        Height:                     73 inch, 185.42 cm   Corporate ID [id number]    Weight:                     190 pounds, 86.2 kg  #   Patient Acct [de-identified]   BSA:          2.11 m^2      BMI:      25.07  #                                                              kg/m^2   MR #         [id number]      Sonographer                 Ivonne Swann   Accession #  [id number]  Interpreting Physician      [id number]   Fellow                   Referring Nurse                           Practitioner   Interpreting             Referring Physician         Donald Hein *  Fellow  Additional Comments Technically difficult study. Type of Study   TTE procedure:2D Echocardiogram, Limited Echo. Procedure Date Date: 08/24/2021 Start: 08:49 AM Study Location: [address] [place] [address] Technical Quality: Limited visualization due to poor acoustical window. Indications:Septic shock. History / Tech. Comments: HLD, HTN Patient Status: Inpatient Height: 73 inches Weight: 190 pounds BSA: 2.11 m^2 BMI: 25.07 kg/m^2 Rhythm: Within normal limits HR: 84 bpm BP: 115/49 mmHg CONCLUSIONS Summary Extremely challenging Echo, not all walls seen, poor windows, poor endocardial definition. Recommend repeat limited with Definity. Within above limitations, LVEF appears mildly reduced with EF 40%. Thickened mitral valve leaflets.  Signature ----------------------------------------------------------------------------  Electronically signed by Ivonne Swann(Sonographer) on 08/27/2021 07:43  AM ---------------------------------------------------------------------------- ----------------------------------------------------------------------------  Electronically signed by Shelton Barraza(Interpreting physician) on 08/25/2021  07:59 AM ---------------------------------------------------------------------------- FINDINGS Left Atrium Left atrium is normal in size. Left Ventricle Extremely challenging Echo, not all walls seen, poor windows, poor endocardial definition. Recommend repeat limited with Definity. Within above limitations, LVEF appears mildly reduced with EF 40%. Right Atrium Right atrium was not well visualized. Right Ventricle Right ventricle was not well visualized. Mitral Valve Thickened mitral valve leaflets. Aortic Valve Aortic valve not well visualized. Tricuspid Valve Tricuspid valve was not well visualized. Pulmonic Valve Pulmonic valve was not well visualized. Pericardial Effusion No significant pericardial effusion is seen. Pleural Effusion No pleural effusion seen. CT ABDOMEN PELVIS WO CONTRAST Additional Contrast? None    Addendum Date: 8/23/2021    ADDENDUM: Impression should also state there is an increased small left pleural effusion with increased bibasilar atelectatic changes. Result Date: 8/23/2021  EXAMINATION: CT OF THE ABDOMEN AND PELVIS WITHOUT CONTRAST 8/23/2021 4:04 pm TECHNIQUE: CT of the abdomen and pelvis was performed without the administration of intravenous contrast. Multiplanar reformatted images are provided for review. Dose modulation, iterative reconstruction, and/or weight based adjustment of the mA/kV was utilized to reduce the radiation dose to as low as reasonably achievable.  COMPARISON: 07/09/2021 HISTORY: ORDERING SYSTEM PROVIDED HISTORY: concern for septic kidney secondary to nephrolithiasis TECHNOLOGIST PROVIDED HISTORY: concern for septic kidney secondary to nephrolithiasis Decision Support Exception - unselect if not a suspected or confirmed emergency medical condition->Emergency Medical Condition (MA) Reason for Exam: concern for septic kidney secondary to nephrolithiasis Acuity: Unknown Type of Exam: Unknown Relevant Medical/Surgical History: colostomy, colectomy FINDINGS: Lower Chest: Increased small left pleural effusion with increased bibasilar atelectatic changes. Decreased hemopericardium. Prosthetic aortic valve partially imaged. Organs: Evaluation is limited by the absence of contrast.  Redemonstration of innumerable hepatic cysts, grossly unchanged. Gallbladder is grossly unremarkable. Spleen is grossly normal caliber. Pancreas is atrophic. Adrenal glands are normal size. Punctate nonobstructing left nephroliths. Bilateral renal cysts measuring up to 7.3 cm on the right and 9.5 cm on the left. No hydronephrosis with mild left perinephric stranding. GI/Bowel: Postoperative changes from presumed colectomy with a Brielle's pouch and a right lower quadrant ostomy with a similar appearing parastomal hernia. No bowel obstruction. Small lipoma in the gastric antrum. Pelvis: Urinary bladder contains numerous layering calcifications posteriorly as well as an indwelling Price catheter with associated intraluminal gas. Gas within 2 anterosuperior bladder diverticula. Perivesicular stranding appears grossly similar to prior. Enlarged prostate. Peritoneum/Retroperitoneum: No free fluid or free air. Intimal flap redemonstrated in the aorta compatible with the known dissection. Abdominal aorta remains unchanged in caliber measuring up to 2.5 cm. Unchanged ectasia of the common iliac arteries, right greater than left. Displaced calcifications in the common iliac vessels are also compatible with underlying intimal flaps/dissection. Bones/Soft Tissues: Recent postoperative changes along the anterior abdominal wall and epigastrium. Presumed sequelae of medication injection in the anterior abdominal wall subcutaneous fat.   No acute bony findings on a background of osseous demineralization, dextroconvex lumbar spinal curvature, advanced degenerative changes throughout the lumbar spine including sequelae of Baastrup's disease, and in unchanged region of sclerosis in the right acetabulum with some other smaller unchanged sclerotic foci in the pelvis. Indwelling Price catheter with associated intraluminal gas in the bladder which appears thickened and with numerous diverticula. The wall thickening may be sequelae of chronic bladder outlet obstruction in the setting of an enlarged prostate, though as there is perivesicular stranding as well as left perinephric stranding, recommend correlation for superimposed ascending urinary tract infection. Innumerable bladder calcifications and nonobstructing left nephrolithiasis without an obstructing stone. Though suboptimally evaluated in the absence of contrast intimal flaps are redemonstrated in the abdominal aorta and common iliac arteries in the setting of known recent dissection two months prior. Aortic caliber is unchanged and remains within normal limits, and there is unchanged ectasia of the common iliac vessels. Recent postoperative changes along the anterior abdominal wall without any organized fluid collection or soft tissue gas. Other unchanged chronic findings as described above. XR CHEST (2 VW)    Result Date: 8/19/2021  EXAMINATION: TWO XRAY VIEWS OF THE CHEST 8/19/2021 2:37 pm COMPARISON: July 9, 2021 CT chest HISTORY: ORDERING SYSTEM PROVIDED HISTORY: dysphagia TECHNOLOGIST PROVIDED HISTORY: dysphagia Reason for Exam: dysphagia, in rehab facility, hx limited. Acuity: Acute Type of Exam: Initial FINDINGS: Median sternotomy, stable normal cardiopericardial silhouette/moderate tortuosity of the thoracic aorta/prosthetic heart valve New moderate left basilar opacity.   Otherwise clear lungs Degenerative changes of the thoracic spine/shoulders     New moderate left basilar opacity likely related moderate pleural effusion/pleural thickening, atelectasis versus infiltrate     CT HEAD WO CONTRAST    Result Date: 9/16/2021  EXAMINATION: CT OF THE HEAD WITHOUT CONTRAST  9/16/2021 10:28 pm TECHNIQUE: CT of the head was performed without the administration of intravenous contrast. Dose modulation, iterative reconstruction, and/or weight based adjustment of the mA/kV was utilized to reduce the radiation dose to as low as reasonably achievable. COMPARISON: 08/23/2021 HISTORY: ORDERING SYSTEM PROVIDED HISTORY: fall TECHNOLOGIST PROVIDED HISTORY: fall Decision Support Exception - unselect if not a suspected or confirmed emergency medical condition->Emergency Medical Condition (MA) Reason for Exam: fall, bruise to forehead Acuity: Acute Type of Exam: Initial FINDINGS: BRAIN/VENTRICLES: There is no acute intracranial hemorrhage, mass effect or midline shift. No abnormal extra-axial fluid collection. No evidence of recent territorial infarct. There are nonspecific areas of hypoattenuation in the periventricular white matter and centrum semiovale that are likely related to chronic small vessel ischemic disease. Stable asymmetric prominence of the left ventricle compared to the right. There is no evidence of hydrocephalus. There is intracranial atherosclerosis. ORBITS: The visualized portion of the orbits demonstrate no acute abnormality. SINUSES: The visualized paranasal sinuses and mastoid air cells demonstrate no acute abnormality. SOFT TISSUES/SKULL:  Small right frontal soft tissue contusion. No underlying calvarial fracture. There is cerumen in the bilateral external auditory canals. No acute intracranial abnormality. Small right frontal soft tissue contusion.      CT Head WO Contrast    Result Date: 8/23/2021  EXAMINATION: CT OF THE HEAD WITHOUT CONTRAST  8/23/2021 5:37 pm TECHNIQUE: CT of the head was performed without the administration of intravenous contrast. Dose modulation, iterative reconstruction, and/or weight based adjustment of the mA/kV was utilized to reduce the radiation dose to as low as reasonably achievable. COMPARISON: November 12, 2012 HISTORY: ORDERING SYSTEM PROVIDED HISTORY: altered mental status TECHNOLOGIST PROVIDED HISTORY: altered mental status Decision Support Exception - unselect if not a suspected or confirmed emergency medical condition->Emergency Medical Condition (MA) Reason for Exam: altered mental status FINDINGS: BRAIN/VENTRICLES: There is no acute intracranial hemorrhage, mass effect or midline shift. Note is made of cavum septum pellucidum, unchanged from prior study. ORBITS: The visualized portion of the orbits demonstrate no acute abnormality. SINUSES: The visualized paranasal sinuses and mastoid air cells demonstrate no acute abnormality. SOFT TISSUES/SKULL:  No acute abnormality of the visualized skull or soft tissues. No acute intracranial abnormality. CT CHEST WO CONTRAST    Result Date: 8/23/2021  EXAMINATION: CT OF THE CHEST WITHOUT CONTRAST 8/23/2021 5:18 pm TECHNIQUE: CT of the chest was performed without the administration of intravenous contrast. Multiplanar reformatted images are provided for review. Dose modulation, iterative reconstruction, and/or weight based adjustment of the mA/kV was utilized to reduce the radiation dose to as low as reasonably achievable. COMPARISON: 08/23/2021, 07/09/2021 HISTORY: ORDERING SYSTEM PROVIDED HISTORY: hx of dissection TECHNOLOGIST PROVIDED HISTORY: hx of dissection Decision Support Exception - unselect if not a suspected or confirmed emergency medical condition->Emergency Medical Condition (MA) Reason for Exam: hx of dissection Acuity: Unknown Type of Exam: Unknown FINDINGS: Mediastinum: Evaluation is limited by the absence of contrast.  Interval median sternotomy with ascending aortic dissection repair and placement of a prosthetic aortic valve. Vessel evaluation is limited. The ascending aortic caliber just distal to the operative repair spans approximately 4.8 cm.   The proximal descending thoracic aorta SPINE WITHOUT CONTRAST 9/16/2021 4:29 pm TECHNIQUE: CT of the cervical spine was performed without the administration of intravenous contrast. Multiplanar reformatted images are provided for review. Dose modulation, iterative reconstruction, and/or weight based adjustment of the mA/kV was utilized to reduce the radiation dose to as low as reasonably achievable. COMPARISON: CT scan dated November 12, 2012 HISTORY: ORDERING SYSTEM PROVIDED HISTORY: found down TECHNOLOGIST PROVIDED HISTORY: found down Decision Support Exception - unselect if not a suspected or confirmed emergency medical condition->Emergency Medical Condition (MA) Reason for Exam: Fall, found down Acuity: Acute Type of Exam: Initial FINDINGS: BONES/ALIGNMENT: There is no acute fracture or traumatic malalignment. DEGENERATIVE CHANGES: No significant degenerative changes. SOFT TISSUES: There is no prevertebral soft tissue swelling. Calcification is present within the ligamentum nuchae. Images through the upper chest include the transverse portion of the aorta demonstrate a portion of the patient's known type A aortic dissection. .  Correlation with the scheduled CT scan of the chest abdomen pelvis recommended. 1. No evidence of an acute fracture or traumatic malalignment involving the cervical spine 2. Images through the upper chest partially demonstrate the patient's known type A aortic dissection. Patient is scheduled to have a CT scan of the chest abdomen and pelvis. 3.  Critical results were called by Dr. Puja rCowley to Dr. Joseph Ortiz on 9/16/2021 at 11 p.m. hours. IR FLUORO GUIDED CVA DEVICE PLMT/REPLACE/REMOVAL    Result Date: 8/27/2021  PROCEDURE: ULTRASOUND GUIDED VASCULAR ACCESS. Midline placement 8/27/2021.  HISTORY: ORDERING SYSTEM PROVIDED HISTORY: Sepsis, discharge on IV antibiotic TECHNOLOGIST PROVIDED HISTORY: Sepsis, discharge on IV antibiotic Lumen?->Single Lumen SEDATION: None FLUOROSCOPY DOSE AND TYPE OR TIME AND EXPOSURES: None TECHNIQUE AND FINDINGS: Informed consent was obtained after a detailed explanation of the procedure including risks, benefits, and alternatives. Universal protocol was observed. The right arm was prepped and draped in sterile fashion using maximum sterile barrier technique. Local anesthesia was achieved with lidocaine. A micropuncture needle was used to access the right brachial vein using ultrasound guidance. An ultrasound image demonstrating patency of the vein with needle tip located within it. An image was obtained and stored in PACs. A 0.018 guidewire was used to place a peel-a-way sheath and a 4.5 Somali 15 cm single-lumen midline catheter was placed with its tip directed centrally. Ultrasound images verify appropriate catheter positioning. The catheter flushed easily and there was a good blood return. The catheter was secured to the skin. The patient tolerated the procedure well and there were no immediate complications. EBL: Less than 3 ml     Successful ultrasound guided midline placement     XR CHEST PORTABLE    Result Date: 8/25/2021  EXAMINATION: ONE XRAY VIEW OF THE CHEST 8/25/2021 5:47 am COMPARISON: 08/23/2021, 08/19/2021 HISTORY: ORDERING SYSTEM PROVIDED HISTORY: R/O CHF TECHNOLOGIST PROVIDED HISTORY: R/O CHF Reason for Exam: R/O CHF Acuity: Acute Type of Exam: Initial Additional signs and symptoms: R/O CHF Relevant Medical/Surgical History: R/O CHF FINDINGS: The cardiac and mediastinal contours appear unchanged. Basilar opacities and pleural effusions, left greater than right, are again demonstrated without appreciable change. No new airspace disease identified in the interval.  No evidence for pneumothorax. No significant interval change.      XR CHEST PORTABLE    Result Date: 8/23/2021  EXAMINATION: ONE XRAY VIEW OF THE CHEST 8/23/2021 4:55 pm COMPARISON: 19 April 2021 HISTORY: ORDERING SYSTEM PROVIDED HISTORY: altered mental status, evaluate for pneumonia TECHNOLOGIST PROVIDED HISTORY: altered mental status, evaluate for pneumonia Reason for Exam: altered mental status, evaluate for pneumonia Acuity: Unknown Type of Exam: Unknown FINDINGS: AP portable view of the chest time stamped at 1725 hours demonstrates overlying cardiac monitoring electrodes. Stable cardiomegaly is noted. Prior median sternotomy is demonstrated. CT. No change in left pleural effusion and left basilar opacity. No vascular congestion or extrapleural air. Surgical clips are present the medial right axilla. Prosthetic aortic valve is noted. No change in left basilar opacity likely related to effusion and atelectasis with focal airspace disease not excluded. CT CHEST ABDOMEN PELVIS WO CONTRAST    Result Date: 9/17/2021  EXAMINATION: CT OF THE CHEST, ABDOMEN, AND PELVIS WITHOUT CONTRAST 9/16/2021 10:32 pm TECHNIQUE: CT of the chest, abdomen and pelvis was performed without the administration of intravenous contrast. Multiplanar reformatted images are provided for review. Dose modulation, iterative reconstruction, and/or weight based adjustment of the mA/kV was utilized to reduce the radiation dose to as low as reasonably achievable. COMPARISON: 08/23/2021 and 07/09/2021 HISTORY: ORDERING SYSTEM PROVIDED HISTORY: found down, abdominal bruising, hypoxic TECHNOLOGIST PROVIDED HISTORY: found down, abdominal bruising, hypoxic Reason for Exam: found down, abdominal bruising, hypoxic Acuity: Acute Type of Exam: Initial Relevant Medical/Surgical History: colectomy, colostomy FINDINGS: Chest: Mediastinum: Suboptimal evaluation of the aorta without intravenous contrast. Stable postoperative findings status post aortic valve replacement and ascending aortic dissection repair. Grossly stable appearance of the persistent Berne type B aortic dissection within the limitations of a noncontrast exam.  No evidence of acute intramural hematoma. 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   The heart and pericardium are without acute abnormality. No mediastinal adenopathy. Coronary artery calcifications are noted. Trace pericardial effusion appears similar to the prior. Lungs/pleura: Small left pleural effusion, similar to the prior study. Motion limited evaluation of the lung bases. There is bilateral dependent atelectasis. No evidence of focal consolidation. No pneumothorax. Soft Tissues/Bones: No acute bony abnormality. Abdomen/Pelvis: Organs: The solid organs are incompletely evaluated without intravenous contrast.  Unchanged hepatic and renal cysts. A mildly complex 1.8 cm right lower pole renal cyst is indeterminate. The solid organs are without acute noncontrast findings. The gallbladder is present without radiopaque cholelithiasis. GI/Bowel: No mechanical bowel obstruction. Stable postoperative findings status post subtotal colectomy with a right lower quadrant ostomy. Pelvis: Several bladder calcifications are again seen. The prostate is enlarged. Gas in the urinary bladder is suggestive of cystitis. Small fat-containing right inguinal hernia. Peritoneum/Retroperitoneum: Moderate atherosclerotic plaque. The known type B aortic dissection extends into the right common iliac artery, similar to the prior exam. Bones/Soft Tissues: There is a left lower quadrant and midline soft tissue contusion. No acute bony abnormality. 1. Limited evaluation of the aorta without intravenous contrast.  Similar appearance of an extensive Noam type B aortic dissection. Unchanged postoperative appearance of the ascending aorta status post aortic valve replacement and ascending aortic dissection repair. The splanchnic arteries are not well evaluated. 2. Unchanged small left pleural effusion. 3. Gas in the urinary bladder is suggestive of cystitis. Correlate with urinalysis. Unchanged layering bladder calculi. 4. Left lower quadrant and midline abdominal wall soft tissue contusion.   No additional noncontrast evidence of acute traumatic injury in the chest, abdomen or pelvis. 5. Indeterminate 1.8 cm right lower pole cystic lesion. If clinically appropriate, outpatient renal protocol MRI can be obtained to better characterize. FL MODIFIED BARIUM SWALLOW W VIDEO    Result Date: 8/24/2021  EXAMINATION: MODIFIED BARIUM SWALLOW WAS PERFORMED IN CONJUNCTION WITH SPEECH PATHOLOGY SERVICES TECHNIQUE: Fluoroscopic evaluation of the swallowing mechanism was performed with multiple consistency of barium product. FLUOROSCOPY DOSE AND TYPE OR TIME AND EXPOSURES: Fluoroscopic time of 3 minutes and 9 seconds. DAP of 130.2 dGycm2. COMPARISON: None HISTORY: ORDERING SYSTEM PROVIDED HISTORY: Aspiration risk TECHNOLOGIST PROVIDED HISTORY: Aspiration risk Reason for Exam: aspiration risk Acuity: Unknown Type of Exam: Unknown FINDINGS: Multiple swallows were attempted with multiple consistencies in the presence of speech pathology under fluoroscopic evaluation. Penetration was visualized with thin liquid. There is no evidence for aspiration. Penetration with thin liquid. No evidence for aspiration. Please see separate speech pathology report for full discussion of findings and recommendations.           Current Facility-Administered Medications   Medication Dose Route Frequency Provider Last Rate Last Admin    dexamethasone (DECADRON) tablet 6 mg  6 mg Oral Daily Landon Oneil MD   6 mg at 09/19/21 1729    aluminum & magnesium hydroxide-simethicone (MAALOX) 200-200-20 MG/5ML suspension 30 mL  30 mL Oral Q6H PRN Dio Vines, APRN - CNP        amiodarone (CORDARONE) tablet 200 mg  200 mg Oral Daily Diojonatan Vines, APRN - CNP   200 mg at 09/19/21 0847    ascorbic acid (VITAMIN C) tablet 500 mg  500 mg Oral Daily Dio Vines, APRN - CNP   500 mg at 09/19/21 0847    atorvastatin (LIPITOR) tablet 20 mg  20 mg Oral Daily Dio [place], APRN - CNP   20 mg at 09/19/21 0847    divalproex (DEPAKOTE SPRINKLE) capsule 125 mg  125 mg Oral BID MOISES Geiger - CNP   125 mg at 09/19/21 1729    ferrous sulfate (IRON 325) tablet 325 mg  325 mg Oral Daily with breakfast Shailesh MelaMOISES barreto CNP   325 mg at 09/19/21 0847    [Held by provider] furosemide (LASIX) tablet 20 mg  20 mg Oral Daily MOISES Geiger CNP        latanoprost (XALATAN) 0.005 % ophthalmic solution 1 drop  1 drop Both Eyes Nightly MOISES Geiger CNP        melatonin tablet 3 mg  3 mg Oral Nightly PRN MOISES Geiger - CNP   3 mg at 09/19/21 2109    therapeutic multivitamin-minerals 1 tablet  1 tablet Oral Daily MOISES Geiger - CNP   1 tablet at 09/19/21 0847    timolol (TIMOPTIC) 0.5 % ophthalmic solution 1 drop  1 drop Both Eyes Daily MOISES Geiger CNP        zinc sulfate (ZINCATE) capsule 50 mg  50 mg Oral Daily MOISES Geiger CNP   50 mg at 09/19/21 0847    sodium chloride flush 0.9 % injection 5-40 mL  5-40 mL IntraVENous 2 times per day MOISES Geiger - CNP   5 mL at 09/17/21 0959    sodium chloride flush 0.9 % injection 10 mL  10 mL IntraVENous PRN MOISES Geiger - CNP        0.9 % sodium chloride infusion  25 mL IntraVENous PRN MOISES Geiger - CNP        ondansetron (ZOFRAN-ODT) disintegrating tablet 4 mg  4 mg Oral Q8H PRN MOISES Geiger - CNP        Or    ondansetron (ZOFRAN) injection 4 mg  4 mg IntraVENous Q6H PRN MOISES Geiger - CNP   4 mg at 09/19/21 1025    polyethylene glycol (GLYCOLAX) packet 17 g  17 g Oral Daily PRN MOISES Geiger - CNP        acetaminophen (TYLENOL) tablet 650 mg  650 mg Oral Q6H PRN MOISES Geiger - CNP        Or    acetaminophen (TYLENOL) suppository 650 mg  650 mg Rectal Q6H PRN Shailesh Plaza APRN - CNP        heparin (porcine) injection 5,000 Units  5,000 Units SubCUTAneous 3 times per day MOISES Geiger - CNP   5,000 Units at 09/19/21 2122    aspirin EC tablet 81 mg  81 mg Oral Daily MOISES Geiger CNP   81 mg at 09/19/21 0847    meropenem (MERREM) 1,000 mg in sodium chloride 0.9 % 100 mL IVPB (mini-bag) 1,000 mg IntraVENous Q12H Loc Mccarthy MD 33.3 mL/hr at 09/19/21 2109 1,000 mg at 09/19/21 2109    0.9 % sodium chloride infusion   IntraVENous Continuous Mary Jane Senior MD 75 mL/hr at 09/17/21 2010 New Bag at 09/17/21 2010     Impressions :      1. Active Problems:    UTI (urinary tract infection)    Severe malnutrition (HCC)  Resolved Problems:    * No resolved hospital problems. *        2.  has a past medical history of Acute respiratory failure following trauma and surgery ([facility] [address] Acute respiratory failure following trauma and surgery ([facility] [address] Altered bowel elimination due to intestinal ostomy ([facility] [address] Full dentures, Full dentures, Gall stones, GERD (gastroesophageal reflux disease), GI bleed, Hemorrhage of gastrointestinal tract, unspecified, Hemorrhage of gastrointestinal tract, unspecified, Hyperlipidemia, Hypertension, Kidney stones, Kidney stones, Primary localized osteoarthrosis, lower leg, Primary localized osteoarthrosis, lower leg, Prostate disorder, Transient disorder of initiating or maintaining sleep, Transient disorder of initiating or maintaining sleep, Unspecified disorder of skin and subcutaneous tissue, Wears glasses, and Wears glasses. Cc 35 mins  Plans:   Patient brought to ED by EMS after being found down at [facility]. Patient was positive for Covid on 9/7/2021. Patient hypoxic on arrival required 2 L nasal cannula. Eventually switched to high flow nasal cannula. SPO2 97% CT scan redemonstrates known aortic dissection, no signs of pneumonia. Pulmonology consulted. Patient given Decadron in the ED. Urinalysis shows infection. Patient started on Rocephin. CT head negative for acute intracranial abnormality. Patient has MERRICK with creatinine 2.46, K 5.1. Patient given 2 L normal saline bolus in the ED. Recheck BMP in the morning. Patient hemodynamically stable.   Admit to progressive unit    Gram negative sepsis likely from uti ESBL  Iv meropenem day 3  Delirium ICU psychosis  Metabolic encephalopathy likely secondary to gram-negative sepsis IV meropenem continued  Overall prognosis is guarded for his age comorbid conditions        Negro Kaur MD  9/19/2021  9:48 PM

## 2021-09-20 NOTE — PROGRESS NOTES
ICU Progress Note (Non-Vent)  [facility]    Patient - Noemi Frankel,  Age - 78 y.o.    - 1940      Room Number - /-74   N -  [id number]   [facility]  Date of Admission -  2021  7:45 PM    Events of Past 24 Hours   On 4 L of oxygen saturating 95%, cut him down to 3 L  Is asking that his wife come and see him. Vitals    height is 6' 1\" (1.854 m) and weight is 189 lb 6 oz (85.9 kg). His axillary temperature is 98 °F (36.7 °C). His blood pressure is 116/79 and his pulse is 88. His respiration is 18 and oxygen saturation is 97%.        Temperature Range: Temp: 98 °F (36.7 °C) Temp  Av.9 °F (36.6 °C)  Min: 97.8 °F (36.6 °C)  Max: 98.1 °F (36.7 °C)  BP Range:  Systolic (82SLQ), WMJ:350 , Min:95 , IFV:730     Diastolic (77OQI), IQV:90, Min:59, Max:107    Pulse Range: Pulse  Av  Min: 80  Max: 97  Respiration Range: Resp  Av  Min: 18  Max: 20  Current Pulse Ox[de-identified]  SpO2: 97 %  24HR Pulse Ox Range:  SpO2  Av.6 %  Min: 91 %  Max: 97 %  Oxygen Amount and Delivery: O2 Flow Rate (L/min): 4 L/min    Wt Readings from Last 3 Encounters:   21 189 lb 6 oz (85.9 kg)   21 186 lb 1.1 oz (84.4 kg)   21 230 lb (104.3 kg)     I/O       Intake/Output Summary (Last 24 hours) at 2021 1335  Last data filed at 2021 1853  Gross per 24 hour   Intake 1150 ml   Output 155 ml   Net 995 ml     DRAIN/TUBE OUTPUT       Invasive Lines   ICP PRESSURE RANGE  No data recorded  CVP PRESSURE RANGE  No data recorded      Medications      dexamethasone  6 mg Oral Daily    amiodarone  200 mg Oral Daily    ascorbic acid  500 mg Oral Daily    atorvastatin  20 mg Oral Daily    divalproex  125 mg Oral BID    ferrous sulfate  325 mg Oral Daily with breakfast    [Held by provider] furosemide  20 mg Oral Daily    latanoprost  1 drop Both Eyes Nightly    therapeutic multivitamin-minerals  1 tablet Oral Daily    timolol  1 drop Both Eyes Daily    zinc sulfate  50 mg Oral Daily    sodium chloride flush  5-40 mL IntraVENous 2 times per day    heparin (porcine)  5,000 Units SubCUTAneous 3 times per day    aspirin  81 mg Oral Daily    meropenem  1,000 mg IntraVENous Q12H     QUEtiapine, perflutren lipid microspheres, aluminum & magnesium hydroxide-simethicone, melatonin, sodium chloride flush, sodium chloride, ondansetron **OR** ondansetron, polyethylene glycol, acetaminophen **OR** acetaminophen  IV Drips/Infusions   sodium chloride      sodium chloride 75 mL/hr at 09/17/21 2010       Diet/Nutrition   ADULT DIET; Easy to Chew; No Added Salt (3-4 gm); Mildly Thick (Nectar)  Adult Oral Nutrition Supplement; Standard High Calorie/High Protein Oral Supplement  Adult Oral Nutrition Supplement; Frozen Oral Supplement    Exam      Constitutional - Alert, arousable, confused  General Appearance  well developed, well nourished  HEENT -normocephalic, atraumatic. PERRLA  Lungs - Chest expands equally, no wheezes,  crackles right base   Cardiovascular - Heart sounds are normal.  normal rate and rhythm regular, no murmur, gallop or rub. Abdomen - soft, nontender, nondistended, no masses or organomegaly  Neurologic - CN II-XII are grossly intact.  There are no focal motor deficits  Skin - no bruising or bleeding  Extremities - no cyanosis, clubbing or edema    Lab Results   CBC     Lab Results   Component Value Date    WBC 11.0 09/20/2021    RBC 4.63 09/20/2021    RBC 4.59 02/16/2012    HGB 12.6 09/20/2021    HCT 40.2 09/20/2021     09/20/2021     02/16/2012    MCV 86.8 09/20/2021    MCH 27.2 09/20/2021    MCHC 31.3 09/20/2021    RDW 17.3 09/20/2021    NRBC 1 11/14/2012    LYMPHOPCT 6 09/16/2021    MONOPCT 5 09/16/2021    MYELOPCT 1 11/14/2012    BASOPCT 0 09/16/2021    MONOSABS 0.57 09/16/2021    LYMPHSABS 0.68 09/16/2021    EOSABS 0.00 09/16/2021    BASOSABS 0.00 09/16/2021    DIFFTYPE NOT REPORTED 09/16/2021       BMP   Lab Results   Component Value Date     09/20/2021    K 4.7 09/20/2021     09/20/2021    CO2 22 09/20/2021    BUN 44 09/20/2021    CREATININE 1.10 09/20/2021    GLUCOSE 134 09/20/2021    GLUCOSE 100 02/16/2012       LFTS  Lab Results   Component Value Date    ALKPHOS 101 09/16/2021    ALT 16 09/16/2021    AST 15 09/16/2021    PROT 7.6 09/16/2021    BILITOT 0.34 09/16/2021    LABALBU 3.3 09/16/2021    LABALBU 4.3 02/16/2012       ABG ABGs:   Lab Results   Component Value Date    PHART 7.411 09/17/2021    PO2ART 64.8 09/17/2021    MWZ1ANQ 37.5 09/17/2021       Lab Results   Component Value Date    MODE NOT REPORTED 09/17/2021         INR  No results for input(s): PROTIME, INR in the last 72 hours. APTT  No results for input(s): APTT in the last 72 hours. Lactic Acid  Lab Results   Component Value Date    LACTA 0.9 09/17/2021    LACTA 1.0 08/24/2021    LACTA 2.2 08/23/2021        BNP   No results for input(s): BNP in the last 72 hours. Cultures       Radiology     CXR            SYSTEMS ASSESSMENT    COVID-19 infection  Acute hypoxic respiratory failure-aspiration pneumonia  ESBL associated sepsis  Delirium due to sepsis, metabolic encephalopathy  Pleural effusion on left  Acute on chronic kidney disease  History of aortic dissection status post graft and surgical valve replacement  History of partial colectomy and ileostomy formation  Full code  Vaccinated for Covid    Continue treatment with Merrem for ESBL  That should also cover aspiration pneumonia  Follow-up inflammatory markers tomorrow  Given elevated D-dimer, will go ahead and switch him to Eliquis  Still do not feel that his main problem is Covid pneumonia, rather most likely due to ESBL and aspiration pneumonia  I did speak to his wife about CODE STATUS and gave her an update.   For now, he is a full code but if it looks like things are irreversible or he will be in a vegetative state, they would not want to prolong his life.         Critical Care Time   0 min    Electronically signed by Allegra Barreto MD on 9/20/2021 at 1:35 PM

## 2021-09-21 NOTE — PROGRESS NOTES
History and Physical Service  [facility]    HISTORY AND PHYSICAL EXAMINATION            Date:   9/20/2021  Patient name:  Tessie Cardenas  MRN:   [id number]  Account:  [de-identified]  YOB: 1940  PCP:    Bernarda Pérez MD  Code Status:    Full Code    Chief Complaint:   unwell    History Obtained From:     Patient ,medical record ,nursing staff    History of Present Illnes       The patient is a 78 y.o. Non- / non  male who presents   Patient brought to ED by EMS after being found down at [facility]. Patient was positive for Covid on 9/7/2021. Patient hypoxic on arrival required 2 L nasal cannula. Eventually switched to high flow nasal cannula. SPO2 97% CT scan redemonstrates known aortic dissection, no signs of pneumonia. Pulmonology consulted. Patient given Decadron in the ED. Urinalysis shows infection. Patient started on Rocephin. CT head negative for acute intracranial abnormality. Patient has MERRICK with creatinine 2.46, K 5.1. Patient given 2 L normal saline bolus in the ED. Recheck BMP in the morning. Patient hemodynamically stable. Admit to progressive unit    9/20   Patient is pleasantly confused  Has poor oral intake, on IV fluids  Blood culture, urine culture growing E.  Coli   More confused  Agitated  Delirium      Past Medical History:   Diagnosis Date    Acute respiratory failure following trauma and surgery ([address]     Acute respiratory failure following trauma and surgery ([address]     Altered bowel elimination due to intestinal ostomy ([address]     iliostomy    Full dentures     pt said he has partial upper and lower    Full dentures     Gall stones     GERD (gastroesophageal reflux disease)     GI bleed     Hemorrhage of gastrointestinal tract, unspecified     Hemorrhage of gastrointestinal tract, unspecified     Hyperlipidemia     Hypertension     Kidney stones     Kidney stones     Primary localized osteoarthrosis, lower leg     both shoulders, neck, legs.  Primary localized osteoarthrosis, lower leg     Prostate disorder     Transient disorder of initiating or maintaining sleep     Transient disorder of initiating or maintaining sleep     Unspecified disorder of skin and subcutaneous tissue     Wears glasses     Wears glasses         Past Surgical History:     Past Surgical History:   Procedure Laterality Date    ABDOMEN SURGERY      CATARACT REMOVAL WITH IMPLANT Bilateral     COLONOSCOPY      X3    COLOSTOMY Right     COLOSTOMY BAG ON RT. SIDE    CYSTOSCOPY Right 8/29/2017    CYSTOSCOPY URETERAL STENT INSERTION performed by Mark Anthony Mills MD at [facility], ESOPHAGUS      EYE SURGERY      bettye. eyes, cataracts extracted with iol's    FRACTURE SURGERY      JOINT REPLACEMENT Right 3-23-15    KIDNEY STONE SURGERY Left     KNEE ARTHROSCOPY Right     LITHOTRIPSY Left 06-20-14    w/ cystoscopy C & P    SHOULDER SURGERY Left 9/14/2020    MOLE EXCISION POSTERIOR SHOULDER performed by Carmenza Marinelli MD at [facility] , PARTIAL RECTUM\"    TOTAL KNEE ARTHROPLASTY  03/23/2015    Right with biomet and GPS product application    TOTAL KNEE ARTHROPLASTY Right 3/23/2015        Medications Prior to Admission:     Prior to Admission medications    Medication Sig Start Date End Date Taking? Authorizing Provider   ascorbic acid (VITAMIN C) 500 MG tablet Take 500 mg by mouth 2 times daily    Yes Historical Provider, MD   azithromycin (ZITHROMAX) 250 MG tablet Take 250 mg by mouth daily For 4 days, 9/16-9/20 9/16/21 9/20/21 Yes Historical Provider, MD   divalproex (DEPAKOTE) 125 MG DR tablet Take 125 mg by mouth 2 times daily   Yes Historical Provider, MD   methylPREDNISolone (MEDROL DOSEPACK) 4 MG tablet Take 4 mg by mouth See Admin Instructions Take by mouth.    Yes Historical Provider, MD   zinc sulfate (ZINCATE) 220 (50 Zn) MG capsule Take 50 mg by mouth daily   Yes Historical Provider, MD   amiodarone (CORDARONE) 200 MG tablet Take 200 mg by mouth daily   Yes Historical Provider, MD   aspirin 81 MG chewable tablet Take 81 mg by mouth daily   Yes Historical Provider, MD   atorvastatin (LIPITOR) 20 MG tablet Take 20 mg by mouth daily   Yes Historical Provider, MD   ferrous sulfate (IRON 325) 325 (65 Fe) MG tablet Take 325 mg by mouth daily (with breakfast)   Yes Historical Provider, MD   furosemide (LASIX) 20 MG tablet Take 20 mg by mouth daily   Yes Historical Provider, MD   ipratropium-albuterol (DUONEB) 0.5-2.5 (3) MG/3ML SOLN nebulizer solution Inhale 1 vial into the lungs every 6 hours as needed for Shortness of Breath   Yes Historical Provider, MD   aluminum & magnesium hydroxide-simethicone (MYLANTA) 400-400-40 MG/5ML SUSP Take 15 mLs by mouth every 6 hours as needed (heartburn)   Yes Historical Provider, MD   methocarbamol (ROBAXIN) 750 MG tablet Take 750 mg by mouth every 8 hours as needed (muscle spasms)    Yes Historical Provider, MD   potassium chloride (KLOR-CON M) 20 MEQ extended release tablet Take 20 mEq by mouth daily   Yes Historical Provider, MD   Multiple Vitamins-Minerals (THERAPEUTIC MULTIVITAMIN-MINERALS) tablet Take 1 tablet by mouth daily   Yes Historical Provider, MD   traZODone (DESYREL) 150 MG tablet Take 150 mg by mouth nightly   Yes Historical Provider, MD   ondansetron (ZOFRAN) 4 MG tablet Take 4 mg by mouth every 6 hours as needed for Nausea or Vomiting   Yes Historical Provider, MD   timolol (TIMOPTIC) 0.5 % ophthalmic solution Place 1 drop into both eyes daily  2/2/21  Yes Historical Provider, MD   latanoprost (XALATAN) 0.005 % ophthalmic solution Place 1 drop into both eyes nightly  5/11/16  Yes Historical Provider, MD        Allergies:     Sulfa antibiotics and Vimovo [naproxen-esomeprazole]    Social History:     Tobacco:    reports that he has never smoked.  He has never used smokeless tobacco.  Alcohol:      reports no history of alcohol use. Drug Use:  reports no history of drug use. Family History:     Family History   Problem Relation Age of Onset    Heart Disease Father     Other Mother        Review of Systems:     Patient confused agitated unable to get review of system    Physical Exam:   /82   Pulse 91   Temp 97.9 °F (36.6 °C) (Axillary)   Resp 18   Ht 6' 1\" (1.854 m)   Wt 189 lb 6 oz (85.9 kg)   SpO2 92%   BMI 24.99 kg/m²   No results for input(s): POCGLU in the last 72 hours. Elderly man  Thoracotomy scar healing noted colostomy in place noted  General Appearance:  unwell  Mental status: Confused , oriented to place  Head:  normocephalic, atraumatic. Eye: no icterus, redness, pupils equal and reactive, extraocular eye movements intact, conjunctiva clear  Ear: normal external ear, no discharge, hearing intact  Nose:  no drainage noted  Mouth: mucous membranes moist  Neck: supple, no carotid bruits, thyroid not palpable  Lungs: basal creakles    Cardiovascular: normal rate, regular rhythm, no murmur, gallop, rub.   Abdomen: Soft, nontender, nondistended, normal bowel sounds, no hepatomegaly or splenomegaly  Neurologic: There are no new focal motor or sensory deficits, normal muscle tone and bulk, no abnormal sensation, normal speech,   Skin: No gross lesions, rashes, bruising or bleeding on exposed skin area  Extremities:  peripheral pulses palpable, no pedal edema or calf pain with palpation  Psych: Agitated disoriented    Investigations:      Laboratory Testing:  Recent Results (from the past 24 hour(s))   Basic Metabolic Panel w/ Reflex to MG    Collection Time: 09/20/21  4:38 AM   Result Value Ref Range    Glucose 134 (H) 70 - 99 mg/dL    BUN 44 (H) 8 - 23 mg/dL    CREATININE 1.10 0.70 - 1.20 mg/dL    Bun/Cre Ratio NOT REPORTED 9 - 20    Calcium 9.7 8.6 - 10.4 mg/dL    Sodium 145 (H) 135 - 144 mmol/L    Potassium 4.7 3.7 - 5.3 mmol/L    Chloride 113 (H) 98 - 107 mmol/L    CO2 22 20 - 31 mmol/L    Anion Gap 10 9 - 17 mmol/L    GFR Non-African American >60 >60 mL/min    GFR African American >60 >60 mL/min    GFR Comment          GFR Staging NOT REPORTED    CBC    Collection Time: 09/20/21  4:38 AM   Result Value Ref Range    WBC 11.0 3.5 - 11.0 k/uL    RBC 4.63 4.5 - 5.9 m/uL    Hemoglobin 12.6 (L) 13.5 - 17.5 g/dL    Hematocrit 40.2 (L) 41 - 53 %    MCV 86.8 80 - 100 fL    MCH 27.2 26 - 34 pg    MCHC 31.3 31 - 37 g/dL    RDW 17.3 (H) 11.5 - 14.9 %    Platelets 061 996 - 218 k/uL    MPV 9.1 6.0 - 12.0 fL    NRBC Automated NOT REPORTED per 100 WBC   Magnesium    Collection Time: 09/20/21  4:38 AM   Result Value Ref Range    Magnesium 2.0 1.6 - 2.6 mg/dL   D-Dimer Test    Collection Time: 09/20/21  4:38 AM   Result Value Ref Range    D-Dimer, Quant 5.05 (H) 0.00 - 0.59 mg/L FEU   C-Reactive Protein    Collection Time: 09/20/21  4:38 AM   Result Value Ref Range    .0 (H) 0.0 - 5.0 mg/L   Lactate Dehydrogenase    Collection Time: 09/20/21  4:38 AM   Result Value Ref Range     (H) 135 - 225 U/L   Ferritin    Collection Time: 09/20/21  4:39 AM   Result Value Ref Range    Ferritin 2,434 (H) 30 - 400 ug/L       Recent Labs     09/20/21  0438 09/17/21  0927 09/16/21  2129   HGB 12.6*   < > 14.0   HCT 40.2*   < > 43.9   WBC 11.0   < > 11.3*   MCV 86.8   < > 84.8   *   < > 140   K 4.7   < > 5.1   *   < > 101   CO2 22   < > 23   BUN 44*   < > 68*   CREATININE 1.10   < > 2.46*   GLUCOSE 134*   < > 105*   INR  --   --  1.1   PROTIME  --   --  14.4   APTT  --   --  32.5   AST  --   --  15   ALT  --   --  16   LABALBU  --   --  3.3*    < > = values in this interval not displayed.        Hematology:  Recent Labs     09/19/21  0429 09/20/21  0438   WBC 10.7 11.0   RBC 4.69 4.63   HGB 12.8* 12.6*   HCT 40.1* 40.2*   MCV 85.6 86.8   MCH 27.2 27.2   MCHC 31.8 31.3   RDW 17.3* 17.3*   * 161   MPV 9.2 9.1   CRP  --  179.0*   DDIMER  --  5.05*     Chemistry:  Recent Labs     09/19/21  0429 09/20/21  0438   * 145*   K 4.0 4.7   * 113*   CO2 23 22   GLUCOSE 88 134*   BUN 51* 44*   CREATININE 1.15 1.10   MG 2.1 2.0   ANIONGAP 10 10   LABGLOM >60 >60   GFRAA >60 >60   CALCIUM 9.8 9.7     Recent Labs     09/20/21  0438   *       Imaging/Diagnostics:       Echocardiogram Limited 2D    Result Date: 8/27/2021  [facility] Transthoracic Echocardiography Report (TTE)  Patient Name Yadiel Loja  Date of Study               08/24/2021               Rich[place] Kevin   Date of      1940  Gender                      Male  Birth   Age          78 year(s)  Race                           Room Number  2061        Height:                     73 inch, 185.42 cm   Corporate ID [id number]    Weight:                     190 pounds, 86.2 kg  #   Patient Acct [de-identified]   BSA:          2.11 m^2      BMI:      25.07  #                                                              kg/m^2   MR #         [id number]      Sonographer                 Ivonne Swann   Accession #  [id number]  Interpreting Physician      [facility]   Fellow                   Referring Nurse                           Practitioner   Interpreting             Referring Physician         Donald Hein *  Fellow  Additional Comments Technically difficult study. Type of Study   TTE procedure:2D Echocardiogram, Limited Echo. Procedure Date Date: 08/24/2021 Start: 08:49 AM Study Location: [address] [place] [address] Technical Quality: Limited visualization due to poor acoustical window. Indications:Septic shock. History / Tech. Comments: HLD, HTN Patient Status: Inpatient Height: 73 inches Weight: 190 pounds BSA: 2.11 m^2 BMI: 25.07 kg/m^2 Rhythm: Within normal limits HR: 84 bpm BP: 115/49 mmHg CONCLUSIONS Summary Extremely challenging Echo, not all walls seen, poor windows, poor endocardial definition. Recommend repeat limited with Definity.  Within above limitations, LVEF appears mildly reduced with EF 40%. Thickened mitral valve leaflets. Signature ----------------------------------------------------------------------------  Electronically signed by Ivonne Swann(Sonographer) on 08/27/2021 07:43  AM ---------------------------------------------------------------------------- ----------------------------------------------------------------------------  Electronically signed by Shelton Barraza(Interpreting physician) on 08/25/2021  07:59 AM ---------------------------------------------------------------------------- FINDINGS Left Atrium Left atrium is normal in size. Left Ventricle Extremely challenging Echo, not all walls seen, poor windows, poor endocardial definition. Recommend repeat limited with Definity. Within above limitations, LVEF appears mildly reduced with EF 40%. Right Atrium Right atrium was not well visualized. Right Ventricle Right ventricle was not well visualized. Mitral Valve Thickened mitral valve leaflets. Aortic Valve Aortic valve not well visualized. Tricuspid Valve Tricuspid valve was not well visualized. Pulmonic Valve Pulmonic valve was not well visualized. Pericardial Effusion No significant pericardial effusion is seen. Pleural Effusion No pleural effusion seen. CT ABDOMEN PELVIS WO CONTRAST Additional Contrast? None    Addendum Date: 8/23/2021    ADDENDUM: Impression should also state there is an increased small left pleural effusion with increased bibasilar atelectatic changes. Result Date: 8/23/2021  EXAMINATION: CT OF THE ABDOMEN AND PELVIS WITHOUT CONTRAST 8/23/2021 4:04 pm TECHNIQUE: CT of the abdomen and pelvis was performed without the administration of intravenous contrast. Multiplanar reformatted images are provided for review. Dose modulation, iterative reconstruction, and/or weight based adjustment of the mA/kV was utilized to reduce the radiation dose to as low as reasonably achievable.  COMPARISON: 07/09/2021 HISTORY: ORDERING SYSTEM PROVIDED HISTORY: concern for septic Recent postoperative changes along the anterior abdominal wall and epigastrium. Presumed sequelae of medication injection in the anterior abdominal wall subcutaneous fat. No acute bony findings on a background of osseous demineralization, dextroconvex lumbar spinal curvature, advanced degenerative changes throughout the lumbar spine including sequelae of Baastrup's disease, and in unchanged region of sclerosis in the right acetabulum with some other smaller unchanged sclerotic foci in the pelvis. Indwelling Price catheter with associated intraluminal gas in the bladder which appears thickened and with numerous diverticula. The wall thickening may be sequelae of chronic bladder outlet obstruction in the setting of an enlarged prostate, though as there is perivesicular stranding as well as left perinephric stranding, recommend correlation for superimposed ascending urinary tract infection. Innumerable bladder calcifications and nonobstructing left nephrolithiasis without an obstructing stone. Though suboptimally evaluated in the absence of contrast intimal flaps are redemonstrated in the abdominal aorta and common iliac arteries in the setting of known recent dissection two months prior. Aortic caliber is unchanged and remains within normal limits, and there is unchanged ectasia of the common iliac vessels. Recent postoperative changes along the anterior abdominal wall without any organized fluid collection or soft tissue gas. Other unchanged chronic findings as described above. XR CHEST (2 VW)    Result Date: 8/19/2021  EXAMINATION: TWO XRAY VIEWS OF THE CHEST 8/19/2021 2:37 pm COMPARISON: July 9, 2021 CT chest HISTORY: ORDERING SYSTEM PROVIDED HISTORY: dysphagia TECHNOLOGIST PROVIDED HISTORY: dysphagia Reason for Exam: dysphagia, in rehab facility, hx limited.  Acuity: Acute Type of Exam: Initial FINDINGS: Median sternotomy, stable normal cardiopericardial silhouette/moderate tortuosity of the thoracic aorta/prosthetic heart valve New moderate left basilar opacity. Otherwise clear lungs Degenerative changes of the thoracic spine/shoulders     New moderate left basilar opacity likely related moderate pleural effusion/pleural thickening, atelectasis versus infiltrate     CT HEAD WO CONTRAST    Result Date: 9/16/2021  EXAMINATION: CT OF THE HEAD WITHOUT CONTRAST  9/16/2021 10:28 pm TECHNIQUE: CT of the head was performed without the administration of intravenous contrast. Dose modulation, iterative reconstruction, and/or weight based adjustment of the mA/kV was utilized to reduce the radiation dose to as low as reasonably achievable. COMPARISON: 08/23/2021 HISTORY: ORDERING SYSTEM PROVIDED HISTORY: fall TECHNOLOGIST PROVIDED HISTORY: fall Decision Support Exception - unselect if not a suspected or confirmed emergency medical condition->Emergency Medical Condition (MA) Reason for Exam: fall, bruise to forehead Acuity: Acute Type of Exam: Initial FINDINGS: BRAIN/VENTRICLES: There is no acute intracranial hemorrhage, mass effect or midline shift. No abnormal extra-axial fluid collection. No evidence of recent territorial infarct. There are nonspecific areas of hypoattenuation in the periventricular white matter and centrum semiovale that are likely related to chronic small vessel ischemic disease. Stable asymmetric prominence of the left ventricle compared to the right. There is no evidence of hydrocephalus. There is intracranial atherosclerosis. ORBITS: The visualized portion of the orbits demonstrate no acute abnormality. SINUSES: The visualized paranasal sinuses and mastoid air cells demonstrate no acute abnormality. SOFT TISSUES/SKULL:  Small right frontal soft tissue contusion. No underlying calvarial fracture. There is cerumen in the bilateral external auditory canals. No acute intracranial abnormality. Small right frontal soft tissue contusion.      CT Head WO Contrast    Result Date: 8/23/2021  EXAMINATION: CT OF THE HEAD WITHOUT CONTRAST  8/23/2021 5:37 pm TECHNIQUE: CT of the head was performed without the administration of intravenous contrast. Dose modulation, iterative reconstruction, and/or weight based adjustment of the mA/kV was utilized to reduce the radiation dose to as low as reasonably achievable. COMPARISON: November 12, 2012 HISTORY: ORDERING SYSTEM PROVIDED HISTORY: altered mental status TECHNOLOGIST PROVIDED HISTORY: altered mental status Decision Support Exception - unselect if not a suspected or confirmed emergency medical condition->Emergency Medical Condition (MA) Reason for Exam: altered mental status FINDINGS: BRAIN/VENTRICLES: There is no acute intracranial hemorrhage, mass effect or midline shift. Note is made of cavum septum pellucidum, unchanged from prior study. ORBITS: The visualized portion of the orbits demonstrate no acute abnormality. SINUSES: The visualized paranasal sinuses and mastoid air cells demonstrate no acute abnormality. SOFT TISSUES/SKULL:  No acute abnormality of the visualized skull or soft tissues. No acute intracranial abnormality. CT CHEST WO CONTRAST    Result Date: 8/23/2021  EXAMINATION: CT OF THE CHEST WITHOUT CONTRAST 8/23/2021 5:18 pm TECHNIQUE: CT of the chest was performed without the administration of intravenous contrast. Multiplanar reformatted images are provided for review. Dose modulation, iterative reconstruction, and/or weight based adjustment of the mA/kV was utilized to reduce the radiation dose to as low as reasonably achievable.  COMPARISON: 08/23/2021, 07/09/2021 HISTORY: ORDERING SYSTEM PROVIDED HISTORY: hx of dissection TECHNOLOGIST PROVIDED HISTORY: hx of dissection Decision Support Exception - unselect if not a suspected or confirmed emergency medical condition->Emergency Medical Condition (MA) Reason for Exam: hx of dissection Acuity: Unknown Type of Exam: Unknown FINDINGS: Mediastinum: Evaluation is limited by the absence of contrast.  Interval median sternotomy with ascending aortic dissection repair and placement of a prosthetic aortic valve. Vessel evaluation is limited. The ascending aortic caliber just distal to the operative repair spans approximately 4.8 cm. The proximal descending thoracic aorta spans approximately 4.9 cm, in the distal descending aorta measures approximately 3 cm. The intimal flap is partially visualized within the transverse and descending aorta with the true and false lumen roughly similar in size to one another. Significant improvement in the associated mediastinal hematoma. Small volume residual hemopericardium. No pneumomediastinum or adenopathy. Lungs/pleura: There are some new air bronchograms at the dependent left lower lobe. Background slightly increased atelectatic changes with pulmonary parenchymal evaluation motion degraded. Right upper lobe calcified granuloma. Resolved right and slightly increased left pleural effusions. No pneumothorax. Upper Abdomen: Abdominal findings are reported separately under same-day abdominal CT. Soft Tissues/Bones: No organized fluid collection or soft tissue gas along the healing median sternotomy. There is mild osseous resorption along the sternotomy incision which remains grossly approximated. No appreciable osseous bridging. Callus formation in the region of the manubrium. Otherwise unchanged degenerative findings in the spine and shoulders. Vascular evaluation is limited by the absence of contrast. Interval operative repair of the ascending aortic dissection. Vessel caliber as described above with the ascending aorta just distal to the operative repair measuring up to 4.9 cm. The true and false lumens now all appear somewhat equal in caliber, though assessment is limited in the absence of contrast. Significant improvement in the associated mediastinal hematoma. Only small volume residual hemopericardium.  New air bronchograms in the left lower lobe which may be due to developing infection in the appropriate clinical setting. Slightly increased atelectatic changes in the lung bases. Improved right and worsened left pleural effusions. CT CERVICAL SPINE WO CONTRAST    Result Date: 9/16/2021  EXAMINATION: CT OF THE CERVICAL SPINE WITHOUT CONTRAST 9/16/2021 4:29 pm TECHNIQUE: CT of the cervical spine was performed without the administration of intravenous contrast. Multiplanar reformatted images are provided for review. Dose modulation, iterative reconstruction, and/or weight based adjustment of the mA/kV was utilized to reduce the radiation dose to as low as reasonably achievable. COMPARISON: CT scan dated November 12, 2012 HISTORY: ORDERING SYSTEM PROVIDED HISTORY: found down TECHNOLOGIST PROVIDED HISTORY: found down Decision Support Exception - unselect if not a suspected or confirmed emergency medical condition->Emergency Medical Condition (MA) Reason for Exam: Fall, found down Acuity: Acute Type of Exam: Initial FINDINGS: BONES/ALIGNMENT: There is no acute fracture or traumatic malalignment. DEGENERATIVE CHANGES: No significant degenerative changes. SOFT TISSUES: There is no prevertebral soft tissue swelling. Calcification is present within the ligamentum nuchae. Images through the upper chest include the transverse portion of the aorta demonstrate a portion of the patient's known type A aortic dissection. .  Correlation with the scheduled CT scan of the chest abdomen pelvis recommended. 1. No evidence of an acute fracture or traumatic malalignment involving the cervical spine 2. Images through the upper chest partially demonstrate the patient's known type A aortic dissection. Patient is scheduled to have a CT scan of the chest abdomen and pelvis. 3.  Critical results were called by Dr. Shivam Judge to Dr. Nicholas Gresham on 9/16/2021 at 11 p.m. hours.      IR FLUORO GUIDED CVA DEVICE PLMT/REPLACE/REMOVAL    Result Date: 8/27/2021  PROCEDURE: ULTRASOUND GUIDED VASCULAR ACCESS. Midline placement 8/27/2021. HISTORY: ORDERING SYSTEM PROVIDED HISTORY: Sepsis, discharge on IV antibiotic TECHNOLOGIST PROVIDED HISTORY: Sepsis, discharge on IV antibiotic Lumen?->Single Lumen SEDATION: None FLUOROSCOPY DOSE AND TYPE OR TIME AND EXPOSURES: None TECHNIQUE AND FINDINGS: Informed consent was obtained after a detailed explanation of the procedure including risks, benefits, and alternatives. Universal protocol was observed. The right arm was prepped and draped in sterile fashion using maximum sterile barrier technique. Local anesthesia was achieved with lidocaine. A micropuncture needle was used to access the right brachial vein using ultrasound guidance. An ultrasound image demonstrating patency of the vein with needle tip located within it. An image was obtained and stored in PACs. A 0.018 guidewire was used to place a peel-a-way sheath and a 4.5 [place] 15 cm single-lumen midline catheter was placed with its tip directed centrally. Ultrasound images verify appropriate catheter positioning. The catheter flushed easily and there was a good blood return. The catheter was secured to the skin. The patient tolerated the procedure well and there were no immediate complications. EBL: Less than 3 ml     Successful ultrasound guided midline placement     XR CHEST PORTABLE    Result Date: 8/25/2021  EXAMINATION: ONE XRAY VIEW OF THE CHEST 8/25/2021 5:47 am COMPARISON: 08/23/2021, 08/19/2021 HISTORY: ORDERING SYSTEM PROVIDED HISTORY: R/O CHF TECHNOLOGIST PROVIDED HISTORY: R/O CHF Reason for Exam: R/O CHF Acuity: Acute Type of Exam: Initial Additional signs and symptoms: R/O CHF Relevant Medical/Surgical History: R/O CHF FINDINGS: The cardiac and mediastinal contours appear unchanged. Basilar opacities and pleural effusions, left greater than right, are again demonstrated without appreciable change.  No new airspace disease identified in the interval.  No aortic valve replacement and ascending aortic dissection repair. 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  No evidence of acute intramural hematoma. 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 The heart and pericardium are without acute abnormality. No mediastinal adenopathy. Coronary artery calcifications are noted. Trace pericardial effusion appears similar to the prior. Lungs/pleura: Small left pleural effusion, similar to the prior study. Motion limited evaluation of the lung bases. There is bilateral dependent atelectasis. No evidence of focal consolidation. No pneumothorax. Soft Tissues/Bones: No acute bony abnormality. Abdomen/Pelvis: Organs: The solid organs are incompletely evaluated without intravenous contrast.  Unchanged hepatic and renal cysts. A mildly complex 1.8 cm right lower pole renal cyst is indeterminate. The solid organs are without acute noncontrast findings. The gallbladder is present without radiopaque cholelithiasis. GI/Bowel: No mechanical bowel obstruction. Stable postoperative findings status post subtotal colectomy with a right lower quadrant ostomy. Pelvis: Several bladder calcifications are again seen. The prostate is enlarged. Gas in the urinary bladder is suggestive of cystitis. Small fat-containing right inguinal hernia. Peritoneum/Retroperitoneum: Moderate atherosclerotic plaque. The known type B aortic dissection extends into the right common iliac artery, similar to the prior exam. Bones/Soft Tissues: There is a left lower quadrant and midline soft tissue contusion. No acute bony abnormality. 1. Limited evaluation of the aorta without intravenous contrast.  Similar appearance of an extensive Magnolia type B aortic dissection. Unchanged postoperative appearance of the ascending aorta status post aortic valve replacement and ascending aortic dissection repair.   The splanchnic arteries are not well evaluated. 2. Unchanged small left pleural effusion. 3. Gas in the urinary bladder is suggestive of cystitis. Correlate with urinalysis. Unchanged layering bladder calculi. 4. Left lower quadrant and midline abdominal wall soft tissue contusion. No additional noncontrast evidence of acute traumatic injury in the chest, abdomen or pelvis. 5. Indeterminate 1.8 cm right lower pole cystic lesion. If clinically appropriate, outpatient renal protocol MRI can be obtained to better characterize. [place] MODIFIED BARIUM SWALLOW W VIDEO    Result Date: 8/24/2021  EXAMINATION: MODIFIED BARIUM SWALLOW WAS PERFORMED IN CONJUNCTION WITH SPEECH PATHOLOGY SERVICES TECHNIQUE: Fluoroscopic evaluation of the swallowing mechanism was performed with multiple consistency of barium product. FLUOROSCOPY DOSE AND TYPE OR TIME AND EXPOSURES: Fluoroscopic time of 3 minutes and 9 seconds. DAP of 130.2 dGycm2. COMPARISON: None HISTORY: ORDERING SYSTEM PROVIDED HISTORY: Aspiration risk TECHNOLOGIST PROVIDED HISTORY: Aspiration risk Reason for Exam: aspiration risk Acuity: Unknown Type of Exam: Unknown FINDINGS: Multiple swallows were attempted with multiple consistencies in the presence of speech pathology under fluoroscopic evaluation. Penetration was visualized with thin liquid. There is no evidence for aspiration. Penetration with thin liquid. No evidence for aspiration. Please see separate speech pathology report for full discussion of findings and recommendations.           Current Facility-Administered Medications   Medication Dose Route Frequency Provider Last Rate Last Admin    QUEtiapine (SEROQUEL) tablet 25 mg  25 mg Oral BID PRN Zulema Perez MD        perflutren lipid microspheres (DEFINITY) injection 2.2 mg  2 mL IntraVENous ONCE PRN Ton Kramer MD        [facility]) tablet 5 mg  5 mg Oral BID Natali Schuler MD   5 mg at 09/20/21 2103    dexamethasone (DECADRON) tablet 6 mg  6 mg Oral Daily Deepak De Santiago MD   6 mg at 09/20/21 0929    aluminum & magnesium hydroxide-simethicone (MAALOX) 200-200-20 MG/5ML suspension 30 mL  30 mL Oral Q6H PRN MOISES Boston CNP        amiodarone (CORDARONE) tablet 200 mg  200 mg Oral Daily MOISES Boston - CNP   200 mg at 09/20/21 6158    ascorbic acid (VITAMIN C) tablet 500 mg  500 mg Oral Daily MOISES Boston - CNP   500 mg at 09/20/21 1143    atorvastatin (LIPITOR) tablet 20 mg  20 mg Oral Daily MOISES Boston - CNP   20 mg at 09/20/21 4664    divalproex (DEPAKOTE SPRINKLE) capsule 125 mg  125 mg Oral BID MOISES Boston - CNP   125 mg at 09/20/21 1745    ferrous sulfate (IRON 325) tablet 325 mg  325 mg Oral Daily with breakfast MOISES Boston CNP   325 mg at 09/20/21 0929    [Held by provider] furosemide (LASIX) tablet 20 mg  20 mg Oral Daily MOISES Boston CNP        latanoprost (XALATAN) 0.005 % ophthalmic solution 1 drop  1 drop Both Eyes Nightly MOISES Boston CNP        melatonin tablet 3 mg  3 mg Oral Nightly PRN MOISES Boston - CNP   3 mg at 09/20/21 2103    therapeutic multivitamin-minerals 1 tablet  1 tablet Oral Daily MOISES Boston CNP   1 tablet at 09/20/21 0931    timolol (TIMOPTIC) 0.5 % ophthalmic solution 1 drop  1 drop Both Eyes Daily MOISES Boston CNP        zinc sulfate (ZINCATE) capsule 50 mg  50 mg Oral Daily MOISES Boston - CNP   50 mg at 09/20/21 0931    sodium chloride flush 0.9 % injection 5-40 mL  5-40 mL IntraVENous 2 times per day MOISES Boston CNP   5 mL at 09/17/21 0959    sodium chloride flush 0.9 % injection 10 mL  10 mL IntraVENous PRN MOISES Boston CNP        0.9 % sodium chloride infusion  25 mL IntraVENous PRN MOISES Boston CNP        ondansetron (ZOFRAN-ODT) disintegrating tablet 4 mg  4 mg Oral Q8H PRN Juniora LONG PetitN - CNP        Or    ondansetron (ZOFRAN) injection 4 mg  4 mg IntraVENous Q6H PRN Dominic Petit, APRN - CNP   4 mg at 09/19/21 1025    polyethylene pneumonia. Pulmonology consulted. Patient given Decadron in the ED. Urinalysis shows infection. Patient started on Rocephin. CT head negative for acute intracranial abnormality. Patient has MERRICK with creatinine 2.46, K 5.1. Patient given 2 L normal saline bolus in the ED. Recheck BMP in the morning. Patient hemodynamically stable.   Admit to progressive unit    Gram negative sepsis likely from uti ESBL  Iv meropenem   Delirium ICU psychosis  Metabolic encephalopathy likely secondary to gram-negative sepsis IV meropenem continued  Overall prognosis is guarded for his age comorbid conditions  On Eliquis for DVT prophylaxis with elevated D-dimer      Danny Freeman MD  9/20/2021  9:40 PM

## 2021-09-21 NOTE — PLAN OF CARE
Nutrition Problem #1: Severe malnutrition  Intervention: Food and/or Nutrient Delivery:  (Will monitor for results of swallow study)  Nutritional Goals: PO intake % of meals and supplements

## 2021-09-21 NOTE — PROGRESS NOTES
Comprehensive Nutrition Assessment    Type and Reason for Visit:  Reassess    Nutrition Recommendations/Plan:   Will provide Easy to Chew diet with Nectar thick liquids and supplements all trays  Will monitor for results of swallow eval    Nutrition Assessment:  Nutritional intake is inadequate.  Speech has been consulted for swallow eval.    Malnutrition Assessment:  Malnutrition Status:  Severe malnutrition    Context:  Acute Illness     Findings of the 6 clinical characteristics of malnutrition:  Energy Intake:   (Within past month, intake has been less than 75% for 7 or more days)  Weight Loss:  7 - Greater than 7.5% over 3 months     Body Fat Loss:  7 - Moderate body fat loss (Fat and muscle loss based on assessment 8/24/21 with further wt loss noted since that time) Orbital   Muscle Mass Loss:  7 - Moderate muscle mass loss Temples (temporalis), Clavicles (pectoralis & deltoids)  Fluid Accumulation:  No significant fluid accumulation Extremities   Strength:  Not Performed    Estimated Daily Nutrient Needs:  Energy (kcal):  6578-0952 based on [place] Sa[employer] with 1.2-1.3 factor; Weight Used for Energy Requirements:  Admission     Protein (g):   based on 1.2-1.3 gm per kg; Weight Used for Protein Requirements:  Admission          Nutrition Related Findings:  trace BLE edema, 50 ml stool from ostomy, Labs: Reviewed, Meds: Vit C, Zinc      Wounds:  Pressure Injury, Stage II       Current Nutrition Therapies:    ADULT DIET; Easy to Chew; No Added Salt (3-4 gm); Mildly Thick (Nectar)  Adult Oral Nutrition Supplement; Standard High Calorie/High Protein Oral Supplement  Adult Oral Nutrition Supplement; Frozen Oral Supplement    Anthropometric Measures:  · Height: 6' 1\" (185.4 cm)  · Current Body Weight: 189 lb (85.7 kg)   · Admission Body Weight: 182 lb 1.6 oz (82.6 kg)    · Usual Body Weight: 230 lb (104.3 kg)     · Ideal Body Weight: 184 lbs;     Nutrition Diagnosis:   · Severe malnutrition related to

## 2021-09-21 NOTE — PROGRESS NOTES
Pt was only able to take one pill (atorvastatin) before he started having a coughing spell and writer felt that it was unsafe to give him his other pills. Pt also coughing with his nectar thick drinks. Will let physician's know and possibly get a swallow study ordered.

## 2021-09-21 NOTE — FLOWSHEET NOTE
Writer called and spoke to pt's wife Yoni Pineda who expressed her appreciation for the care patient is receiving and for the updates she receives. She acknowledged the difficulty of not being able to visit patient, but is glad she has good support with her kids and neighbors and again knows he's in the best place for him right now. 09/21/21 3470   Encounter Summary   Services provided to: Family   Referral/Consult From: [facility]   Support System Spouse; Children   Continue Visiting   (9-21-21)   Complexity of Encounter Moderate   Length of Encounter 15 minutes   Spiritual/Moravian   Type Spiritual support   Assessment Calm; Approachable   Intervention Active listening;Explored feelings, thoughts, concerns;Sustaining presence/ Ministry of presence; Discussed illness/injury and it's impact   Outcome Expressed gratitude;Engaged in conversation;Expressed feelings/needs/concerns;Coping;Receptive

## 2021-09-21 NOTE — PROGRESS NOTES
Called and updated patients wife Vimal French on patient condition over night. Answered all questions and Vimal French said she would call patient later today.

## 2021-09-21 NOTE — PLAN OF CARE
Problem: Airway Clearance - Ineffective  Goal: Achieve or maintain patent airway  9/21/2021 0407 by Cory Hi RN  Outcome: Ongoing  9/20/2021 1451 by Shelby Mathias RN  Outcome: Ongoing  Note: Respiratory assessment as charted. Problem: Gas Exchange - Impaired  Goal: Absence of hypoxia  9/21/2021 0407 by Cory Hi RN  Outcome: Ongoing  9/20/2021 1451 by Shelby Mathias RN  Outcome: Ongoing  Goal: Promote optimal lung function  9/21/2021 0407 by Cory Hi RN  Outcome: Ongoing  9/20/2021 1451 by Shelby Mathias RN  Outcome: Ongoing     Problem: Breathing Pattern - Ineffective  Goal: Ability to achieve and maintain a regular respiratory rate  9/21/2021 0407 by Cory Hi RN  Outcome: Ongoing  9/20/2021 1451 by Shelby Mathias RN  Outcome: Ongoing     Problem:  Body Temperature -  Risk of, Imbalanced  Goal: Ability to maintain a body temperature within defined limits  9/21/2021 0407 by Cory Hi RN  Outcome: Ongoing  9/20/2021 1451 by Shelby Mathias RN  Outcome: Ongoing  Goal: Will regain or maintain usual level of consciousness  9/21/2021 0407 by Cory Hi RN  Outcome: Ongoing  9/20/2021 1451 by Shelby Mathias RN  Outcome: Ongoing  Goal: Complications related to the disease process, condition or treatment will be avoided or minimized  9/21/2021 0407 by Cory Hi RN  Outcome: Ongoing  9/20/2021 1451 by Shelby Mathias RN  Outcome: Ongoing     Problem: Isolation Precautions - Risk of Spread of Infection  Goal: Prevent transmission of infection  9/21/2021 0407 by Cory Hi RN  Outcome: Ongoing  9/20/2021 1451 by Shelby Mathias RN  Outcome: Ongoing     Problem: Nutrition Deficits  Goal: Optimize nutritional status  9/21/2021 0407 by Cory Hi RN  Outcome: Ongoing  9/20/2021 1451 by Shelby Mathias RN  Outcome: Ongoing     Problem: Risk for Fluid Volume Deficit  Goal: Maintain normal heart rhythm  9/21/2021 0407 by Cory Hi RN  Outcome: Ongoing  9/20/2021 1451 by Brianna Selby ELIDIA Mascorro  Outcome: Ongoing  Goal: Maintain absence of muscle cramping  9/21/2021 0407 by Aidan Solis RN  Outcome: Ongoing  9/20/2021 1451 by Phong Chavez RN  Outcome: Ongoing  Goal: Maintain normal serum potassium, sodium, calcium, phosphorus, and pH  9/21/2021 0407 by Aidan Solis RN  Outcome: Ongoing  9/20/2021 1451 by Phong Chavez RN  Outcome: Ongoing     Problem: Loneliness or Risk for Loneliness  Goal: Demonstrate positive use of time alone when socialization is not possible  9/21/2021 0407 by Aidan Solis RN  Outcome: Ongoing  9/20/2021 1451 by Phong Chavez RN  Outcome: Ongoing     Problem: Fatigue  Goal: Verbalize increase energy and improved vitality  9/21/2021 0407 by Aidan Solis RN  Outcome: Ongoing  9/20/2021 1451 by Phong Chavez RN  Outcome: Ongoing     Problem: Patient Education: Go to Patient Education Activity  Goal: Patient/Family Education  9/21/2021 0407 by Aidan Solis RN  Outcome: Ongoing  9/20/2021 1451 by Phong Chavez RN  Outcome: Ongoing     Problem: Nutrition  Goal: Optimal nutrition therapy  9/21/2021 0407 by Aidan Solis RN  Outcome: Ongoing  9/20/2021 1451 by Phong Chavez RN  Outcome: Ongoing     Problem: Falls - Risk of:  Goal: Will remain free from falls  Description: Will remain free from falls  9/21/2021 0407 by Aidan Solis RN  Outcome: Ongoing  9/20/2021 1451 by Phong Chavez RN  Outcome: Ongoing  Note: Patient had no falls this shift. Call light within reach. Side rails up x2. Bed in lowest position. Patient safety is maintain.    Goal: Absence of physical injury  Description: Absence of physical injury  9/21/2021 0407 by Aidan Solis RN  Outcome: Ongoing  9/20/2021 1451 by Phong Chavez RN  Outcome: Ongoing     Problem: Skin Integrity:  Goal: Will show no infection signs and symptoms  Description: Will show no infection signs and symptoms  9/21/2021 0407 by Aidan Solis RN  Outcome: Ongoing  9/20/2021 1451 by Phong Chavez RN  Outcome: Ongoing  Note: Patient had no falls this shift. Call light within reach. Side rails up x2. Bed in lowest position. Patient safety is maintain.    Goal: Absence of new skin breakdown  Description: Absence of new skin breakdown  9/21/2021 0407 by Rosanna Griffin RN  Outcome: Ongoing  9/20/2021 1451 by Dimas Palomares RN  Outcome: Ongoing     Problem: Musculor/Skeletal Functional Status  Goal: Highest potential functional level  9/21/2021 0407 by Rosanna Griffin RN  Outcome: Ongoing  9/20/2021 1451 by Dimas Palomares RN  Outcome: Ongoing  Goal: Absence of falls  9/21/2021 0407 by Rosanna Griffin RN  Outcome: Ongoing  9/20/2021 1451 by Dimas Palomares RN  Outcome: Ongoing     Problem: Musculor/Skeletal Functional Status  Goal: Highest potential functional level  Outcome: Ongoing  Goal: Absence of falls  Outcome: Ongoing

## 2021-09-21 NOTE — PROGRESS NOTES
History and Physical Service  [facility] Internal Medicine    HISTORY AND PHYSICAL EXAMINATION            Date:   9/21/2021  Patient name:  Mecca Stark  MRN:   [id number]  Account:  [de-identified]  YOB: 1940  PCP:    Paulina Grove MD  Code Status:    Full Code    Chief Complaint:   unwell    History Obtained From:     Patient ,medical record ,nursing staff    History of Present Illnes       The patient is a 78 y.o. Non- / non  male who presents   Patient brought to ED by EMS after being found down at [facility]. Patient was positive for Covid on 9/7/2021. Patient hypoxic on arrival required 2 L nasal cannula. Eventually switched to high flow nasal cannula. SPO2 97% CT scan redemonstrates known aortic dissection, no signs of pneumonia. Pulmonology consulted. Patient given Decadron in the ED. Urinalysis shows infection. Patient started on Rocephin. CT head negative for acute intracranial abnormality. Patient has MERRICK with creatinine 2.46, K 5.1. Patient given 2 L normal saline bolus in the ED. Recheck BMP in the morning. Patient hemodynamically stable. Admit to progressive unit    9/20   Patient is pleasantly confused  Has poor oral intake, on IV fluids  Blood culture, urine culture growing E.  Coli   More confused  Agitated  Delirium    9/21  Patient has coughing spells with medication  Speech therapy evaluating the patient  Patient is in delirium, oriented to person, not to time  Past Medical History:   Diagnosis Date    Acute respiratory failure following trauma and surgery ([address]     Acute respiratory failure following trauma and surgery ([address]     Altered bowel elimination due to intestinal ostomy ([address]     iliostomy    Full dentures     pt said he has partial upper and lower    Full dentures     Gall stones     GERD (gastroesophageal reflux disease)     GI bleed     Hemorrhage of gastrointestinal tract, unspecified     Hemorrhage of gastrointestinal tract, unspecified     Hyperlipidemia     Hypertension     Kidney stones     Kidney stones     Primary localized osteoarthrosis, lower leg     both shoulders, neck, legs.  Primary localized osteoarthrosis, lower leg     Prostate disorder     Transient disorder of initiating or maintaining sleep     Transient disorder of initiating or maintaining sleep     Unspecified disorder of skin and subcutaneous tissue     Wears glasses     Wears glasses         Past Surgical History:     Past Surgical History:   Procedure Laterality Date    ABDOMEN SURGERY      CATARACT REMOVAL WITH IMPLANT Bilateral     COLONOSCOPY      X3    COLOSTOMY Right     COLOSTOMY BAG ON RT. SIDE    CYSTOSCOPY Right 8/29/2017    CYSTOSCOPY URETERAL STENT INSERTION performed by Vilma Cole MD at [facility], ESOPHAGUS      EYE SURGERY      bettye. eyes, cataracts extracted with iol's    FRACTURE SURGERY      JOINT REPLACEMENT Right 3-23-15    KIDNEY STONE SURGERY Left     KNEE ARTHROSCOPY Right     LITHOTRIPSY Left 06-20-14    w/ cystoscopy C & P    SHOULDER SURGERY Left 9/14/2020    MOLE EXCISION POSTERIOR SHOULDER performed by Maria Luisa Orozco MD at [facility] 41 , PARTIAL RECTUM\"    TOTAL KNEE ARTHROPLASTY  03/23/2015    Right with biomet and GPS product application    TOTAL KNEE ARTHROPLASTY Right 3/23/2015        Medications Prior to Admission:     Prior to Admission medications    Medication Sig Start Date End Date Taking? Authorizing Provider   ascorbic acid (VITAMIN C) 500 MG tablet Take 500 mg by mouth 2 times daily    Yes Historical Provider, MD   divalproex (DEPAKOTE) 125 MG DR tablet Take 125 mg by mouth 2 times daily   Yes Historical Provider, MD   methylPREDNISolone (MEDROL DOSEPACK) 4 MG tablet Take 4 mg by mouth See Admin Instructions Take by mouth.    Yes Historical Provider, MD   zinc sulfate (ZINCATE) 220 (50 Zn) MG capsule Take 50 mg by mouth daily   Yes Historical Provider, MD   amiodarone (CORDARONE) 200 MG tablet Take 200 mg by mouth daily   Yes Historical Provider, MD   aspirin 81 MG chewable tablet Take 81 mg by mouth daily   Yes Historical Provider, MD   atorvastatin (LIPITOR) 20 MG tablet Take 20 mg by mouth daily   Yes Historical Provider, MD   ferrous sulfate (IRON 325) 325 (65 Fe) MG tablet Take 325 mg by mouth daily (with breakfast)   Yes Historical Provider, MD   furosemide (LASIX) 20 MG tablet Take 20 mg by mouth daily   Yes Historical Provider, MD   ipratropium-albuterol (DUONEB) 0.5-2.5 (3) MG/3ML SOLN nebulizer solution Inhale 1 vial into the lungs every 6 hours as needed for Shortness of Breath   Yes Historical Provider, MD   aluminum & magnesium hydroxide-simethicone (MYLANTA) 400-400-40 MG/5ML SUSP Take 15 mLs by mouth every 6 hours as needed (heartburn)   Yes Historical Provider, MD   methocarbamol (ROBAXIN) 750 MG tablet Take 750 mg by mouth every 8 hours as needed (muscle spasms)    Yes Historical Provider, MD   potassium chloride (KLOR-CON M) 20 MEQ extended release tablet Take 20 mEq by mouth daily   Yes Historical Provider, MD   Multiple Vitamins-Minerals (THERAPEUTIC MULTIVITAMIN-MINERALS) tablet Take 1 tablet by mouth daily   Yes Historical Provider, MD   traZODone (DESYREL) 150 MG tablet Take 150 mg by mouth nightly   Yes Historical Provider, MD   ondansetron (ZOFRAN) 4 MG tablet Take 4 mg by mouth every 6 hours as needed for Nausea or Vomiting   Yes Historical Provider, MD   timolol (TIMOPTIC) 0.5 % ophthalmic solution Place 1 drop into both eyes daily  2/2/21  Yes Historical Provider, MD   latanoprost (XALATAN) 0.005 % ophthalmic solution Place 1 drop into both eyes nightly  5/11/16  Yes Historical Provider, MD        Allergies:     Sulfa antibiotics and Vimovo [naproxen-esomeprazole]    Social History:     Tobacco:    reports that he has never smoked.  He has never used smokeless tobacco.  Alcohol:      reports no history of alcohol use. Drug Use:  reports no history of drug use. Family History:     Family History   Problem Relation Age of Onset    Heart Disease Father     Other Mother        Review of Systems:     Patient confused agitated unable to get review of system    Physical Exam:   /89   Pulse 81   Temp 98 °F (36.7 °C) (Axillary)   Resp 14   Ht 6' 1\" (1.854 m)   Wt 189 lb 6 oz (85.9 kg)   SpO2 92%   BMI 24.99 kg/m²   No results for input(s): POCGLU in the last 72 hours. Elderly man  Thoracotomy scar healing noted colostomy in place noted  General Appearance:  unwell  Mental status: Confused , oriented to place  Head:  normocephalic, atraumatic. Eye: no icterus, redness, pupils equal and reactive, extraocular eye movements intact, conjunctiva clear  Ear: normal external ear, no discharge, hearing intact  Nose:  no drainage noted  Mouth: mucous membranes moist  Neck: supple, no carotid bruits, thyroid not palpable  Lungs: basal creakles    Cardiovascular: normal rate, regular rhythm, no murmur, gallop, rub.   Abdomen: Soft, nontender, nondistended, normal bowel sounds, no hepatomegaly or splenomegaly  Neurologic: There are no new focal motor or sensory deficits, normal muscle tone and bulk, no abnormal sensation, normal speech,   Skin: No gross lesions, rashes, bruising or bleeding on exposed skin area  Extremities:  peripheral pulses palpable, no pedal edema or calf pain with palpation  Psych: Agitated disoriented    Investigations:      Laboratory Testing:  Recent Results (from the past 24 hour(s))   Basic Metabolic Panel w/ Reflex to MG    Collection Time: 09/21/21  4:54 AM   Result Value Ref Range    Glucose 103 (H) 70 - 99 mg/dL    BUN 41 (H) 8 - 23 mg/dL    CREATININE 0.95 0.70 - 1.20 mg/dL    Bun/Cre Ratio NOT REPORTED 9 - 20    Calcium 9.4 8.6 - 10.4 mg/dL    Sodium 144 135 - 144 mmol/L    Potassium 4.4 3.7 - 5.3 mmol/L    Chloride 115 (H) 98 - 107 mmol/L    CO2 23 20 - 31 mmol/L    Anion Gap 6 (L) 9 - 17 mmol/L    GFR Non-African American >60 >60 mL/min    GFR African American >60 >60 mL/min    GFR Comment          GFR Staging NOT REPORTED    CBC    Collection Time: 09/21/21  4:54 AM   Result Value Ref Range    WBC 14.3 (H) 3.5 - 11.0 k/uL    RBC 4.40 (L) 4.5 - 5.9 m/uL    Hemoglobin 11.8 (L) 13.5 - 17.5 g/dL    Hematocrit 38.3 (L) 41 - 53 %    MCV 86.9 80 - 100 fL    MCH 26.7 26 - 34 pg    MCHC 30.7 (L) 31 - 37 g/dL    RDW 17.2 (H) 11.5 - 14.9 %    Platelets 334 282 - 483 k/uL    MPV 8.6 6.0 - 12.0 fL    NRBC Automated NOT REPORTED per 100 WBC   Magnesium    Collection Time: 09/21/21  4:54 AM   Result Value Ref Range    Magnesium 2.1 1.6 - 2.6 mg/dL   Procalcitonin    Collection Time: 09/21/21  4:54 AM   Result Value Ref Range    Procalcitonin 0.15 (H) <0.09 ng/mL       Recent Labs     09/21/21 0454 09/17/21 0927 09/16/21 2129   HGB 11.8*   < > 14.0   HCT 38.3*   < > 43.9   WBC 14.3*   < > 11.3*   MCV 86.9   < > 84.8      < > 140   K 4.4   < > 5.1   *   < > 101   CO2 23   < > 23   BUN 41*   < > 68*   CREATININE 0.95   < > 2.46*   GLUCOSE 103*   < > 105*   INR  --   --  1.1   PROTIME  --   --  14.4   APTT  --   --  32.5   AST  --   --  15   ALT  --   --  16   LABALBU  --   --  3.3*    < > = values in this interval not displayed.        Hematology:  Recent Labs     09/19/21  0429 09/20/21  0438 09/21/21 0454   WBC 10.7 11.0 14.3*   RBC 4.69 4.63 4.40*   HGB 12.8* 12.6* 11.8*   HCT 40.1* 40.2* 38.3*   MCV 85.6 86.8 86.9   MCH 27.2 27.2 26.7   MCHC 31.8 31.3 30.7*   RDW 17.3* 17.3* 17.2*   * 161 170   MPV 9.2 9.1 8.6   CRP  --  179.0*  --    DDIMER  --  5.05*  --      Chemistry:  Recent Labs     09/19/21  0429 09/20/21  0438 09/21/21  0454   * 145* 144   K 4.0 4.7 4.4   * 113* 115*   CO2 23 22 23   GLUCOSE 88 134* 103*   BUN 51* 44* 41*   CREATININE 1.15 1.10 0.95   MG 2.1 2.0 2.1   ANIONGAP 10 10 6*   LABGLOM >60 >60 >60 GFRAA >60 >60 >60   CALCIUM 9.8 9.7 9.4     Recent Labs     09/20/21  0438   *       Imaging/Diagnostics:       Echocardiogram Limited 2D    Result Date: 8/27/2021  [address] [facility] Transthoracic Echocardiography Report (TTE)  Patient Name Yadiel Loja  Date of Study               08/24/2021               Festus Gonzales   Date of      1940  Gender                      Male  Birth   Age          78 year(s)  Race                           Room Number  2061        Height:                     73 inch, 185.42 cm   Corporate ID [id number]    Weight:                     190 pounds, 86.2 kg  #   Patient Acct [de-identified]   BSA:          2.11 m^2      BMI:      25.07  #                                                              kg/m^2   MR #         [id number]      Sonographer                 Ivonne Swann   Accession #  [id number]  Interpreting Physician      [address] [place] [address]   Fellow                   Referring Nurse                           Practitioner   Interpreting             Referring Physician         Ale Solo *  Fellow  Additional Comments Technically difficult study. Type of Study   TTE procedure:2D Echocardiogram, Limited Echo. Procedure Date Date: 08/24/2021 Start: 08:49 AM Study Location: [facility] Technical Quality: Limited visualization due to poor acoustical window. Indications:Septic shock. History / Tech. Comments: HLD, HTN Patient Status: Inpatient Height: 73 inches Weight: 190 pounds BSA: 2.11 m^2 BMI: 25.07 kg/m^2 Rhythm: Within normal limits HR: 84 bpm BP: 115/49 mmHg CONCLUSIONS Summary Extremely challenging Echo, not all walls seen, poor windows, poor endocardial definition. Recommend repeat limited with Definity. Within above limitations, LVEF appears mildly reduced with EF 40%. Thickened mitral valve leaflets.  Signature ----------------------------------------------------------------------------  Electronically signed by Ivonne Swann(Sonographer) on confirmed emergency medical condition->Emergency Medical Condition (MA) Reason for Exam: concern for septic kidney secondary to nephrolithiasis Acuity: Unknown Type of Exam: Unknown Relevant Medical/Surgical History: colostomy, colectomy FINDINGS: Lower Chest: Increased small left pleural effusion with increased bibasilar atelectatic changes. Decreased hemopericardium. Prosthetic aortic valve partially imaged. Organs: Evaluation is limited by the absence of contrast.  Redemonstration of innumerable hepatic cysts, grossly unchanged. Gallbladder is grossly unremarkable. Spleen is grossly normal caliber. Pancreas is atrophic. Adrenal glands are normal size. Punctate nonobstructing left nephroliths. Bilateral renal cysts measuring up to 7.3 cm on the right and 9.5 cm on the left. No hydronephrosis with mild left perinephric stranding. GI/Bowel: Postoperative changes from presumed colectomy with a Brielle's pouch and a right lower quadrant ostomy with a similar appearing parastomal hernia. No bowel obstruction. Small lipoma in the gastric antrum. Pelvis: Urinary bladder contains numerous layering calcifications posteriorly as well as an indwelling Price catheter with associated intraluminal gas. Gas within 2 anterosuperior bladder diverticula. Perivesicular stranding appears grossly similar to prior. Enlarged prostate. Peritoneum/Retroperitoneum: No free fluid or free air. Intimal flap redemonstrated in the aorta compatible with the known dissection. Abdominal aorta remains unchanged in caliber measuring up to 2.5 cm. Unchanged ectasia of the common iliac arteries, right greater than left. Displaced calcifications in the common iliac vessels are also compatible with underlying intimal flaps/dissection. Bones/Soft Tissues: Recent postoperative changes along the anterior abdominal wall and epigastrium. Presumed sequelae of medication injection in the anterior abdominal wall subcutaneous fat.   No acute bony findings on a background of osseous demineralization, dextroconvex lumbar spinal curvature, advanced degenerative changes throughout the lumbar spine including sequelae of Baastrup's disease, and in unchanged region of sclerosis in the right acetabulum with some other smaller unchanged sclerotic foci in the pelvis. Indwelling Price catheter with associated intraluminal gas in the bladder which appears thickened and with numerous diverticula. The wall thickening may be sequelae of chronic bladder outlet obstruction in the setting of an enlarged prostate, though as there is perivesicular stranding as well as left perinephric stranding, recommend correlation for superimposed ascending urinary tract infection. Innumerable bladder calcifications and nonobstructing left nephrolithiasis without an obstructing stone. Though suboptimally evaluated in the absence of contrast intimal flaps are redemonstrated in the abdominal aorta and common iliac arteries in the setting of known recent dissection two months prior. Aortic caliber is unchanged and remains within normal limits, and there is unchanged ectasia of the common iliac vessels. Recent postoperative changes along the anterior abdominal wall without any organized fluid collection or soft tissue gas. Other unchanged chronic findings as described above. XR CHEST (2 VW)    Result Date: 8/19/2021  EXAMINATION: TWO XRAY VIEWS OF THE CHEST 8/19/2021 2:37 pm COMPARISON: July 9, 2021 CT chest HISTORY: ORDERING SYSTEM PROVIDED HISTORY: dysphagia TECHNOLOGIST PROVIDED HISTORY: dysphagia Reason for Exam: dysphagia, in rehab facility, hx limited. Acuity: Acute Type of Exam: Initial FINDINGS: Median sternotomy, stable normal cardiopericardial silhouette/moderate tortuosity of the thoracic aorta/prosthetic heart valve New moderate left basilar opacity.   Otherwise clear lungs Degenerative changes of the thoracic spine/shoulders     New moderate left basilar opacity likely related moderate pleural effusion/pleural thickening, atelectasis versus infiltrate     CT HEAD WO CONTRAST    Result Date: 9/16/2021  EXAMINATION: CT OF THE HEAD WITHOUT CONTRAST  9/16/2021 10:28 pm TECHNIQUE: CT of the head was performed without the administration of intravenous contrast. Dose modulation, iterative reconstruction, and/or weight based adjustment of the mA/kV was utilized to reduce the radiation dose to as low as reasonably achievable. COMPARISON: 08/23/2021 HISTORY: ORDERING SYSTEM PROVIDED HISTORY: fall TECHNOLOGIST PROVIDED HISTORY: fall Decision Support Exception - unselect if not a suspected or confirmed emergency medical condition->Emergency Medical Condition (MA) Reason for Exam: fall, bruise to forehead Acuity: Acute Type of Exam: Initial FINDINGS: BRAIN/VENTRICLES: There is no acute intracranial hemorrhage, mass effect or midline shift. No abnormal extra-axial fluid collection. No evidence of recent territorial infarct. There are nonspecific areas of hypoattenuation in the periventricular white matter and centrum semiovale that are likely related to chronic small vessel ischemic disease. Stable asymmetric prominence of the left ventricle compared to the right. There is no evidence of hydrocephalus. There is intracranial atherosclerosis. ORBITS: The visualized portion of the orbits demonstrate no acute abnormality. SINUSES: The visualized paranasal sinuses and mastoid air cells demonstrate no acute abnormality. SOFT TISSUES/SKULL:  Small right frontal soft tissue contusion. No underlying calvarial fracture. There is cerumen in the bilateral external auditory canals. No acute intracranial abnormality. Small right frontal soft tissue contusion.      CT Head WO Contrast    Result Date: 8/23/2021  EXAMINATION: CT OF THE HEAD WITHOUT CONTRAST  8/23/2021 5:37 pm TECHNIQUE: CT of the head was performed without the administration of intravenous contrast. Dose modulation, iterative reconstruction, and/or weight based adjustment of the mA/kV was utilized to reduce the radiation dose to as low as reasonably achievable. COMPARISON: November 12, 2012 HISTORY: ORDERING SYSTEM PROVIDED HISTORY: altered mental status TECHNOLOGIST PROVIDED HISTORY: altered mental status Decision Support Exception - unselect if not a suspected or confirmed emergency medical condition->Emergency Medical Condition (MA) Reason for Exam: altered mental status FINDINGS: BRAIN/VENTRICLES: There is no acute intracranial hemorrhage, mass effect or midline shift. Note is made of cavum septum pellucidum, unchanged from prior study. ORBITS: The visualized portion of the orbits demonstrate no acute abnormality. SINUSES: The visualized paranasal sinuses and mastoid air cells demonstrate no acute abnormality. SOFT TISSUES/SKULL:  No acute abnormality of the visualized skull or soft tissues. No acute intracranial abnormality. CT CHEST WO CONTRAST    Result Date: 8/23/2021  EXAMINATION: CT OF THE CHEST WITHOUT CONTRAST 8/23/2021 5:18 pm TECHNIQUE: CT of the chest was performed without the administration of intravenous contrast. Multiplanar reformatted images are provided for review. Dose modulation, iterative reconstruction, and/or weight based adjustment of the mA/kV was utilized to reduce the radiation dose to as low as reasonably achievable. COMPARISON: 08/23/2021, 07/09/2021 HISTORY: ORDERING SYSTEM PROVIDED HISTORY: hx of dissection TECHNOLOGIST PROVIDED HISTORY: hx of dissection Decision Support Exception - unselect if not a suspected or confirmed emergency medical condition->Emergency Medical Condition (MA) Reason for Exam: hx of dissection Acuity: Unknown Type of Exam: Unknown FINDINGS: Mediastinum: Evaluation is limited by the absence of contrast.  Interval median sternotomy with ascending aortic dissection repair and placement of a prosthetic aortic valve. Vessel evaluation is limited.   The ascending aortic caliber just distal to the operative repair spans approximately 4.8 cm. The proximal descending thoracic aorta spans approximately 4.9 cm, in the distal descending aorta measures approximately 3 cm. The intimal flap is partially visualized within the transverse and descending aorta with the true and false lumen roughly similar in size to one another. Significant improvement in the associated mediastinal hematoma. Small volume residual hemopericardium. No pneumomediastinum or adenopathy. Lungs/pleura: There are some new air bronchograms at the dependent left lower lobe. Background slightly increased atelectatic changes with pulmonary parenchymal evaluation motion degraded. Right upper lobe calcified granuloma. Resolved right and slightly increased left pleural effusions. No pneumothorax. Upper Abdomen: Abdominal findings are reported separately under same-day abdominal CT. Soft Tissues/Bones: No organized fluid collection or soft tissue gas along the healing median sternotomy. There is mild osseous resorption along the sternotomy incision which remains grossly approximated. No appreciable osseous bridging. Callus formation in the region of the manubrium. Otherwise unchanged degenerative findings in the spine and shoulders. Vascular evaluation is limited by the absence of contrast. Interval operative repair of the ascending aortic dissection. Vessel caliber as described above with the ascending aorta just distal to the operative repair measuring up to 4.9 cm. The true and false lumens now all appear somewhat equal in caliber, though assessment is limited in the absence of contrast. Significant improvement in the associated mediastinal hematoma. Only small volume residual hemopericardium. New air bronchograms in the left lower lobe which may be due to developing infection in the appropriate clinical setting. Slightly increased atelectatic changes in the lung bases.  Improved right and worsened left pleural effusions. CT CERVICAL SPINE WO CONTRAST    Result Date: 9/16/2021  EXAMINATION: CT OF THE CERVICAL SPINE WITHOUT CONTRAST 9/16/2021 4:29 pm TECHNIQUE: CT of the cervical spine was performed without the administration of intravenous contrast. Multiplanar reformatted images are provided for review. Dose modulation, iterative reconstruction, and/or weight based adjustment of the mA/kV was utilized to reduce the radiation dose to as low as reasonably achievable. COMPARISON: CT scan dated November 12, 2012 HISTORY: ORDERING SYSTEM PROVIDED HISTORY: found down TECHNOLOGIST PROVIDED HISTORY: found down Decision Support Exception - unselect if not a suspected or confirmed emergency medical condition->Emergency Medical Condition (MA) Reason for Exam: Fall, found down Acuity: Acute Type of Exam: Initial FINDINGS: BONES/ALIGNMENT: There is no acute fracture or traumatic malalignment. DEGENERATIVE CHANGES: No significant degenerative changes. SOFT TISSUES: There is no prevertebral soft tissue swelling. Calcification is present within the ligamentum nuchae. Images through the upper chest include the transverse portion of the aorta demonstrate a portion of the patient's known type A aortic dissection. .  Correlation with the scheduled CT scan of the chest abdomen pelvis recommended. 1. No evidence of an acute fracture or traumatic malalignment involving the cervical spine 2. Images through the upper chest partially demonstrate the patient's known type A aortic dissection. Patient is scheduled to have a CT scan of the chest abdomen and pelvis. 3.  Critical results were called by Dr. Yan Hammond to Dr. Val Samson on 9/16/2021 at 11 p.m. hours. IR FLUORO GUIDED CVA DEVICE PLMT/REPLACE/REMOVAL    Result Date: 8/27/2021  PROCEDURE: ULTRASOUND GUIDED VASCULAR ACCESS. Midline placement 8/27/2021.  HISTORY: ORDERING SYSTEM PROVIDED HISTORY: Sepsis, discharge on IV antibiotic TECHNOLOGIST PROVIDED HISTORY: Sepsis, HISTORY: ORDERING SYSTEM PROVIDED HISTORY: altered mental status, evaluate for pneumonia TECHNOLOGIST PROVIDED HISTORY: altered mental status, evaluate for pneumonia Reason for Exam: altered mental status, evaluate for pneumonia Acuity: Unknown Type of Exam: Unknown FINDINGS: AP portable view of the chest time stamped at 1725 hours demonstrates overlying cardiac monitoring electrodes. Stable cardiomegaly is noted. Prior median sternotomy is demonstrated. CT. No change in left pleural effusion and left basilar opacity. No vascular congestion or extrapleural air. Surgical clips are present the medial right axilla. Prosthetic aortic valve is noted. No change in left basilar opacity likely related to effusion and atelectasis with focal airspace disease not excluded. CT CHEST ABDOMEN PELVIS WO CONTRAST    Result Date: 9/17/2021  EXAMINATION: CT OF THE CHEST, ABDOMEN, AND PELVIS WITHOUT CONTRAST 9/16/2021 10:32 pm TECHNIQUE: CT of the chest, abdomen and pelvis was performed without the administration of intravenous contrast. Multiplanar reformatted images are provided for review. Dose modulation, iterative reconstruction, and/or weight based adjustment of the mA/kV was utilized to reduce the radiation dose to as low as reasonably achievable. COMPARISON: 08/23/2021 and 07/09/2021 HISTORY: ORDERING SYSTEM PROVIDED HISTORY: found down, abdominal bruising, hypoxic TECHNOLOGIST PROVIDED HISTORY: found down, abdominal bruising, hypoxic Reason for Exam: found down, abdominal bruising, hypoxic Acuity: Acute Type of Exam: Initial Relevant Medical/Surgical History: colectomy, colostomy FINDINGS: Chest: Mediastinum: Suboptimal evaluation of the aorta without intravenous contrast. Stable postoperative findings status post aortic valve replacement and ascending aortic dissection repair.   Grossly stable appearance of the persistent Noam type B aortic dissection within the limitations of a noncontrast exam.  No evidence of acute intramural hematoma. 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 The heart and pericardium are without acute abnormality. No mediastinal adenopathy. Coronary artery calcifications are noted. Trace pericardial effusion appears similar to the prior. Lungs/pleura: Small left pleural effusion, similar to the prior study. Motion limited evaluation of the lung bases. There is bilateral dependent atelectasis. No evidence of focal consolidation. No pneumothorax. Soft Tissues/Bones: No acute bony abnormality. Abdomen/Pelvis: Organs: The solid organs are incompletely evaluated without intravenous contrast.  Unchanged hepatic and renal cysts. A mildly complex 1.8 cm right lower pole renal cyst is indeterminate. The solid organs are without acute noncontrast findings. The gallbladder is present without radiopaque cholelithiasis. GI/Bowel: No mechanical bowel obstruction. Stable postoperative findings status post subtotal colectomy with a right lower quadrant ostomy. Pelvis: Several bladder calcifications are again seen. The prostate is enlarged. Gas in the urinary bladder is suggestive of cystitis. Small fat-containing right inguinal hernia. Peritoneum/Retroperitoneum: Moderate atherosclerotic plaque. The known type B aortic dissection extends into the right common iliac artery, similar to the prior exam. Bones/Soft Tissues: There is a left lower quadrant and midline soft tissue contusion. No acute bony abnormality. 1. Limited evaluation of the aorta without intravenous contrast.  Similar appearance of an extensive Noam type B aortic dissection. Unchanged postoperative appearance of the ascending aorta status post aortic valve replacement and ascending aortic dissection repair. The splanchnic arteries are not well evaluated. 2. Unchanged small left pleural effusion. 3. Gas in the urinary bladder is suggestive of cystitis. Correlate with urinalysis.   Unchanged layering bladder calculi. 4. Left lower quadrant and midline abdominal wall soft tissue contusion. No additional noncontrast evidence of acute traumatic injury in the chest, abdomen or pelvis. 5. Indeterminate 1.8 cm right lower pole cystic lesion. If clinically appropriate, outpatient renal protocol MRI can be obtained to better characterize. [place] MODIFIED BARIUM SWALLOW W VIDEO    Result Date: 8/24/2021  EXAMINATION: MODIFIED BARIUM SWALLOW WAS PERFORMED IN CONJUNCTION WITH SPEECH PATHOLOGY SERVICES TECHNIQUE: Fluoroscopic evaluation of the swallowing mechanism was performed with multiple consistency of barium product. FLUOROSCOPY DOSE AND TYPE OR TIME AND EXPOSURES: Fluoroscopic time of 3 minutes and 9 seconds. DAP of 130.2 dGycm2. COMPARISON: None HISTORY: ORDERING SYSTEM PROVIDED HISTORY: Aspiration risk TECHNOLOGIST PROVIDED HISTORY: Aspiration risk Reason for Exam: aspiration risk Acuity: Unknown Type of Exam: Unknown FINDINGS: Multiple swallows were attempted with multiple consistencies in the presence of speech pathology under fluoroscopic evaluation. Penetration was visualized with thin liquid. There is no evidence for aspiration. Penetration with thin liquid. No evidence for aspiration. Please see separate speech pathology report for full discussion of findings and recommendations.           Current Facility-Administered Medications   Medication Dose Route Frequency Provider Last Rate Last Admin    meropenem (MERREM) 1,000 mg in sodium chloride 0.9 % 100 mL IVPB (mini-bag)  1,000 mg IntraVENous Q8H Hoda Jamison MD   Stopped at 09/21/21 1215    QUEtiapine (SEROQUEL) tablet 25 mg  25 mg Oral BID PRN Pb Boyce MD        perflutren lipid microspheres (DEFINITY) injection 2.2 mg  2 mL IntraVENous ONCE PRN Silvio Abraham MD        apixaban Veto Ocean) tablet 5 mg  5 mg Oral BID Manuelito Foote MD   5 mg at 09/20/21 2103    dexamethasone (DECADRON) tablet 6 mg  6 mg Oral Daily Hoda Jamison MD   6 mg at 09/20/21 0929    aluminum & magnesium hydroxide-simethicone (MAALOX) 200-200-20 MG/5ML suspension 30 mL  30 mL Oral Q6H PRN [facility], APRN - CNP        amiodarone (CORDARONE) tablet 200 mg  200 mg Oral Daily [facility], APRN - CNP   200 mg at 09/20/21 2488    ascorbic acid (VITAMIN C) tablet 500 mg  500 mg Oral Daily [facility], APRN - CNP   500 mg at 09/20/21 1011    atorvastatin (LIPITOR) tablet 20 mg  20 mg Oral Daily [facility], APRN - CNP   20 mg at 09/21/21 0915    divalproex (DEPAKOTE SPRINKLE) capsule 125 mg  125 mg Oral BID [facility], APRN - CNP   125 mg at 09/20/21 1745    ferrous sulfate (IRON 325) tablet 325 mg  325 mg Oral Daily with breakfast [facility], APRN - CNP   325 mg at 09/21/21 0914    [Held by provider] furosemide (LASIX) tablet 20 mg  20 mg Oral Daily [facility], APRN - CNP        latanoprost (XALATAN) 0.005 % ophthalmic solution 1 drop  1 drop Both Eyes Nightly [facility], APRN - CNP        melatonin tablet 3 mg  3 mg Oral Nightly PRN [facility], APRN - CNP   3 mg at 09/20/21 2103    therapeutic multivitamin-minerals 1 tablet  1 tablet Oral Daily [facility], APRN - CNP   1 tablet at 09/21/21 0918    timolol (TIMOPTIC) 0.5 % ophthalmic solution 1 drop  1 drop Both Eyes Daily [facility], APRN - CNP   1 drop at 09/21/21 0919    zinc sulfate (ZINCATE) capsule 50 mg  50 mg Oral Daily [facility], APRN - CNP   50 mg at 09/20/21 0931    sodium chloride flush 0.9 % injection 5-40 mL  5-40 mL IntraVENous 2 times per day [facility], APRN - CNP   5 mL at 09/17/21 0959    sodium chloride flush 0.9 % injection 10 mL  10 mL IntraVENous PRN [facility], APRN - CNP        0.9 % sodium chloride infusion  25 mL IntraVENous PRN [facility], APRN - CNP        ondansetron (ZOFRAN-ODT) disintegrating tablet 4 mg  4 mg Oral Q8H PRN Michael Sleet, APRN - CNP        Or    ondansetron (ZOFRAN) injection 4 mg  4 mg IntraVENous Q6H PRN Michael Sleet, APRN - CNP   4 mg at 09/19/21 9276    polyethylene glycol (GLYCOLAX) packet 17 g  17 g Oral Daily PRN Kiran Erika, APRN - CNP        acetaminophen (TYLENOL) tablet 650 mg  650 mg Oral Q6H PRN Kiran Erika, APRN - CNP        Or    acetaminophen (TYLENOL) suppository 650 mg  650 mg Rectal Q6H PRN Kiran Erika, APRN - CNP        aspirin EC tablet 81 mg  81 mg Oral Daily Kiran Erika, APRN - CNP   81 mg at 09/20/21 0929    0.9 % sodium chloride infusion   IntraVENous Continuous Miriam Horvath MD 75 mL/hr at 09/21/21 0825 New Bag at 09/21/21 0825     Impressions :      1. Active Problems:    UTI (urinary tract infection)    Severe malnutrition (HCC)  Resolved Problems:    * No resolved hospital problems. *        2.  has a past medical history of Acute respiratory failure following trauma and surgery ([facility] [address] Acute respiratory failure following trauma and surgery ([facility] [address] Altered bowel elimination due to intestinal ostomy ([facility] [address] Full dentures, Full dentures, Gall stones, GERD (gastroesophageal reflux disease), GI bleed, Hemorrhage of gastrointestinal tract, unspecified, Hemorrhage of gastrointestinal tract, unspecified, Hyperlipidemia, Hypertension, Kidney stones, Kidney stones, Primary localized osteoarthrosis, lower leg, Primary localized osteoarthrosis, lower leg, Prostate disorder, Transient disorder of initiating or maintaining sleep, Transient disorder of initiating or maintaining sleep, Unspecified disorder of skin and subcutaneous tissue, Wears glasses, and Wears glasses. Plans:   Patient brought to ED by EMS after being found down at [facility]. Patient was positive for Covid on 9/7/2021. Patient hypoxic on arrival , NOW ON 3 LITERS   CT scan redemonstrates known aortic dissection, no signs of pneumonia. Pulmonology consulted. Patient given Decadron in the ED. CT head negative for acute intracranial abnormality.     Gram negative sepsis likely from uti ESBL,   Iv meropenem   Delirium ICU psychosis  Metabolic encephalopathy likely secondary to gram-negative sepsis IV meropenem continued  Overall prognosis is guarded for his age comorbid conditions  On Eliquis for DVT prophylaxis with elevated D-dimer  Acute kidney injury, resolved  Working with physical therapy  ID consulted  History of abdominal aortic aneurysm surgery  Overall prognosis guarded  Discussed case with speech pathologist, diamante Block MD  9/21/2021  2:47 PM

## 2021-09-21 NOTE — PROGRESS NOTES
Speech Language Pathology  Facility/Department: [address] [place] [address]   CLINICAL BEDSIDE SWALLOW EVALUATION    NAME: Ag Gama  : 1940  MRN: [id number]    ADMISSION DATE: 2021  ADMITTING DIAGNOSIS: has Hyperlipidemia; Hypertensive heart disease with heart failure ([address] Ileostomy status ([address] Benign prostatic hyperplasia with lower urinary tract symptoms; Glaucoma of right eye; Chronic gout without tophus; MERRICK (acute kidney injury) ([address] Recurrent kidney stones; Skin lesion of left upper extremity; Pigmented skin lesion of suspected malignant nature; Ventral hernia; Sepsis associated hypotension ([address] UTI (urinary tract infection); Hematuria; Altered mental status; Elevated troponin; and Severe malnutrition (HCC) on their problem list.    Recent Chest Xray/CT of Chest:   - CXR-   Worsening airspace opacification in the right lower lung zone with stable   airspace changes and pleural fluid on the left.  Pattern of concern for   developing pneumonitis.  Worsening pulmonary edema is not excluded. Date of Eval: 2021  Evaluating Therapist: FELICITA Jimenez    Current Diet level:  Current Diet :  (easy to chew)  Current Liquid Diet : Mildly Thick (Nectar)      Primary Complaint   Per IM H&P: The patient is a 78 y.o. Non- / non  male who presents   Patient brought to ED by EMS after being found down at [facility].  Patient was positive for Covid on 2021.  Patient hypoxic on arrival required 2 L nasal cannula.  Eventually switched to high flow nasal cannula.  SPO2 97% CT scan redemonstrates known aortic dissection, no signs of pneumonia.  Pulmonology consulted.  Patient given Decadron in the ED.  Urinalysis shows infection.  Patient started on Rocephin.  CT head negative for acute intracranial abnormality.  Patient has MERRICK with creatinine 2.46, K 5.1.  Patient given 2 L normal saline bolus in the ED.  Recheck BMP in the morning.  Patient hemodynamically stable.  Admit to progressive unit    Pain:  Pain Assessment  Pain Assessment: 0-10  Pain Level: 0  Patient's Stated Pain Goal: No pain    Reason for Referral  Emily Liz was referred for a bedside swallow evaluation to assess the efficiency of his swallow function, identify signs and symptoms of aspiration and make recommendations regarding safe dietary consistencies, effective compensatory strategies, and safe eating environment. Impression  Dysphagia Diagnosis: Suspected needs further assessment  Dysphagia Impression : Recommend video swallow study as unable to r/o or confirm aspiration at bedside when pt. is out of Droplet + isolation. Recommend to trial meds in pudding/applesauce and downgrade diet to moderately thick liquids, easy to chew solids. If overt s/s aspiration continues to be demosntrated, recommend NPO. Treatment Plan  Requires SLP Intervention: Yes             Recommended Diet and Intervention  Diet Solids Recommendation: Dental Soft (easy to chew)  Liquid Consistency Recommendation: Moderately Thick (Honey)     Recommendations: Modified barium swallow study (when pt. cleared from Droplet + isolation)       Compensatory Swallowing Strategies  Compensatory Swallowing Strategies: Eat/Feed slowly;Upright as possible for all oral intake;Small bites/sips      General  Behavior/Cognition: Alert; Cooperative;Confused  Respiratory Status: O2 via nasual cannula  O2 Device: Nasal cannula  Communication Observation: Functional  Follows Directions: Simple  Dentition: Some missing teeth  Patient Positioning: Upright in bed  Baseline Vocal Quality: Normal  Consistencies Administered: Reg solid;Pudding - teaspoon;Nectar - straw;Honey - straw           Vision/Hearing  Vision  Vision: Impaired  Vision Exceptions: Wears glasses at all times  Hearing  Hearing: Exceptions to [facility]  Hearing Exceptions: Hard of hearing/hearing concerns; No hearing aid    Oral Motor Deficits  Oral/Motor  Oral Motor: Within functional limits    Oral Phase Dysfunction  Oral Phase  Oral Phase: WNL     Indicators of Pharyngeal Phase Dysfunction   Pharyngeal Phase  Pharyngeal Phase: Exceptions  Pharyngeal Phase   Pharyngeal: No overt s/s aspiration following pudding trials x4, sips mildly thick liquid x8. Pt. then demonstrated cough following trial of cracker, but was delayed. Pt. then demonstrated no overt s/s aspiration following moderately thick liquid x3. Pt. requiring max cues to continue with assessment. Education  Patient Education Response: No evidence of learning             Therapy Time  SLP Individual Minutes  Time In: 1410  Time Out: [id number]  Minutes: [address].CCC/SLP    9/21/2021 2:54 PM

## 2021-09-21 NOTE — PROGRESS NOTES
[facility] -   Infectious diseases evaluation  admission date 9/16/2021    reason for consultation:   UTI  Bacteremia  COVID-19 infection    Impression :   Current:  · UTI  · ESBL producing E. coli bacteremia secondary to above  · COVID-19 infection  · Acute hypoxic respiratory failure  · Left pleural effusion  · Acute on chronic renal failure  · Encephalopathy  · History of aortic valve replacement June 2021  · Aortic dissection  · Sulfa allergy    Recommendations   · Continue IV meropenem day 4  · Decadron  · On Eliquis  · Follow CBC and renal function  · Follow inflammatory markers and chest x-ray  · Continue supportive care  · Droplet plus isolation      History of Present Illness:   Initial history:  Noemi Frankel is a 78y.o.-year-old male was brought to the hospital from [facility] after he was found laying on the ground. At the ER he was tachypneic and hypoxic, disoriented. .  Urinalysis was suggestive of UTI  He was placed on oxygen per nasal cannula then he was placed on high flow oxygen. Blood culture grew ESBL producing E. Coli  The patient is confused    COVID-19 came back positive  The patient received Covid 19 vaccine    Interval changes  9/20/2021   He remains on 4 L of oxygen by nasal cannula, confused, no reported fever, no new events. D-dimer elevated at 5  Patient Vitals for the past 8 hrs:   SpO2   09/20/21 1556 93 %             I have personally reviewed the past medical history, past surgical history, medications, social history, and family history, and I haveupdated the database accordingly. Allergies:   Sulfa antibiotics and Vimovo [naproxen-esomeprazole]     Review of Systems:     Review of Systems  Confused, agitated, unable to provide  Physical Examination :       Physical Exam  Remainder of examination deferred due to excessive risk conferred by physical examination during a global pandemic due to coronavirus 19.   In a setting where local and national supplies of personal protective equipment are depleted  Past Medical History:     Past Medical History:   Diagnosis Date    Acute respiratory failure following trauma and surgery ([address]     Acute respiratory failure following trauma and surgery ([address] [address]     Altered bowel elimination due to intestinal ostomy ([address]     iliostomy    Full dentures     pt said he has partial upper and lower    Full dentures     Gall stones     GERD (gastroesophageal reflux disease)     GI bleed     Hemorrhage of gastrointestinal tract, unspecified     Hemorrhage of gastrointestinal tract, unspecified     Hyperlipidemia     Hypertension     Kidney stones     Kidney stones     Primary localized osteoarthrosis, lower leg     both shoulders, neck, legs.  Primary localized osteoarthrosis, lower leg     Prostate disorder     Transient disorder of initiating or maintaining sleep     Transient disorder of initiating or maintaining sleep     Unspecified disorder of skin and subcutaneous tissue     Wears glasses     Wears glasses        Past Surgical  History:     Past Surgical History:   Procedure Laterality Date    ABDOMEN SURGERY      CATARACT REMOVAL WITH IMPLANT Bilateral     COLONOSCOPY      X3    COLOSTOMY Right     COLOSTOMY BAG ON RT.  SIDE    CYSTOSCOPY Right 8/29/2017    CYSTOSCOPY URETERAL STENT INSERTION performed by Cindi Juarez MD at [place] [address], ESOPHAGUS      EYE SURGERY      bettye. eyes, cataracts extracted with iol's    FRACTURE SURGERY      JOINT REPLACEMENT Right 3-23-15    KIDNEY STONE SURGERY Left     KNEE ARTHROSCOPY Right     LITHOTRIPSY Left 06-20-14    w/ cystoscopy C & P    SHOULDER SURGERY Left 9/14/2020    MOLE EXCISION POSTERIOR SHOULDER performed by Alok Lynn MD at [facility] , PARTIAL RECTUM\"    TOTAL KNEE ARTHROPLASTY  03/23/2015    Right with biomet and GPS product application    TOTAL KNEE ARTHROPLASTY Right 3/23/2015       Medications:      apixaban  5 mg Oral BID    dexamethasone  6 mg Oral Daily    amiodarone  200 mg Oral Daily    ascorbic acid  500 mg Oral Daily    atorvastatin  20 mg Oral Daily    divalproex  125 mg Oral BID    ferrous sulfate  325 mg Oral Daily with breakfast    [Held by provider] furosemide  20 mg Oral Daily    latanoprost  1 drop Both Eyes Nightly    therapeutic multivitamin-minerals  1 tablet Oral Daily    timolol  1 drop Both Eyes Daily    zinc sulfate  50 mg Oral Daily    sodium chloride flush  5-40 mL IntraVENous 2 times per day    aspirin  81 mg Oral Daily    meropenem  1,000 mg IntraVENous Q12H       Social History:     Social History     Socioeconomic History    Marital status:      Spouse name: Not on file    Number of children: Not on file    Years of education: Not on file    Highest education level: Not on file   Occupational History    Not on file   Tobacco Use    Smoking status: Never Smoker    Smokeless tobacco: Never Used   Vaping Use    Vaping Use: Never used   Substance and Sexual Activity    Alcohol use: No     Alcohol/week: 0.0 standard drinks     Comment: occassional    Drug use: No    Sexual activity: Not on file   Other Topics Concern    Not on file   Social History Narrative    Not on file     Social Determinants of Health     Financial Resource Strain:     Difficulty of Paying Living Expenses:    Food Insecurity:     Worried About Running Out of Food in the Last Year:     Ran Out of Food in the Last Year:    Transportation Needs:     Lack of Transportation (Medical):      Lack of Transportation (Non-Medical):    Physical Activity:     Days of Exercise per Week:     Minutes of Exercise per Session:    Stress:     Feeling of Stress :    Social Connections:     Frequency of Communication with Friends and Family:     Frequency of Social Gatherings with Friends and Family:     Attends Rastafari Services:     Active Member of Clubs or Organizations:     Attends Club or Organization Meetings:     Marital Status:    Intimate Partner Violence:     Fear of Current or Ex-Partner:     Emotionally Abused:     Physically Abused:     Sexually Abused:        Family History:     Family History   Problem Relation Age of Onset    Heart Disease Father     Other Mother       Medical Decision Making:   I have independently reviewed/ordered the following labs:    CBC with Differential:   Recent Labs     09/19/21 0429 09/20/21  0438   WBC 10.7 11.0   HGB 12.8* 12.6*   HCT 40.1* 40.2*   * 161     BMP:  Recent Labs     09/19/21 0429 09/20/21  0438   * 145*   K 4.0 4.7   * 113*   CO2 23 22   BUN 51* 44*   CREATININE 1.15 1.10   MG 2.1 2.0     Hepatic Function Panel:   No results for input(s): PROT, LABALBU, BILIDIR, IBILI, BILITOT, ALKPHOS, ALT, AST in the last 72 hours. No results for input(s): RPR in the last 72 hours. No results for input(s): HIV in the last 72 hours. No results for input(s): BC in the last 72 hours. Lab Results   Component Value Date    CREATININE 1.10 09/20/2021    GLUCOSE 134 09/20/2021    GLUCOSE 100 02/16/2012       Detailed results: Thank you for allowing us to participate in the care of this patient. Please call with questions. This note is created with the assistance of a speech recognition program.  While intending to generate adocument that actually reflects the content of the visit, the document can still have some errors including those of syntax and sound a like substitutions which may escape proof reading. It such instances, actual meaningcan be extrapolated by contextual diversion.     Aurora Johnson MD  Office: [phone]  Perfect serve / office [phone]

## 2021-09-21 NOTE — PROGRESS NOTES
Pulmonary Progress Note  [facility]      Patient - Mecca Stark,  Age - 78 y.o.    - 1940      Room Number - /-85   N -  [id number]   [facility]  Date of Admission -  2021  7:45 PM        Consulting Lucy Kuhn MD  Primary Care Physician - Paulina Grove MD     SUBJECTIVE     Pt is on 3L NC. There are concerns for aspiration when taking pills this am. Vital signs remain stable. He is awake and knows he is in the hospital. He is in no distress    OBJECTIVE   VITALS    height is 6' 1\" (1.854 m) and weight is 189 lb 6 oz (85.9 kg). His axillary temperature is 98 °F (36.7 °C). His blood pressure is 108/89 and his pulse is 83. His respiration is 14 and oxygen saturation is 96%. Body mass index is 24.99 kg/m². Temperature Range: Temp: 98 °F (36.7 °C) Temp  Av.9 °F (36.6 °C)  Min: 97.7 °F (36.5 °C)  Max: 98 °F (36.7 °C)  BP Range:  Systolic (23RYV), MDJ:062 , Min:108 , IGE:398     Diastolic (41CBQ), KYY:19, Min:82, Max:96    Pulse Range: Pulse  Av  Min: 67  Max: 94  Respiration Range: Resp  Av.7  Min: 14  Max: 18  Current Pulse Ox[de-identified]  SpO2: 96 %  24HR Pulse Ox Range:  SpO2  Av.6 %  Min: 92 %  Max: 96 %  Oxygen Amount and Delivery: O2 Flow Rate (L/min): 3 L/min    Wt Readings from Last 3 Encounters:   21 189 lb 6 oz (85.9 kg)   21 186 lb 1.1 oz (84.4 kg)   21 230 lb (104.3 kg)       I/O (24 Hours)    Intake/Output Summary (Last 24 hours) at 2021 1130  Last data filed at 2021 0650  Gross per 24 hour   Intake --   Output 550 ml   Net -550 ml       EXAM     General Appearance  Awake, alert, oriented, in no acute distress  HEENT - normocephalic, atraumatic   Neck - Supple,  trachea midline   Lungs - Non labored respirations, lung sounds diminished on right.  No rhonchi or wheezing  Cardiovascular - Heart sounds are normal.  Regular rate and rhythm   Abdomen - Soft, nontender, nondistended, no masses or organomegaly  Neurologic - There are no focal motor or sensory deficits  Skin - No bruising or bleeding  Extremities - No  cyanosis, edema    MEDS      meropenem  1,000 mg IntraVENous Q8H    apixaban  5 mg Oral BID    dexamethasone  6 mg Oral Daily    amiodarone  200 mg Oral Daily    ascorbic acid  500 mg Oral Daily    atorvastatin  20 mg Oral Daily    divalproex  125 mg Oral BID    ferrous sulfate  325 mg Oral Daily with breakfast    [Held by provider] furosemide  20 mg Oral Daily    latanoprost  1 drop Both Eyes Nightly    therapeutic multivitamin-minerals  1 tablet Oral Daily    timolol  1 drop Both Eyes Daily    zinc sulfate  50 mg Oral Daily    sodium chloride flush  5-40 mL IntraVENous 2 times per day    aspirin  81 mg Oral Daily      sodium chloride      sodium chloride 75 mL/hr at 09/21/21 0825     QUEtiapine, perflutren lipid microspheres, aluminum & magnesium hydroxide-simethicone, melatonin, sodium chloride flush, sodium chloride, ondansetron **OR** ondansetron, polyethylene glycol, acetaminophen **OR** acetaminophen    LABS   CBC   Recent Labs     09/21/21  0454   WBC 14.3*   HGB 11.8*   HCT 38.3*   MCV 86.9        BMP:   Lab Results   Component Value Date     09/21/2021    K 4.4 09/21/2021     09/21/2021    CO2 23 09/21/2021    BUN 41 09/21/2021    LABALBU 3.3 09/16/2021    LABALBU 4.3 02/16/2012    CREATININE 0.95 09/21/2021    CALCIUM 9.4 09/21/2021    GFRAA >60 09/21/2021    LABGLOM >60 09/21/2021     ABGs:  Lab Results   Component Value Date    PHART 7.411 09/17/2021    PO2ART 64.8 09/17/2021    OZO0OLY 37.5 09/17/2021      Lab Results   Component Value Date    MODE NOT REPORTED 09/17/2021     Ionized Calcium:  No results found for: IONCA  Magnesium:    Lab Results   Component Value Date    MG 2.1 09/21/2021     Phosphorus:    Lab Results   Component Value Date    PHOS 3.7 11/19/2012 LIVER PROFILE No results for input(s): AST, ALT, LIPASE, BILIDIR, BILITOT, ALKPHOS in the last 72 hours. Invalid input(s): AMYLASE,  ALB  INR No results for input(s): INR in the last 72 hours. PTT   Lab Results   Component Value Date    APTT 32.5 09/16/2021         RADIOLOGY     New RLL infiltrate       ASSESSMENT/PLAN       1. COVID-19 infection-tested positive on 9/16/2021, from a respiratory standpoint this is pretty mild disease  2. Acute hypoxemic respiratory failure-currently stable on nasal cannula  3. Small left-sided pleural effusion  4. Sepsis secondary to ESBL positive E. coli bacteremia and probable pyelonephritis versus infected cyst  5. Visual hallucinations-these appear to have resolved, suspect secondary to meropenem  6. Acute delirium superimposed on metabolic encephalopathy  7. Hyperkalemia  8. Acute kidney injury-improving  9. NSTEMI-mild, likely the consequence of acute kidney injury than true myocardial injury  10. Thrombocytopenia  11. Lactic acidosis-resolved  12. Paroxysmal atrial fibrillation  13. Recent aortic dissection complicated by acute aortic valve insufficiency status post graft and surgical valve replacement  14. Recent partial colectomy with ileostomy formation  15. GERD  16. Hyperlipidemia  17. Hypertension  18. BPH  19. History of GI bleed status post partial colectomy with ileostomy formation  20. Functional decline  21. Patient was vaccinated against [id number]  43. Patient is a never smoker  21. No history of intrinsic lung disease  24. CODE STATUS-full code        Plan:    Definite concerns for aspiration  Started on meropenem today  Monitor for O2 needs  On dexamethasone  Swallow study  Speech consult  I discussed with RN  If pt is unable to take crushed pills in applesauce pt will need to be placed on full dose lovenox.    Code status was discussed yesterday and pt still full code    Electronically signed by Elder Aschoff, APRN - CNP on 9/21/2021 at 11:30 AM

## 2021-09-22 NOTE — PROGRESS NOTES
History and Physical Service  [facility] Internal Medicine    HISTORY AND PHYSICAL EXAMINATION            Date:   9/22/2021  Patient name:  Sergey Lezama  MRN:   [id number]  Account:  [de-identified]  YOB: 1940  PCP:    Melvi Crump MD  Code Status:    Full Code    Chief Complaint:   unwell    History Obtained From:     Patient ,medical record ,nursing staff    History of Present Illnes       The patient is a 78 y.o. Non- / non  male who presents   Patient brought to ED by EMS after being found down at [facility]. Patient was positive for Covid on 9/7/2021. Patient hypoxic on arrival required 2 L nasal cannula. Eventually switched to high flow nasal cannula. SPO2 97% CT scan redemonstrates known aortic dissection, no signs of pneumonia. Pulmonology consulted. Patient given Decadron in the ED. Urinalysis shows infection. Patient started on Rocephin. CT head negative for acute intracranial abnormality. Patient has MERRICK with creatinine 2.46, K 5.1. Patient given 2 L normal saline bolus in the ED. Recheck BMP in the morning. Patient hemodynamically stable. Admit to progressive unit    9/20   Patient is pleasantly confused  Has poor oral intake, on IV fluids  Blood culture, urine culture growing E.  Coli   More confused  Agitated  Delirium    9/21  Patient has coughing spells with medication  Speech therapy evaluating the patient  Patient is in delirium, oriented to person, not to time    9/22  Patient unfortunately has very poor oral intake  On meropenem with ESBL UTI and bacteremia  Past Medical History:   Diagnosis Date    Acute respiratory failure following trauma and surgery ([address]     Acute respiratory failure following trauma and surgery ([address]     Altered bowel elimination due to intestinal ostomy ([address]     iliostomy    Full dentures     pt said he has partial upper and lower    Full dentures     Gall stones     GERD (gastroesophageal reflux disease)     GI bleed     Hemorrhage of gastrointestinal tract, unspecified     Hemorrhage of gastrointestinal tract, unspecified     Hyperlipidemia     Hypertension     Kidney stones     Kidney stones     Primary localized osteoarthrosis, lower leg     both shoulders, neck, legs.  Primary localized osteoarthrosis, lower leg     Prostate disorder     Transient disorder of initiating or maintaining sleep     Transient disorder of initiating or maintaining sleep     Unspecified disorder of skin and subcutaneous tissue     Wears glasses     Wears glasses         Past Surgical History:     Past Surgical History:   Procedure Laterality Date    ABDOMEN SURGERY      CATARACT REMOVAL WITH IMPLANT Bilateral     COLONOSCOPY      X3    COLOSTOMY Right     COLOSTOMY BAG ON RT. SIDE    CYSTOSCOPY Right 8/29/2017    CYSTOSCOPY URETERAL STENT INSERTION performed by Angela Woods MD at [facility], ESOPHAGUS      EYE SURGERY      bettye. eyes, cataracts extracted with iol's    FRACTURE SURGERY      JOINT REPLACEMENT Right 3-23-15    KIDNEY STONE SURGERY Left     KNEE ARTHROSCOPY Right     LITHOTRIPSY Left 06-20-14    w/ cystoscopy C & P    SHOULDER SURGERY Left 9/14/2020    MOLE EXCISION POSTERIOR SHOULDER performed by Basilio Magallanes [place] at [facility] , PARTIAL RECTUM\"    TOTAL KNEE ARTHROPLASTY  03/23/2015    Right with biomet and GPS product application    TOTAL KNEE ARTHROPLASTY Right 3/23/2015        Medications Prior to Admission:     Prior to Admission medications    Medication Sig Start Date End Date Taking?  Authorizing Provider   ascorbic acid (VITAMIN C) 500 MG tablet Take 500 mg by mouth 2 times daily    Yes Historical Provider, MD   divalproex (DEPAKOTE) 125 MG DR tablet Take 125 mg by mouth 2 times daily   Yes Historical Provider, MD   methylPREDNISolone (MEDROL DOSEPACK) 4 MG tablet Take 4 mg by mouth See Admin Instructions Take by mouth.    Yes Historical Provider, MD   zinc sulfate (ZINCATE) 220 (50 Zn) MG capsule Take 50 mg by mouth daily   Yes Historical Provider, MD   amiodarone (CORDARONE) 200 MG tablet Take 200 mg by mouth daily   Yes Historical Provider, MD   aspirin 81 MG chewable tablet Take 81 mg by mouth daily   Yes Historical Provider, MD   atorvastatin (LIPITOR) 20 MG tablet Take 20 mg by mouth daily   Yes Historical Provider, MD   ferrous sulfate (IRON 325) 325 (65 Fe) MG tablet Take 325 mg by mouth daily (with breakfast)   Yes Historical Provider, MD   furosemide (LASIX) 20 MG tablet Take 20 mg by mouth daily   Yes Historical Provider, MD   ipratropium-albuterol (DUONEB) 0.5-2.5 (3) MG/3ML SOLN nebulizer solution Inhale 1 vial into the lungs every 6 hours as needed for Shortness of Breath   Yes Historical Provider, MD   aluminum & magnesium hydroxide-simethicone (MYLANTA) 400-400-40 MG/5ML SUSP Take 15 mLs by mouth every 6 hours as needed (heartburn)   Yes Historical Provider, MD   methocarbamol (ROBAXIN) 750 MG tablet Take 750 mg by mouth every 8 hours as needed (muscle spasms)    Yes Historical Provider, MD   potassium chloride (KLOR-CON M) 20 MEQ extended release tablet Take 20 mEq by mouth daily   Yes Historical Provider, MD   Multiple Vitamins-Minerals (THERAPEUTIC MULTIVITAMIN-MINERALS) tablet Take 1 tablet by mouth daily   Yes Historical Provider, MD   traZODone (DESYREL) 150 MG tablet Take 150 mg by mouth nightly   Yes Historical Provider, MD   ondansetron (ZOFRAN) 4 MG tablet Take 4 mg by mouth every 6 hours as needed for Nausea or Vomiting   Yes Historical Provider, MD   timolol (TIMOPTIC) 0.5 % ophthalmic solution Place 1 drop into both eyes daily  2/2/21  Yes Historical Provider, MD   latanoprost (XALATAN) 0.005 % ophthalmic solution Place 1 drop into both eyes nightly  5/11/16  Yes Historical Provider, MD        Allergies:     Sulfa antibiotics and Vimovo [naproxen-esomeprazole]    Social History:     Tobacco:    reports that he has never smoked. He has never used smokeless tobacco.  Alcohol:      reports no history of alcohol use. Drug Use:  reports no history of drug use. Family History:     Family History   Problem Relation Age of Onset    Heart Disease Father     Other Mother        Review of Systems:     Patient confused agitated unable to get review of system    Physical Exam:   /68   Pulse 90   Temp 97.9 °F (36.6 °C) (Axillary)   Resp 16   Ht 6' 1\" (1.854 m)   Wt 189 lb 6 oz (85.9 kg)   SpO2 97%   BMI 24.99 kg/m²   No results for input(s): POCGLU in the last 72 hours. Elderly man  Thoracotomy scar healing noted colostomy in place noted  General Appearance:  unwell  Mental status: Confused , oriented to place  Head:  normocephalic, atraumatic. Eye: no icterus, redness, pupils equal and reactive, extraocular eye movements intact, conjunctiva clear  Ear: normal external ear, no discharge, hearing intact  Nose:  no drainage noted  Mouth: mucous membranes moist  Neck: supple, no carotid bruits, thyroid not palpable  Lungs: basal creakles    Cardiovascular: normal rate, regular rhythm, no murmur, gallop, rub.   Abdomen: Soft, nontender, nondistended, normal bowel sounds, no hepatomegaly or splenomegaly  Neurologic: There are no new focal motor or sensory deficits, normal muscle tone and bulk, no abnormal sensation, normal speech,   Skin: No gross lesions, rashes, bruising or bleeding on exposed skin area  Extremities:  peripheral pulses palpable, no pedal edema or calf pain with palpation  Psych: Agitated disoriented    Investigations:      Laboratory Testing:  Recent Results (from the past 24 hour(s))   Basic Metabolic Panel w/ Reflex to MG    Collection Time: 09/22/21  5:45 AM   Result Value Ref Range    Glucose 88 70 - 99 mg/dL    BUN 31 (H) 8 - 23 mg/dL    CREATININE 0.83 0.70 - 1.20 mg/dL    Bun/Cre Ratio NOT REPORTED 9 - 20    Calcium 9.5 8.6 - 10.4 mg/dL    Sodium 149 (H) 135 - 144 mmol/L    Potassium 3.9 3.7 - 5.3 mmol/L    Chloride 114 (H) 98 - 107 mmol/L    CO2 26 20 - 31 mmol/L    Anion Gap 9 9 - 17 mmol/L    GFR Non-African American >60 >60 mL/min    GFR African American >60 >60 mL/min    GFR Comment          GFR Staging NOT REPORTED    CBC    Collection Time: 09/22/21  5:45 AM   Result Value Ref Range    WBC 15.4 (H) 3.5 - 11.0 k/uL    RBC 4.56 4.5 - 5.9 m/uL    Hemoglobin 12.3 (L) 13.5 - 17.5 g/dL    Hematocrit 38.1 (L) 41 - 53 %    MCV 83.5 80 - 100 fL    MCH 26.9 26 - 34 pg    MCHC 32.2 31 - 37 g/dL    RDW 17.1 (H) 11.5 - 14.9 %    Platelets 318 979 - 917 k/uL    MPV 8.2 6.0 - 12.0 fL    NRBC Automated NOT REPORTED per 100 WBC   Magnesium    Collection Time: 09/22/21  5:45 AM   Result Value Ref Range    Magnesium 1.8 1.6 - 2.6 mg/dL       Recent Labs     09/22/21  0545 09/17/21 0927 09/16/21 2129   HGB 12.3*   < > 14.0   HCT 38.1*   < > 43.9   WBC 15.4*   < > 11.3*   MCV 83.5   < > 84.8   *   < > 140   K 3.9   < > 5.1   *   < > 101   CO2 26   < > 23   BUN 31*   < > 68*   CREATININE 0.83   < > 2.46*   GLUCOSE 88   < > 105*   INR  --   --  1.1   PROTIME  --   --  14.4   APTT  --   --  32.5   AST  --   --  15   ALT  --   --  16   LABALBU  --   --  3.3*    < > = values in this interval not displayed.        Hematology:  Recent Labs     09/20/21  0438 09/21/21  0454 09/22/21  0545   WBC 11.0 14.3* 15.4*   RBC 4.63 4.40* 4.56   HGB 12.6* 11.8* 12.3*   HCT 40.2* 38.3* 38.1*   MCV 86.8 86.9 83.5   MCH 27.2 26.7 26.9   MCHC 31.3 30.7* 32.2   RDW 17.3* 17.2* 17.1*    170 159   MPV 9.1 8.6 8.2   .0*  --   --    DDIMER 5.05*  --   --      Chemistry:  Recent Labs     09/20/21  0438 09/21/21  0454 09/22/21  0545   * 144 149*   K 4.7 4.4 3.9   * 115* 114*   CO2 22 23 26   GLUCOSE 134* 103* 88   BUN 44* 41* 31*   CREATININE 1.10 0.95 0.83   MG 2.0 2.1 1.8   ANIONGAP 10 6* 9   LABGLOM >60 >60 >60   GFRAA >60 >60 >60 ---------------------------------------------------------------------------- ----------------------------------------------------------------------------  Electronically signed by Shelton Barraza(Interpreting physician) on 08/25/2021  07:59 AM ---------------------------------------------------------------------------- FINDINGS Left Atrium Left atrium is normal in size. Left Ventricle Extremely challenging Echo, not all walls seen, poor windows, poor endocardial definition. Recommend repeat limited with Definity. Within above limitations, LVEF appears mildly reduced with EF 40%. Right Atrium Right atrium was not well visualized. Right Ventricle Right ventricle was not well visualized. Mitral Valve Thickened mitral valve leaflets. Aortic Valve Aortic valve not well visualized. Tricuspid Valve Tricuspid valve was not well visualized. Pulmonic Valve Pulmonic valve was not well visualized. Pericardial Effusion No significant pericardial effusion is seen. Pleural Effusion No pleural effusion seen. CT ABDOMEN PELVIS WO CONTRAST Additional Contrast? None    Addendum Date: 8/23/2021    ADDENDUM: Impression should also state there is an increased small left pleural effusion with increased bibasilar atelectatic changes. Result Date: 8/23/2021  EXAMINATION: CT OF THE ABDOMEN AND PELVIS WITHOUT CONTRAST 8/23/2021 4:04 pm TECHNIQUE: CT of the abdomen and pelvis was performed without the administration of intravenous contrast. Multiplanar reformatted images are provided for review. Dose modulation, iterative reconstruction, and/or weight based adjustment of the mA/kV was utilized to reduce the radiation dose to as low as reasonably achievable.  COMPARISON: 07/09/2021 HISTORY: ORDERING SYSTEM PROVIDED HISTORY: concern for septic kidney secondary to nephrolithiasis TECHNOLOGIST PROVIDED HISTORY: concern for septic kidney secondary to nephrolithiasis Decision Support Exception - unselect if not a suspected or confirmed emergency medical condition->Emergency Medical Condition ([place]) Reason for Exam: concern for septic kidney secondary to nephrolithiasis Acuity: Unknown Type of Exam: Unknown Relevant Medical/Surgical History: colostomy, colectomy FINDINGS: Lower Chest: Increased small left pleural effusion with increased bibasilar atelectatic changes. Decreased hemopericardium. Prosthetic aortic valve partially imaged. Organs: Evaluation is limited by the absence of contrast.  Redemonstration of innumerable hepatic cysts, grossly unchanged. Gallbladder is grossly unremarkable. Spleen is grossly normal caliber. Pancreas is atrophic. Adrenal glands are normal size. Punctate nonobstructing left nephroliths. Bilateral renal cysts measuring up to 7.3 cm on the right and 9.5 cm on the left. No hydronephrosis with mild left perinephric stranding. GI/Bowel: Postoperative changes from presumed colectomy with a Brielle's pouch and a right lower quadrant ostomy with a similar appearing parastomal hernia. No bowel obstruction. Small lipoma in the gastric antrum. Pelvis: Urinary bladder contains numerous layering calcifications posteriorly as well as an indwelling Price catheter with associated intraluminal gas. Gas within 2 anterosuperior bladder diverticula. Perivesicular stranding appears grossly similar to prior. Enlarged prostate. Peritoneum/Retroperitoneum: No free fluid or free air. Intimal flap redemonstrated in the aorta compatible with the known dissection. Abdominal aorta remains unchanged in caliber measuring up to 2.5 cm. Unchanged ectasia of the common iliac arteries, right greater than left. Displaced calcifications in the common iliac vessels are also compatible with underlying intimal flaps/dissection. Bones/Soft Tissues: Recent postoperative changes along the anterior abdominal wall and epigastrium. Presumed sequelae of medication injection in the anterior abdominal wall subcutaneous fat.   No acute bony findings on a background of osseous demineralization, dextroconvex lumbar spinal curvature, advanced degenerative changes throughout the lumbar spine including sequelae of Baastrup's disease, and in unchanged region of sclerosis in the right acetabulum with some other smaller unchanged sclerotic foci in the pelvis. Indwelling Price catheter with associated intraluminal gas in the bladder which appears thickened and with numerous diverticula. The wall thickening may be sequelae of chronic bladder outlet obstruction in the setting of an enlarged prostate, though as there is perivesicular stranding as well as left perinephric stranding, recommend correlation for superimposed ascending urinary tract infection. Innumerable bladder calcifications and nonobstructing left nephrolithiasis without an obstructing stone. Though suboptimally evaluated in the absence of contrast intimal flaps are redemonstrated in the abdominal aorta and common iliac arteries in the setting of known recent dissection two months prior. Aortic caliber is unchanged and remains within normal limits, and there is unchanged ectasia of the common iliac vessels. Recent postoperative changes along the anterior abdominal wall without any organized fluid collection or soft tissue gas. Other unchanged chronic findings as described above. XR CHEST (2 VW)    Result Date: 8/19/2021  EXAMINATION: TWO XRAY VIEWS OF THE CHEST 8/19/2021 2:37 pm COMPARISON: July 9, 2021 CT chest HISTORY: ORDERING SYSTEM PROVIDED HISTORY: dysphagia TECHNOLOGIST PROVIDED HISTORY: dysphagia Reason for Exam: dysphagia, in rehab facility, hx limited. Acuity: Acute Type of Exam: Initial FINDINGS: Median sternotomy, stable normal cardiopericardial silhouette/moderate tortuosity of the thoracic aorta/prosthetic heart valve New moderate left basilar opacity.   Otherwise clear lungs Degenerative changes of the thoracic spine/shoulders     New moderate left basilar opacity likely related moderate pleural effusion/pleural thickening, atelectasis versus infiltrate     CT HEAD WO CONTRAST    Result Date: 9/16/2021  EXAMINATION: CT OF THE HEAD WITHOUT CONTRAST  9/16/2021 10:28 pm TECHNIQUE: CT of the head was performed without the administration of intravenous contrast. Dose modulation, iterative reconstruction, and/or weight based adjustment of the mA/kV was utilized to reduce the radiation dose to as low as reasonably achievable. COMPARISON: 08/23/2021 HISTORY: ORDERING SYSTEM PROVIDED HISTORY: fall TECHNOLOGIST PROVIDED HISTORY: fall Decision Support Exception - unselect if not a suspected or confirmed emergency medical condition->Emergency Medical Condition (MA) Reason for Exam: fall, bruise to forehead Acuity: Acute Type of Exam: Initial FINDINGS: BRAIN/VENTRICLES: There is no acute intracranial hemorrhage, mass effect or midline shift. No abnormal extra-axial fluid collection. No evidence of recent territorial infarct. There are nonspecific areas of hypoattenuation in the periventricular white matter and centrum semiovale that are likely related to chronic small vessel ischemic disease. Stable asymmetric prominence of the left ventricle compared to the right. There is no evidence of hydrocephalus. There is intracranial atherosclerosis. ORBITS: The visualized portion of the orbits demonstrate no acute abnormality. SINUSES: The visualized paranasal sinuses and mastoid air cells demonstrate no acute abnormality. SOFT TISSUES/SKULL:  Small right frontal soft tissue contusion. No underlying calvarial fracture. There is cerumen in the bilateral external auditory canals. No acute intracranial abnormality. Small right frontal soft tissue contusion.      CT Head WO Contrast    Result Date: 8/23/2021  EXAMINATION: CT OF THE HEAD WITHOUT CONTRAST  8/23/2021 5:37 pm TECHNIQUE: CT of the head was performed without the administration of intravenous contrast. Dose modulation, iterative reconstruction, and/or weight based adjustment of the mA/kV was utilized to reduce the radiation dose to as low as reasonably achievable. COMPARISON: November 12, 2012 HISTORY: ORDERING SYSTEM PROVIDED HISTORY: altered mental status TECHNOLOGIST PROVIDED HISTORY: altered mental status Decision Support Exception - unselect if not a suspected or confirmed emergency medical condition->Emergency Medical Condition (MA) Reason for Exam: altered mental status FINDINGS: BRAIN/VENTRICLES: There is no acute intracranial hemorrhage, mass effect or midline shift. Note is made of cavum septum pellucidum, unchanged from prior study. ORBITS: The visualized portion of the orbits demonstrate no acute abnormality. SINUSES: The visualized paranasal sinuses and mastoid air cells demonstrate no acute abnormality. SOFT TISSUES/SKULL:  No acute abnormality of the visualized skull or soft tissues. No acute intracranial abnormality. CT CHEST WO CONTRAST    Result Date: 8/23/2021  EXAMINATION: CT OF THE CHEST WITHOUT CONTRAST 8/23/2021 5:18 pm TECHNIQUE: CT of the chest was performed without the administration of intravenous contrast. Multiplanar reformatted images are provided for review. Dose modulation, iterative reconstruction, and/or weight based adjustment of the mA/kV was utilized to reduce the radiation dose to as low as reasonably achievable. COMPARISON: 08/23/2021, 07/09/2021 HISTORY: ORDERING SYSTEM PROVIDED HISTORY: hx of dissection TECHNOLOGIST PROVIDED HISTORY: hx of dissection Decision Support Exception - unselect if not a suspected or confirmed emergency medical condition->Emergency Medical Condition (MA) Reason for Exam: hx of dissection Acuity: Unknown Type of Exam: Unknown FINDINGS: Mediastinum: Evaluation is limited by the absence of contrast.  Interval median sternotomy with ascending aortic dissection repair and placement of a prosthetic aortic valve. Vessel evaluation is limited.   The ascending aortic caliber just distal to the operative repair spans approximately 4.8 cm. The proximal descending thoracic aorta spans approximately 4.9 cm, in the distal descending aorta measures approximately 3 cm. The intimal flap is partially visualized within the transverse and descending aorta with the true and false lumen roughly similar in size to one another. Significant improvement in the associated mediastinal hematoma. Small volume residual hemopericardium. No pneumomediastinum or adenopathy. Lungs/pleura: There are some new air bronchograms at the dependent left lower lobe. Background slightly increased atelectatic changes with pulmonary parenchymal evaluation motion degraded. Right upper lobe calcified granuloma. Resolved right and slightly increased left pleural effusions. No pneumothorax. Upper Abdomen: Abdominal findings are reported separately under same-day abdominal CT. Soft Tissues/Bones: No organized fluid collection or soft tissue gas along the healing median sternotomy. There is mild osseous resorption along the sternotomy incision which remains grossly approximated. No appreciable osseous bridging. Callus formation in the region of the manubrium. Otherwise unchanged degenerative findings in the spine and shoulders. Vascular evaluation is limited by the absence of contrast. Interval operative repair of the ascending aortic dissection. Vessel caliber as described above with the ascending aorta just distal to the operative repair measuring up to 4.9 cm. The true and false lumens now all appear somewhat equal in caliber, though assessment is limited in the absence of contrast. Significant improvement in the associated mediastinal hematoma. Only small volume residual hemopericardium. New air bronchograms in the left lower lobe which may be due to developing infection in the appropriate clinical setting. Slightly increased atelectatic changes in the lung bases. Improved right and worsened left pleural effusions.      CT CERVICAL SPINE WO CONTRAST    Result Date: 9/16/2021  EXAMINATION: CT OF THE CERVICAL SPINE WITHOUT CONTRAST 9/16/2021 4:29 pm TECHNIQUE: CT of the cervical spine was performed without the administration of intravenous contrast. Multiplanar reformatted images are provided for review. Dose modulation, iterative reconstruction, and/or weight based adjustment of the mA/kV was utilized to reduce the radiation dose to as low as reasonably achievable. COMPARISON: CT scan dated November 12, 2012 HISTORY: ORDERING SYSTEM PROVIDED HISTORY: found down TECHNOLOGIST PROVIDED HISTORY: found down Decision Support Exception - unselect if not a suspected or confirmed emergency medical condition->Emergency Medical Condition (MA) Reason for Exam: Fall, found down Acuity: Acute Type of Exam: Initial FINDINGS: BONES/ALIGNMENT: There is no acute fracture or traumatic malalignment. DEGENERATIVE CHANGES: No significant degenerative changes. SOFT TISSUES: There is no prevertebral soft tissue swelling. Calcification is present within the ligamentum nuchae. Images through the upper chest include the transverse portion of the aorta demonstrate a portion of the patient's known type A aortic dissection. .  Correlation with the scheduled CT scan of the chest abdomen pelvis recommended. 1. No evidence of an acute fracture or traumatic malalignment involving the cervical spine 2. Images through the upper chest partially demonstrate the patient's known type A aortic dissection. Patient is scheduled to have a CT scan of the chest abdomen and pelvis. 3.  Critical results were called by Dr. Cayla Recio to Dr. Mushtaq Arredondo on 9/16/2021 at 11 p.m. hours. IR FLUORO GUIDED CVA DEVICE PLMT/REPLACE/REMOVAL    Result Date: 8/27/2021  PROCEDURE: ULTRASOUND GUIDED VASCULAR ACCESS. Midline placement 8/27/2021.  HISTORY: ORDERING SYSTEM PROVIDED HISTORY: Sepsis, discharge on IV antibiotic TECHNOLOGIST PROVIDED HISTORY: Sepsis, discharge on IV antibiotic Lumen?->Single Lumen SEDATION: None FLUOROSCOPY DOSE AND TYPE OR TIME AND EXPOSURES: None TECHNIQUE AND FINDINGS: Informed consent was obtained after a detailed explanation of the procedure including risks, benefits, and alternatives. Universal protocol was observed. The right arm was prepped and draped in sterile fashion using maximum sterile barrier technique. Local anesthesia was achieved with lidocaine. A micropuncture needle was used to access the right brachial vein using ultrasound guidance. An ultrasound image demonstrating patency of the vein with needle tip located within it. An image was obtained and stored in PACs. A 0.018 guidewire was used to place a peel-a-way sheath and a 4.5 Upper sorbian 15 cm single-lumen midline catheter was placed with its tip directed centrally. Ultrasound images verify appropriate catheter positioning. The catheter flushed easily and there was a good blood return. The catheter was secured to the skin. The patient tolerated the procedure well and there were no immediate complications. EBL: Less than 3 ml     Successful ultrasound guided midline placement     XR CHEST PORTABLE    Result Date: 8/25/2021  EXAMINATION: ONE XRAY VIEW OF THE CHEST 8/25/2021 5:47 am COMPARISON: 08/23/2021, 08/19/2021 HISTORY: ORDERING SYSTEM PROVIDED HISTORY: R/O CHF TECHNOLOGIST PROVIDED HISTORY: R/O CHF Reason for Exam: R/O CHF Acuity: Acute Type of Exam: Initial Additional signs and symptoms: R/O CHF Relevant Medical/Surgical History: R/O CHF FINDINGS: The cardiac and mediastinal contours appear unchanged. Basilar opacities and pleural effusions, left greater than right, are again demonstrated without appreciable change. No new airspace disease identified in the interval.  No evidence for pneumothorax. No significant interval change.      XR CHEST PORTABLE    Result Date: 8/23/2021  EXAMINATION: ONE XRAY VIEW OF THE CHEST 8/23/2021 4:55 pm COMPARISON: 19 April 2021 HISTORY: ORDERING SYSTEM PROVIDED HISTORY: altered mental status, evaluate for pneumonia TECHNOLOGIST PROVIDED HISTORY: altered mental status, evaluate for pneumonia Reason for Exam: altered mental status, evaluate for pneumonia Acuity: Unknown Type of Exam: Unknown FINDINGS: AP portable view of the chest time stamped at 1725 hours demonstrates overlying cardiac monitoring electrodes. Stable cardiomegaly is noted. Prior median sternotomy is demonstrated. CT. No change in left pleural effusion and left basilar opacity. No vascular congestion or extrapleural air. Surgical clips are present the medial right axilla. Prosthetic aortic valve is noted. No change in left basilar opacity likely related to effusion and atelectasis with focal airspace disease not excluded. CT CHEST ABDOMEN PELVIS WO CONTRAST    Result Date: 9/17/2021  EXAMINATION: CT OF THE CHEST, ABDOMEN, AND PELVIS WITHOUT CONTRAST 9/16/2021 10:32 pm TECHNIQUE: CT of the chest, abdomen and pelvis was performed without the administration of intravenous contrast. Multiplanar reformatted images are provided for review. Dose modulation, iterative reconstruction, and/or weight based adjustment of the mA/kV was utilized to reduce the radiation dose to as low as reasonably achievable. COMPARISON: 08/23/2021 and 07/09/2021 HISTORY: ORDERING SYSTEM PROVIDED HISTORY: found down, abdominal bruising, hypoxic TECHNOLOGIST PROVIDED HISTORY: found down, abdominal bruising, hypoxic Reason for Exam: found down, abdominal bruising, hypoxic Acuity: Acute Type of Exam: Initial Relevant Medical/Surgical History: colectomy, colostomy FINDINGS: Chest: Mediastinum: Suboptimal evaluation of the aorta without intravenous contrast. Stable postoperative findings status post aortic valve replacement and ascending aortic dissection repair.   Grossly stable appearance of the persistent Noam type B aortic dissection within the limitations of a noncontrast exam.  No evidence of acute 09/22/21 0813    aluminum & magnesium hydroxide-simethicone (MAALOX) 200-200-20 MG/5ML suspension 30 mL  30 mL Oral Q6H PRN MOISES Duarte CNP        amiodarone (CORDARONE) tablet 200 mg  200 mg Oral Daily MOISES Duarte CNP   200 mg at 09/22/21 0813    ascorbic acid (VITAMIN C) tablet 500 mg  500 mg Oral Daily MOISES Duarte CNP   500 mg at 09/22/21 0813    atorvastatin (LIPITOR) tablet 20 mg  20 mg Oral Daily MOISES Duarte CNP   20 mg at 09/22/21 8686    divalproex (DEPAKOTE SPRINKLE) capsule 125 mg  125 mg Oral BID MOISES Duarte CNP   125 mg at 09/22/21 3612    ferrous sulfate (IRON 325) tablet 325 mg  325 mg Oral Daily with breakfast MOISES Duarte CNP   325 mg at 09/22/21 0734    [Held by provider] furosemide (LASIX) tablet 20 mg  20 mg Oral Daily MOISES Duarte CNP        latanoprost (XALATAN) 0.005 % ophthalmic solution 1 drop  1 drop Both Eyes Nightly MOISES Duarte CNP   1 drop at 09/21/21 2124    melatonin tablet 3 mg  3 mg Oral Nightly PRN MOISES Duarte CNP   3 mg at 09/21/21 2119    therapeutic multivitamin-minerals 1 tablet  1 tablet Oral Daily MOISES Duarte CNP   1 tablet at 09/22/21 0814    timolol (TIMOPTIC) 0.5 % ophthalmic solution 1 drop  1 drop Both Eyes Daily MOISES Duarte CNP   1 drop at 09/22/21 0820    zinc sulfate (ZINCATE) capsule 50 mg  50 mg Oral Daily MOISES Duarte CNP   50 mg at 09/22/21 0815    sodium chloride flush 0.9 % injection 5-40 mL  5-40 mL IntraVENous 2 times per day MOISES Duarte CNP   5 mL at 09/17/21 0959    sodium chloride flush 0.9 % injection 10 mL  10 mL IntraVENous PRN MOISES Duarte CNP        0.9 % sodium chloride infusion  25 mL IntraVENous PRN MOISES Duarte CNP        ondansetron (ZOFRAN-ODT) disintegrating tablet 4 mg  4 mg Oral Q8H PRN MOISES Duarte CNP        Or    ondansetron (ZOFRAN) injection 4 mg  4 mg IntraVENous Q6H PRN MOISES Duarte CNP   4 mg at 09/19/21 1025    polyethylene glycol (GLYCOLAX) packet 17 g  17 g Oral Daily PRN MOISES Ridley CNP        acetaminophen (TYLENOL) tablet 650 mg  650 mg Oral Q6H PRN MOISES Ridley CNP        Or    acetaminophen (TYLENOL) suppository 650 mg  650 mg Rectal Q6H PRN MOISES Ridley - CNP        aspirin EC tablet 81 mg  81 mg Oral Daily MOISES Ridley - CNP   81 mg at 09/22/21 0814    0.9 % sodium chloride infusion   IntraVENous Continuous Manuelito Foote MD 75 mL/hr at 09/21/21 0825 New Bag at 09/21/21 0825     Impressions :      1. Active Problems:    UTI (urinary tract infection)    Severe malnutrition (HCC)  Resolved Problems:    * No resolved hospital problems. *        2.  has a past medical history of Acute respiratory failure following trauma and surgery ([facility] [address] Acute respiratory failure following trauma and surgery ([facility] [address] Altered bowel elimination due to intestinal ostomy ([facility] [address] Full dentures, Full dentures, Gall stones, GERD (gastroesophageal reflux disease), GI bleed, Hemorrhage of gastrointestinal tract, unspecified, Hemorrhage of gastrointestinal tract, unspecified, Hyperlipidemia, Hypertension, Kidney stones, Kidney stones, Primary localized osteoarthrosis, lower leg, Primary localized osteoarthrosis, lower leg, Prostate disorder, Transient disorder of initiating or maintaining sleep, Transient disorder of initiating or maintaining sleep, Unspecified disorder of skin and subcutaneous tissue, Wears glasses, and Wears glasses. Plans:   Patient brought to ED by EMS after being found down at [facility]. Patient was positive for Covid on 9/7/2021. Patient hypoxic on arrival , NOW ON 3 LITERS   CT scan redemonstrates known aortic dissection, no signs of pneumonia. Pulmonology consulted. Patient given Decadron in the ED. CT head negative for acute intracranial abnormality.     Gram negative sepsis likely from uti ESBL,   Iv meropenem, will get total 2 weeks of antibiotics, will plan for midline  Delirium ICU psychosis  Metabolic encephalopathy likely secondary to gram-negative sepsis IV meropenem continued  Overall prognosis is guarded for his age comorbid conditions  On Eliquis for DVT prophylaxis with elevated D-dimer  Acute kidney injury, resolved  Working with physical therapy  ID consulted  History of abdominal aortic aneurysm surgery  Overall prognosis guarded  Discussed case with speech pathologist, diet changed  9/22  Very difficult social situation, patient has very poor oral intake  Has dementia and metabolic encephalopathy with bacteremia   will need encouragement for diet  Likely discharge to nursing facility soon,    Alison Siddiqui MD  9/22/2021  1:46 PM

## 2021-09-22 NOTE — PROGRESS NOTES
Physical Therapy  Facility/Department: [facility] – [place], [place] SURGE OVERFLOW  Daily Treatment Note  NAME: Shelly Wu  : 1940  MRN: [id number]    Date of Service: 2021    Discharge Recommendations:  Patient would benefit from continued therapy after discharge        Assessment   Body structures, Functions, Activity limitations: Decreased functional mobility ; Decreased safe awareness;Decreased strength;Decreased endurance;Decreased cognition  Assessment: Needs 2 assist with mobility this session, sustained episode of desatting with standing. POC updated to include bed mobility and sit<>stand transfer. Cont per POC  Treatment Diagnosis: impaired mobility due to debilitation and (+) COVID 19 infection  Specific instructions for Next Treatment: continue rolling and bed mobility, instruct in strengthening exercises and energy conservation techniques, further assess transfers when medically ready- MONITOR O2 SATS w/ TREATMENT  Prognosis: Guarded  History: pt admitted through the ER due to a fall at the nursing home. Pt found down on the floor. PT Education: PT Role  Barriers to Learning: cognition  REQUIRES PT FOLLOW UP: Yes  Activity Tolerance  Activity Tolerance: Patient limited by cognitive status; Patient limited by endurance     Patient Diagnosis(es): The primary encounter diagnosis was Septicemia ([address] Diagnoses of Urinary tract infection in male, Hypoxia, and COVID-19 were also pertinent to this visit.      has a past medical history of Acute respiratory failure following trauma and surgery ([address] Acute respiratory failure following trauma and surgery ([address] Altered bowel elimination due to intestinal ostomy ([address] Full dentures, Full dentures, Gall stones, GERD (gastroesophageal reflux disease), GI bleed, Hemorrhage of gastrointestinal tract, unspecified, Hemorrhage of gastrointestinal tract, unspecified, Hyperlipidemia, Hypertension, Kidney stones, Kidney stones, Primary localized osteoarthrosis, lower leg, Primary localized osteoarthrosis, lower leg, Prostate disorder, Transient disorder of initiating or maintaining sleep, Transient disorder of initiating or maintaining sleep, Unspecified disorder of skin and subcutaneous tissue, Wears glasses, and Wears glasses. has a past surgical history that includes Knee arthroscopy (Right); Tonsillectomy; Cataract removal with implant (Bilateral); Kidney stone surgery (Left); Colonoscopy; total colectomy; colostomy (Right); Lithotripsy (Left, 06-20-14); Abdomen surgery; Dilatation, esophagus; fracture surgery; eye surgery; Total knee arthroplasty (03/23/2015); Total knee arthroplasty (Right, 3/23/2015); joint replacement (Right, 3-23-15); Cystoscopy (Right, 8/29/2017); and shoulder surgery (Left, 9/14/2020). Restrictions  Restrictions/Precautions  Restrictions/Precautions: Fall Risk, Isolation, Cardiac (Droplet Plus Precautions)  Required Braces or Orthoses?: No  Implants present? : Metal implants (R TKR, Aortic Valve Replacement)  Position Activity Restriction  Other position/activity restrictions: up w/ assist, Colostomy, 1:1 sitter,  O2 per nasal cannula, Patient does have history of type aortic dissection in July 2021 which was treated by surgery at [facility].  Subjective   General  Chart Reviewed: Yes  Additional Pertinent Hx: 9/22/21 Patient agitated, combative, and attempting to get OOB overnight. Response To Previous Treatment: Patient with no complaints from previous session. Family / Caregiver Present: Yes (1:1 sitter)  Subjective  Subjective: pt is confused, 1:1 present in room. Rikki Harden RN ok's session, states pt is medically stable and no longer agitated. General Comment  Comments: per chart, pt found down at the nursing facility for unknown period of time. CT chest shows known type A  aortic  dissection. CT scan of the head shows no acute intracranial abnormality but right frontal soft tissue contusion.   Pain Screening  Patient Currently in Pain: Denies  Vital Signs  Patient Currently in Pain: Denies       Orientation     Cognition      Objective   Bed mobility  Supine to Sit: 2 Person assistance; Moderate assistance  Sit to Supine: Moderate assistance;2 Person assistance  Scootin Person assistance;Dependent/Total  Comment: pt needs assist at trunk and to bring LEs off EOB  Transfers  Sit to Stand: 2 Person Assistance;Maximum Assistance (sit<>stand at EOB without device)  Stand to sit: 2 Person Assistance;Maximum Assistance  Comment: Pt stood 2x with therapy for 10-30 secs each. RN entered room and states pt is desatting to 87-88%. Pt is then assisted back to bed.  Noted to be coughing throughout session  Ambulation  Ambulation?: No (unable at this time)     Balance  Posture: Poor (flexed posture, unable to stand fully upright at EOB)  Comments: stood without device                           G-Code     OutComes Score                                                     AM-PAC Score             Goals  Short term goals  Time Frame for Short term goals: 3-5 treatments/ week  Short term goal 1: pt to tolerate 1/2 hour of therapuetic exercise and activity keeping O2 sats above 90%  Short term goal 2: pt to follow directions accurately 50% of the time to complete LE strengthening exercises (cardiac precautions) and energy conservation techniques  Short term goal 3: Pt will perform bed mobility with Min A x1  Short term goal 4: Pt will perform sit<>stand transfer with RW Mod A x1  Patient Goals   Patient goals : unable to state    Plan    Plan  Times per week: 3-5 treatments/ week  Times per day:  (3-5 treatments/ week)  Specific instructions for Next Treatment: continue rolling and bed mobility, instruct in strengthening exercises and energy conservation techniques, further assess transfers when medically ready- MONITOR O2 SATS w/ TREATMENT  Current Treatment Recommendations: Strengthening, Patient/Caregiver Education & Training, ROM, Positioning, Safety Education & Training, Functional Mobility Training, Transfer Training  Safety Devices  Type of devices: Sitter present, Left in bed, Patient at risk for falls, Call light within reach, Bed alarm in place, Nurse notified, Gait belt     Therapy Time   Individual Concurrent Group Co-treatment   Time In 1340         Time Out 1400         Minutes 1700 [facility],

## 2021-09-22 NOTE — PROGRESS NOTES
Writer called into room by patient's 1:1 Kamala. Writer informed that patient is becoming increasingly verbally and physically aggressive. Jenise Rossi wanted to make sure writer was aware of patient's change in behavior. Will continue to monitor.

## 2021-09-22 NOTE — PROGRESS NOTES
I spoke with Dr Dona Dawn about antibiotics at discharge. Patient will need a midline for total of 2 weeks lori.  Electronically signed by Ese Bowden RN on 9/22/2021 at 1:45 PM

## 2021-09-22 NOTE — PROGRESS NOTES
[facility] -   Infectious diseases evaluation  admission date 9/16/2021    reason for consultation:   UTI  Bacteremia  COVID-19 infection    Impression :   Current:  · UTI  · ESBL producing E. coli bacteremia secondary to above  · COVID-19 infection  · Acute hypoxic respiratory failure  · Left pleural effusion  · Acute on chronic renal failure  · Encephalopathy  · History of aortic valve replacement June 2021  · Aortic dissection  · Sulfa allergy    Recommendations   · Continue IV meropenem day 5  · Decadron  · On Eliquis  · Swallow study  · Follow CBC and renal function  · Follow inflammatory markers and chest x-ray  · Continue supportive care  · Droplet plus isolation      History of Present Illness:   Initial history:  Mecca Stark is a 78y.o.-year-old male was brought to the hospital from [facility] after he was found laying on the ground. At the ER he was tachypneic and hypoxic, disoriented. .  Urinalysis was suggestive of UTI  He was placed on oxygen per nasal cannula then he was placed on high flow oxygen. Blood culture grew ESBL producing E. Coli  The patient is confused    COVID-19 came back positive  The patient received Covid 19 vaccine    Interval changes  9/21/2021   He remains confused, on 3 L of oxygen, concern regarding aspiration when he was taking his medication this morning, afebrile. WBC increased to 14.3  Procalcitonin 0.15  Patient Vitals for the past 8 hrs:   Pulse SpO2   09/21/21 1819 91 91 %   09/21/21 1731 81 94 %   09/21/21 1622 82 95 %   09/21/21 1524 67 95 %   09/21/21 1418 81 92 %   09/21/21 1322 86 92 %   09/21/21 1300 79 --   09/21/21 1251 90 94 %             I have personally reviewed the past medical history, past surgical history, medications, social history, and family history, and I haveupdated the database accordingly.       Allergies:   Sulfa antibiotics and Vimovo [naproxen-esomeprazole]     Review of Systems:     Review of Systems  Confused, agitated, unable to provide  Physical Examination :       Physical Exam  Remainder of examination deferred due to excessive risk conferred by physical examination during a global pandemic due to coronavirus 19. In a setting where local and national supplies of personal protective equipment are depleted  Past Medical History:     Past Medical History:   Diagnosis Date    Acute respiratory failure following trauma and surgery ([address]     Acute respiratory failure following trauma and surgery ([address]     Altered bowel elimination due to intestinal ostomy ([address]     iliostomy    Full dentures     pt said he has partial upper and lower    Full dentures     Gall stones     GERD (gastroesophageal reflux disease)     GI bleed     Hemorrhage of gastrointestinal tract, unspecified     Hemorrhage of gastrointestinal tract, unspecified     Hyperlipidemia     Hypertension     Kidney stones     Kidney stones     Primary localized osteoarthrosis, lower leg     both shoulders, neck, legs.  Primary localized osteoarthrosis, lower leg     Prostate disorder     Transient disorder of initiating or maintaining sleep     Transient disorder of initiating or maintaining sleep     Unspecified disorder of skin and subcutaneous tissue     Wears glasses     Wears glasses        Past Surgical  History:     Past Surgical History:   Procedure Laterality Date    ABDOMEN SURGERY      CATARACT REMOVAL WITH IMPLANT Bilateral     COLONOSCOPY      X3    COLOSTOMY Right     COLOSTOMY BAG ON RT.  SIDE    CYSTOSCOPY Right 8/29/2017    CYSTOSCOPY URETERAL STENT INSERTION performed by Nadia Head MD at [facility], ESOPHAGUS      EYE SURGERY      bettye. eyes, cataracts extracted with iol's    FRACTURE SURGERY      JOINT REPLACEMENT Right 3-23-15    KIDNEY STONE SURGERY Left     KNEE ARTHROSCOPY Right     LITHOTRIPSY Left 06-20-14    w/ cystoscopy C & P    SHOULDER SURGERY Left 9/14/2020    MOLE EXCISION POSTERIOR SHOULDER performed by Jose Ortiz MD [facility] , PARTIAL RECTUM\"    TOTAL KNEE ARTHROPLASTY  03/23/2015    Right with biomet and GPS product application    TOTAL KNEE ARTHROPLASTY Right 3/23/2015       Medications:      meropenem  1,000 mg IntraVENous Q8H    apixaban  5 mg Oral BID    dexamethasone  6 mg Oral Daily    amiodarone  200 mg Oral Daily    ascorbic acid  500 mg Oral Daily    atorvastatin  20 mg Oral Daily    divalproex  125 mg Oral BID    ferrous sulfate  325 mg Oral Daily with breakfast    [Held by provider] furosemide  20 mg Oral Daily    latanoprost  1 drop Both Eyes Nightly    therapeutic multivitamin-minerals  1 tablet Oral Daily    timolol  1 drop Both Eyes Daily    zinc sulfate  50 mg Oral Daily    sodium chloride flush  5-40 mL IntraVENous 2 times per day    aspirin  81 mg Oral Daily       Social History:     Social History     Socioeconomic History    Marital status:      Spouse name: Not on file    Number of children: Not on file    Years of education: Not on file    Highest education level: Not on file   Occupational History    Not on file   Tobacco Use    Smoking status: Never Smoker    Smokeless tobacco: Never Used   Vaping Use    Vaping Use: Never used   Substance and Sexual Activity    Alcohol use: No     Alcohol/week: 0.0 standard drinks     Comment: occassional    Drug use: No    Sexual activity: Not on file   Other Topics Concern    Not on file   Social History Narrative    Not on file     Social Determinants of Health     Financial Resource Strain:     Difficulty of Paying Living Expenses:    Food Insecurity:     Worried About Running Out of Food in the Last Year:     Ran Out of Food in the Last Year:    Transportation Needs:     Lack of Transportation (Medical):      Lack of Transportation (Non-Medical):    Physical Activity:     Days of Exercise per Week:     Minutes of Exercise per Session:    Stress:     Feeling of Stress :    Social Connections:     Frequency of Communication with Friends and Family:     Frequency of Social Gatherings with Friends and Family:     Attends Gnosticism Services:     Active Member of Clubs or Organizations:     Attends Club or Organization Meetings:     Marital Status:    Intimate Partner Violence:     Fear of Current or Ex-Partner:     Emotionally Abused:     Physically Abused:     Sexually Abused:        Family History:     Family History   Problem Relation Age of Onset    Heart Disease Father     Other Mother       Medical Decision Making:   I have independently reviewed/ordered the following labs:    CBC with Differential:   Recent Labs     09/20/21 0438 09/21/21 0454   WBC 11.0 14.3*   HGB 12.6* 11.8*   HCT 40.2* 38.3*    170     BMP:  Recent Labs     09/20/21 0438 09/21/21 0454   * 144   K 4.7 4.4   * 115*   CO2 22 23   BUN 44* 41*   CREATININE 1.10 0.95   MG 2.0 2.1     Hepatic Function Panel:   No results for input(s): PROT, LABALBU, BILIDIR, IBILI, BILITOT, ALKPHOS, ALT, AST in the last 72 hours. No results for input(s): RPR in the last 72 hours. No results for input(s): HIV in the last 72 hours. No results for input(s): BC in the last 72 hours. Lab Results   Component Value Date    CREATININE 0.95 09/21/2021    GLUCOSE 103 09/21/2021    GLUCOSE 100 02/16/2012       Detailed results: Thank you for allowing us to participate in the care of this patient. Please call with questions. This note is created with the assistance of a speech recognition program.  While intending to generate adocument that actually reflects the content of the visit, the document can still have some errors including those of syntax and sound a like substitutions which may escape proof reading. It such instances, actual meaningcan be extrapolated by contextual diversion.     Jyoti Ramsey MD  Office:

## 2021-09-22 NOTE — FLOWSHEET NOTE
Pt. SpO2 levels dropped down to 87-88% when standing at side of the bed with therapy. Pt. Bumped up to 3.5L of O2 via nasal cannula to assist in recovery. Will continue to monitor and wean oxygen when appropriate.

## 2021-09-22 NOTE — PROGRESS NOTES
EBONY spoke to Ted valladares from [facility]. Pt had a rapid covid test on 9-7-21 and another positive test on the 14th. Addendum; EBONY spoke to Miranda from [facility]. She said pt was negative on the 7th of September. The only positive test is from the 14th. Pt is approved to return to [facility] however, he will have to be off the one on one. Gabi from [facility] will start the pre-cert over tomorrow.

## 2021-09-22 NOTE — PROGRESS NOTES
Spoke with Carmen Lawrence, pt. Wife, about pt. Getting a midline in place prior to discharge for IV antibiotic at the facility he discahrge too. Wife is agreeable to Midline placement. Writer updated pt. Wife at this time on pt. Status. Answered all question. Pt. Wife transferred into room to talk to patient at this time.

## 2021-09-22 NOTE — PLAN OF CARE
Problem: Isolation Precautions - Risk of Spread of Infection  Goal: Prevent transmission of infection  9/22/2021 1641 by Andrew Lake RN  Outcome: Ongoing  Patient will cover mouth when coughing to decrease the spread of infection.       Problem: Nutrition Deficits  Goal: Optimize nutritional status  9/22/2021 1641 by Andrew Lake RN  Outcome: Ongoing   Patient will eat 75% of his meal at each meal with encouragement

## 2021-09-22 NOTE — PROGRESS NOTES
[facility] -   Infectious diseases evaluation  admission date 9/16/2021    reason for consultation:   UTI  Bacteremia  COVID-19 infection    Impression :   Current:  · UTI  · ESBL producing E. coli bacteremia secondary to above  · COVID-19 infection  · Acute hypoxic respiratory failure  · Left pleural effusion  · Acute on chronic renal failure  · Encephalopathy  · History of aortic valve replacement June 2021  · Aortic dissection  · Sulfa allergy    Recommendations   · Continue IV meropenem through 9/29/2021  · Decadron  · Follow CBC and renal function  · Follow inflammatory markers and chest x-ray  · Continue supportive care  · Droplet plus isolation  · Discussed with internal medicine team      History of Present Illness:   Initial history:  Arcenio Dover is a 78y.o.-year-old male was brought to the hospital from [facility] after he was found laying on the ground. At the ER he was tachypneic and hypoxic, disoriented. .  Urinalysis was suggestive of UTI  He was placed on oxygen per nasal cannula then he was placed on high flow oxygen. Blood culture grew ESBL producing E. Coli  The patient is confused    COVID-19 came back positive  The patient received Covid 19 vaccine    Interval changes  9/22/2021   He remains on 2 L of oxygen per nasal cannula, no reported fever, poor oral intake, no new events. Patient Vitals for the past 8 hrs:   BP Temp Temp src Pulse Resp SpO2   09/22/21 0757 118/68 97.9 °F (36.6 °C) Axillary 90 16 97 %             I have personally reviewed the past medical history, past surgical history, medications, social history, and family history, and I haveupdated the database accordingly.       Allergies:   Sulfa antibiotics and Vimovo [naproxen-esomeprazole]     Review of Systems:     Review of Systems  Confused, agitated, unable to provide  Physical Examination :       Physical Exam  Remainder of examination deferred due to excessive risk conferred by physical examination during a global pandemic due to coronavirus 19. In a setting where local and national supplies of personal protective equipment are depleted  Past Medical History:     Past Medical History:   Diagnosis Date    Acute respiratory failure following trauma and surgery ([address]     Acute respiratory failure following trauma and surgery ([address]     Altered bowel elimination due to intestinal ostomy ([address]     iliostomy    Full dentures     pt said he has partial upper and lower    Full dentures     Gall stones     GERD (gastroesophageal reflux disease)     GI bleed     Hemorrhage of gastrointestinal tract, unspecified     Hemorrhage of gastrointestinal tract, unspecified     Hyperlipidemia     Hypertension     Kidney stones     Kidney stones     Primary localized osteoarthrosis, lower leg     both shoulders, neck, legs.  Primary localized osteoarthrosis, lower leg     Prostate disorder     Transient disorder of initiating or maintaining sleep     Transient disorder of initiating or maintaining sleep     Unspecified disorder of skin and subcutaneous tissue     Wears glasses     Wears glasses        Past Surgical  History:     Past Surgical History:   Procedure Laterality Date    ABDOMEN SURGERY      CATARACT REMOVAL WITH IMPLANT Bilateral     COLONOSCOPY      X3    COLOSTOMY Right     COLOSTOMY BAG ON RT.  SIDE    CYSTOSCOPY Right 8/29/2017    CYSTOSCOPY URETERAL STENT INSERTION performed by Vilma Cole MD at [facility], ESOPHAGUS      EYE SURGERY      bettye. eyes, cataracts extracted with iol's    FRACTURE SURGERY      JOINT REPLACEMENT Right 3-23-15    KIDNEY STONE SURGERY Left     KNEE ARTHROSCOPY Right     LITHOTRIPSY Left 06-20-14    w/ cystoscopy C & P    SHOULDER SURGERY Left 9/14/2020    MOLE EXCISION POSTERIOR SHOULDER performed by Maria Luisa Orozco MD at [place] [address] , PARTIAL RECTUM\"    TOTAL KNEE ARTHROPLASTY  03/23/2015    Right with biomet and GPS product application    TOTAL KNEE ARTHROPLASTY Right 3/23/2015       Medications:      meropenem  1,000 mg IntraVENous Q8H    apixaban  5 mg Oral BID    dexamethasone  6 mg Oral Daily    amiodarone  200 mg Oral Daily    ascorbic acid  500 mg Oral Daily    atorvastatin  20 mg Oral Daily    divalproex  125 mg Oral BID    ferrous sulfate  325 mg Oral Daily with breakfast    [Held by provider] furosemide  20 mg Oral Daily    latanoprost  1 drop Both Eyes Nightly    therapeutic multivitamin-minerals  1 tablet Oral Daily    timolol  1 drop Both Eyes Daily    zinc sulfate  50 mg Oral Daily    sodium chloride flush  5-40 mL IntraVENous 2 times per day    aspirin  81 mg Oral Daily       Social History:     Social History     Socioeconomic History    Marital status:      Spouse name: Not on file    Number of children: Not on file    Years of education: Not on file    Highest education level: Not on file   Occupational History    Not on file   Tobacco Use    Smoking status: Never Smoker    Smokeless tobacco: Never Used   Vaping Use    Vaping Use: Never used   Substance and Sexual Activity    Alcohol use: No     Alcohol/week: 0.0 standard drinks     Comment: occassional    Drug use: No    Sexual activity: Not on file   Other Topics Concern    Not on file   Social History Narrative    Not on file     Social Determinants of Health     Financial Resource Strain:     Difficulty of Paying Living Expenses:    Food Insecurity:     Worried About Running Out of Food in the Last Year:     Ran Out of Food in the Last Year:    Transportation Needs:     Lack of Transportation (Medical):      Lack of Transportation (Non-Medical):    Physical Activity:     Days of Exercise per Week:     Minutes of Exercise per Session:    Stress:     Feeling of Stress :    Social Connections:     Frequency of Communication with Friends and Family:     Frequency of Social Gatherings with Friends and Family:     Attends Yarsanism Services:     Active Member of Clubs or Organizations:     Attends Club or Organization Meetings:     Marital Status:    Intimate Partner Violence:     Fear of Current or Ex-Partner:     Emotionally Abused:     Physically Abused:     Sexually Abused:        Family History:     Family History   Problem Relation Age of Onset    Heart Disease Father     Other Mother       Medical Decision Making:   I have independently reviewed/ordered the following labs:    CBC with Differential:   Recent Labs     09/21/21  0454 09/22/21  0545   WBC 14.3* 15.4*   HGB 11.8* 12.3*   HCT 38.3* 38.1*    159     BMP:  Recent Labs     09/21/21  0454 09/22/21  0545    149*   K 4.4 3.9   * 114*   CO2 23 26   BUN 41* 31*   CREATININE 0.95 0.83   MG 2.1 1.8     Hepatic Function Panel:   No results for input(s): PROT, LABALBU, BILIDIR, IBILI, BILITOT, ALKPHOS, ALT, AST in the last 72 hours. No results for input(s): RPR in the last 72 hours. No results for input(s): HIV in the last 72 hours. No results for input(s): BC in the last 72 hours. Lab Results   Component Value Date    CREATININE 0.83 09/22/2021    GLUCOSE 88 09/22/2021    GLUCOSE 100 02/16/2012       Detailed results: Thank you for allowing us to participate in the care of this patient. Please call with questions. This note is created with the assistance of a speech recognition program.  While intending to generate adocument that actually reflects the content of the visit, the document can still have some errors including those of syntax and sound a like substitutions which may escape proof reading. It such instances, actual meaningcan be extrapolated by contextual diversion.     Aurora Johnson MD  Office: [phone]  Perfect serve / office [phone]

## 2021-09-22 NOTE — PROGRESS NOTES
ICU Progress Note (Non-Vent)  [facility]    Patient - Bj Page,  Age - 78 y.o.    - 1940      Room Number - /-28   N -  [id number]   [facility]  Date of Admission -  2021  7:45 PM    Events of Past 24 Hours   More alert, oriented still wants to get out of bed and out of isolation    Vitals    height is 6' 1\" (1.854 m) and weight is 189 lb 6 oz (85.9 kg). His axillary temperature is 97.4 °F (36.3 °C). His blood pressure is 109/86 and his pulse is 87. His respiration is 16 and oxygen saturation is 92%.        Temperature Range: Temp: 97.4 °F (36.3 °C) Temp  Av.9 °F (36.6 °C)  Min: 97.4 °F (36.3 °C)  Max: 98.3 °F (36.8 °C)  BP Range:  Systolic (16RNQ), YXY:170 , Min:109 , AR     Diastolic (37HPA), EEM:10, Min:68, Max:86    Pulse Range: Pulse  Av.8  Min: 67  Max: 91  Respiration Range: Resp  Av  Min: 16  Max: 16  Current Pulse Ox[de-identified]  SpO2: 92 %  24HR Pulse Ox Range:  SpO2  Av.3 %  Min: 91 %  Max: 97 %  Oxygen Amount and Delivery: O2 Flow Rate (L/min): 2 L/min    Wt Readings from Last 3 Encounters:   21 189 lb 6 oz (85.9 kg)   21 186 lb 1.1 oz (84.4 kg)   21 230 lb (104.3 kg)     I/O       Intake/Output Summary (Last 24 hours) at 2021 5020  Last data filed at 2021 6306  Gross per 24 hour   Intake 70 ml   Output 675 ml   Net -605 ml     DRAIN/TUBE OUTPUT       Invasive Lines   ICP PRESSURE RANGE  No data recorded  CVP PRESSURE RANGE  No data recorded      Medications      meropenem  1,000 mg IntraVENous Q8H    apixaban  5 mg Oral BID    dexamethasone  6 mg Oral Daily    amiodarone  200 mg Oral Daily    ascorbic acid  500 mg Oral Daily    atorvastatin  20 mg Oral Daily    divalproex  125 mg Oral BID    ferrous sulfate  325 mg Oral Daily with breakfast    [Held by provider] furosemide  20 mg Oral Daily    latanoprost  1 drop Both Eyes Nightly    therapeutic multivitamin-minerals  1 tablet Oral Daily    timolol  1 drop Both Eyes Daily    zinc sulfate  50 mg Oral Daily    sodium chloride flush  5-40 mL IntraVENous 2 times per day    aspirin  81 mg Oral Daily     QUEtiapine, perflutren lipid microspheres, aluminum & magnesium hydroxide-simethicone, melatonin, sodium chloride flush, sodium chloride, ondansetron **OR** ondansetron, polyethylene glycol, acetaminophen **OR** acetaminophen  IV Drips/Infusions   sodium chloride      sodium chloride 75 mL/hr at 09/21/21 0825       Diet/Nutrition   Adult Oral Nutrition Supplement; Standard High Calorie/High Protein Oral Supplement  Adult Oral Nutrition Supplement; Frozen Oral Supplement  ADULT DIET; Easy to Chew; No Added Salt (3-4 gm); Moderately Thick (Honey)    Exam      Constitutional - Alert, arousable  General Appearance  well developed, well nourished  HEENT -normocephalic, atraumatic. PERRLA  Lungs - Chest expands equally, no wheezes, coarse left side  Cardiovascular - Heart sounds are normal.  normal rate and rhythm regular, no murmur, gallop or rub. Abdomen - soft, nontender, nondistended, no masses or organomegaly  Neurologic - CN II-XII are grossly intact.  There are no focal motor deficits  Skin - no bruising or bleeding  Extremities - no cyanosis, clubbing or edema    Lab Results   CBC     Lab Results   Component Value Date    WBC 15.4 09/22/2021    RBC 4.56 09/22/2021    RBC 4.59 02/16/2012    HGB 12.3 09/22/2021    HCT 38.1 09/22/2021     09/22/2021     02/16/2012    MCV 83.5 09/22/2021    MCH 26.9 09/22/2021    MCHC 32.2 09/22/2021    RDW 17.1 09/22/2021    NRBC 1 11/14/2012    LYMPHOPCT 6 09/16/2021    MONOPCT 5 09/16/2021    MYELOPCT 1 11/14/2012    BASOPCT 0 09/16/2021    MONOSABS 0.57 09/16/2021    LYMPHSABS 0.68 09/16/2021    EOSABS 0.00 09/16/2021    BASOSABS 0.00 09/16/2021    DIFFTYPE NOT REPORTED 09/16/2021       BMP   Lab Results Component Value Date     09/22/2021    K 3.9 09/22/2021     09/22/2021    CO2 26 09/22/2021    BUN 31 09/22/2021    CREATININE 0.83 09/22/2021    GLUCOSE 88 09/22/2021    GLUCOSE 100 02/16/2012       LFTS  Lab Results   Component Value Date    ALKPHOS 101 09/16/2021    ALT 16 09/16/2021    AST 15 09/16/2021    PROT 7.6 09/16/2021    BILITOT 0.34 09/16/2021    LABALBU 3.3 09/16/2021    LABALBU 4.3 02/16/2012       ABG ABGs:   Lab Results   Component Value Date    PHART 7.411 09/17/2021    PO2ART 64.8 09/17/2021    PRA3DWQ 37.5 09/17/2021       Lab Results   Component Value Date    MODE NOT REPORTED 09/17/2021         INR  No results for input(s): PROTIME, INR in the last 72 hours. APTT  No results for input(s): APTT in the last 72 hours. Lactic Acid  Lab Results   Component Value Date    LACTA 0.9 09/17/2021    LACTA 1.0 08/24/2021    LACTA 2.2 08/23/2021        BNP   No results for input(s): BNP in the last 72 hours.      Cultures       Radiology     CXR      CT Scans    (See actual reports for details)      SYSTEMS ASSESSMENT      COVID-19 infection  Acute hypoxic respiratory failure-aspiration pneumonia  ESBL associated sepsis  Delirium due to sepsis, metabolic encephalopathy-improved  Pleural effusion on left  Acute on chronic kidney disease  History of aortic dissection status post graft and surgical valve replacement  History of partial colectomy and ileostomy formation  Full code  Vaccinated for Covid    Still requiring oxygen, when he moves saturations drop but I have cut him down to 2 L nasal cannula  Merrem for ESBL and aspiration pneumonia will be needed for approximately 2 weeks  Wean oxygen as tolerated  He tested positive on 9/14 can come out of isolation 9/24    Electronically signed by Manuelito Foote MD on 9/22/2021 at 2:53 PM

## 2021-09-22 NOTE — PLAN OF CARE
Problem: Airway Clearance - Ineffective  Goal: Achieve or maintain patent airway  Outcome: Ongoing     Problem: Gas Exchange - Impaired  Goal: Absence of hypoxia  Outcome: Ongoing  Goal: Promote optimal lung function  Outcome: Ongoing     Problem: Breathing Pattern - Ineffective  Goal: Ability to achieve and maintain a regular respiratory rate  Outcome: Ongoing     Problem:  Body Temperature -  Risk of, Imbalanced  Goal: Ability to maintain a body temperature within defined limits  Outcome: Ongoing  Goal: Will regain or maintain usual level of consciousness  Outcome: Ongoing  Goal: Complications related to the disease process, condition or treatment will be avoided or minimized  Outcome: Ongoing     Problem: Isolation Precautions - Risk of Spread of Infection  Goal: Prevent transmission of infection  Outcome: Ongoing     Problem: Nutrition Deficits  Goal: Optimize nutritional status  Outcome: Ongoing     Problem: Risk for Fluid Volume Deficit  Goal: Maintain normal heart rhythm  Outcome: Ongoing  Goal: Maintain absence of muscle cramping  Outcome: Ongoing  Goal: Maintain normal serum potassium, sodium, calcium, phosphorus, and pH  Outcome: Ongoing     Problem: Loneliness or Risk for Loneliness  Goal: Demonstrate positive use of time alone when socialization is not possible  Outcome: Ongoing     Problem: Fatigue  Goal: Verbalize increase energy and improved vitality  Outcome: Ongoing     Problem: Patient Education: Go to Patient Education Activity  Goal: Patient/Family Education  Outcome: Ongoing     Problem: Nutrition  Goal: Optimal nutrition therapy  Outcome: Ongoing     Problem: Musculor/Skeletal Functional Status  Goal: Highest potential functional level  Outcome: Ongoing  Goal: Absence of falls  Outcome: Ongoing     Problem: Musculor/Skeletal Functional Status  Goal: Highest potential functional level  Outcome: Ongoing  Goal: Absence of falls  Outcome: Ongoing     Problem: Falls - Risk of:  Goal: Will remain
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
## 2021-09-23 PROBLEM — R77.8 ELEVATED TROPONIN: Status: RESOLVED | Noted: 2021-01-01 | Resolved: 2021-01-01

## 2021-09-23 PROBLEM — R79.89 ELEVATED TROPONIN: Status: RESOLVED | Noted: 2021-01-01 | Resolved: 2021-01-01

## 2021-09-23 NOTE — PROGRESS NOTES
[address] [facility]    INPATIENT OCCUPATIONAL THERAPY  PROGRESS NOTE  Date: 2021  Patient Name: Jed Cloud      Room: /-26  MRN: [id number]    : 1940  (78 y.o.) Gender: male     Discharge Recommendations:  Further Occupational Therapy is recommended upon facility discharge. Referring Practitioner: MOISES Garcia CNP  Diagnosis: UTI, COVID-19  General  Chart Reviewed: Yes, Orders, Progress Notes, History and Physical  Patient assessed for rehabilitation services?: Yes  Additional Pertinent Hx: Patient does have history of type aortic dissection in 2021 which was treated by surgery at [facility].  Family / Caregiver Present: No  Referring Practitioner: MOISES Garcia CNP  Diagnosis: UTI, COVID-19    Restrictions  Restrictions/Precautions: Fall Risk, Isolation, Cardiac (Droplet Plus Precautions)  Implants present? : Metal implants (R TKR, Aortic Valve Replacement)  Other position/activity restrictions: up w/ assist, Colostomy, 1:1 sitter,  O2 per nasal cannula, Patient does have history of type aortic dissection in 2021 which was treated by surgery at [facility].  Required Braces or Orthoses?: No      Subjective  Subjective: \"Two usually, but sometimes one. ..6 hours, I mean 6 feet\" patient reports that he ambulates with staff assist at nursing, typically with 1-2 people. Comments: pt sleeping upon arrival. roused but demo  difficulty following directions  Patient Currently in Pain: Denies             Objective  Cognition  Overall Cognitive Status: Impaired  Arousal/Alertness: Delayed responses to stimuli  Attention Span: Attends with cues to redirect  Memory: Decreased short term memory;Decreased recall of recent events  Following Commands:  Follows one step commands with repetition  Safety Judgement: Decreased awareness of need for safety  Awareness of Errors: Decreased awareness of errors  Insights: Decreased awareness of Patient Education: OT POC, HEP, ACTIVITY PROMOTION     Learner:patient  Method: demonstration and explanation       Outcome: needs reinforcement     Plan  Safety Devices  Safety Devices in place: Yes  Type of devices: All fall risk precautions in place, Bed alarm in place, Patient at risk for falls, Left in bed, Nurse notified (1:1 present)  Plan  Times per week: 3-5  Times per day: Daily  Current Treatment Recommendations: Self-Care / ADL, Strengthening, Balance Training, Functional Mobility Training, Endurance Training, Safety Education & Training, Patient/Caregiver Education & Training, Equipment Evaluation, Education, & procurement      Goals  Short term goals  Time Frame for Short term goals: By discharge  Short term goal 1: Patient will actively participate in 15+ minutes of self-care to the best of patient's ability with Minimal verbal cues for attention to task. Short term goal 2: Patient will actively participate in 15-25 minutes of therapeutic exercise/functional activities to promote increased independence with self-care and mobility. Short term goal 3: Patient will perform self-feeding with SBA and Fair attention to task. Short term goal 4: Patient will perform grooming and upper body bathing/dressing with Minimal assist and Fair attention to task. Short term goal 5: Patient will perform bed mobility with SBA to sit EOB for 10+ minutes unsupported while engaging in functional activity of choice. Short term goal 6: Patient will perform sit to stand transfer with Minimal assist x Roberto Santiago and Fair safety to transfer to bedside commode/chair.     OT Individual Minutes  Time In: 1952  Time Out: 1625  Minutes: 28      Electronically signed by JUDY Iyer on 9/23/21 at 4:29 PM EDT

## 2021-09-23 NOTE — PLAN OF CARE
Problem: Airway Clearance - Ineffective  Goal: Achieve or maintain patent airway  9/23/2021 1654 by Chris Marx RN  Outcome: Ongoing  9/23/2021 0419 by Hoda Bryson RN  Outcome: Ongoing     Problem: Gas Exchange - Impaired  Goal: Absence of hypoxia  9/23/2021 1654 by Chris Marx RN  Outcome: Ongoing  9/23/2021 0419 by Hoda Bryson RN  Outcome: Ongoing  Goal: Promote optimal lung function  9/23/2021 1654 by Chris Marx RN  Outcome: Ongoing  9/23/2021 0419 by Hoda Bryson RN  Outcome: Ongoing     Problem: Breathing Pattern - Ineffective  Goal: Ability to achieve and maintain a regular respiratory rate  9/23/2021 1654 by Chris Marx RN  Outcome: Ongoing  9/23/2021 0419 by Hoda Bryson RN  Outcome: Ongoing     Problem:  Body Temperature -  Risk of, Imbalanced  Goal: Ability to maintain a body temperature within defined limits  9/23/2021 1654 by Chris Marx RN  Outcome: Ongoing  9/23/2021 0419 by Hoda Bryson RN  Outcome: Ongoing  Goal: Will regain or maintain usual level of consciousness  9/23/2021 1654 by Chris Marx RN  Outcome: Ongoing  9/23/2021 0419 by Hoda Bryson RN  Outcome: Ongoing  Goal: Complications related to the disease process, condition or treatment will be avoided or minimized  9/23/2021 1654 by Chris Marx RN  Outcome: Ongoing  9/23/2021 0419 by Hoda Bryson RN  Outcome: Ongoing     Problem: Isolation Precautions - Risk of Spread of Infection  Goal: Prevent transmission of infection  9/23/2021 1654 by Chris Marx RN  Outcome: Ongoing  9/23/2021 0419 by Hoda Bryson RN  Outcome: Ongoing     Problem: Nutrition Deficits  Goal: Optimize nutritional status  9/23/2021 1654 by Chris Marx RN  Outcome: Ongoing  9/23/2021 0419 by Hoda Bryson RN  Outcome: Ongoing     Problem: Risk for Fluid Volume Deficit  Goal: Maintain normal heart rhythm  9/23/2021 1654 by Chris Marx RN  Outcome: Ongoing  9/23/2021 0419 by Hoda Bryson RN  Outcome: Ongoing  Goal: Kehinde Morris RN  Outcome: Ongoing  9/23/2021 0419 by Bell Vincent RN  Outcome: Ongoing     Problem: Musculor/Skeletal Functional Status  Goal: Highest potential functional level  9/23/2021 1654 by Kehinde Morris RN  Outcome: Ongoing  9/23/2021 0419 by Bell Vincent RN  Outcome: Ongoing  Goal: Absence of falls  9/23/2021 1654 by Kehinde Morris RN  Outcome: Ongoing  9/23/2021 0419 by Bell Vincent RN  Outcome: Ongoing     Problem: Musculor/Skeletal Functional Status  Goal: Highest potential functional level  9/23/2021 1654 by Kehinde Morris RN  Outcome: Ongoing  9/23/2021 0419 by Bell Vincent RN  Outcome: Ongoing  Goal: Absence of falls  9/23/2021 1654 by Kehinde Morris RN  Outcome: Ongoing  9/23/2021 0419 by Bell Vincent RN  Outcome: Ongoing

## 2021-09-23 NOTE — PROGRESS NOTES
Pulmonary Progress Note  [facility]      Patient - Mecca Stark,  Age - 78 y.o.    - 1940      Room Number - /-34   N -  [id number]   [facility]  Date of Admission -  2021  7:45 PM        Consulting Lucy Kuhn MD  Primary Care Physician - Paulina Grove MD     SUBJECTIVE   He seems to be depressed also more tachypneic today    OBJECTIVE   VITALS    height is 6' 1\" (1.854 m) and weight is 189 lb 6 oz (85.9 kg). His axillary temperature is 97.6 °F (36.4 °C). His blood pressure is 116/89 and his pulse is 79. His respiration is 18 and oxygen saturation is 92%. Body mass index is 24.99 kg/m². Temperature Range: Temp: 97.6 °F (36.4 °C) Temp  Av.6 °F (36.4 °C)  Min: 97.4 °F (36.3 °C)  Max: 97.8 °F (36.6 °C)  BP Range:  Systolic (09RAG), EJX:992 , Min:106 , OPU:923     Diastolic (10WVZ), KIEL:22, Min:64, Max:89    Pulse Range: Pulse  Av  Min: 79  Max: 100  Respiration Range: Resp  Av.4  Min: 16  Max: 20  Current Pulse Ox[de-identified]  SpO2: 92 %  24HR Pulse Ox Range:  SpO2  Av.2 %  Min: 91 %  Max: 96 %  Oxygen Amount and Delivery: O2 Flow Rate (L/min): 2 L/min    Wt Readings from Last 3 Encounters:   21 189 lb 6 oz (85.9 kg)   21 186 lb 1.1 oz (84.4 kg)   21 230 lb (104.3 kg)       I/O (24 Hours)    Intake/Output Summary (Last 24 hours) at 2021 1354  Last data filed at 2021 1248  Gross per 24 hour   Intake --   Output 275 ml   Net -275 ml       EXAM     General Appearance  Awake, alert, agitated at times according to nursing staff   HEENT - normocephalic, atraumatic.  []  Mallampati  [] Crowded airway   [] Macroglossia  []  Retrognathia  [] Micrognathia  []  Normal tongue size []  Normal Bite  [] Lemhi sign positive    Neck - Supple,  trachea midline   Lungs -coarse harsh rhonchi right side compared with left  Cardiovascular - Heart sounds are normal.  Regular rate and rhythm   Abdomen - Soft, nontender, nondistended, no masses or organomegaly  Neurologic - There are no focal motor or sensory deficits  Skin - No bruising or bleeding  Extremities - No clubbing, cyanosis, edema    MEDS      QUEtiapine  25 mg Oral Nightly    meropenem  1,000 mg IntraVENous Q8H    apixaban  5 mg Oral BID    dexamethasone  6 mg Oral Daily    amiodarone  200 mg Oral Daily    ascorbic acid  500 mg Oral Daily    atorvastatin  20 mg Oral Daily    divalproex  125 mg Oral BID    ferrous sulfate  325 mg Oral Daily with breakfast    [Held by provider] furosemide  20 mg Oral Daily    latanoprost  1 drop Both Eyes Nightly    therapeutic multivitamin-minerals  1 tablet Oral Daily    timolol  1 drop Both Eyes Daily    zinc sulfate  50 mg Oral Daily    sodium chloride flush  5-40 mL IntraVENous 2 times per day    aspirin  81 mg Oral Daily      sodium chloride 75 mL/hr at 09/23/21 1351    sodium chloride       QUEtiapine, perflutren lipid microspheres, aluminum & magnesium hydroxide-simethicone, melatonin, sodium chloride flush, sodium chloride, ondansetron **OR** ondansetron, polyethylene glycol, acetaminophen **OR** acetaminophen    LABS   CBC   Recent Labs     09/23/21  0941   WBC 14.7*   HGB 14.0   HCT 44.1   MCV 83.4        BMP:   Lab Results   Component Value Date     09/23/2021    K 4.3 09/23/2021     09/23/2021    CO2 26 09/23/2021    BUN 39 09/23/2021    LABALBU 3.3 09/16/2021    LABALBU 4.3 02/16/2012    CREATININE 0.83 09/23/2021    CALCIUM 9.6 09/23/2021    GFRAA >60 09/23/2021    LABGLOM >60 09/23/2021     ABGs:  Lab Results   Component Value Date    PHART 7.411 09/17/2021    PO2ART 64.8 09/17/2021    JBG2EPB 37.5 09/17/2021      Lab Results   Component Value Date    MODE NOT REPORTED 09/17/2021     Ionized Calcium:  No results found for: IONCA  Magnesium:    Lab Results   Component Value Date    MG 1.9 09/23/2021     Phosphorus:    Lab Results   Component Value Date    PHOS 3.7 11/19/2012        LIVER PROFILE No results for input(s): AST, ALT, LIPASE, BILIDIR, BILITOT, ALKPHOS in the last 72 hours. Invalid input(s): AMYLASE,  ALB  INR No results for input(s): INR in the last 72 hours. PTT   Lab Results   Component Value Date    APTT 32.5 09/16/2021         RADIOLOGY         ASSESSMENT/PLAN   Active Problems:    UTI (urinary tract infection)    Severe malnutrition (HCC)  Resolved Problems:    * No resolved hospital problems.  *    COVID-19 infection  Acute hypoxic respiratory failure-aspiration pneumonia  ESBL associated sepsis  Delirium due to sepsis, metabolic encephalopathy-improved  Pleural effusion on left  Acute on chronic kidney disease  History of aortic dissection status post graft and surgical valve replacement  History of partial colectomy and ileostomy formation  Full code  Vaccinated for Covid    Should be able to come out of isolation tomorrow  Not able to cough up secretions  Chest x-ray shows worsening right lung infiltrate and small effusion on left  Strongly feel CODE STATUS needs to be addressed  Palliative care consult noted  Recheck inflammatory markers and BNP tomorrow    Electronically signed by Nadia Ruffin MD on 9/23/2021 at 1:54 PM

## 2021-09-23 NOTE — CONSULTS
Palliative Care Inpatient Consult    NAME:  Thais Alan RECORD NUMBER:  [id number]  AGE: 78 y.o. GENDER: male  : 1940  TODAY'S DATE:  2021    Reasons for Consultation:    Symptom and/or pain management  Provision of information regarding PC and/or hospice philosophies  Complex, time-intensive communication and interdisciplinary psychosocial support  Clarification of goals of care and/or assistance with difficult decision-making  Guidance in regards to resources and transition(s)    Members of PC team contributing to this consultation are :  Yvette Sender, Palliative Care APRN-CNP  History of Present Illness     The patient is a 78 y.o. Non- / non  male who presents with Fall      Referred to Palliative Care by   [x] Physician   [] Nursing  [] Family Request   [] Other:       He was admitted to the primary service for UTI (urinary tract infection) [N39.0]  Septicemia ([address] [A41.9]  Hypoxia [R09.02]  Urinary tract infection in male [N39.0]  COVID-19 [U07.1]. His hospital course has been associated with UTI, Sepsis, malnutrition, aspiration pneumonia, metabolic encephalopathy, acute on chronic kidney disease, acute hypoxic respiratory failure, left pleural effusion, and Covid-19. The patient has a complicated medical history and has been hospitalized since 2021  7:45 PM. I reviewed patients EMR, spoke to RN, and patients wife/DIL to collect history. Patient has history of GI bleed, HTN, GERD, HLD, ileostomy, aortic dissection with repair, and prostate disorder. Patient recently had aortic dissection surgery at [facility] in July and was DC to [facility] for rehab. During this time patient remained altered, and had lost a large amount of weight (approximently 40 pounds) according to daughter in law. Patient was then hospitalized here in August for sepsis, UTI, and syncopal/hypotension episode while getting barium swallow study.  He was in ICU on pressors, and treated with ATB's. Patient then went to [facility] 8/27. Patient developed Covid-19, and tested positive on 9/7. He had had 3 falls prior to being admitted and remained confused. DIL reports facility is unable to put alarms on patients? Wife reports fall was unwitnessed, and patient was transported here on supplemental 02. He was noted to be hypotensive, tachypniec, and Sp02 in 80's. Patient was given fluid boluses in the ED, and placed on HFNC. Patient admitting labs included; troponin 45-53, Bun 68, Cr 2.46, albumin 3.3, lactic 2.4,  BNP 1,719, Wbc 11.3, myoglobin 116, and UA with large Hgb/large leukocytes. CT head was negative other than small right frontal soft tissues contusion. CT cervical spine revealed no evidence of acute Fx or traumatic malalignment. CT chest revealed small left pleural effusion, gas in urinary bladder suggesting cystitis, LLQ/midline abdominal wall soft tissue contusion, and 1.8cm right lower pole cystic lesion. BC taken, IV ATB's, and decadron initiated. ID consulted and reported not candidate for Remdesivir. Pulmonary also consulted. BC and Urine Cx positive for Ecoli. Patient is on [facility]. He is on 1:1 sitter d/t confusion/agitation and pulled out IV. Palliative care was consulted for review of goals, and code status. 9/23 pertinent labs include; Wbc 14.7, Bun 39, and Na 152.      Active Hospital Problems    Diagnosis Date Noted    Severe malnutrition ([address] [E43] 09/18/2021    UTI (urinary tract infection) [N39.0] 08/24/2021       PAST MEDICAL HISTORY      Diagnosis Date    Acute respiratory failure following trauma and surgery ([address]     Acute respiratory failure following trauma and surgery ([address]     Altered bowel elimination due to intestinal ostomy ([address]     iliostomy    Full dentures     pt said he has partial upper and lower    Full dentures     Gall stones     GERD (gastroesophageal reflux disease)     GI bleed     Hemorrhage of gastrointestinal tract, unspecified     Hemorrhage of gastrointestinal tract, unspecified     Hyperlipidemia     Hypertension     Kidney stones     Kidney stones     Primary localized osteoarthrosis, lower leg     both shoulders, neck, legs.  Primary localized osteoarthrosis, lower leg     Prostate disorder     Transient disorder of initiating or maintaining sleep     Transient disorder of initiating or maintaining sleep     Unspecified disorder of skin and subcutaneous tissue     Wears glasses     Wears glasses        PAST SURGICAL HISTORY  Past Surgical History:   Procedure Laterality Date    ABDOMEN SURGERY      CATARACT REMOVAL WITH IMPLANT Bilateral     COLONOSCOPY      X3    COLOSTOMY Right     COLOSTOMY BAG ON RT.  SIDE    CYSTOSCOPY Right 8/29/2017    CYSTOSCOPY URETERAL STENT INSERTION performed by More Siddiqui MD at [facility], ESOPHAGUS      EYE SURGERY      bettye. eyes, cataracts extracted with iol's    FRACTURE SURGERY      JOINT REPLACEMENT Right 3-23-15    KIDNEY STONE SURGERY Left     KNEE ARTHROSCOPY Right     LITHOTRIPSY Left 06-20-14    w/ cystoscopy C & P    SHOULDER SURGERY Left 9/14/2020    MOLE EXCISION POSTERIOR SHOULDER performed by Aaron Rice [place] [facility] , PARTIAL RECTUM\"    TOTAL KNEE ARTHROPLASTY  03/23/2015    Right with biomet and GPS product application    TOTAL KNEE ARTHROPLASTY Right 3/23/2015       SOCIAL HISTORY  Social History     Tobacco Use    Smoking status: Never Smoker    Smokeless tobacco: Never Used   Vaping Use    Vaping Use: Never used   Substance Use Topics    Alcohol use: No     Alcohol/week: 0.0 standard drinks     Comment: occassional    Drug use: No       ALLERGIES  Allergies   Allergen Reactions    Sulfa Antibiotics     Vimovo [Naproxen-Esomeprazole]      Pain shoulder           MEDICATIONS  Current Medications    QUEtiapine  25 mg Oral Nightly    meropenem for Shortness of Breath      aluminum & magnesium hydroxide-simethicone (MYLANTA) 400-400-40 MG/5ML SUSP Take 15 mLs by mouth every 6 hours as needed (heartburn)      methocarbamol (ROBAXIN) 750 MG tablet Take 750 mg by mouth every 8 hours as needed (muscle spasms)       potassium chloride (KLOR-CON M) 20 MEQ extended release tablet Take 20 mEq by mouth daily      Multiple Vitamins-Minerals (THERAPEUTIC MULTIVITAMIN-MINERALS) tablet Take 1 tablet by mouth daily      traZODone (DESYREL) 150 MG tablet Take 150 mg by mouth nightly      ondansetron (ZOFRAN) 4 MG tablet Take 4 mg by mouth every 6 hours as needed for Nausea or Vomiting      timolol (TIMOPTIC) 0.5 % ophthalmic solution Place 1 drop into both eyes daily       latanoprost (XALATAN) 0.005 % ophthalmic solution Place 1 drop into both eyes nightly   0       Data         /89   Pulse 79   Temp 97.6 °F (36.4 °C) (Axillary)   Resp 18   Ht 6' 1\" (1.854 m)   Wt 189 lb 6 oz (85.9 kg)   SpO2 92%   BMI 24.99 kg/m²     Wt Readings from Last 3 Encounters:   09/19/21 189 lb 6 oz (85.9 kg)   08/26/21 186 lb 1.1 oz (84.4 kg)   08/19/21 230 lb (104.3 kg)        Code Status: Full Code     ADVANCED CARE PLANNING:  Patient has capacity for medical decisions: no  Health Care Power of : yes  Living Will: yes     Personal, Social, and Family History  Marital Status:   Living situation: nursing home  Importance of basia/Yarsani/spiritual beliefs: [] Very [] Somewhat [] Not   Psychological Distress: Unable to assess  Does patient understand diagnosis/treatment? not asked  Does caregiver understand diagnosis/treatment?  yes    Past Medical History:   Diagnosis Date    Acute respiratory failure following trauma and surgery ([address]     Acute respiratory failure following trauma and surgery ([address]     Altered bowel elimination due to intestinal ostomy ([address]     iliostomy    Full dentures     pt said he has partial upper and lower    Full dentures  Gall stones     GERD (gastroesophageal reflux disease)     GI bleed     Hemorrhage of gastrointestinal tract, unspecified     Hemorrhage of gastrointestinal tract, unspecified     Hyperlipidemia     Hypertension     Kidney stones     Kidney stones     Primary localized osteoarthrosis, lower leg     both shoulders, neck, legs.  Primary localized osteoarthrosis, lower leg     Prostate disorder     Transient disorder of initiating or maintaining sleep     Transient disorder of initiating or maintaining sleep     Unspecified disorder of skin and subcutaneous tissue     Wears glasses     Wears glasses          Family History   Problem Relation Age of Onset    Heart Disease Father     Other Mother        Social History     Tobacco Use    Smoking status: Never Smoker    Smokeless tobacco: Never Used   Vaping Use    Vaping Use: Never used   Substance Use Topics    Alcohol use: No     Alcohol/week: 0.0 standard drinks     Comment: occassional    Drug use: No           Assessment        REVIEW OF SYSTEMS  Unable to assess- confused    PHYSICAL ASSESSMENT: Patient seen from glass door d/t strict Covid isolation and in efforts to conserve PPE. Constitutional: 1:1 sitter present  Cardiovascular: Normal rate and regular rhythm, S1, S2, no murmur. Pulmonary: on [facility] per nursing    Palliative Performance Scale:  ___60%  Ambulation reduced; Significant disease; Can't do hobbies/housework; intake normal or reduced; occasional assist; LOC full/confusion  ___50%  Mainly sit/lie; Extensive disease; Can't do any work; Considerable assist; intake normal or reduced; LOC full/confusion  _x__40%  Mainly in bed; Extensive disease; Mainly assist; intake normal or reduced; LOC full/confusion   ___30%  Bed Bound; Extensive disease; Total care; intake reduced; LOC full/confusion  ___20%  Bed Bound; Extensive disease; Total care; intake minimal; Drowsy/coma  ___10%  Bed Bound; Extensive disease;  Total care; Mouth care only; Drowsy/coma  ___0       Death    Plan      Palliative Interaction: I went to see patient through glass door, and he had 1:1 sitter PCT at bedside. I spoke to RN and received update. She reports patient is confused and oriented to person only. Patient had pulled IV out, and was refusing restart. Patient is presently on [facility]. He is on honey thick liquids after speech seeing him, and has poor appetite. She reports patient is a full code. I informed her that I was going to call wife today. I reached out to patients wife, and introduced myself to her and the palliative role. I discussed patients previous functional status, and wife reports patient was active and home prior to July's surgery. She reports he has been in rehab since, and this would be his second hospitalization. Wife reports patient is weak, and confused. She reports talking to him, but sometimes can't understand him d/t mumbled speech. I discussed HCPOA/LE on file, and wife reports this accurate. I discussed patients code status, and explained all code classifications in detail. Wife reports not being sure, and wants time to talk to her Sonu Adin who is a nurse. She reports Vanessa Burgess to call writer to further explain. She believes that patient would not want intubation. I offered wife and update and support at this time. She was appreciative of call. I received call from patients DIL Vanessa Burgess, and I introduced myself to her. I provided her with update, and she reports discussing code status with wife. She reports patient would not want CPR or intubation. She reports patient would want DNRCC-A no intubation code status. I informed her that I needed to call wife to confirm. Vanessa Casillaston tells writer that patient has lost 40 pounds since his surgery. She reports patient has been confused since then too. She reports [facility] not being able to put alarms on patient, and wants to discuss with SW options.  She reports patient was at the [facility] and shopping with wife on regular basis prior to surgery. They are hoping he will recover and be able to return home eventually. I called wife, and she confirms that patients code status should be DNRCC-A no intubation. I explained this classification again in detail, and she agrees. I told her I will change, and update staffing. I again offered her support, and told her if she wanted to see patient we could consult spiritual care to do a ZOOM visit. Education/support to staff  Education/support to family  Communications with primary service  Providing support for coping/adaptation/distress of family  Continue with current plan of care  Clarification of medical condition to patient and family  Code status clarified: Full Code  Code status clarified: [facility]  Code status clarified: [facility]  Palliative care orders introduced  Validating patient/family distress  Wife and DIL report patient has been declining since July Sx. Patient was previously very active. Wife was explained code status classifications, and elected DNRCC-A no intubation. Support offered. Principle Problem/Diagnosis:  Sepsis    Additional Assessments:   Active Problems:    Septicemia ([address]    Urinary tract infection in male    Severe malnutrition ([address]    COVID-19    Hypoxia    Palliative care encounter    Goals of care, counseling/discussion    DNR (do not resuscitate) discussion    ACP (advance care planning)  Resolved Problems:    * No resolved hospital problems. *    1- Symptom management/ pain control     Pain Assessment:  Unable to assess- RN documents no               Anxiety:  agitation per documentation and pulling out IVs                          Dyspnea:  acute dyspnea - on NC                          Fatigue:  genralized weakness    Other: Malnutrition- not eating per nursing    We feel the patient symptoms are being controlled. his current regimen is reviewed by myself and discussed with the staff.      2- Goals of care evaluation   The patient goals of care are live longer, improve or maintain function/quality of life and support for family/caregiver   Goals of care discussed with:    [] Patient independently    [] Patient and Family    [x] Family or Healthcare DPOA independently    [] Unable to discuss with patient, family/DPOA not present    3- Code Status  Full Code    4- Other recommendations   - We will continue to provide comfort and support to the patient and the family  Please call with any palliative questions or concerns. Palliative Care Team is available via perfect serve or via phone. Palliative Care will continue to follow Mr. Adithya Chen care as needed. Thank you for allowing Palliative Care to participate in the care of Mr. Kathryn Luna . This note has been dictated by dragon, typing errors may be a possibility.     The total time I spent in seeing the patient, discussing goals of care, advanced directives, code status, greater than 50% time in counseling and other major issues was more than 60 minutes      Electronically signed by   MOISES Lott CNP  Palliative Care Team  on 9/23/2021 at 2:52 PM    Palliative care office: [phone]

## 2021-09-23 NOTE — PROGRESS NOTES
Pt removed IV per self. Pt agitated, PRN meds given. At this time pt refusing to let RN attempt IV access, will continue to monitor.

## 2021-09-23 NOTE — PROGRESS NOTES
RN spoke with pts wife Viry Louise and updated her on pt per covid policy. All questions answered at this time.

## 2021-09-23 NOTE — PROGRESS NOTES
History and Physical Service  [facility] Internal Medicine    HISTORY AND PHYSICAL EXAMINATION            Date:   9/23/2021  Patient name:  Chad Reese  MRN:   [id number]  Account:  [de-identified]  YOB: 1940  PCP:    Germaine Nair MD  Code Status:    DNR-CCA    Chief Complaint:   unwell    History Obtained From:     Patient ,medical record ,nursing staff    History of Present Illnes       The patient is a 78 y.o. Non- / non  male who presents   Patient brought to ED by EMS after being found down at [facility]. Patient was positive for Covid on 9/7/2021. Patient hypoxic on arrival required 2 L nasal cannula. Eventually switched to high flow nasal cannula. SPO2 97% CT scan redemonstrates known aortic dissection, no signs of pneumonia. Pulmonology consulted. Patient given Decadron in the ED. Urinalysis shows infection. Patient started on Rocephin. CT head negative for acute intracranial abnormality. Patient has MERRICK with creatinine 2.46, K 5.1. Patient given 2 L normal saline bolus in the ED. Recheck BMP in the morning. Patient hemodynamically stable. Admit to progressive unit    9/20   Patient is pleasantly confused  Has poor oral intake, on IV fluids  Blood culture, urine culture growing E.  Coli   More confused  Agitated  Delirium    9/21  Patient has coughing spells with medication  Speech therapy evaluating the patient  Patient is in delirium, oriented to person, not to time    9/22  Patient unfortunately has very poor oral intake  On meropenem with ESBL UTI and bacteremia    9/23  Patient has a poor oral intake  On antibiotics for UTI  Found to have hypernatremia on saline drip  Past Medical History:   Diagnosis Date    Acute respiratory failure following trauma and surgery ([address]     Acute respiratory failure following trauma and surgery ([address]     Altered bowel elimination due to intestinal ostomy ([address]     iliostomy    Full dentures     pt said he has partial upper and lower    Full dentures     Gall stones     GERD (gastroesophageal reflux disease)     GI bleed     Hemorrhage of gastrointestinal tract, unspecified     Hemorrhage of gastrointestinal tract, unspecified     Hyperlipidemia     Hypertension     Kidney stones     Kidney stones     Primary localized osteoarthrosis, lower leg     both shoulders, neck, legs.  Primary localized osteoarthrosis, lower leg     Prostate disorder     Transient disorder of initiating or maintaining sleep     Transient disorder of initiating or maintaining sleep     Unspecified disorder of skin and subcutaneous tissue     Wears glasses     Wears glasses         Past Surgical History:     Past Surgical History:   Procedure Laterality Date    ABDOMEN SURGERY      CATARACT REMOVAL WITH IMPLANT Bilateral     COLONOSCOPY      X3    COLOSTOMY Right     COLOSTOMY BAG ON RT. SIDE    CYSTOSCOPY Right 8/29/2017    CYSTOSCOPY URETERAL STENT INSERTION performed by Juana Felty, MD at [address] [place] [address], [facility]      EYE SURGERY      bettye. eyes, cataracts extracted with iol's    FRACTURE SURGERY      JOINT REPLACEMENT Right 3-23-15    KIDNEY STONE SURGERY Left     KNEE ARTHROSCOPY Right     LITHOTRIPSY Left 06-20-14    w/ cystoscopy C & P    SHOULDER SURGERY Left 9/14/2020    MOLE EXCISION POSTERIOR SHOULDER performed by Guillermo Naqvi MD at [place] [address] , PARTIAL RECTUM\"    TOTAL KNEE ARTHROPLASTY  03/23/2015    Right with biomet and GPS product application    TOTAL KNEE ARTHROPLASTY Right 3/23/2015        Medications Prior to Admission:     Prior to Admission medications    Medication Sig Start Date End Date Taking?  Authorizing Provider   ascorbic acid (VITAMIN C) 500 MG tablet Take 500 mg by mouth 2 times daily    Yes Historical Provider, MD   divalproex (DEPAKOTE) 125 MG DR tablet Take 125 mg by mouth 2 times daily   Yes Historical Provider, MD   methylPREDNISolone (MEDROL DOSEPACK) 4 MG tablet Take 4 mg by mouth See Admin Instructions Take by mouth.    Yes Historical Provider, MD   zinc sulfate (ZINCATE) 220 (50 Zn) MG capsule Take 50 mg by mouth daily   Yes Historical Provider, MD   amiodarone (CORDARONE) 200 MG tablet Take 200 mg by mouth daily   Yes Historical Provider, MD   aspirin 81 MG chewable tablet Take 81 mg by mouth daily   Yes Historical Provider, MD   atorvastatin (LIPITOR) 20 MG tablet Take 20 mg by mouth daily   Yes Historical Provider, MD   ferrous sulfate (IRON 325) 325 (65 Fe) MG tablet Take 325 mg by mouth daily (with breakfast)   Yes Historical Provider, MD   furosemide (LASIX) 20 MG tablet Take 20 mg by mouth daily   Yes Historical Provider, MD   ipratropium-albuterol (DUONEB) 0.5-2.5 (3) MG/3ML SOLN nebulizer solution Inhale 1 vial into the lungs every 6 hours as needed for Shortness of Breath   Yes Historical Provider, MD   aluminum & magnesium hydroxide-simethicone (MYLANTA) 400-400-40 MG/5ML SUSP Take 15 mLs by mouth every 6 hours as needed (heartburn)   Yes Historical Provider, MD   methocarbamol (ROBAXIN) 750 MG tablet Take 750 mg by mouth every 8 hours as needed (muscle spasms)    Yes Historical Provider, MD   potassium chloride (KLOR-CON M) 20 MEQ extended release tablet Take 20 mEq by mouth daily   Yes Historical Provider, MD   Multiple Vitamins-Minerals (THERAPEUTIC MULTIVITAMIN-MINERALS) tablet Take 1 tablet by mouth daily   Yes Historical Provider, MD   traZODone (DESYREL) 150 MG tablet Take 150 mg by mouth nightly   Yes Historical Provider, MD   ondansetron (ZOFRAN) 4 MG tablet Take 4 mg by mouth every 6 hours as needed for Nausea or Vomiting   Yes Historical Provider, MD   timolol (TIMOPTIC) 0.5 % ophthalmic solution Place 1 drop into both eyes daily  2/2/21  Yes Historical Provider, MD   latanoprost (XALATAN) 0.005 % ophthalmic solution Place 1 drop into both eyes nightly 5/11/16  Yes Historical Provider, MD        Allergies:     Sulfa antibiotics and Vimovo [naproxen-esomeprazole]    Social History:     Tobacco:    reports that he has never smoked. He has never used smokeless tobacco.  Alcohol:      reports no history of alcohol use. Drug Use:  reports no history of drug use. Family History:     Family History   Problem Relation Age of Onset    Heart Disease Father     Other Mother        Review of Systems:     Patient confused agitated unable to get review of system    Physical Exam:   /89   Pulse 79   Temp 97.6 °F (36.4 °C) (Axillary)   Resp 18   Ht 6' 1\" (1.854 m)   Wt 189 lb 6 oz (85.9 kg)   SpO2 92%   BMI 24.99 kg/m²   No results for input(s): POCGLU in the last 72 hours. Elderly man  Thoracotomy scar healing noted colostomy in place noted  General Appearance:  unwell  Mental status: Confused , oriented to place  Head:  normocephalic, atraumatic. Eye: no icterus, redness, pupils equal and reactive, extraocular eye movements intact, conjunctiva clear  Ear: normal external ear, no discharge, hearing intact  Nose:  no drainage noted  Mouth: mucous membranes moist  Neck: supple, no carotid bruits, thyroid not palpable  Lungs: basal creakles    Cardiovascular: normal rate, regular rhythm, no murmur, gallop, rub.   Abdomen: Soft, nontender, nondistended, normal bowel sounds, no hepatomegaly or splenomegaly  Neurologic: There are no new focal motor or sensory deficits, normal muscle tone and bulk, no abnormal sensation, normal speech,   Skin: No gross lesions, rashes, bruising or bleeding on exposed skin area  Extremities:  peripheral pulses palpable, no pedal edema or calf pain with palpation  Psych: Agitated disoriented    Investigations:      Laboratory Testing:  Recent Results (from the past 24 hour(s))   Basic Metabolic Panel w/ Reflex to MG    Collection Time: 09/23/21  9:41 AM   Result Value Ref Range    Glucose 153 (H) 70 - 99 mg/dL    BUN 39 (H) 8 - 23 mg/dL    CREATININE 0.83 0.70 - 1.20 mg/dL    Bun/Cre Ratio NOT REPORTED 9 - 20    Calcium 9.6 8.6 - 10.4 mg/dL    Sodium 152 (H) 135 - 144 mmol/L    Potassium 4.3 3.7 - 5.3 mmol/L    Chloride 117 (H) 98 - 107 mmol/L    CO2 26 20 - 31 mmol/L    Anion Gap 9 9 - 17 mmol/L    GFR Non-African American >60 >60 mL/min    GFR African American >60 >60 mL/min    GFR Comment          GFR Staging NOT REPORTED    CBC    Collection Time: 09/23/21  9:41 AM   Result Value Ref Range    WBC 14.7 (H) 3.5 - 11.0 k/uL    RBC 5.29 4.5 - 5.9 m/uL    Hemoglobin 14.0 13.5 - 17.5 g/dL    Hematocrit 44.1 41 - 53 %    MCV 83.4 80 - 100 fL    MCH 26.5 26 - 34 pg    MCHC 31.8 31 - 37 g/dL    RDW 17.4 (H) 11.5 - 14.9 %    Platelets 870 421 - 614 k/uL    MPV 8.5 6.0 - 12.0 fL    NRBC Automated NOT REPORTED per 100 WBC   Magnesium    Collection Time: 09/23/21  9:41 AM   Result Value Ref Range    Magnesium 1.9 1.6 - 2.6 mg/dL       Recent Labs     09/23/21  0941 09/17/21 0927 09/16/21 2129   HGB 14.0   < > 14.0   HCT 44.1   < > 43.9   WBC 14.7*   < > 11.3*   MCV 83.4   < > 84.8   *   < > 140   K 4.3   < > 5.1   *   < > 101   CO2 26   < > 23   BUN 39*   < > 68*   CREATININE 0.83   < > 2.46*   GLUCOSE 153*   < > 105*   INR  --   --  1.1   PROTIME  --   --  14.4   APTT  --   --  32.5   AST  --   --  15   ALT  --   --  16   LABALBU  --   --  3.3*    < > = values in this interval not displayed.        Hematology:  Recent Labs     09/21/21  0454 09/22/21  0545 09/23/21  0941   WBC 14.3* 15.4* 14.7*   RBC 4.40* 4.56 5.29   HGB 11.8* 12.3* 14.0   HCT 38.3* 38.1* 44.1   MCV 86.9 83.5 83.4   MCH 26.7 26.9 26.5   MCHC 30.7* 32.2 31.8   RDW 17.2* 17.1* 17.4*    159 184   MPV 8.6 8.2 8.5     Chemistry:  Recent Labs     09/21/21  0454 09/22/21  0545 09/23/21  0941    149* 152*   K 4.4 3.9 4.3   * 114* 117*   CO2 23 26 26   GLUCOSE 103* 88 153*   BUN 41* 31* 39*   CREATININE 0.95 0.83 0.83   MG 2.1 1.8 1.9   ANIONGAP 6* 9 9 LABGLOM >60 >60 >60   GFRAA >60 >60 >60   CALCIUM 9.4 9.5 9.6     No results for input(s): PROT, LABALBU, LABA1C, X8MBPNX, X5WLGUL, FT4, TSH, AST, ALT, LDH, GGT, ALKPHOS, LABGGT, BILITOT, BILIDIR, AMMONIA, AMYLASE, LIPASE, LACTATE, CHOL, HDL, LDLCHOLESTEROL, CHOLHDLRATIO, TRIG, VLDL, SIG14OT, PHENYTOIN, PHENYF, URICACID, POCGLU in the last 72 hours. Imaging/Diagnostics:       Echocardiogram Limited 2D    Result Date: 8/27/2021  [address] [facility] Transthoracic Echocardiography Report (TTE)  Patient Name Emmie Bone  Date of Study               08/24/2021               Festus Gonzales   Date of      1940  Gender                      Male  Birth   Age          78 year(s)  Race                           Room Number  2061        Height:                     73 inch, 185.42 cm   Corporate ID [id number]    Weight:                     190 pounds, 86.2 kg  #   Patient Acct [de-identified]   BSA:          2.11 m^2      BMI:      25.07  #                                                              kg/m^2   MR #         [id number]      Sonographer                 Ivonne Swann   Accession #  [id number]  Interpreting Physician      [address] [place] [address]   Fellow                   Referring Nurse                           Practitioner   Interpreting             Referring Physician         Ale Solo *  Fellow  Additional Comments Technically difficult study. Type of Study   TTE procedure:2D Echocardiogram, Limited Echo. Procedure Date Date: 08/24/2021 Start: 08:49 AM Study Location: [facility] Technical Quality: Limited visualization due to poor acoustical window. Indications:Septic shock. History / Tech. Comments: HLD, HTN Patient Status: Inpatient Height: 73 inches Weight: 190 pounds BSA: 2.11 m^2 BMI: 25.07 kg/m^2 Rhythm: Within normal limits HR: 84 bpm BP: 115/49 mmHg CONCLUSIONS Summary Extremely challenging Echo, not all walls seen, poor windows, poor endocardial definition.  Recommend repeat limited with Definity. Within above limitations, LVEF appears mildly reduced with EF 40%. Thickened mitral valve leaflets. Signature ----------------------------------------------------------------------------  Electronically signed by Ivonne Swann(Sonographer) on 08/27/2021 07:43  AM ---------------------------------------------------------------------------- ----------------------------------------------------------------------------  Electronically signed by Shelton Barraza(Interpreting physician) on 08/25/2021  07:59 AM ---------------------------------------------------------------------------- FINDINGS Left Atrium Left atrium is normal in size. Left Ventricle Extremely challenging Echo, not all walls seen, poor windows, poor endocardial definition. Recommend repeat limited with Definity. Within above limitations, LVEF appears mildly reduced with EF 40%. Right Atrium Right atrium was not well visualized. Right Ventricle Right ventricle was not well visualized. Mitral Valve Thickened mitral valve leaflets. Aortic Valve Aortic valve not well visualized. Tricuspid Valve Tricuspid valve was not well visualized. Pulmonic Valve Pulmonic valve was not well visualized. Pericardial Effusion No significant pericardial effusion is seen. Pleural Effusion No pleural effusion seen. CT ABDOMEN PELVIS WO CONTRAST Additional Contrast? None    Addendum Date: 8/23/2021    ADDENDUM: Impression should also state there is an increased small left pleural effusion with increased bibasilar atelectatic changes. Result Date: 8/23/2021  EXAMINATION: CT OF THE ABDOMEN AND PELVIS WITHOUT CONTRAST 8/23/2021 4:04 pm TECHNIQUE: CT of the abdomen and pelvis was performed without the administration of intravenous contrast. Multiplanar reformatted images are provided for review. Dose modulation, iterative reconstruction, and/or weight based adjustment of the mA/kV was utilized to reduce the radiation dose to as low as reasonably achievable. COMPARISON: 07/09/2021 HISTORY: ORDERING SYSTEM PROVIDED HISTORY: concern for septic kidney secondary to nephrolithiasis TECHNOLOGIST PROVIDED HISTORY: concern for septic kidney secondary to nephrolithiasis Decision Support Exception - unselect if not a suspected or confirmed emergency medical condition->Emergency Medical Condition (MA) Reason for Exam: concern for septic kidney secondary to nephrolithiasis Acuity: Unknown Type of Exam: Unknown Relevant Medical/Surgical History: colostomy, colectomy FINDINGS: Lower Chest: Increased small left pleural effusion with increased bibasilar atelectatic changes. Decreased hemopericardium. Prosthetic aortic valve partially imaged. Organs: Evaluation is limited by the absence of contrast.  Redemonstration of innumerable hepatic cysts, grossly unchanged. Gallbladder is grossly unremarkable. Spleen is grossly normal caliber. Pancreas is atrophic. Adrenal glands are normal size. Punctate nonobstructing left nephroliths. Bilateral renal cysts measuring up to 7.3 cm on the right and 9.5 cm on the left. No hydronephrosis with mild left perinephric stranding. GI/Bowel: Postoperative changes from presumed colectomy with a Brielle's pouch and a right lower quadrant ostomy with a similar appearing parastomal hernia. No bowel obstruction. Small lipoma in the gastric antrum. Pelvis: Urinary bladder contains numerous layering calcifications posteriorly as well as an indwelling Price catheter with associated intraluminal gas. Gas within 2 anterosuperior bladder diverticula. Perivesicular stranding appears grossly similar to prior. Enlarged prostate. Peritoneum/Retroperitoneum: No free fluid or free air. Intimal flap redemonstrated in the aorta compatible with the known dissection. Abdominal aorta remains unchanged in caliber measuring up to 2.5 cm. Unchanged ectasia of the common iliac arteries, right greater than left.   Displaced calcifications in the common iliac vessels are also compatible with underlying intimal flaps/dissection. Bones/Soft Tissues: Recent postoperative changes along the anterior abdominal wall and epigastrium. Presumed sequelae of medication injection in the anterior abdominal wall subcutaneous fat. No acute bony findings on a background of osseous demineralization, dextroconvex lumbar spinal curvature, advanced degenerative changes throughout the lumbar spine including sequelae of Baastrup's disease, and in unchanged region of sclerosis in the right acetabulum with some other smaller unchanged sclerotic foci in the pelvis. Indwelling Price catheter with associated intraluminal gas in the bladder which appears thickened and with numerous diverticula. The wall thickening may be sequelae of chronic bladder outlet obstruction in the setting of an enlarged prostate, though as there is perivesicular stranding as well as left perinephric stranding, recommend correlation for superimposed ascending urinary tract infection. Innumerable bladder calcifications and nonobstructing left nephrolithiasis without an obstructing stone. Though suboptimally evaluated in the absence of contrast intimal flaps are redemonstrated in the abdominal aorta and common iliac arteries in the setting of known recent dissection two months prior. Aortic caliber is unchanged and remains within normal limits, and there is unchanged ectasia of the common iliac vessels. Recent postoperative changes along the anterior abdominal wall without any organized fluid collection or soft tissue gas. Other unchanged chronic findings as described above. XR CHEST (2 VW)    Result Date: 8/19/2021  EXAMINATION: TWO XRAY VIEWS OF THE CHEST 8/19/2021 2:37 pm COMPARISON: July 9, 2021 CT chest HISTORY: ORDERING SYSTEM PROVIDED HISTORY: dysphagia TECHNOLOGIST PROVIDED HISTORY: dysphagia Reason for Exam: dysphagia, in rehab facility, hx limited.  Acuity: Acute Type of Exam: Initial FINDINGS: Median sternotomy, stable normal cardiopericardial silhouette/moderate tortuosity of the thoracic aorta/prosthetic heart valve New moderate left basilar opacity. Otherwise clear lungs Degenerative changes of the thoracic spine/shoulders     New moderate left basilar opacity likely related moderate pleural effusion/pleural thickening, atelectasis versus infiltrate     CT HEAD WO CONTRAST    Result Date: 9/16/2021  EXAMINATION: CT OF THE HEAD WITHOUT CONTRAST  9/16/2021 10:28 pm TECHNIQUE: CT of the head was performed without the administration of intravenous contrast. Dose modulation, iterative reconstruction, and/or weight based adjustment of the mA/kV was utilized to reduce the radiation dose to as low as reasonably achievable. COMPARISON: 08/23/2021 HISTORY: ORDERING SYSTEM PROVIDED HISTORY: fall TECHNOLOGIST PROVIDED HISTORY: fall Decision Support Exception - unselect if not a suspected or confirmed emergency medical condition->Emergency Medical Condition (MA) Reason for Exam: fall, bruise to forehead Acuity: Acute Type of Exam: Initial FINDINGS: BRAIN/VENTRICLES: There is no acute intracranial hemorrhage, mass effect or midline shift. No abnormal extra-axial fluid collection. No evidence of recent territorial infarct. There are nonspecific areas of hypoattenuation in the periventricular white matter and centrum semiovale that are likely related to chronic small vessel ischemic disease. Stable asymmetric prominence of the left ventricle compared to the right. There is no evidence of hydrocephalus. There is intracranial atherosclerosis. ORBITS: The visualized portion of the orbits demonstrate no acute abnormality. SINUSES: The visualized paranasal sinuses and mastoid air cells demonstrate no acute abnormality. SOFT TISSUES/SKULL:  Small right frontal soft tissue contusion. No underlying calvarial fracture. There is cerumen in the bilateral external auditory canals. No acute intracranial abnormality.  Small right frontal Exam: Unknown FINDINGS: Mediastinum: Evaluation is limited by the absence of contrast.  Interval median sternotomy with ascending aortic dissection repair and placement of a prosthetic aortic valve. Vessel evaluation is limited. The ascending aortic caliber just distal to the operative repair spans approximately 4.8 cm. The proximal descending thoracic aorta spans approximately 4.9 cm, in the distal descending aorta measures approximately 3 cm. The intimal flap is partially visualized within the transverse and descending aorta with the true and false lumen roughly similar in size to one another. Significant improvement in the associated mediastinal hematoma. Small volume residual hemopericardium. No pneumomediastinum or adenopathy. Lungs/pleura: There are some new air bronchograms at the dependent left lower lobe. Background slightly increased atelectatic changes with pulmonary parenchymal evaluation motion degraded. Right upper lobe calcified granuloma. Resolved right and slightly increased left pleural effusions. No pneumothorax. Upper Abdomen: Abdominal findings are reported separately under same-day abdominal CT. Soft Tissues/Bones: No organized fluid collection or soft tissue gas along the healing median sternotomy. There is mild osseous resorption along the sternotomy incision which remains grossly approximated. No appreciable osseous bridging. Callus formation in the region of the manubrium. Otherwise unchanged degenerative findings in the spine and shoulders. Vascular evaluation is limited by the absence of contrast. Interval operative repair of the ascending aortic dissection. Vessel caliber as described above with the ascending aorta just distal to the operative repair measuring up to 4.9 cm. The true and false lumens now all appear somewhat equal in caliber, though assessment is limited in the absence of contrast. Significant improvement in the associated mediastinal hematoma.   Only small volume residual hemopericardium. New air bronchograms in the left lower lobe which may be due to developing infection in the appropriate clinical setting. Slightly increased atelectatic changes in the lung bases. Improved right and worsened left pleural effusions. CT CERVICAL SPINE WO CONTRAST    Result Date: 9/16/2021  EXAMINATION: CT OF THE CERVICAL SPINE WITHOUT CONTRAST 9/16/2021 4:29 pm TECHNIQUE: CT of the cervical spine was performed without the administration of intravenous contrast. Multiplanar reformatted images are provided for review. Dose modulation, iterative reconstruction, and/or weight based adjustment of the mA/kV was utilized to reduce the radiation dose to as low as reasonably achievable. COMPARISON: CT scan dated November 12, 2012 HISTORY: ORDERING SYSTEM PROVIDED HISTORY: found down TECHNOLOGIST PROVIDED HISTORY: found down Decision Support Exception - unselect if not a suspected or confirmed emergency medical condition->Emergency Medical Condition (MA) Reason for Exam: Fall, found down Acuity: Acute Type of Exam: Initial FINDINGS: BONES/ALIGNMENT: There is no acute fracture or traumatic malalignment. DEGENERATIVE CHANGES: No significant degenerative changes. SOFT TISSUES: There is no prevertebral soft tissue swelling. Calcification is present within the ligamentum nuchae. Images through the upper chest include the transverse portion of the aorta demonstrate a portion of the patient's known type A aortic dissection. .  Correlation with the scheduled CT scan of the chest abdomen pelvis recommended. 1. No evidence of an acute fracture or traumatic malalignment involving the cervical spine 2. Images through the upper chest partially demonstrate the patient's known type A aortic dissection. Patient is scheduled to have a CT scan of the chest abdomen and pelvis. 3.  Critical results were called by Dr. Shraddha Brenner to Dr. Radha Llamas on 9/16/2021 at 11 p.m. hours.      IR FLUORO No new airspace disease identified in the interval.  No evidence for pneumothorax. No significant interval change. XR CHEST PORTABLE    Result Date: 8/23/2021  EXAMINATION: ONE XRAY VIEW OF THE CHEST 8/23/2021 4:55 pm COMPARISON: 19 April 2021 HISTORY: ORDERING SYSTEM PROVIDED HISTORY: altered mental status, evaluate for pneumonia TECHNOLOGIST PROVIDED HISTORY: altered mental status, evaluate for pneumonia Reason for Exam: altered mental status, evaluate for pneumonia Acuity: Unknown Type of Exam: Unknown FINDINGS: AP portable view of the chest time stamped at 1725 hours demonstrates overlying cardiac monitoring electrodes. Stable cardiomegaly is noted. Prior median sternotomy is demonstrated. CT. No change in left pleural effusion and left basilar opacity. No vascular congestion or extrapleural air. Surgical clips are present the medial right axilla. Prosthetic aortic valve is noted. No change in left basilar opacity likely related to effusion and atelectasis with focal airspace disease not excluded. CT CHEST ABDOMEN PELVIS WO CONTRAST    Result Date: 9/17/2021  EXAMINATION: CT OF THE CHEST, ABDOMEN, AND PELVIS WITHOUT CONTRAST 9/16/2021 10:32 pm TECHNIQUE: CT of the chest, abdomen and pelvis was performed without the administration of intravenous contrast. Multiplanar reformatted images are provided for review. Dose modulation, iterative reconstruction, and/or weight based adjustment of the mA/kV was utilized to reduce the radiation dose to as low as reasonably achievable.  COMPARISON: 08/23/2021 and 07/09/2021 HISTORY: ORDERING SYSTEM PROVIDED HISTORY: found down, abdominal bruising, hypoxic TECHNOLOGIST PROVIDED HISTORY: found down, abdominal bruising, hypoxic Reason for Exam: found down, abdominal bruising, hypoxic Acuity: Acute Type of Exam: Initial Relevant Medical/Surgical History: colectomy, colostomy FINDINGS: Chest: Mediastinum: Suboptimal evaluation of the aorta without intravenous contrast. Stable postoperative findings status post aortic valve replacement and ascending aortic dissection repair. 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  No evidence of acute intramural hematoma. 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 The heart and pericardium are without acute abnormality. No mediastinal adenopathy. Coronary artery calcifications are noted. Trace pericardial effusion appears similar to the prior. Lungs/pleura: Small left pleural effusion, similar to the prior study. Motion limited evaluation of the lung bases. There is bilateral dependent atelectasis. No evidence of focal consolidation. No pneumothorax. Soft Tissues/Bones: No acute bony abnormality. Abdomen/Pelvis: Organs: The solid organs are incompletely evaluated without intravenous contrast.  Unchanged hepatic and renal cysts. A mildly complex 1.8 cm right lower pole renal cyst is indeterminate. The solid organs are without acute noncontrast findings. The gallbladder is present without radiopaque cholelithiasis. GI/Bowel: No mechanical bowel obstruction. Stable postoperative findings status post subtotal colectomy with a right lower quadrant ostomy. Pelvis: Several bladder calcifications are again seen. The prostate is enlarged. Gas in the urinary bladder is suggestive of cystitis. Small fat-containing right inguinal hernia. Peritoneum/Retroperitoneum: Moderate atherosclerotic plaque. The known type B aortic dissection extends into the right common iliac artery, similar to the prior exam. Bones/Soft Tissues: There is a left lower quadrant and midline soft tissue contusion. No acute bony abnormality. 1. Limited evaluation of the aorta without intravenous contrast.  Similar appearance of an extensive Noam type B aortic dissection.   Unchanged postoperative appearance of the ascending aorta status post aortic valve replacement and ascending aortic dissection repair. The splanchnic arteries are not well evaluated. 2. Unchanged small left pleural effusion. 3. Gas in the urinary bladder is suggestive of cystitis. Correlate with urinalysis. Unchanged layering bladder calculi. 4. Left lower quadrant and midline abdominal wall soft tissue contusion. No additional noncontrast evidence of acute traumatic injury in the chest, abdomen or pelvis. 5. Indeterminate 1.8 cm right lower pole cystic lesion. If clinically appropriate, outpatient renal protocol MRI can be obtained to better characterize. FL MODIFIED BARIUM SWALLOW W VIDEO    Result Date: 8/24/2021  EXAMINATION: MODIFIED BARIUM SWALLOW WAS PERFORMED IN CONJUNCTION WITH SPEECH PATHOLOGY SERVICES TECHNIQUE: Fluoroscopic evaluation of the swallowing mechanism was performed with multiple consistency of barium product. FLUOROSCOPY DOSE AND TYPE OR TIME AND EXPOSURES: Fluoroscopic time of 3 minutes and 9 seconds. DAP of 130.2 dGycm2. COMPARISON: None HISTORY: ORDERING SYSTEM PROVIDED HISTORY: Aspiration risk TECHNOLOGIST PROVIDED HISTORY: Aspiration risk Reason for Exam: aspiration risk Acuity: Unknown Type of Exam: Unknown FINDINGS: Multiple swallows were attempted with multiple consistencies in the presence of speech pathology under fluoroscopic evaluation. Penetration was visualized with thin liquid. There is no evidence for aspiration. Penetration with thin liquid. No evidence for aspiration. Please see separate speech pathology report for full discussion of findings and recommendations.           Current Facility-Administered Medications   Medication Dose Route Frequency Provider Last Rate Last Admin    0.45 % sodium chloride infusion   IntraVENous Continuous Danny Freeman MD 75 mL/hr at 09/23/21 1351 New Bag at 09/23/21 1351    QUEtiapine (SEROQUEL) tablet 25 mg  25 mg Oral Nightly Danny Freeman MD        meropenem (MERREM) 1,000 mg in sodium chloride 0.9 % 100 mL IVPB (mini-bag)  1,000 mg IntraVENous Q8H Tracie Baker MD 33.3 mL/hr at 09/23/21 1351 1,000 mg at 09/23/21 1351    QUEtiapine (SEROQUEL) tablet 25 mg  25 mg Oral BID PRN Mariann Pham MD   25 mg at 09/23/21 1012    perflutren lipid microspheres (DEFINITY) injection 2.2 mg  2 mL IntraVENous ONCE PRN Jeremiah Marcelino MD        apixaban Pilar Rocha) tablet 5 mg  5 mg Oral BID Carlos Gómez MD   5 mg at 09/23/21 0809    dexamethasone (DECADRON) tablet 6 mg  6 mg Oral Daily Tracie Baker MD   6 mg at 09/23/21 0809    aluminum & magnesium hydroxide-simethicone (MAALOX) 200-200-20 MG/5ML suspension 30 mL  30 mL Oral Q6H PRN Bernardine Harm, APRN - CNP        amiodarone (CORDARONE) tablet 200 mg  200 mg Oral Daily Bernardine Harm, APRN - CNP   200 mg at 09/23/21 0809    ascorbic acid (VITAMIN C) tablet 500 mg  500 mg Oral Daily Bernardine Harm, APRN - CNP   500 mg at 09/23/21 0809    atorvastatin (LIPITOR) tablet 20 mg  20 mg Oral Daily Bernardine Harm, APRN - CNP   20 mg at 09/23/21 0809    divalproex (DEPAKOTE SPRINKLE) capsule 125 mg  125 mg Oral BID Bernardine Harm, APRN - CNP   125 mg at 09/23/21 0809    ferrous sulfate (IRON 325) tablet 325 mg  325 mg Oral Daily with breakfast Bernardine Harm, APRN - CNP   325 mg at 09/23/21 0809    [Held by provider] furosemide (LASIX) tablet 20 mg  20 mg Oral Daily Bernardine Harm, APRN - CNP        latanoprost (XALATAN) 0.005 % ophthalmic solution 1 drop  1 drop Both Eyes Nightly Bernardine Harm, APRN - CNP   1 drop at 09/21/21 2124    melatonin tablet 3 mg  3 mg Oral Nightly PRN Bernardine Harm, APRN - CNP   3 mg at 09/22/21 1949    therapeutic multivitamin-minerals 1 tablet  1 tablet Oral Daily Bernardine Harm, APRN - CNP   1 tablet at 09/23/21 0809    timolol (TIMOPTIC) 0.5 % ophthalmic solution 1 drop  1 drop Both Eyes Daily Bernmarlenne Karol, APRN - CNP   1 drop at 09/23/21 0809    zinc sulfate (ZINCATE) capsule 50 mg  50 mg Oral Daily Bernmarlenne Karol, APRN - CNP   50 mg at 09/23/21 0809    sodium chloride flush 0.9 % injection 5-40 mL tissue, Wears glasses, and Wears glasses. Plans:   Patient brought to ED by EMS after being found down at [facility]. Patient was positive for Covid on 9/7/2021. Patient hypoxic on arrival , NOW ON 3 LITERS   CT scan redemonstrates known aortic dissection, no signs of pneumonia. Pulmonology consulted. Patient given Decadron in the ED. CT head negative for acute intracranial abnormality.     Gram negative sepsis likely from uti ESBL,   Iv meropenem, will get total 2 weeks of antibiotics, will plan for midline  Delirium ICU psychosis  Metabolic encephalopathy likely secondary to gram-negative sepsis IV meropenem continued  Overall prognosis is guarded for his age comorbid conditions  On Eliquis for DVT prophylaxis with elevated D-dimer  Acute kidney injury, resolved  Working with physical therapy  ID consulted  History of abdominal aortic aneurysm surgery  Overall prognosis guarded  Discussed case with speech pathologist, diet changed  9/23  Very difficult social situation, patient has very poor oral intake  Has dementia and metabolic encephalopathy with bacteremia   will need encouragement for diet  Has hypernatremia, fluids changed to half-normal saline    Rosy Drake MD  9/23/2021  4:54 PM

## 2021-09-23 NOTE — PROGRESS NOTES
Comprehensive Nutrition Assessment    Type and Reason for Visit:  Reassess    Nutrition Recommendations/Plan:   Will continue to provide Easy to Chew diet with Honey thick liquids and supplements on all trays. Nutrition Assessment:  Results of Bedside Swallow noted. Plan is for [facility] 9/24 when pt is out of isolation. Pt is confused, consuming up to 25%. Malnutrition Assessment:  Malnutrition Status:  Severe malnutrition    Context:  Acute Illness     Findings of the 6 clinical characteristics of malnutrition:  Energy Intake:   (Within past month, intake has been less than 75% for 7 or more days)  Weight Loss:  7 - Greater than 7.5% over 3 months     Body Fat Loss:  7 - Moderate body fat loss (Fat and muscle loss based on assessment 8/24/21 with further wt loss noted since that time) Orbital   Muscle Mass Loss:  7 - Moderate muscle mass loss Temples (temporalis), Clavicles (pectoralis & deltoids)  Fluid Accumulation:  No significant fluid accumulation Extremities   Strength:  Not Performed    Estimated Daily Nutrient Needs:  Energy (kcal):  3957-2569 based on [place] with 1.2-1.3 factor; Weight Used for Energy Requirements:  Admission     Protein (g):   based on 1.2-1.3 gm per kg; Weight Used for Protein Requirements:  Admission          Nutrition Related Findings:  mild edema all extremites, 125 ml stool via ostomy, Labs/Meds: Reviewed      Wounds:  Pressure Injury, Stage I       Current Nutrition Therapies:    Adult Oral Nutrition Supplement; Frozen Oral Supplement  ADULT DIET; Easy to Chew; No Added Salt (3-4 gm);  Moderately Thick (Honey)    Anthropometric Measures:  · Height: 6' 1\" (185.4 cm)  · Current Body Weight: 189 lb (85.7 kg)   · Admission Body Weight: 182 lb 1.6 oz (82.6 kg)    · Usual Body Weight: 230 lb (104.3 kg)     Ideal Body Weight: 184 lbs;    Nutrition Diagnosis:   · Severe malnutrition related to inadequate protein-energy intake as evidenced by intake 26-50%, poor intake prior to admission, weight loss 7.5% in 3 months, moderate muscle loss, moderate loss of subcutaneous fat    Nutrition Interventions:   Food and/or Nutrient Delivery:  Continue Current Diet, Continue Oral Nutrition Supplement  Nutrition Education/Counseling:  Education not indicated   Coordination of Nutrition Care:  Continue to monitor while inpatient    Goals:  PO intake % of meals and supplements       Nutrition Monitoring and Evaluation:   Food/Nutrient Intake Outcomes:  Food and Nutrient Intake, Supplement Intake  Physical Signs/Symptoms Outcomes:  Biochemical Data, Chewing or Swallowing, GI Status, Skin, Weight, Fluid Status or Edema     Discharge Planning:     Too soon to determine     Electronically signed by Alfredo Garber RD, LD on 9/23/21 at 11:23 AM EDT    Contact: [phone]

## 2021-09-23 NOTE — PLAN OF CARE
Problem: Airway Clearance - Ineffective  Goal: Achieve or maintain patent airway  Outcome: Ongoing     Problem: Gas Exchange - Impaired  Goal: Absence of hypoxia  Outcome: Ongoing  Goal: Promote optimal lung function  Outcome: Ongoing     Problem: Breathing Pattern - Ineffective  Goal: Ability to achieve and maintain a regular respiratory rate  Outcome: Ongoing     Problem:  Body Temperature -  Risk of, Imbalanced  Goal: Ability to maintain a body temperature within defined limits  Outcome: Ongoing  Goal: Will regain or maintain usual level of consciousness  Outcome: Ongoing  Goal: Complications related to the disease process, condition or treatment will be avoided or minimized  Outcome: Ongoing     Problem: Isolation Precautions - Risk of Spread of Infection  Goal: Prevent transmission of infection  9/23/2021 0419 by Addy Charlton RN  Outcome: Ongoing  9/22/2021 1641 by Joyceann Aase, RN  Outcome: Ongoing     Problem: Nutrition Deficits  Goal: Optimize nutritional status  9/23/2021 0419 by Addy Charlton RN  Outcome: Ongoing  9/22/2021 1641 by Joyceann Aase, RN  Outcome: Ongoing     Problem: Risk for Fluid Volume Deficit  Goal: Maintain normal heart rhythm  Outcome: Ongoing  Goal: Maintain absence of muscle cramping  Outcome: Ongoing  Goal: Maintain normal serum potassium, sodium, calcium, phosphorus, and pH  Outcome: Ongoing     Problem: Loneliness or Risk for Loneliness  Goal: Demonstrate positive use of time alone when socialization is not possible  Outcome: Ongoing     Problem: Fatigue  Goal: Verbalize increase energy and improved vitality  Outcome: Ongoing     Problem: Patient Education: Go to Patient Education Activity  Goal: Patient/Family Education  Outcome: Ongoing     Problem: Nutrition  Goal: Optimal nutrition therapy  Outcome: Ongoing     Problem: Falls - Risk of:  Goal: Will remain free from falls  Description: Will remain free from falls  Outcome: Ongoing  Goal: Absence of physical injury  Description: Absence of physical injury  Outcome: Ongoing     Problem: Skin Integrity:  Goal: Will show no infection signs and symptoms  Description: Will show no infection signs and symptoms  Outcome: Ongoing  Goal: Absence of new skin breakdown  Description: Absence of new skin breakdown  Outcome: Ongoing     Problem: Musculor/Skeletal Functional Status  Goal: Highest potential functional level  Outcome: Ongoing  Goal: Absence of falls  Outcome: Ongoing     Problem: Musculor/Skeletal Functional Status  Goal: Highest potential functional level  Outcome: Ongoing  Goal: Absence of falls  Outcome: Ongoing

## 2021-09-23 NOTE — PLAN OF CARE
Nutrition Problem #1: Severe malnutrition  Intervention: Food and/or Nutrient Delivery: Continue Current Diet, Continue Oral Nutrition Supplement  Nutritional Goals: PO intake % of meals and supplements

## 2021-09-23 NOTE — PROGRESS NOTES
[facility] -   Infectious diseases evaluation  admission date 9/16/2021    reason for consultation:   UTI  Bacteremia  COVID-19 infection    Impression :   Current:  · UTI  · ESBL producing E. coli bacteremia secondary to above  · COVID-19 infection  · Acute hypoxic respiratory failure  · Left pleural effusion  · Acute on chronic renal failure  · Encephalopathy  · History of aortic valve replacement June 2021  · Aortic dissection  · Sulfa allergy    Recommendations   · Continue IV meropenem through 9/29/2021  · Decadron received 5 doses  · Follow CBC and renal function  · Follow inflammatory markers and chest x-ray  · Continue supportive care  · Droplet plus isolation        History of Present Illness:   Initial history:  Zeina Kelly is a 78y.o.-year-old male was brought to the hospital from [facility] after he was found laying on the ground. At the ER he was tachypneic and hypoxic, disoriented. .  Urinalysis was suggestive of UTI  He was placed on oxygen per nasal cannula then he was placed on high flow oxygen. Blood culture grew ESBL producing E. Coli  The patient is confused    COVID-19 came back positive  The patient received Covid 19 vaccine    Interval changes  9/23/2021   He remains on 2 L of oxygen per nasal cannula, afebrile  WBC 14.7  Patient Vitals for the past 8 hrs:   BP Temp Temp src Pulse Resp SpO2   09/23/21 0815 127/84 97.6 °F (36.4 °C) Axillary 100 20 96 %             I have personally reviewed the past medical history, past surgical history, medications, social history, and family history, and I haveupdated the database accordingly.       Allergies:   Sulfa antibiotics and Vimovo [naproxen-esomeprazole]     Review of Systems:     Review of Systems  Confused, agitated, unable to provide  Physical Examination :       Physical Exam  Remainder of examination deferred due to excessive risk conferred by physical examination during a global pandemic due to coronavirus 19. In a setting where local and national supplies of personal protective equipment are depleted  Past Medical History:     Past Medical History:   Diagnosis Date    [facility] failure [facility]     Acute respiratory failure following trauma and surgery ([facility] [address] [facility]     Altered bowel elimination due to intestinal ostomy ([facility] [address]     iliostomy    Full dentures     pt said he has partial upper and lower    Full dentures     Gall stones     GERD (gastroesophageal reflux disease)     GI bleed     Hemorrhage of gastrointestinal tract, unspecified     Hemorrhage of gastrointestinal tract, unspecified     Hyperlipidemia     Hypertension     Kidney stones     Kidney stones     Primary localized osteoarthrosis, lower leg     both shoulders, neck, legs.  Primary localized osteoarthrosis, lower leg     Prostate disorder     Transient disorder of initiating or maintaining sleep     Transient disorder of initiating or maintaining sleep     Unspecified disorder of skin and subcutaneous tissue     Wears glasses     Wears glasses        Past Surgical  History:     Past Surgical History:   Procedure Laterality Date    ABDOMEN SURGERY      CATARACT REMOVAL WITH IMPLANT Bilateral     COLONOSCOPY      X3    COLOSTOMY Right     COLOSTOMY BAG ON RT.  SIDE    CYSTOSCOPY Right 8/29/2017    CYSTOSCOPY URETERAL STENT INSERTION performed by Alexander Magallanes MD at [facility], ESOPHAGUS      EYE SURGERY      bettye. eyes, cataracts extracted with iol's    FRACTURE SURGERY      JOINT REPLACEMENT Right 3-23-15    KIDNEY STONE SURGERY Left     KNEE ARTHROSCOPY Right     LITHOTRIPSY Left 06-20-14    w/ cystoscopy C & P    SHOULDER SURGERY Left 9/14/2020    MOLE EXCISION POSTERIOR SHOULDER performed by Demario Dang MD at [facility] , PARTIAL RECTUM\"    TOTAL KNEE ARTHROPLASTY  03/23/2015 Right with biomet and GPS product application    TOTAL KNEE ARTHROPLASTY Right 3/23/2015       Medications:      meropenem  1,000 mg IntraVENous Q8H    apixaban  5 mg Oral BID    dexamethasone  6 mg Oral Daily    amiodarone  200 mg Oral Daily    ascorbic acid  500 mg Oral Daily    atorvastatin  20 mg Oral Daily    divalproex  125 mg Oral BID    ferrous sulfate  325 mg Oral Daily with breakfast    [Held by provider] furosemide  20 mg Oral Daily    latanoprost  1 drop Both Eyes Nightly    therapeutic multivitamin-minerals  1 tablet Oral Daily    timolol  1 drop Both Eyes Daily    zinc sulfate  50 mg Oral Daily    sodium chloride flush  5-40 mL IntraVENous 2 times per day    aspirin  81 mg Oral Daily       Social History:     Social History     Socioeconomic History    Marital status:      Spouse name: Not on file    Number of children: Not on file    Years of education: Not on file    Highest education level: Not on file   Occupational History    Not on file   Tobacco Use    Smoking status: Never Smoker    Smokeless tobacco: Never Used   Vaping Use    Vaping Use: Never used   Substance and Sexual Activity    Alcohol use: No     Alcohol/week: 0.0 standard drinks     Comment: occassional    Drug use: No    Sexual activity: Not on file   Other Topics Concern    Not on file   Social History Narrative    Not on file     Social Determinants of Health     Financial Resource Strain:     Difficulty of Paying Living Expenses:    Food Insecurity:     Worried About Running Out of Food in the Last Year:     Ran Out of Food in the Last Year:    Transportation Needs:     Lack of Transportation (Medical):      Lack of Transportation (Non-Medical):    Physical Activity:     Days of Exercise per Week:     Minutes of Exercise per Session:    Stress:     Feeling of Stress :    Social Connections:     Frequency of Communication with Friends and Family:     Frequency of Social Gatherings with Friends and Family:     Attends Muslim Services:     Active Member of Clubs or Organizations:     Attends Club or Organization Meetings:     Marital Status:    Intimate Partner Violence:     Fear of Current or Ex-Partner:     Emotionally Abused:     Physically Abused:     Sexually Abused:        Family History:     Family History   Problem Relation Age of Onset    Heart Disease Father     Other Mother       Medical Decision Making:   I have independently reviewed/ordered the following labs:    CBC with Differential:   Recent Labs     09/22/21  0545 09/23/21  0941   WBC 15.4* 14.7*   HGB 12.3* 14.0   HCT 38.1* 44.1    184     BMP:  Recent Labs     09/22/21  0545 09/23/21  0941   * 152*   K 3.9 4.3   * 117*   CO2 26 26   BUN 31* 39*   CREATININE 0.83 0.83   MG 1.8 1.9     Hepatic Function Panel:   No results for input(s): PROT, LABALBU, BILIDIR, IBILI, BILITOT, ALKPHOS, ALT, AST in the last 72 hours. No results for input(s): RPR in the last 72 hours. No results for input(s): HIV in the last 72 hours. No results for input(s): BC in the last 72 hours. Lab Results   Component Value Date    CREATININE 0.83 09/23/2021    GLUCOSE 153 09/23/2021    GLUCOSE 100 02/16/2012       Detailed results: Thank you for allowing us to participate in the care of this patient. Please call with questions. This note is created with the assistance of a speech recognition program.  While intending to generate adocument that actually reflects the content of the visit, the document can still have some errors including those of syntax and sound a like substitutions which may escape proof reading. It such instances, actual meaningcan be extrapolated by contextual diversion.     Amanda Celaya MD  Office: [phone]  Perfect serve / office [phone]

## 2021-09-24 NOTE — PLAN OF CARE
Problem: Airway Clearance - Ineffective  Goal: Achieve or maintain patent airway  9/23/2021 2054 by Buzz Starr RN  Outcome: Ongoing     Problem: Gas Exchange - Impaired  Goal: Absence of hypoxia  9/23/2021 2054 by Buzz Starr RN  Outcome: Ongoing     Problem: Gas Exchange - Impaired  Goal: Promote optimal lung function  9/23/2021 2054 by Buzz Starr RN  Outcome: Ongoing

## 2021-09-24 NOTE — PLAN OF CARE
Problem: Skin Integrity:  Goal: Will show no infection signs and symptoms  Description: Will show no infection signs and symptoms  9/24/2021 1039 by Rosa Maria Landry RN  Outcome: Ongoing     Problem: Falls - Risk of:  Goal: Will remain free from falls  Description: Will remain free from falls  9/24/2021 1039 by Rosa Maria Landry RN  Outcome: Ongoing

## 2021-09-24 NOTE — PROGRESS NOTES
[facility] -   Infectious diseases evaluation  admission date 9/16/2021    reason for consultation:   UTI  Bacteremia  COVID-19 infection    Impression :   Current:  · UTI  · ESBL producing E. coli bacteremia secondary to above  · COVID-19 infection  · Acute hypoxic respiratory failure  · Left pleural effusion  · Acute on chronic renal failure  · Encephalopathy  · History of aortic valve replacement June 2021  · Aortic dissection  · Sulfa allergy    Recommendations   · Continue IV meropenem through 9/29/2021  · Decadron  · Follow CBC and renal function  · Follow inflammatory markers and chest x-ray  · Continue supportive care  · Droplet plus isolation        History of Present Illness:   Initial history:  Luz Bender is a 78y.o.-year-old male was brought to the hospital from [facility] after he was found laying on the ground. At the ER he was tachypneic and hypoxic, disoriented. .  Urinalysis was suggestive of UTI  He was placed on oxygen per nasal cannula then he was placed on high flow oxygen. Blood culture grew ESBL producing E. Coli  The patient is confused    COVID-19 came back positive  The patient received Covid 19 vaccine    Interval changes  9/24/2021   The patient remains on 2 L of oxygen per nasal cannula, afebrile  WBC 12.2  No data found. I have personally reviewed the past medical history, past surgical history, medications, social history, and family history, and I haveupdated the database accordingly. Allergies:   Sulfa antibiotics and Vimovo [naproxen-esomeprazole]     Review of Systems:     Review of Systems  Confused, agitated, unable to provide  Physical Examination :       Physical Exam  Remainder of examination deferred due to excessive risk conferred by physical examination during a global pandemic due to coronavirus 19.   In a setting where local and national supplies of personal protective equipment are depleted  Past Medical History:     Past Medical History:   Diagnosis Date    Acute respiratory failure following trauma and surgery ([address]     Acute respiratory failure following trauma and surgery ([address]     Altered bowel elimination due to intestinal ostomy ([address]     iliostomy    Full dentures     pt said he has partial upper and lower    Full dentures     Gall stones     GERD (gastroesophageal reflux disease)     GI bleed     Hemorrhage of gastrointestinal tract, unspecified     Hemorrhage of gastrointestinal tract, unspecified     Hyperlipidemia     Hypertension     Kidney stones     Kidney stones     Primary localized osteoarthrosis, lower leg     both shoulders, neck, legs.  Primary localized osteoarthrosis, lower leg     Prostate disorder     Transient disorder of initiating or maintaining sleep     Transient disorder of initiating or maintaining sleep     Unspecified disorder of skin and subcutaneous tissue     Wears glasses     Wears glasses        Past Surgical  History:     Past Surgical History:   Procedure Laterality Date    ABDOMEN SURGERY      CATARACT REMOVAL WITH IMPLANT Bilateral     COLONOSCOPY      X3    COLOSTOMY Right     COLOSTOMY BAG ON RT.  SIDE    CYSTOSCOPY Right 8/29/2017    CYSTOSCOPY URETERAL STENT INSERTION performed by Edwin Martinez MD at [facility], ESOPHAGUS      EYE SURGERY      bettye. eyes, cataracts extracted with iol's    FRACTURE SURGERY      JOINT REPLACEMENT Right 3-23-15    KIDNEY STONE SURGERY Left     KNEE ARTHROSCOPY Right     LITHOTRIPSY Left 06-20-14    w/ cystoscopy C & P    SHOULDER SURGERY Left 9/14/2020    MOLE EXCISION POSTERIOR SHOULDER performed by Roger Nolen MD at [facility] , PARTIAL RECTUM\"    TOTAL KNEE ARTHROPLASTY  03/23/2015    Right with biomet and GPS product application    TOTAL KNEE ARTHROPLASTY Right 3/23/2015       Medications:      QUEtiapine  25 mg Oral Nightly    meropenem  1,000 mg IntraVENous Q8H    apixaban  5 mg Oral BID    dexamethasone  6 mg Oral Daily    amiodarone  200 mg Oral Daily    ascorbic acid  500 mg Oral Daily    atorvastatin  20 mg Oral Daily    divalproex  125 mg Oral BID    ferrous sulfate  325 mg Oral Daily with breakfast    [Held by provider] furosemide  20 mg Oral Daily    latanoprost  1 drop Both Eyes Nightly    therapeutic multivitamin-minerals  1 tablet Oral Daily    timolol  1 drop Both Eyes Daily    zinc sulfate  50 mg Oral Daily    sodium chloride flush  5-40 mL IntraVENous 2 times per day    aspirin  81 mg Oral Daily       Social History:     Social History     Socioeconomic History    Marital status:      Spouse name: Not on file    Number of children: Not on file    Years of education: Not on file    Highest education level: Not on file   Occupational History    Not on file   Tobacco Use    Smoking status: Never Smoker    Smokeless tobacco: Never Used   Vaping Use    Vaping Use: Never used   Substance and Sexual Activity    Alcohol use: No     Alcohol/week: 0.0 standard drinks     Comment: occassional    Drug use: No    Sexual activity: Not on file   Other Topics Concern    Not on file   Social History Narrative    Not on file     Social Determinants of Health     Financial Resource Strain:     Difficulty of Paying Living Expenses:    Food Insecurity:     Worried About Running Out of Food in the Last Year:     Ran Out of Food in the Last Year:    Transportation Needs:     Lack of Transportation (Medical):      Lack of Transportation (Non-Medical):    Physical Activity:     Days of Exercise per Week:     Minutes of Exercise per Session:    Stress:     Feeling of Stress :    Social Connections:     Frequency of Communication with Friends and Family:     Frequency of Social Gatherings with Friends and Family:     Attends [employer] Services:     Active Member of Clubs or Organizations:  Attends Club or Organization Meetings:     Marital Status:    Intimate Partner Violence:     Fear of Current or Ex-Partner:     Emotionally Abused:     Physically Abused:     Sexually Abused:        Family History:     Family History   Problem Relation Age of Onset    Heart Disease Father     Other Mother       Medical Decision Making:   I have independently reviewed/ordered the following labs:    CBC with Differential:   Recent Labs     09/23/21  0941 09/24/21  0812   WBC 14.7* 12.2*   HGB 14.0 13.1*   HCT 44.1 41.0    156     BMP:  Recent Labs     09/23/21  0941 09/23/21 0941 09/23/21 2050 09/24/21  0812   *   < > 148* 151*   K 4.3  --   --  4.0   *  --   --  116*   CO2 26  --   --  26   BUN 39*  --   --  44*   CREATININE 0.83  --   --  0.97   MG 1.9  --   --  2.0    < > = values in this interval not displayed. Hepatic Function Panel:   No results for input(s): PROT, LABALBU, BILIDIR, IBILI, BILITOT, ALKPHOS, ALT, AST in the last 72 hours. No results for input(s): RPR in the last 72 hours. No results for input(s): HIV in the last 72 hours. No results for input(s): BC in the last 72 hours. Lab Results   Component Value Date    CREATININE 0.97 09/24/2021    GLUCOSE 120 09/24/2021    GLUCOSE 100 02/16/2012       Detailed results: Thank you for allowing us to participate in the care of this patient. Please call with questions. This note is created with the assistance of a speech recognition program.  While intending to generate adocument that actually reflects the content of the visit, the document can still have some errors including those of syntax and sound a like substitutions which may escape proof reading. It such instances, actual meaningcan be extrapolated by contextual diversion.     Jyoti Ramsey MD  Office: [phone]  Perfect serve / office [phone]

## 2021-09-24 NOTE — BRIEF OP NOTE
Brief Postoperative Note    Gela Coronel  YOB: 1940  [id number]    Pre-operative Diagnosis: UTI    Post-operative Diagnosis: Same    Procedure: Midline venous catheter placement    Anesthesia: Local    Surgeons/Assistants: Beau    Estimated Blood Loss: less than 50     Complications: None    Specimens: Was Not Obtained     Electronically signed by Hayley Cruz MD on 9/24/2021 at 3:48 PM

## 2021-09-24 NOTE — PROGRESS NOTES
..    Palliative Care Progress Note    NAME:  Thais Alan RECORD NUMBER:  [id number]  AGE: 78 y.o. GENDER: male  : 1940  TODAY'S DATE:  2021    Reason for Consult:  goals of care and Support  History of Present Illness     The patient is a 78 y.o. Non- / non  male who presents with Fall      Referred to Palliative Care by  [x] Physician   [] Nursing  [] Family Request   [] Other:       He was admitted to the primary service for UTI (urinary tract infection) [N39.0]  Septicemia ([address] [A41.9]  Hypoxia [R09.02]  Urinary tract infection in male [N39.0]  COVID-19 [U07.1]. His hospital course has been associated with UTI, Sepsis, malnutrition, aspiration pneumonia, metabolic encephalopathy, acute on chronic kidney disease, acute hypoxic respiratory failure, left pleural effusion, and Covid-19. The patient has a complicated medical history and has been hospitalized since 2021  7:45 PM. Patients code status was changed with wife yesterday to [facility] intubation to reflect patients wishes. Palliative care following for review of goals, and support. OVERNIGHT EVENTS: Poor appetite and weak-RN checking with PCP about nutrition/meds- she reports not giving AM meds d/t risk of aspiration. Patient had jed red blood noted in urine this AM by nursing, and they are continuing to monitor. Loss of IV access- going for midline today. Missed a couple ATB doses. Fluids changed to 1/2 NS d/t hypernatremia. RN reports patient is now on 4LNC. RN reports patient is talking of a  aunt with 1:1 continuing.      pertinent labs include; BNP 2,812, , CRP 44.6, Wbc 12.2, Hgb 13.1, Bun 44, and Na 151.      BP (!) 138/94   Pulse 90   Temp 98.7 °F (37.1 °C) (Axillary)   Resp 18   Ht 6' 1\" (1.854 m)   Wt 189 lb 6 oz (85.9 kg)   SpO2 92%   BMI 24.99 kg/m²     Assessment        REVIEW OF SYSTEMS    [x]   UNABLE TO OBTAIN:  Confused    Constitutional:  []   Chills   [] Fatigue   []  Fevers   []  Malaise   []  Weight loss   [] Other:     Respiratory:   []  Cough    []  Shortness of breath    []  Chest pain    [] Other:     Cardiovascular:   []  Chest pain  []  Dyspnea    []  Exertional chest pressure/discomfort     [] Fatigue      []  Palpitations    []  Syncope   [] Other:     Gastrointestinal:   []  Abdominal pain   []  Constipation    []  Diarrhea    []   Dysphagia   []  Reflux             []  Vomiting   [] Other:     Genitourinary:  []  Dysuria     []  Frequency   []  Hematuria   [] Nocturia   []  Urinary incontinence   [] Other:     Musculoskeletal:   [] Back pain    []  Muscle weakness   []  Myalgias    []  Neck pain   []  Stiff joints   []  Other:     Behavioral/Psych:   [] Anxiety    []  Depression     []  Mood swings   [] Other:     PHYSICAL ASSESSMENT: Exam limited d/t patient in strict Covid- isolation, so seen from door to conserve PPE during global pandemic. General: []  Oriented x3      [] well appearing      [] Intubated      [] ill appearing      [x] Other: 1:1 sitter continues    Mental Status: [] normal mental status exam      [] drowsy      [] Confused      [x] Other: Confused per nursing    Cardiovascular: [x]  Regular rate/rhythm      [] Arrhythmia      [] Other:     Chest: [] Effort normal      [] lungs clear      [] respiratory distress      [] Tachypnea      [x]  Other: on 2LNC per charting, nursing reported 4LNC    Abdomen: [] Soft/non-tender      []  Normal appearance      [] Distended      [] Ascites      [] Other:    Neurological: [] Normal Speech      [] Normal Sensation      []  Deficits present:      Extremity:  [] normal skin color/temp      [] clubbing/cyanosis      []  No edema      [] Other:     Palliative Performance Scale:  ___60%  Ambulation reduced; Significant disease; Can't do hobbies/housework; intake normal or reduced; occasional assist; LOC full/confusion  ___50%  Mainly sit/lie;  Extensive disease; Can't do any work; Considerable assist; intake normal or reduced; LOC full/confusion  ___40%  Mainly in bed; Extensive disease; Mainly assist; intake normal or reduced; LOC full/confusion   _x__30%  Bed Bound; Extensive disease; Total care; intake reduced; LOCfull/confusion  ___20%  Bed Bound; Extensive disease; Total care; intake minimal; Drowsy/coma  ___10%  Bed Bound; Extensive disease; Total care; Mouth care only; Drowsy/coma  ___0       Death      Plan      Palliative Interaction: I went to see patient, and he remains on 1:1 sitter. I received update from RN since patient remains in strict isolation. RN reports patient is on 4LNC. She reports that patient is confused, and talking of his  aunt. She reports not giving meds in applesauce this AM d/t risk of aspiration with his mental status. Patient is not eating/drinking, and she plans to discuss nutrition with PCP today. She asked about goals, and I informed her that patient does not have history of dementia, and according to family prior to his 2021 aortic dissection surgery he was very active going to the gym ect. Patient is to get midline today. She reports patient remains very weak. Bright red blood was noted in catheter today, and she is monitoring. I reached out to patients wife and received voicemail. I then called Jory Mullen DIL that wife asked me to call yesterday, and introduced myself to her and reminded her that we had spoke yesterday. I provided her with above update, and answered all questions. We further discussed patients confused/weak/malnurished stated. She is aware that nursing is going to discuss risk of aspiration, and nutritional state today. She reports patient will likely pull out NG tube. Jory Mullen reports after patient losing 40 pounds these last few months, she and family discussed peg tube in the past. She reports wife reports that they will likely have to go down that road soon. Jory Mullen reports being realistic and seeing patients continued decline.  She reports the family thinking that he wouldn't make it through the surgery in July. She reports feeling that a peg tube will prolong things, and patient may never return to baseline. We talked about options if patient continues to decline and unable to sustain nutritional status including peg tube, or comfort care/hospice care. She had questions about hospice care, and these were answered. Manuel Samano reports changing code status yesterday, and I confirmed that wife did request DNRCC-A no intubation code status. She is aware that patient neurological status is poor, and nursing reported that he is at risk for aspiration. She reports patient neurological status was better after his surgery in July, but surgeon did tell him that with patients age they are wondering mental capacity after surgery. She reports patient left hospital and she was concerned of UTI after seeing urine. She reports Lilly Camarillo was asked to take sample, and eventually did but patient turned septic and was hospitalized. She reports mental status has not improved since then. She reports that patient use to walk 5 miles a day on treadmill at gym with wife, and had no memory issues prior. Manuel Samano reports that her MAHAMED has been telling his mother to bring patient home to care for him, and felt that maybe he would improve faster. DIL reports that patients level of care is high, and would likely not do well at home. She reports wife remaining hopeful that patient will turn around, and be able to come home someday. I offered support to her and family at this time. Manuel Samano reports wanting to see when isolation is done, and will check with nursing about visitation. She reports we will see how patient does over the weekend with this.      Education/support to staff  Education/support to family  Communications with primary service  Providing support for coping/adaptation/distress of family  Discussing meaning/purpose   Decision making regarding life prolonging treatment  Continue with current plan of care  Clarification of medical condition to patient and family  Code status clarified: [facility]  Provided information about hospice  Validating patient/family distress  Continued communication updates  Was unable to reach wife, so called Uyen Zambrano who reports to be RN. I provided her update, and discussed patients weak/malnurished/confused state. She reports that patient has been declining, and if patient doesn't improve may need to have family meeting to discuss goals further. DIL reports that she plans to discuss goals with MIL today. Family wonders when visitation will be allowed if isolation is going to be discontinued- deferred to nursing. Principle Problem/Diagnosis:  Sepsis    Additional Assessments:  Active Problems:    Septicemia ([facility]    Urinary tract infection in male    Severe malnutrition ([facility]    COVID-19    Hypoxia    Palliative care encounter    Goals of care, counseling/discussion    DNR (do not resuscitate) discussion    ACP (advance care planning)  Resolved Problems:    * No resolved hospital problems. *    1- Symptom management/ pain control     Pain Assessment:  Unable to assess - per nursing no. Anxiety:  none-per nursing                          Dyspnea:  acute dyspnea - on NC                          Fatigue:  generalized weakness- per nursing    Other: Malnutrition- not eating    We feel the patient symptoms are being controlled. his current regimen is reviewed by myself and discussed with the staff.      2- Goals of care evaluation   The patient goals of care are improve or maintain function/quality of life and support for family/caregiver   Goals of care discussed with:    [] Patient independently    [] Patient and Family    [x] Family or Healthcare DPOA independently    [] Unable to discuss with patient, family/DPOA not present    3- Code Status  DNR-CCA- no intubation    4- Other recommendations  - We will continue to provide comfort and support to the patient and the family    Please call with any palliative questions or concerns. Palliative Care Team is available via perfect serve or via phone. Palliative Care will continue to follow Mr. Grover Martino care as needed. The note has been dictated by dragon, typing errors may be a possibility     Thank you for allowing Palliative Care to participate in the care of Mr. Syl Donnelly . Electronically signed by   MOISES Lafleur CNP  Palliative Care Team  on 9/24/2021 at 9:13 AM    Palliative care can be reached via perfect serve.

## 2021-09-24 NOTE — PROGRESS NOTES
Writer spoke with pts wife Cindy Lares via phone call to explain midline procedure, risks and benefits. verbal consent given via phone call and verified with 2 IR RNs.

## 2021-09-24 NOTE — PROGRESS NOTES
ICU Progress Note (Non-Vent)  [facility]    Patient - Josue Bashir,  Age - 78 y.o.    - 1940      Room Number - /-34   MRN -  [id number]   [facility]  Date of Admission -  2021  7:45 PM    Events of Past 24 Hours   Apparently having hematuria today    Vitals    height is 6' 1\" (1.854 m) and weight is 189 lb 6 oz (85.9 kg). His axillary temperature is 98.7 °F (37.1 °C). His blood pressure is 138/94 (abnormal) and his pulse is 90. His respiration is 18 and oxygen saturation is 92%.        Temperature Range: Temp: 98.7 °F (37.1 °C) Temp  Av.6 °F (37 °C)  Min: 98.4 °F (36.9 °C)  Max: 98.7 °F (37.1 °C)  BP Range:  Systolic (81EIQ), PJB:602 , Min:112 , JTR:443     Diastolic (49PHH), QMH:40, Min:88, Max:94    Pulse Range: Pulse  Av  Min: 82  Max: 90  Respiration Range: Resp  Av  Min: 18  Max: 18  Current Pulse Ox[de-identified]  SpO2: 92 %  24HR Pulse Ox Range:  No data recorded  Oxygen Amount and Delivery: O2 Flow Rate (L/min): 2 L/min    Wt Readings from Last 3 Encounters:   21 189 lb 6 oz (85.9 kg)   21 186 lb 1.1 oz (84.4 kg)   21 230 lb (104.3 kg)     I/O       Intake/Output Summary (Last 24 hours) at 2021 1416  Last data filed at 2021 0830  Gross per 24 hour   Intake 50 ml   Output 175 ml   Net -125 ml     DRAIN/TUBE OUTPUT       Invasive Lines   ICP PRESSURE RANGE  No data recorded  CVP PRESSURE RANGE  No data recorded      Medications      QUEtiapine  25 mg Oral Nightly    meropenem  1,000 mg IntraVENous Q8H    [Held by provider] apixaban  5 mg Oral BID    dexamethasone  6 mg Oral Daily    amiodarone  200 mg Oral Daily    ascorbic acid  500 mg Oral Daily    atorvastatin  20 mg Oral Daily    divalproex  125 mg Oral BID    ferrous sulfate  325 mg Oral Daily with breakfast    [Held by provider] furosemide  20 mg Oral Daily    latanoprost  1 drop Both Eyes Nightly    therapeutic multivitamin-minerals  1 tablet Oral Daily    timolol  1 drop Both Eyes Daily    zinc sulfate  50 mg Oral Daily    sodium chloride flush  5-40 mL IntraVENous 2 times per day    aspirin  81 mg Oral Daily     QUEtiapine, perflutren lipid microspheres, aluminum & magnesium hydroxide-simethicone, melatonin, sodium chloride flush, sodium chloride, ondansetron **OR** ondansetron, polyethylene glycol, acetaminophen **OR** acetaminophen  IV Drips/Infusions   sodium chloride 75 mL/hr at 09/23/21 1351    sodium chloride         Diet/Nutrition   ADULT DIET; Easy to Chew; No Added Salt (3-4 gm); Moderately Thick (Honey)  Adult Oral Nutrition Supplement; Frozen Oral Supplement    Exam      Constitutional - Alert, arousable  General Appearance  well developed, well nourished  HEENT -normocephalic, atraumatic. PERRLA  Lungs - Chest expands equally, no wheezes, rales or rhonchi. Cardiovascular - Heart sounds are normal.  normal rate and rhythm regular, no murmur, gallop or rub. Abdomen - soft, nontender, nondistended, no masses or organomegaly  Neurologic - CN II-XII are grossly intact.  There are no focal motor deficits  Skin - no bruising or bleeding  Extremities - no cyanosis, clubbing or edema    Lab Results   CBC     Lab Results   Component Value Date    WBC 12.2 09/24/2021    RBC 4.91 09/24/2021    RBC 4.59 02/16/2012    HGB 13.1 09/24/2021    HCT 41.0 09/24/2021     09/24/2021     02/16/2012    MCV 83.5 09/24/2021    MCH 26.6 09/24/2021    MCHC 31.9 09/24/2021    RDW 17.3 09/24/2021    NRBC 1 11/14/2012    LYMPHOPCT 6 09/16/2021    MONOPCT 5 09/16/2021    MYELOPCT 1 11/14/2012    BASOPCT 0 09/16/2021    MONOSABS 0.57 09/16/2021    LYMPHSABS 0.68 09/16/2021    EOSABS 0.00 09/16/2021    BASOSABS 0.00 09/16/2021    DIFFTYPE NOT REPORTED 09/16/2021       BMP   Lab Results   Component Value Date     09/24/2021    K 4.0 09/24/2021     09/24/2021 CO2 26 09/24/2021    BUN 44 09/24/2021    CREATININE 0.97 09/24/2021    GLUCOSE 120 09/24/2021    GLUCOSE 100 02/16/2012       LFTS  Lab Results   Component Value Date    ALKPHOS 101 09/16/2021    ALT 16 09/16/2021    AST 15 09/16/2021    PROT 7.6 09/16/2021    BILITOT 0.34 09/16/2021    LABALBU 3.3 09/16/2021    LABALBU 4.3 02/16/2012       ABG ABGs:   Lab Results   Component Value Date    PHART 7.411 09/17/2021    PO2ART 64.8 09/17/2021    WNW6WYJ 37.5 09/17/2021       Lab Results   Component Value Date    MODE NOT REPORTED 09/17/2021         INR  No results for input(s): PROTIME, INR in the last 72 hours. APTT  No results for input(s): APTT in the last 72 hours. Lactic Acid  Lab Results   Component Value Date    LACTA 0.9 09/17/2021    LACTA 1.0 08/24/2021    LACTA 2.2 08/23/2021        BNP   No results for input(s): BNP in the last 72 hours.      Cultures       Radiology     CXR      CT Scans    (See actual reports for details)      SYSTEMS ASSESSMENT    COVID-19 infection  Acute hypoxic respiratory failure-aspiration pneumonia  ESBL associated sepsis  Delirium due to sepsis, metabolic encephalopathy-improved  Pleural effusion on left  Hypernatremia  Acute on chronic kidney disease  History of aortic dissection status post graft and surgical valve replacement  History of partial colectomy and ileostomy formation  Full code  Vaccinated for Covid    Having hematuria so Eliquis is on hold  Check follow-up D-dimer  proBNP also elevated despite BUN/creatinine ratio being elevated  Can be taken out of droplet plus isolation has gone 10 days  Wean oxygen as tolerated  Electronically signed by Jostin Graham MD on 9/24/2021 at 2:16 PM

## 2021-09-24 NOTE — PROGRESS NOTES
Physical Therapy        Physical Therapy Cancel Note      DATE: 2021    NAME: Christopher Rodriguez  MRN: [id number]   : 1940      Patient not seen this date for Physical Therapy due to:    Pt with increased confusion- not appropriate for PT at this time- will continue to follow      Electronically signed by Melissa Marcelino PT on 2021 at 2:37 PM

## 2021-09-24 NOTE — ACP (ADVANCE CARE PLANNING)
Advance Care Planning     Advance Care Planning (ACP) Physician/NP/PA Conversation    Date of Conversation: 9/16/2021  Conducted with:  Healthcare Decision Maker: Named in Advance Directive or Healthcare Power of  (name) Wife is primary agent, then son Lisandra Witt, and then son Geryl Cranker. Healthcare Decision Maker:      Primary Decision Maker: Renetta Campa - Spouse - [phone]    Secondary Decision Maker: Any Alvarengawright - [phone]    Supplemental (Other) Decision Maker: Ricardo Gray - Child - [phone]    Click here to complete Healthcare Decision Makers including selection of the Healthcare Decision Maker Relationship (ie \"Primary\")  Today we documented Decision Maker(s) consistent with ACP documents on file. Care Preferences:    Hospitalization: \"If your health worsens and it becomes clear that your chance of recovery is unlikely, what would be your preference regarding hospitalization? \"  The patient would prefer hospitalization. Ventilation: \"If you were unable to breath on your own and your chance of recovery was unlikely, what would be your preference about the use of a ventilator (breathing machine) if it was available to you? \"  The patient would NOT desire the use of a ventilator. Resuscitation: \"In the event your heart stopped as a result of an underlying serious health condition, would you want attempts made to restart your heart, or would you prefer a natural death? \"  No, do NOT attempt to resuscitate.     benefit/burden of treatment options, ventilation preferences, hospitalization preferences and resuscitation preferences    Conversation Outcomes / Follow-Up Plan:  ACP in process - information provided, considering goals and options  Reviewed DNR/DNI and patient elects DNR order - completed portable DNR form & placed order    Length of Voluntary ACP Conversation in minutes:  16 minutes    Sage Perez APRN - CNP

## 2021-09-24 NOTE — PROGRESS NOTES
History and Physical Service  [facility] Internal Medicine    HISTORY AND PHYSICAL EXAMINATION            Date:   9/24/2021  Patient name:  Jed Cloud  MRN:   [id number]  Account:  [de-identified]  YOB: 1940  PCP:    Suma Ellsworth MD  Code Status:    DNR-CCA    Chief Complaint:   unwell    History Obtained From:     Patient ,medical record ,nursing staff    History of Present Illnes       The patient is a 78 y.o. Non- / non  male who presents   Patient brought to ED by EMS after being found down at [facility]. Patient was positive for Covid on 9/7/2021. Patient hypoxic on arrival required 2 L nasal cannula. Eventually switched to high flow nasal cannula. SPO2 97% CT scan redemonstrates known aortic dissection, no signs of pneumonia. Pulmonology consulted. Patient given Decadron in the ED. Urinalysis shows infection. Patient started on Rocephin. CT head negative for acute intracranial abnormality. Patient has MERRICK with creatinine 2.46, K 5.1. Patient given 2 L normal saline bolus in the ED. Recheck BMP in the morning. Patient hemodynamically stable. Admit to progressive unit    9/24   Patient has very poor oral intake  In delirium  Hypernatremia, access does not have IV access  Past Medical History:   Diagnosis Date    Acute respiratory failure following trauma and surgery ([address]     Acute respiratory failure following trauma and surgery ([address]     Altered bowel elimination due to intestinal ostomy ([address]     iliostomy    Full dentures     pt said he has partial upper and lower    Full dentures     Gall stones     GERD (gastroesophageal reflux disease)     GI bleed     Hemorrhage of gastrointestinal tract, unspecified     Hemorrhage of gastrointestinal tract, unspecified     Hyperlipidemia     Hypertension     Kidney stones     Kidney stones     Primary localized osteoarthrosis, lower leg     both shoulders, neck, legs.     Primary localized osteoarthrosis, lower leg     Prostate disorder     Transient disorder of initiating or maintaining sleep     Transient disorder of initiating or maintaining sleep     Unspecified disorder of skin and subcutaneous tissue     Wears glasses     Wears glasses         Past Surgical History:     Past Surgical History:   Procedure Laterality Date    ABDOMEN SURGERY      CATARACT REMOVAL WITH IMPLANT Bilateral     COLONOSCOPY      X3    COLOSTOMY Right     COLOSTOMY BAG ON RT. SIDE    CYSTOSCOPY Right 8/29/2017    CYSTOSCOPY URETERAL STENT INSERTION performed by Ludwin Braswell MD at [facility], ESOPHAGUS      EYE SURGERY      bettye. eyes, cataracts extracted with iol's    FRACTURE SURGERY      JOINT REPLACEMENT Right 3-23-15    KIDNEY STONE SURGERY Left     KNEE ARTHROSCOPY Right     LITHOTRIPSY Left 06-20-14    w/ cystoscopy C & P    SHOULDER SURGERY Left 9/14/2020    MOLE EXCISION POSTERIOR SHOULDER performed by Jose Becerra MD at [facility] , PARTIAL RECTUM\"    TOTAL KNEE ARTHROPLASTY  03/23/2015    Right with biomet and GPS product application    TOTAL KNEE ARTHROPLASTY Right 3/23/2015        Medications Prior to Admission:     Prior to Admission medications    Medication Sig Start Date End Date Taking? Authorizing Provider   ascorbic acid (VITAMIN C) 500 MG tablet Take 500 mg by mouth 2 times daily    Yes Historical Provider, MD   divalproex (DEPAKOTE) 125 MG DR tablet Take 125 mg by mouth 2 times daily   Yes Historical Provider, MD   methylPREDNISolone (MEDROL DOSEPACK) 4 MG tablet Take 4 mg by mouth See Admin Instructions Take by mouth.    Yes Historical Provider, MD   zinc sulfate (ZINCATE) 220 (50 Zn) MG capsule Take 50 mg by mouth daily   Yes Historical Provider, MD   amiodarone (CORDARONE) 200 MG tablet Take 200 mg by mouth daily   Yes Historical Provider, MD   aspirin 81 MG chewable tablet Take 81 mg by mouth daily   Yes Historical Provider, MD   atorvastatin (LIPITOR) 20 MG tablet Take 20 mg by mouth daily   Yes Historical Provider, MD   ferrous sulfate (IRON 325) 325 (65 Fe) MG tablet Take 325 mg by mouth daily (with breakfast)   Yes Historical Provider, MD   furosemide (LASIX) 20 MG tablet Take 20 mg by mouth daily   Yes Historical Provider, MD   ipratropium-albuterol (DUONEB) 0.5-2.5 (3) MG/3ML SOLN nebulizer solution Inhale 1 vial into the lungs every 6 hours as needed for Shortness of Breath   Yes Historical Provider, MD   aluminum & magnesium hydroxide-simethicone (MYLANTA) 400-400-40 MG/5ML SUSP Take 15 mLs by mouth every 6 hours as needed (heartburn)   Yes Historical Provider, MD   methocarbamol (ROBAXIN) 750 MG tablet Take 750 mg by mouth every 8 hours as needed (muscle spasms)    Yes Historical Provider, MD   potassium chloride (KLOR-CON M) 20 MEQ extended release tablet Take 20 mEq by mouth daily   Yes Historical Provider, MD   Multiple Vitamins-Minerals (THERAPEUTIC MULTIVITAMIN-MINERALS) tablet Take 1 tablet by mouth daily   Yes Historical Provider, MD   traZODone (DESYREL) 150 MG tablet Take 150 mg by mouth nightly   Yes Historical Provider, MD   ondansetron (ZOFRAN) 4 MG tablet Take 4 mg by mouth every 6 hours as needed for Nausea or Vomiting   Yes Historical Provider, MD   timolol (TIMOPTIC) 0.5 % ophthalmic solution Place 1 drop into both eyes daily  2/2/21  Yes Historical Provider, MD   latanoprost (XALATAN) 0.005 % ophthalmic solution Place 1 drop into both eyes nightly  5/11/16  Yes Historical Provider, MD        Allergies:     Sulfa antibiotics and Vimovo [naproxen-esomeprazole]    Social History:     Tobacco:    reports that he has never smoked. He has never used smokeless tobacco.  Alcohol:      reports no history of alcohol use. Drug Use:  reports no history of drug use.     Family History:     Family History   Problem Relation Age of Onset    Heart Disease Father     Other GFR Non-African American >60 >60 mL/min    GFR African American >60 >60 mL/min    GFR Comment          GFR Staging NOT REPORTED    CBC    Collection Time: 09/24/21  8:12 AM   Result Value Ref Range    WBC 12.2 (H) 3.5 - 11.0 k/uL    RBC 4.91 4.5 - 5.9 m/uL    Hemoglobin 13.1 (L) 13.5 - 17.5 g/dL    Hematocrit 41.0 41 - 53 %    MCV 83.5 80 - 100 fL    MCH 26.6 26 - 34 pg    MCHC 31.9 31 - 37 g/dL    RDW 17.3 (H) 11.5 - 14.9 %    Platelets 385 424 - 951 k/uL    MPV 8.5 6.0 - 12.0 fL    NRBC Automated NOT REPORTED per 100 WBC   Magnesium    Collection Time: 09/24/21  8:12 AM   Result Value Ref Range    Magnesium 2.0 1.6 - 2.6 mg/dL   C-Reactive Protein    Collection Time: 09/24/21  8:12 AM   Result Value Ref Range    CRP 44.6 (H) 0.0 - 5.0 mg/L   Ferritin    Collection Time: 09/24/21  8:12 AM   Result Value Ref Range    Ferritin 3,636 (H) 30 - 400 ug/L   Lactate Dehydrogenase    Collection Time: 09/24/21  8:12 AM   Result Value Ref Range     (H) 135 - 225 U/L   Brain Natriuretic Peptide    Collection Time: 09/24/21  8:12 AM   Result Value Ref Range    Pro-BNP 2,812 (H) <300 pg/mL    BNP Interpretation Pro-BNP Reference Range:    SPECIMEN REJECTION    Collection Time: 09/24/21  8:12 AM   Result Value Ref Range    Specimen Source . BLOOD     Ordered Test DIME     Reason for Rejection Unable to perform testing: No specimen received. - NOT REPORTED        Recent Labs     09/24/21  0812 09/17/21  0927 09/16/21 2125   HGB 13.1*   < > 14.0   HCT 41.0   < > 43.9   WBC 12.2*   < > 11.3*   MCV 83.5   < > 84.8   *   < > 140   K 4.0   < > 5.1   *   < > 101   CO2 26   < > 23   BUN 44*   < > 68*   CREATININE 0.97   < > 2.46*   GLUCOSE 120*   < > 105*   INR  --   --  1.1   PROTIME  --   --  14.4   APTT  --   --  32.5   AST  --   --  15   ALT  --   --  16   LABALBU  --   --  3.3*    < > = values in this interval not displayed.        Hematology:  Recent Labs     09/22/21  0545 09/23/21  0941 09/24/21  2456 WBC 15.4* 14.7* 12.2*   RBC 4.56 5.29 4.91   HGB 12.3* 14.0 13.1*   HCT 38.1* 44.1 41.0   MCV 83.5 83.4 83.5   MCH 26.9 26.5 26.6   MCHC 32.2 31.8 31.9   RDW 17.1* 17.4* 17.3*    184 156   MPV 8.2 8.5 8.5   CRP  --   --  44.6*     Chemistry:  Recent Labs     09/22/21  0545 09/22/21  0545 09/23/21 0941 09/23/21 2050 09/24/21  0812   *   < > 152* 148* 151*   K 3.9  --  4.3  --  4.0   *  --  117*  --  116*   CO2 26  --  26  --  26   GLUCOSE 88  --  153*  --  120*   BUN 31*  --  39*  --  44*   CREATININE 0.83  --  0.83  --  0.97   MG 1.8  --  1.9  --  2.0   ANIONGAP 9  --  9  --  9   LABGLOM >60  --  >60  --  >60   GFRAA >60  --  >60  --  >60   CALCIUM 9.5  --  9.6  --  9.6   PROBNP  --   --   --   --  2,812*    < > = values in this interval not displayed. Recent Labs     09/24/21  0812   *       Imaging/Diagnostics:       Echocardiogram Limited 2D    Result Date: 8/27/2021  16[address] [facility] Transthoracic Echocardiography Report (TTE)  Patient Name Emmie Bone  Date of Study               08/24/2021               [place]   Date of      1940  Gender                      Male  Birth   Age          78 year(s)  Race                           Room Number  2061        Height:                     73 inch, 185.42 cm   Corporate ID [id number]    Weight:                     190 pounds, 86.2 kg  #   Patient Acct [de-identified]   BSA:          2.11 m^2      BMI:      25.07  #                                                              kg/m^2   MR #         [id number]      Sonographer                 Ivonne Swann   Accession #  [id number]  Interpreting Physician      [address] [place] [address]   Fellow                   Referring Nurse                           Practitioner   Interpreting             Referring Physician         Ale Solo *  Fellow  Additional Comments Technically difficult study. Type of Study   TTE procedure:2D Echocardiogram, Limited Echo.   Procedure Date Date: 08/24/2021 Start: 08:49 AM Study Location: [address] [place] [address] Technical Quality: Limited visualization due to poor acoustical window. Indications:Septic shock. History / Tech. Comments: HLD, HTN Patient Status: Inpatient Height: 73 inches Weight: 190 pounds BSA: 2.11 m^2 BMI: 25.07 kg/m^2 Rhythm: Within normal limits HR: 84 bpm BP: 115/49 mmHg CONCLUSIONS Summary Extremely challenging Echo, not all walls seen, poor windows, poor endocardial definition. Recommend repeat limited with Definity. Within above limitations, LVEF appears mildly reduced with EF 40%. Thickened mitral valve leaflets. Signature ----------------------------------------------------------------------------  Electronically signed by Ivonne Swann(Sonographer) on 08/27/2021 07:43  AM ---------------------------------------------------------------------------- ----------------------------------------------------------------------------  Electronically signed by Shelton Barraza(Interpreting physician) on 08/25/2021  07:59 AM ---------------------------------------------------------------------------- FINDINGS Left Atrium Left atrium is normal in size. Left Ventricle Extremely challenging Echo, not all walls seen, poor windows, poor endocardial definition. Recommend repeat limited with Definity. Within above limitations, LVEF appears mildly reduced with EF 40%. Right Atrium Right atrium was not well visualized. Right Ventricle Right ventricle was not well visualized. Mitral Valve Thickened mitral valve leaflets. Aortic Valve Aortic valve not well visualized. Tricuspid Valve Tricuspid valve was not well visualized. Pulmonic Valve Pulmonic valve was not well visualized. Pericardial Effusion No significant pericardial effusion is seen. Pleural Effusion No pleural effusion seen.     CT ABDOMEN PELVIS WO CONTRAST Additional Contrast? None    Addendum Date: 8/23/2021    ADDENDUM: Impression should also state there is an increased small left pleural effusion with increased bibasilar atelectatic changes. Result Date: 8/23/2021  EXAMINATION: CT OF THE ABDOMEN AND PELVIS WITHOUT CONTRAST 8/23/2021 4:04 pm TECHNIQUE: CT of the abdomen and pelvis was performed without the administration of intravenous contrast. Multiplanar reformatted images are provided for review. Dose modulation, iterative reconstruction, and/or weight based adjustment of the mA/kV was utilized to reduce the radiation dose to as low as reasonably achievable. COMPARISON: 07/09/2021 HISTORY: ORDERING SYSTEM PROVIDED HISTORY: concern for septic kidney secondary to nephrolithiasis TECHNOLOGIST PROVIDED HISTORY: concern for septic kidney secondary to nephrolithiasis Decision Support Exception - unselect if not a suspected or confirmed emergency medical condition->Emergency Medical Condition (MA) Reason for Exam: concern for septic kidney secondary to nephrolithiasis Acuity: Unknown Type of Exam: Unknown Relevant Medical/Surgical History: colostomy, colectomy FINDINGS: Lower Chest: Increased small left pleural effusion with increased bibasilar atelectatic changes. Decreased hemopericardium. Prosthetic aortic valve partially imaged. Organs: Evaluation is limited by the absence of contrast.  Redemonstration of innumerable hepatic cysts, grossly unchanged. Gallbladder is grossly unremarkable. Spleen is grossly normal caliber. Pancreas is atrophic. Adrenal glands are normal size. Punctate nonobstructing left nephroliths. Bilateral renal cysts measuring up to 7.3 cm on the right and 9.5 cm on the left. No hydronephrosis with mild left perinephric stranding. GI/Bowel: Postoperative changes from presumed colectomy with a Brielle's pouch and a right lower quadrant ostomy with a similar appearing parastomal hernia. No bowel obstruction. Small lipoma in the gastric antrum.  Pelvis: Urinary bladder contains numerous layering calcifications posteriorly as well as an indwelling Price catheter with associated intraluminal gas. Gas within 2 anterosuperior bladder diverticula. Perivesicular stranding appears grossly similar to prior. Enlarged prostate. Peritoneum/Retroperitoneum: No free fluid or free air. Intimal flap redemonstrated in the aorta compatible with the known dissection. Abdominal aorta remains unchanged in caliber measuring up to 2.5 cm. Unchanged ectasia of the common iliac arteries, right greater than left. Displaced calcifications in the common iliac vessels are also compatible with underlying intimal flaps/dissection. Bones/Soft Tissues: Recent postoperative changes along the anterior abdominal wall and epigastrium. Presumed sequelae of medication injection in the anterior abdominal wall subcutaneous fat. No acute bony findings on a background of osseous demineralization, dextroconvex lumbar spinal curvature, advanced degenerative changes throughout the lumbar spine including sequelae of Baastrup's disease, and in unchanged region of sclerosis in the right acetabulum with some other smaller unchanged sclerotic foci in the pelvis. Indwelling Price catheter with associated intraluminal gas in the bladder which appears thickened and with numerous diverticula. The wall thickening may be sequelae of chronic bladder outlet obstruction in the setting of an enlarged prostate, though as there is perivesicular stranding as well as left perinephric stranding, recommend correlation for superimposed ascending urinary tract infection. Innumerable bladder calcifications and nonobstructing left nephrolithiasis without an obstructing stone. Though suboptimally evaluated in the absence of contrast intimal flaps are redemonstrated in the abdominal aorta and common iliac arteries in the setting of known recent dissection two months prior. Aortic caliber is unchanged and remains within normal limits, and there is unchanged ectasia of the common iliac vessels.  Recent postoperative changes along the anterior abdominal wall without any organized fluid collection or soft tissue gas. Other unchanged chronic findings as described above. XR CHEST (2 VW)    Result Date: 8/19/2021  EXAMINATION: TWO XRAY VIEWS OF THE CHEST 8/19/2021 2:37 pm COMPARISON: July 9, 2021 CT chest HISTORY: ORDERING SYSTEM PROVIDED HISTORY: dysphagia TECHNOLOGIST PROVIDED HISTORY: dysphagia Reason for Exam: dysphagia, in rehab facility, hx limited. Acuity: Acute Type of Exam: Initial FINDINGS: Median sternotomy, stable normal cardiopericardial silhouette/moderate tortuosity of the thoracic aorta/prosthetic heart valve New moderate left basilar opacity. Otherwise clear lungs Degenerative changes of the thoracic spine/shoulders     New moderate left basilar opacity likely related moderate pleural effusion/pleural thickening, atelectasis versus infiltrate     CT HEAD WO CONTRAST    Result Date: 9/16/2021  EXAMINATION: CT OF THE HEAD WITHOUT CONTRAST  9/16/2021 10:28 pm TECHNIQUE: CT of the head was performed without the administration of intravenous contrast. Dose modulation, iterative reconstruction, and/or weight based adjustment of the mA/kV was utilized to reduce the radiation dose to as low as reasonably achievable. COMPARISON: 08/23/2021 HISTORY: ORDERING SYSTEM PROVIDED HISTORY: fall TECHNOLOGIST PROVIDED HISTORY: fall Decision Support Exception - unselect if not a suspected or confirmed emergency medical condition->Emergency Medical Condition (MA) Reason for Exam: fall, bruise to forehead Acuity: Acute Type of Exam: Initial FINDINGS: BRAIN/VENTRICLES: There is no acute intracranial hemorrhage, mass effect or midline shift. No abnormal extra-axial fluid collection. No evidence of recent territorial infarct. There are nonspecific areas of hypoattenuation in the periventricular white matter and centrum semiovale that are likely related to chronic small vessel ischemic disease.   Stable asymmetric prominence of the left ventricle compared to modulation, iterative reconstruction, and/or weight based adjustment of the mA/kV was utilized to reduce the radiation dose to as low as reasonably achievable. COMPARISON: 08/23/2021, 07/09/2021 HISTORY: ORDERING SYSTEM PROVIDED HISTORY: hx of dissection TECHNOLOGIST PROVIDED HISTORY: hx of dissection Decision Support Exception - unselect if not a suspected or confirmed emergency medical condition->Emergency Medical Condition (MA) Reason for Exam: hx of dissection Acuity: Unknown Type of Exam: Unknown FINDINGS: Mediastinum: Evaluation is limited by the absence of contrast.  Interval median sternotomy with ascending aortic dissection repair and placement of a prosthetic aortic valve. Vessel evaluation is limited. The ascending aortic caliber just distal to the operative repair spans approximately 4.8 cm. The proximal descending thoracic aorta spans approximately 4.9 cm, in the distal descending aorta measures approximately 3 cm. The intimal flap is partially visualized within the transverse and descending aorta with the true and false lumen roughly similar in size to one another. Significant improvement in the associated mediastinal hematoma. Small volume residual hemopericardium. No pneumomediastinum or adenopathy. Lungs/pleura: There are some new air bronchograms at the dependent left lower lobe. Background slightly increased atelectatic changes with pulmonary parenchymal evaluation motion degraded. Right upper lobe calcified granuloma. Resolved right and slightly increased left pleural effusions. No pneumothorax. Upper Abdomen: Abdominal findings are reported separately under same-day abdominal CT. Soft Tissues/Bones: No organized fluid collection or soft tissue gas along the healing median sternotomy. There is mild osseous resorption along the sternotomy incision which remains grossly approximated. No appreciable osseous bridging. Callus formation in the region of the manubrium.  Otherwise unchanged degenerative findings in the spine and shoulders. Vascular evaluation is limited by the absence of contrast. Interval operative repair of the ascending aortic dissection. Vessel caliber as described above with the ascending aorta just distal to the operative repair measuring up to 4.9 cm. The true and false lumens now all appear somewhat equal in caliber, though assessment is limited in the absence of contrast. Significant improvement in the associated mediastinal hematoma. Only small volume residual hemopericardium. New air bronchograms in the left lower lobe which may be due to developing infection in the appropriate clinical setting. Slightly increased atelectatic changes in the lung bases. Improved right and worsened left pleural effusions. CT CERVICAL SPINE WO CONTRAST    Result Date: 9/16/2021  EXAMINATION: CT OF THE CERVICAL SPINE WITHOUT CONTRAST 9/16/2021 4:29 pm TECHNIQUE: CT of the cervical spine was performed without the administration of intravenous contrast. Multiplanar reformatted images are provided for review. Dose modulation, iterative reconstruction, and/or weight based adjustment of the mA/kV was utilized to reduce the radiation dose to as low as reasonably achievable. COMPARISON: CT scan dated November 12, 2012 HISTORY: ORDERING SYSTEM PROVIDED HISTORY: found [facility] TECHNOLOGIST PROVIDED HISTORY: found down Decision Support Exception - unselect if not a suspected or confirmed emergency medical condition->Emergency Medical Condition (MA) Reason for Exam: Fall, found down Acuity: Acute Type of Exam: Initial FINDINGS: BONES/ALIGNMENT: There is no acute fracture or traumatic malalignment. DEGENERATIVE CHANGES: No significant degenerative changes. SOFT TISSUES: There is no prevertebral soft tissue swelling. Calcification is present within the ligamentum nuchae.   Images through the upper chest include the transverse portion of the aorta demonstrate a portion of the patient's known type A aortic dissection. .  Correlation with the scheduled CT scan of the chest abdomen pelvis recommended. 1. No evidence of an acute fracture or traumatic malalignment involving the cervical spine 2. Images through the upper chest partially demonstrate the patient's known type A aortic dissection. Patient is scheduled to have a CT scan of the chest abdomen and pelvis. 3.  Critical results were called by Dr. Shivam Judge to Dr. Nicholas Gresham on 9/16/2021 at 11 p.m. hours. IR FLUORO GUIDED CVA DEVICE PLMT/REPLACE/REMOVAL    Result Date: 8/27/2021  PROCEDURE: ULTRASOUND GUIDED VASCULAR ACCESS. Midline placement 8/27/2021. HISTORY: ORDERING SYSTEM PROVIDED HISTORY: Sepsis, discharge on IV antibiotic TECHNOLOGIST PROVIDED HISTORY: Sepsis, discharge on IV antibiotic Lumen?->Single Lumen SEDATION: None FLUOROSCOPY DOSE AND TYPE OR TIME AND EXPOSURES: None TECHNIQUE AND FINDINGS: Informed consent was obtained after a detailed explanation of the procedure including risks, benefits, and alternatives. Universal protocol was observed. The right arm was prepped and draped in sterile fashion using maximum sterile barrier technique. Local anesthesia was achieved with lidocaine. A micropuncture needle was used to access the right brachial vein using ultrasound guidance. An ultrasound image demonstrating patency of the vein with needle tip located within it. An image was obtained and stored in PACs. A 0.018 guidewire was used to place a peel-a-way sheath and a 4.5 [place] 15 cm single-lumen midline catheter was placed with its tip directed centrally. Ultrasound images verify appropriate catheter positioning. The catheter flushed easily and there was a good blood return. The catheter was secured to the skin. The patient tolerated the procedure well and there were no immediate complications.  EBL: Less than 3 ml     Successful ultrasound guided midline placement     XR CHEST PORTABLE    Result Date: 8/25/2021  EXAMINATION: ONE XRAY VIEW OF THE CHEST 8/25/2021 5:47 am COMPARISON: 08/23/2021, 08/19/2021 HISTORY: ORDERING SYSTEM PROVIDED HISTORY: R/O CHF TECHNOLOGIST PROVIDED HISTORY: R/O CHF Reason for Exam: R/O CHF Acuity: Acute Type of Exam: Initial Additional signs and symptoms: R/O CHF Relevant Medical/Surgical History: R/O CHF FINDINGS: The cardiac and mediastinal contours appear unchanged. Basilar opacities and pleural effusions, left greater than right, are again demonstrated without appreciable change. No new airspace disease identified in the interval.  No evidence for pneumothorax. No significant interval change. XR CHEST PORTABLE    Result Date: 8/23/2021  EXAMINATION: ONE XRAY VIEW OF THE CHEST 8/23/2021 4:55 pm COMPARISON: 19 April 2021 HISTORY: ORDERING SYSTEM PROVIDED HISTORY: altered mental status, evaluate for pneumonia TECHNOLOGIST PROVIDED HISTORY: altered mental status, evaluate for pneumonia Reason for Exam: altered mental status, evaluate for pneumonia Acuity: Unknown Type of Exam: Unknown FINDINGS: AP portable view of the chest time stamped at 1725 hours demonstrates overlying cardiac monitoring electrodes. Stable cardiomegaly is noted. Prior median sternotomy is demonstrated. CT. No change in left pleural effusion and left basilar opacity. No vascular congestion or extrapleural air. Surgical clips are present the medial right axilla. Prosthetic aortic valve is noted. No change in left basilar opacity likely related to effusion and atelectasis with focal airspace disease not excluded. CT CHEST ABDOMEN PELVIS WO CONTRAST    Result Date: 9/17/2021  EXAMINATION: CT OF THE CHEST, ABDOMEN, AND PELVIS WITHOUT CONTRAST 9/16/2021 10:32 pm TECHNIQUE: CT of the chest, abdomen and pelvis was performed without the administration of intravenous contrast. Multiplanar reformatted images are provided for review.  Dose modulation, iterative reconstruction, and/or weight based adjustment of the mA/kV was utilized to reduce the radiation dose to as low as reasonably achievable. COMPARISON: 08/23/2021 and 07/09/2021 HISTORY: ORDERING SYSTEM PROVIDED HISTORY: found down, abdominal bruising, hypoxic TECHNOLOGIST PROVIDED HISTORY: found down, abdominal bruising, hypoxic Reason for Exam: found down, abdominal bruising, hypoxic Acuity: Acute Type of Exam: Initial Relevant Medical/Surgical History: colectomy, colostomy FINDINGS: Chest: Mediastinum: Suboptimal evaluation of the aorta without intravenous contrast. Stable postoperative findings status post aortic valve replacement and ascending aortic dissection repair. Grossly stable appearance of the persistent Noam type B aortic dissection within the limitations of a noncontrast exam.  No evidence of acute intramural hematoma. 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 The heart and pericardium are without acute abnormality. No mediastinal adenopathy. Coronary artery calcifications are noted. Trace pericardial effusion appears similar to the prior. Lungs/pleura: Small left pleural effusion, similar to the prior study. Motion limited evaluation of the lung bases. There is bilateral dependent atelectasis. No evidence of focal consolidation. No pneumothorax. Soft Tissues/Bones: No acute bony abnormality. Abdomen/Pelvis: Organs: The solid organs are incompletely evaluated without intravenous contrast.  Unchanged hepatic and renal cysts. A mildly complex 1.8 cm right lower pole renal cyst is indeterminate. The solid organs are without acute noncontrast findings. The gallbladder is present without radiopaque cholelithiasis. GI/Bowel: No mechanical bowel obstruction. Stable postoperative findings status post subtotal colectomy with a right lower quadrant ostomy. Pelvis: Several bladder calcifications are again seen. The prostate is enlarged. Gas in the urinary bladder is suggestive of cystitis. Small fat-containing right inguinal hernia. 09/23/21 2047    melatonin tablet 3 mg  3 mg Oral Nightly PRN MOISES Reis - CNP   3 mg at 09/23/21 2041    therapeutic multivitamin-minerals 1 tablet  1 tablet Oral Daily MOISES Reis - CNP   1 tablet at 09/23/21 0809    timolol (TIMOPTIC) 0.5 % ophthalmic solution 1 drop  1 drop Both Eyes Daily MOISES Reis CNP   1 drop at 09/23/21 0809    zinc sulfate (ZINCATE) capsule 50 mg  50 mg Oral Daily Leonel Lakhani, APRN - CNP   50 mg at 09/23/21 0809    sodium chloride flush 0.9 % injection 5-40 mL  5-40 mL IntraVENous 2 times per day MOISES Reis CNP   10 mL at 09/23/21 2042    sodium chloride flush 0.9 % injection 10 mL  10 mL IntraVENous PRN MOISES Reis CNP        0.9 % sodium chloride infusion  25 mL IntraVENous PRN MOISES Reis - CNP        ondansetron (ZOFRAN-ODT) disintegrating tablet 4 mg  4 mg Oral Q8H PRN MOISES Reis CNP        Or    ondansetron (ZOFRAN) injection 4 mg  4 mg IntraVENous Q6H PRN MOISES Reis - CNP   4 mg at 09/19/21 1025    polyethylene glycol (GLYCOLAX) packet 17 g  17 g Oral Daily PRN MOISES Reis - CNP        acetaminophen (TYLENOL) tablet 650 mg  650 mg Oral Q6H PRN MOISES Reis CNP        Or    acetaminophen (TYLENOL) suppository 650 mg  650 mg Rectal Q6H PRN Leonel Lakhani APRN - CNP        aspirin EC tablet 81 mg  81 mg Oral Daily Leonel Lakhani APRN - CNP   81 mg at 09/24/21 1247     Impressions :      1. Active Problems:    Septicemia ([facility] [address]    Urinary tract infection in male    Severe malnutrition ([facility] [address]    COVID-19    Hypoxia    Palliative care encounter    Goals of care, counseling/discussion    DNR (do not resuscitate) discussion    ACP (advance care planning)  Resolved Problems:    * No resolved hospital problems.  *        2.  has a past medical history of Acute respiratory failure following trauma and surgery ([facility] [address] Acute respiratory failure following trauma and surgery ([address] Altered bowel elimination due to intestinal ostomy ([address] Full dentures, Full dentures, Gall stones, GERD (gastroesophageal reflux disease), GI bleed, Hemorrhage of gastrointestinal tract, unspecified, Hemorrhage of gastrointestinal tract, unspecified, Hyperlipidemia, Hypertension, Kidney stones, Kidney stones, Primary localized osteoarthrosis, lower leg, Primary localized osteoarthrosis, lower leg, Prostate disorder, Transient disorder of initiating or maintaining sleep, Transient disorder of initiating or maintaining sleep, Unspecified disorder of skin and subcutaneous tissue, Wears glasses, and Wears glasses. Plans:   1.   COVID-19 pneumonia, patient already received Decadron 5 dosages  ESBL UTI on meropenem  History of aortic valve replacement with aortic dissection  Acute hypoxic respiratory failure on oxygen  Patient is delirium, multifactorial hypernatremia, metabolic encephalopathy from hypoxemia, bacteremia  Hematuria, holding Eliquis, requesting urology consult  CODE STATUS changed to DNR CCA  Overall prognosis guarded  Discussed case with Dr. Rehana Pastrana MD  9/24/2021  2:14 PM

## 2021-09-24 NOTE — PROGRESS NOTES
Writer unable to admin pt meds this morning after crushing them and putting them in pudding;attempted to give meds to pt but pt was not following commands;as of now pt is more alert & is following simple commands;writer will attempt to admin morning meds at this time

## 2021-09-24 NOTE — PROGRESS NOTES
Pt utinating jed pearson blood. Crystal Piper NP notified. Order to continue monitoring received. Pt seems increasingly weak. Ivon Carl called and notified. Loss if iv access. Was unable to receive IV ATB. X 2 doses.  picc to be inserted today

## 2021-09-24 NOTE — PROGRESS NOTES
Speech Language Pathology  [facility]  Speech Language Pathology    Date: 9/24/2021  Patient Name: Zeina Kelly  YOB: 1940   AGE: 78 y.o. MRN: [id number]        Patient Not Available for Speech Therapy     Due to:  [] Testing  [] Hemodialysis  [] Cancelled by RN  [] Surgery   [] Intubation/Sedation/Pain Medication  [] Medical instability  [] Other: Per RN, Pt. was unable to swallow medications earlier this AM and was more confused, but was able to swallow meds without difficulty in the afternoon. Per Dr. Homer Sandhoff, Pt. to be cleared from droplet plus isolation today. Plan for possible MBS tomorrow, if Pt. appropriate. RN and Dr. Homer Sandhoff notified. Next scheduled treatment: possible MBS 09/25    Completed by:  Dg Barrett M.A., CCC-SLP

## 2021-09-25 NOTE — PROGRESS NOTES
Speech Language Pathology  [facility]  Speech Language Pathology    Date: 9/25/2021  Patient Name: Josue Bashir  YOB: 1940   AGE: 78 y.o. MRN: [id number]        Patient Not Available for Speech Therapy     Due to:  [] Testing  [] Hemodialysis  [] Cancelled by RN  [] Surgery   [] Intubation/Sedation/Pain Medication  [] Medical instability  [x] Other: 1300 - Attempted to see Pt. for dysphagia therapy and to determine appropriateness for MBS. Per RN, Pt. unable remain awake, only eating ~5 bites of lunch before falling asleep. Pt. easy to wake with verbal cue when writer initially walked into room, however, unable to remain awake/alert. Pt. refused PO trials while awake. Pt. unable to remain awake/alert for >2 min in order to participate in treatment or MBS at this time. ST to continue to follow and plan to complete MBS when Pt. is able to remain alert and participate. RN notified. Next scheduled treatment: 09/27  Completed by:  Brit Frey M.A., CCC-SLP

## 2021-09-25 NOTE — PLAN OF CARE
Problem: Airway Clearance - Ineffective  Goal: Achieve or maintain patent airway  9/25/2021 1502 by Landon Lea RN  Outcome: Ongoing   Pt continues on 4LNC. Tolerating well. Will continue to monitor. Problem: Gas Exchange - Impaired  Goal: Absence of hypoxia  9/25/2021 1502 by Landon Lea RN  Outcome: Ongoing   Pt continues to be monitored for oxygen needs. Continues on 4LNC. Problem: Gas Exchange - Impaired  Goal: Promote optimal lung function  9/25/2021 1502 by Landon Lea RN  Outcome: Ongoing   Monitoring. Problem: Breathing Pattern - Ineffective  Goal: Ability to achieve and maintain a regular respiratory rate  9/25/2021 1502 by Landon Lea RN  Outcome: Ongoing   Monitoring. Problem: Body Temperature -  Risk of, Imbalanced  Goal: Ability to maintain a body temperature within defined limits  9/25/2021 1502 by Landon Lea RN  Outcome: Ongoing   Monitoring. Problem: Body Temperature -  Risk of, Imbalanced  Goal: Will regain or maintain usual level of consciousness  9/25/2021 1502 by Landon Lea RN  Outcome: Ongoing   Pt temperature adequate after use of temporal thermometer. Problem: Body Temperature -  Risk of, Imbalanced  Goal: Complications related to the disease process, condition or treatment will be avoided or minimized  9/25/2021 1502 by Landon Lea RN  Outcome: Ongoing   Monitoring. Problem: Isolation Precautions - Risk of Spread of Infection  Goal: Prevent transmission of infection  9/25/2021 1502 by Landon Lea RN  Outcome: Ongoing  Pt continues in contact precautions due to urine/esbl  Problem: Nutrition Deficits  Goal: Optimize nutritional status  9/25/2021 1502 by Landon Lea RN  Outcome: Ongoing   Pt has poor intake this shift, but pt did eat 50-75% of lunch. Will continue to monitor and encourage po intake.    Problem: Risk for Fluid Volume Deficit  Goal: Maintain normal heart rhythm  9/25/2021 1502 by Landon Lea RN  Outcome: Ongoing Monitoring. Problem: Risk for Fluid Volume Deficit  Goal: Maintain normal serum potassium, sodium, calcium, phosphorus, and pH  9/25/2021 1502 by Dmitriy Emery RN  Outcome: Ongoing   Pt continues to have elevated sodium level and continues on 1/2 NS. Problem: Patient Education: Go to Patient Education Activity  Goal: Patient/Family Education  9/25/2021 1502 by Dmitriy Emery RN  Outcome: Ongoing  Monitoring. Problem: Nutrition  Goal: Optimal nutrition therapy  9/25/2021 1502 by Dmitriy Emery RN  Outcome: Ongoing   Pt continues to have poor intake. Will continue to monitor. Problem: Musculor/Skeletal Functional Status  Goal: Highest potential functional level  9/25/2021 1502 by Dmitriy Emery RN  Outcome: Ongoing   Monitoring. Problem: Musculor/Skeletal Functional Status  Goal: Absence of falls  9/25/2021 1502 by Dmitriy Emery RN  Outcome: Ongoing   No falls noted this shift. Problem: Skin Integrity:  Goal: Absence of new skin breakdown  Description: Absence of new skin breakdown  9/25/2021 1502 by Dmitriy Emery RN  Outcome: Ongoing   No new altered skin noted this shift.

## 2021-09-25 NOTE — PROGRESS NOTES
Per Dr. Lito Chan, Dr. Florentino Terry is on call for the group. Call [phone] for the on call physician. Paged Dr. Florentino Terry, awaiting call back.

## 2021-09-25 NOTE — PROGRESS NOTES
[facility] -   Infectious diseases evaluation  admission date 9/16/2021    reason for consultation:   UTI  Bacteremia  COVID-19 infection    Impression :   Current:  · UTI  · ESBL producing E. coli bacteremia secondary to above  · COVID-19 infection  · Elevated CRP  · Elevated Ferritin  · Acute hypoxic respiratory failure  · Left pleural effusion  · Acute on chronic renal failure  · Encephalopathy  · History of aortic valve replacement June 2021  · Aortic dissection  · Sulfa allergy    Recommendations   · Continue Meropenem 1 gm IV every 8 hours through 9/29/2021. · Remains on Decadron. · Follow CBC and renal function  · Follow inflammatory markers and chest x-ray  · Continue supportive care  · Discussed with RN. History of Present Illness:   Initial history:  Christopher Rodriguez is a 78y.o.-year-old male was brought to the hospital from [facility] after he was found laying on the ground. At the ER he was tachypneic and hypoxic, disoriented. .  Associated symptoms included fever, chills, dysuria. No associated symptoms of n/v/d. Urinalysis was suggestive of UTI  He was placed on oxygen per nasal cannula then he was placed on high flow oxygen. Blood culture grew ESBL producing E. Coli  The patient is confused    COVID-19 came back positive  The patient received Covid 19 vaccine    Interval changes  9/25/2021   Afebrile. Resting comfortably. Does not answer questions. Hematuria. No rodriguez. Patient Vitals for the past 8 hrs:   BP Temp Temp src Pulse Resp SpO2   09/25/21 1100 133/83 96.3 °F (35.7 °C) Temporal 85 18 90 %   09/25/21 0900 126/79 94.5 °F (34.7 °C) Axillary 82 18 94 %   09/25/21 0715 (!) 149/79 94.1 °F (34.5 °C) Axillary 86 20 90 %   Labs:   Cre: 0.83-0.97-0.91  CRP: 179.0-44.6  WBC: 15.4-14.7-15.4  Ferritin: 7,299-2,601  Micro:  9/16 BC x 2-ESBL, MDRO E. Coli. S-Meropenem, Gent, Bactrim. 9/16 Urine cx- ESBL MDRO E. Coli.     I have personally reviewed the past medical history, past surgical history, medications, social history, and family history, and I haveupdated the database accordingly. Allergies:   Sulfa antibiotics and Vimovo [naproxen-esomeprazole]     Review of Systems:     Review of Systems   Reason unable to perform ROS: Patient does not answer questions. Physical Examination :       Physical Exam  Vitals and nursing note reviewed. Constitutional:       General: He is not in acute distress. Appearance: He is not ill-appearing. Comments: Does not answer questions. HENT:      Head: Normocephalic and atraumatic. Right Ear: External ear normal.      Left Ear: External ear normal.      Nose: Nose normal.      Mouth/Throat:      Mouth: Mucous membranes are dry. Pharynx: Oropharynx is clear. Eyes:      General: No scleral icterus. Extraocular Movements: Extraocular movements intact. Conjunctiva/sclera: Conjunctivae normal.      Pupils: Pupils are equal, round, and reactive to light. Cardiovascular:      Rate and Rhythm: Normal rate and regular rhythm. Heart sounds: Normal heart sounds. No murmur heard. Pulmonary:      Effort: Pulmonary effort is normal. No respiratory distress. Breath sounds: No wheezing. Comments: Rhonchi to RLL, otherwise clear. Abdominal:      General: Bowel sounds are normal. There is no distension. Palpations: Abdomen is soft. Tenderness: There is no abdominal tenderness. Genitourinary:     Comments: Hematuria. Musculoskeletal:      Cervical back: Normal range of motion. Right lower leg: No edema. Left lower leg: No edema. Skin:     General: Skin is warm and dry. Capillary Refill: Capillary refill takes less than 2 seconds. Findings: No rash. Neurological:      Mental Status: He is alert. Comments: Does not respond to questions. Psychiatric:      Comments: RAJI.          Past Medical History:     Past Medical History: Diagnosis Date    Acute respiratory failure following trauma and surgery ([address]     Acute respiratory failure following trauma and surgery ([address]     Altered bowel elimination due to intestinal ostomy ([address]     iliostomy    Full dentures     pt said he has partial upper and lower    Full dentures     Gall stones     GERD (gastroesophageal reflux disease)     GI bleed     Hemorrhage of gastrointestinal tract, unspecified     Hemorrhage of gastrointestinal tract, unspecified     Hyperlipidemia     Hypertension     Kidney stones     Kidney stones     Primary localized osteoarthrosis, lower leg     both shoulders, neck, legs.  Primary localized osteoarthrosis, lower leg     Prostate disorder     Transient disorder of initiating or maintaining sleep     Transient disorder of initiating or maintaining sleep     Unspecified disorder of skin and subcutaneous tissue     Wears glasses     Wears glasses        Past Surgical  History:     Past Surgical History:   Procedure Laterality Date    ABDOMEN SURGERY      CATARACT REMOVAL WITH IMPLANT Bilateral     COLONOSCOPY      X3    COLOSTOMY Right     COLOSTOMY BAG ON RT.  SIDE    CYSTOSCOPY Right 8/29/2017    CYSTOSCOPY URETERAL STENT INSERTION performed by Leonie Darling MD at [facility], ESOPHAGUS      EYE SURGERY      bettye. eyes, cataracts extracted with iol's    FRACTURE SURGERY      JOINT REPLACEMENT Right 3-23-15    KIDNEY STONE SURGERY Left     KNEE ARTHROSCOPY Right     LITHOTRIPSY Left 06-20-14    w/ cystoscopy C & P    SHOULDER SURGERY Left 9/14/2020    MOLE EXCISION POSTERIOR SHOULDER performed by Elsa Gonzalez MD at [facility] , PARTIAL RECTUM\"    TOTAL KNEE ARTHROPLASTY  03/23/2015    Right with biomet and GPS product application    TOTAL KNEE ARTHROPLASTY Right 3/23/2015       Medications:      QUEtiapine  25 mg Oral Nightly    meropenem  1,000 mg IntraVENous Q8H    [Held by provider] apixaban  5 mg Oral BID    dexamethasone  6 mg Oral Daily    amiodarone  200 mg Oral Daily    ascorbic acid  500 mg Oral Daily    atorvastatin  20 mg Oral Daily    divalproex  125 mg Oral BID    ferrous sulfate  325 mg Oral Daily with breakfast    [Held by provider] furosemide  20 mg Oral Daily    latanoprost  1 drop Both Eyes Nightly    therapeutic multivitamin-minerals  1 tablet Oral Daily    timolol  1 drop Both Eyes Daily    zinc sulfate  50 mg Oral Daily    sodium chloride flush  5-40 mL IntraVENous 2 times per day    aspirin  81 mg Oral Daily       Social History:     Social History     Socioeconomic History    Marital status:      Spouse name: Not on file    Number of children: Not on file    Years of education: Not on file    Highest education level: Not on file   Occupational History    Not on file   Tobacco Use    Smoking status: Never Smoker    Smokeless tobacco: Never Used   Vaping Use    Vaping Use: Never used   Substance and Sexual Activity    Alcohol use: No     Alcohol/week: 0.0 standard drinks     Comment: occassional    Drug use: No    Sexual activity: Not on file   Other Topics Concern    Not on file   Social History Narrative    Not on file     Social Determinants of Health     Financial Resource Strain:     Difficulty of Paying Living Expenses:    Food Insecurity:     Worried About Running Out of Food in the Last Year:     Ran Out of Food in the Last Year:    Transportation Needs:     Lack of Transportation (Medical):      Lack of Transportation (Non-Medical):    Physical Activity:     Days of Exercise per Week:     Minutes of Exercise per Session:    Stress:     Feeling of Stress :    Social Connections:     Frequency of Communication with Friends and Family:     Frequency of Social Gatherings with Friends and Family:     Attends Buddhism Services:     Active Member of Clubs or Organizations:     Attends Club or Organization Meetings:     Marital Status:    Intimate Partner Violence:     Fear of Current or Ex-Partner:     Emotionally Abused:     Physically Abused:     Sexually Abused:        Family History:     Family History   Problem Relation Age of Onset    Heart Disease Father     Other Mother       Medical Decision Making:   I have independently reviewed/ordered the following labs:    CBC with Differential:   Recent Labs     09/24/21  0812 09/25/21  0444   WBC 12.2* 15.4*   HGB 13.1* 13.8   HCT 41.0 44.0    178     BMP:  Recent Labs     09/24/21  0812 09/24/21  1705 09/24/21  2355 09/25/21  0444   *   < > 149* 151*   K 4.0  --   --  4.2   *  --   --  119*   CO2 26  --   --  23   BUN 44*  --   --  46*   CREATININE 0.97  --   --  0.91   MG 2.0  --   --  2.2    < > = values in this interval not displayed. Hepatic Function Panel:   No results for input(s): PROT, LABALBU, BILIDIR, IBILI, BILITOT, ALKPHOS, ALT, AST in the last 72 hours. No results for input(s): RPR in the last 72 hours. No results for input(s): HIV in the last 72 hours. No results for input(s): BC in the last 72 hours. Lab Results   Component Value Date    CREATININE 0.91 09/25/2021    GLUCOSE 125 09/25/2021    GLUCOSE 100 02/16/2012       Detailed results: Thank you for allowing us to participate in the care of this patient. Please call with questions. This note is created with the assistance of a speech recognition program.  While intending to generate adocument that actually reflects the content of the visit, the document can still have some errors including those of syntax and sound a like substitutions which may escape proof reading. It such instances, actual meaningcan be extrapolated by contextual diversion.     MOISES Buckner - PETE  Office: [phone]  Perfect serve / office [phone]

## 2021-09-25 NOTE — CARE COORDINATION
ONGOING DISCHARGE PLANNING NOTE:    COVID +, Out of Droplet Isolation, Transfer from  Overflow Unit today. Remains Afebrile, Sating 90% on 4LNC. Writer reviewed LSW notes, and discharge plan is to return to [address] [place] [address] [address]. Pre-Cert to start Monday. Need Therapy notes on Monday.     Electronically signed by Ken Meléndez RN on 9/25/2021 at 2:48 PM

## 2021-09-25 NOTE — PROGRESS NOTES
History and Physical Service  [facility]    HISTORY AND PHYSICAL EXAMINATION            Date:   9/25/2021  Patient name:  Karen Ruby  MRN:   [id number]  Account:  [de-identified]  YOB: 1940  PCP:    Ervin Ricks MD  Code Status:    DNR-CCA    Chief Complaint:   unwell    History Obtained From:     Patient ,medical record ,nursing staff    History of Present Illnes       The patient is a 78 y.o. Non- / non  male who presents   Patient brought to ED by EMS after being found down at [facility]. Patient was positive for Covid on 9/7/2021. Patient hypoxic on arrival required 2 L nasal cannula. Eventually switched to high flow nasal cannula. SPO2 97% CT scan redemonstrates known aortic dissection, no signs of pneumonia. Pulmonology consulted. Patient given Decadron in the ED. Urinalysis shows infection. Patient started on Rocephin. CT head negative for acute intracranial abnormality. Patient has MERRICK with creatinine 2.46, K 5.1. Patient given 2 L normal saline bolus in the ED. Recheck BMP in the morning. Patient hemodynamically stable.   Admit to progressive unit    9/24   Patient has very poor oral intake  In delirium  Hypernatremia, access does not have IV access    9/25  Patient, has better oral intake  Still has hematuria, urology inputs awaited  Past Medical History:   Diagnosis Date    Acute respiratory failure following trauma and surgery ([address]     Acute respiratory failure following trauma and surgery ([address]     Altered bowel elimination due to intestinal ostomy ([address]     iliostomy    Full dentures     pt said he has partial upper and lower    Full dentures     Gall stones     GERD (gastroesophageal reflux disease)     GI bleed     Hemorrhage of gastrointestinal tract, unspecified     Hemorrhage of gastrointestinal tract, unspecified     Hyperlipidemia     Hypertension     Kidney stones     Kidney stones  Primary localized osteoarthrosis, lower leg     both shoulders, neck, legs.  Primary localized osteoarthrosis, lower leg     Prostate disorder     Transient disorder of initiating or maintaining sleep     Transient disorder of initiating or maintaining sleep     Unspecified disorder of skin and subcutaneous tissue     Wears glasses     Wears glasses         Past Surgical History:     Past Surgical History:   Procedure Laterality Date    ABDOMEN SURGERY      CATARACT REMOVAL WITH IMPLANT Bilateral     COLONOSCOPY      X3    COLOSTOMY Right     COLOSTOMY BAG ON RT. SIDE    CYSTOSCOPY Right 8/29/2017    CYSTOSCOPY URETERAL STENT INSERTION performed by Angela Woods [facility] at [facility], ESOPHAGUS      EYE SURGERY      bettye. eyes, cataracts extracted with iol's    FRACTURE SURGERY      JOINT REPLACEMENT Right 3-23-15    KIDNEY STONE SURGERY Left     KNEE ARTHROSCOPY Right     LITHOTRIPSY Left 06-20-14    w/ cystoscopy C & P    SHOULDER SURGERY Left 9/14/2020    MOLE EXCISION POSTERIOR SHOULDER performed by Basilio Magallanes [place] [facility] [place] [facility] , PARTIAL RECTUM\"    TOTAL KNEE ARTHROPLASTY  03/23/2015    Right with biomet and GPS product application    TOTAL KNEE ARTHROPLASTY Right 3/23/2015        Medications Prior to Admission:     Prior to Admission medications    Medication Sig Start Date End Date Taking? Authorizing Provider   ascorbic acid (VITAMIN C) 500 MG tablet Take 500 mg by mouth 2 times daily    Yes Historical Provider, MD   divalproex (DEPAKOTE) 125 MG DR tablet Take 125 mg by mouth 2 times daily   Yes Historical Provider, MD   methylPREDNISolone (MEDROL DOSEPACK) 4 MG tablet Take 4 mg by mouth See Admin Instructions Take by mouth.    Yes Historical Provider, MD   zinc sulfate (ZINCATE) 220 (50 Zn) MG capsule Take 50 mg by mouth daily   Yes Historical Provider, MD   amiodarone (CORDARONE) 200 MG tablet Take 200 mg by mouth daily   Yes Historical Provider, MD   aspirin 81 MG chewable tablet Take 81 mg by mouth daily   Yes Historical Provider, MD   atorvastatin (LIPITOR) 20 MG tablet Take 20 mg by mouth daily   Yes Historical Provider, MD   ferrous sulfate (IRON 325) 325 (65 Fe) MG tablet Take 325 mg by mouth daily (with breakfast)   Yes Historical Provider, MD   furosemide (LASIX) 20 MG tablet Take 20 mg by mouth daily   Yes Historical Provider, MD   ipratropium-albuterol (DUONEB) 0.5-2.5 (3) MG/3ML SOLN nebulizer solution Inhale 1 vial into the lungs every 6 hours as needed for Shortness of Breath   Yes Historical Provider, MD   aluminum & magnesium hydroxide-simethicone (MYLANTA) 400-400-40 MG/5ML SUSP Take 15 mLs by mouth every 6 hours as needed (heartburn)   Yes Historical Provider, MD   methocarbamol (ROBAXIN) 750 MG tablet Take 750 mg by mouth every 8 hours as needed (muscle spasms)    Yes Historical Provider, MD   potassium chloride (KLOR-CON M) 20 MEQ extended release tablet Take 20 mEq by mouth daily   Yes Historical Provider, MD   Multiple Vitamins-Minerals (THERAPEUTIC MULTIVITAMIN-MINERALS) tablet Take 1 tablet by mouth daily   Yes Historical Provider, MD   traZODone (DESYREL) 150 MG tablet Take 150 mg by mouth nightly   Yes Historical Provider, MD   ondansetron (ZOFRAN) 4 MG tablet Take 4 mg by mouth every 6 hours as needed for Nausea or Vomiting   Yes Historical Provider, MD   timolol (TIMOPTIC) 0.5 % ophthalmic solution Place 1 drop into both eyes daily  2/2/21  Yes Historical Provider, MD   latanoprost (XALATAN) 0.005 % ophthalmic solution Place 1 drop into both eyes nightly  5/11/16  Yes Historical Provider, MD        Allergies:     Sulfa antibiotics and Vimovo [naproxen-esomeprazole]    Social History:     Tobacco:    reports that he has never smoked. He has never used smokeless tobacco.  Alcohol:      reports no history of alcohol use. Drug Use:  reports no history of drug use.     Family History: Family History   Problem Relation Age of Onset    Heart Disease Father     Other Mother        Review of Systems:     Patient confused agitated unable to get review of system    Physical Exam:   /83   Pulse 85   Temp 96.3 °F (35.7 °C) (Temporal)   Resp 18   Ht 6' 1\" (1.854 m)   Wt 189 lb 6 oz (85.9 kg)   SpO2 93% Comment: recheck, RN  BMI 24.99 kg/m²   No results for input(s): POCGLU in the last 72 hours. Elderly man  Thoracotomy scar healing noted colostomy in place noted  General Appearance:  unwell  Mental status: Confused , oriented to place  Head:  normocephalic, atraumatic. Eye: no icterus, redness, pupils equal and reactive, extraocular eye movements intact, conjunctiva clear  Ear: normal external ear, no discharge, hearing intact  Nose:  no drainage noted  Mouth: mucous membranes moist  Neck: supple, no carotid bruits, thyroid not palpable  Lungs: basal creakles    Cardiovascular: normal rate, regular rhythm, no murmur, gallop, rub.   Abdomen: Soft, nontender, nondistended, normal bowel sounds, no hepatomegaly or splenomegaly  Hematuria noticed in the urobag  Neurologic: There are no new focal motor or sensory deficits, normal muscle tone and bulk, no abnormal sensation, normal speech,   Skin: No gross lesions, rashes, bruising or bleeding on exposed skin area  Extremities:  peripheral pulses palpable, no pedal edema or calf pain with palpation  Psych: Agitated disoriented    Investigations:      Laboratory Testing:  Recent Results (from the past 24 hour(s))   Sodium    Collection Time: 09/24/21  5:05 PM   Result Value Ref Range    Sodium 152 (H) 135 - 144 mmol/L   D-Dimer, Quantitative    Collection Time: 09/24/21  5:05 PM   Result Value Ref Range    D-Dimer, Quant 3.58 (H) 0.00 - 0.59 mg/L FEU   Sodium    Collection Time: 09/24/21 11:55 PM   Result Value Ref Range    Sodium 149 (H) 135 - 144 mmol/L   Basic Metabolic Panel w/ Reflex to MG    Collection Time: 09/25/21  4:44 AM   Result Value Ref Range    Glucose 125 (H) 70 - 99 mg/dL    BUN 46 (H) 8 - 23 mg/dL    CREATININE 0.91 0.70 - 1.20 mg/dL    Bun/Cre Ratio NOT REPORTED 9 - 20    Calcium 9.8 8.6 - 10.4 mg/dL    Sodium 151 (H) 135 - 144 mmol/L    Potassium 4.2 3.7 - 5.3 mmol/L    Chloride 119 (H) 98 - 107 mmol/L    CO2 23 20 - 31 mmol/L    Anion Gap 9 9 - 17 mmol/L    GFR Non-African American >60 >60 mL/min    GFR African American >60 >60 mL/min    GFR Comment          GFR Staging NOT REPORTED    CBC    Collection Time: 09/25/21  4:44 AM   Result Value Ref Range    WBC 15.4 (H) 3.5 - 11.0 k/uL    RBC 5.21 4.5 - 5.9 m/uL    Hemoglobin 13.8 13.5 - 17.5 g/dL    Hematocrit 44.0 41 - 53 %    MCV 84.5 80 - 100 fL    MCH 26.5 26 - 34 pg    MCHC 31.4 31 - 37 g/dL    RDW 17.5 (H) 11.5 - 14.9 %    Platelets 792 307 - 191 k/uL    MPV 9.3 6.0 - 12.0 fL    NRBC Automated NOT REPORTED per 100 WBC   Magnesium    Collection Time: 09/25/21  4:44 AM   Result Value Ref Range    Magnesium 2.2 1.6 - 2.6 mg/dL   D-Dimer Test    Collection Time: 09/25/21  4:44 AM   Result Value Ref Range    D-Dimer, Quant 3.60 (H) 0.00 - 0.59 mg/L FEU   NA (Sodium)    Collection Time: 09/25/21  1:03 PM   Result Value Ref Range    Sodium 151 (H) 135 - 144 mmol/L       Recent Labs     09/25/21  1303 09/25/21  0444 09/25/21  0444 09/17/21  0927 09/16/21  3730   HGB  --   --  13.8   < > 14.0   HCT  --   --  44.0   < > 43.9   WBC  --   --  15.4*   < > 11.3*   MCV  --   --  84.5   < > 84.8   *   < > 151*   < > 140   K  --   --  4.2   < > 5.1   CL  --   --  119*   < > 101   CO2  --   --  23   < > 23   BUN  --   --  46*   < > 68*   CREATININE  --   --  0.91   < > 2.46*   GLUCOSE  --   --  125*   < > 105*   INR  --   --   --   --  1.1   PROTIME  --   --   --   --  14.4   APTT  --   --   --   --  32.5   AST  --   --   --   --  15   ALT  --   --   --   --  16   LABALBU  --   --   --   --  3.3*    < > = values in this interval not displayed.        Hematology:  Recent Labs 09/23/21 0941 09/24/21 0812 09/24/21 1705 09/25/21  0444   WBC 14.7* 12.2*  --  15.4*   RBC 5.29 4.91  --  5.21   HGB 14.0 13.1*  --  13.8   HCT 44.1 41.0  --  44.0   MCV 83.4 83.5  --  84.5   MCH 26.5 26.6  --  26.5   MCHC 31.8 31.9  --  31.4   RDW 17.4* 17.3*  --  17.5*    156  --  178   MPV 8.5 8.5  --  9.3   CRP  --  44.6*  --   --    DDIMER  --   --  3.58* 3.60*     Chemistry:  Recent Labs     09/23/21 0941 09/23/21 2050 09/24/21 0812 09/24/21 1705 09/24/21  2355 09/25/21  0444 09/25/21  1303   *   < > 151*   < > 149* 151* 151*   K 4.3  --  4.0  --   --  4.2  --    *  --  116*  --   --  119*  --    CO2 26  --  26  --   --  23  --    GLUCOSE 153*  --  120*  --   --  125*  --    BUN 39*  --  44*  --   --  46*  --    CREATININE 0.83  --  0.97  --   --  0.91  --    MG 1.9  --  2.0  --   --  2.2  --    ANIONGAP 9  --  9  --   --  9  --    LABGLOM >60  --  >60  --   --  >60  --    GFRAA >60  --  >60  --   --  >60  --    CALCIUM 9.6  --  9.6  --   --  9.8  --    PROBNP  --   --  2,812*  --   --   --   --     < > = values in this interval not displayed.      Recent Labs     09/24/21 0812   *       Imaging/Diagnostics:       Echocardiogram Limited 2D    Result Date: 8/27/2021  1604 [facility] Transthoracic Echocardiography Report (TTE)  Patient Name Sal Harris  Date of Study               08/24/2021               Chitra Thompson   Date of      1940  Gender                      Male  Birth   Age          78 year(s)  Race                           Room Number  2061        Height:                     73 inch, 185.42 cm   Corporate ID [id number]    Weight:                     190 pounds, 86.2 kg  #   Patient Acct [de-identified]   BSA:          2.11 m^2      BMI:      25.07  #                                                              kg/m^2   MR #         [id number]      Sonographer                 Ivonne Swann   Accession #  [id number]  Interpreting Physician      [address] [place] [address] Fellow                   Referring Nurse                           Practitioner   Interpreting             Referring Physician         Kristine Martinez *  Fellow  Additional Comments Technically difficult study. Type of Study   TTE procedure:2D Echocardiogram, Limited Echo. Procedure Date Date: 08/24/2021 Start: 08:49 AM Study Location: [facility] Technical Quality: Limited visualization due to poor acoustical window. Indications:Septic shock. History / Tech. Comments: HLD, HTN Patient Status: Inpatient Height: 73 inches Weight: 190 pounds BSA: 2.11 m^2 BMI: 25.07 kg/m^2 Rhythm: Within normal limits HR: 84 bpm BP: 115/49 mmHg CONCLUSIONS Summary Extremely challenging Echo, not all walls seen, poor windows, poor endocardial definition. Recommend repeat limited with Definity. Within above limitations, LVEF appears mildly reduced with EF 40%. Thickened mitral valve leaflets. Signature ----------------------------------------------------------------------------  Electronically signed by Ivonne Swann(Sonographer) on 08/27/2021 07:43  AM ---------------------------------------------------------------------------- ----------------------------------------------------------------------------  Electronically signed by Shelton Barraza(Interpreting physician) on 08/25/2021  07:59 AM ---------------------------------------------------------------------------- FINDINGS Left Atrium Left atrium is normal in size. Left Ventricle Extremely challenging Echo, not all walls seen, poor windows, poor endocardial definition. Recommend repeat limited with Definity. Within above limitations, LVEF appears mildly reduced with EF 40%. Right Atrium Right atrium was not well visualized. Right Ventricle Right ventricle was not well visualized. Mitral Valve Thickened mitral valve leaflets. Aortic Valve Aortic valve not well visualized. Tricuspid Valve Tricuspid valve was not well visualized.  Pulmonic Valve Pulmonic valve was not well colectomy with a Brielle's pouch and a right lower quadrant ostomy with a similar appearing parastomal hernia. No bowel obstruction. Small lipoma in the gastric antrum. Pelvis: Urinary bladder contains numerous layering calcifications posteriorly as well as an indwelling Price catheter with associated intraluminal gas. Gas within 2 anterosuperior bladder diverticula. Perivesicular stranding appears grossly similar to prior. Enlarged prostate. Peritoneum/Retroperitoneum: No free fluid or free air. Intimal flap redemonstrated in the aorta compatible with the known dissection. Abdominal aorta remains unchanged in caliber measuring up to 2.5 cm. Unchanged ectasia of the common iliac arteries, right greater than left. Displaced calcifications in the common iliac vessels are also compatible with underlying intimal flaps/dissection. Bones/Soft Tissues: Recent postoperative changes along the anterior abdominal wall and epigastrium. Presumed sequelae of medication injection in the anterior abdominal wall subcutaneous fat. No acute bony findings on a background of osseous demineralization, dextroconvex lumbar spinal curvature, advanced degenerative changes throughout the lumbar spine including sequelae of Baastrup's disease, and in unchanged region of sclerosis in the right acetabulum with some other smaller unchanged sclerotic foci in the pelvis. Indwelling Price catheter with associated intraluminal gas in the bladder which appears thickened and with numerous diverticula. The wall thickening may be sequelae of chronic bladder outlet obstruction in the setting of an enlarged prostate, though as there is perivesicular stranding as well as left perinephric stranding, recommend correlation for superimposed ascending urinary tract infection. Innumerable bladder calcifications and nonobstructing left nephrolithiasis without an obstructing stone.  Though suboptimally evaluated in the absence of contrast intimal flaps are redemonstrated in the abdominal aorta and common iliac arteries in the setting of known recent dissection two months prior. Aortic caliber is unchanged and remains within normal limits, and there is unchanged ectasia of the common iliac vessels. Recent postoperative changes along the anterior abdominal wall without any organized fluid collection or soft tissue gas. Other unchanged chronic findings as described above. XR CHEST (2 VW)    Result Date: 8/19/2021  EXAMINATION: TWO XRAY VIEWS OF THE CHEST 8/19/2021 2:37 pm COMPARISON: July 9, 2021 CT chest HISTORY: ORDERING SYSTEM PROVIDED HISTORY: dysphagia TECHNOLOGIST PROVIDED HISTORY: dysphagia Reason for Exam: dysphagia, in rehab facility, hx limited. Acuity: Acute Type of Exam: Initial FINDINGS: Median sternotomy, stable normal cardiopericardial silhouette/moderate tortuosity of the thoracic aorta/prosthetic heart valve New moderate left basilar opacity. Otherwise clear lungs Degenerative changes of the thoracic spine/shoulders     New moderate left basilar opacity likely related moderate pleural effusion/pleural thickening, atelectasis versus infiltrate     CT HEAD WO CONTRAST    Result Date: 9/16/2021  EXAMINATION: CT OF THE HEAD WITHOUT CONTRAST  9/16/2021 10:28 pm TECHNIQUE: CT of the head was performed without the administration of intravenous contrast. Dose modulation, iterative reconstruction, and/or weight based adjustment of the mA/kV was utilized to reduce the radiation dose to as low as reasonably achievable. COMPARISON: 08/23/2021 HISTORY: ORDERING SYSTEM PROVIDED HISTORY: fall TECHNOLOGIST PROVIDED HISTORY: fall Decision Support Exception - unselect if not a suspected or confirmed emergency medical condition->Emergency Medical Condition (MA) Reason for Exam: fall, bruise to forehead Acuity: Acute Type of Exam: Initial FINDINGS: BRAIN/VENTRICLES: There is no acute intracranial hemorrhage, mass effect or midline shift.   No abnormal acute intracranial abnormality. CT CHEST WO CONTRAST    Result Date: 8/23/2021  EXAMINATION: CT OF THE CHEST WITHOUT CONTRAST 8/23/2021 5:18 pm TECHNIQUE: CT of the chest was performed without the administration of intravenous contrast. Multiplanar reformatted images are provided for review. Dose modulation, iterative reconstruction, and/or weight based adjustment of the mA/kV was utilized to reduce the radiation dose to as low as reasonably achievable. COMPARISON: 08/23/2021, 07/09/2021 HISTORY: ORDERING SYSTEM PROVIDED HISTORY: hx of dissection TECHNOLOGIST PROVIDED HISTORY: hx of dissection Decision Support Exception - unselect if not a suspected or confirmed emergency medical condition->Emergency Medical Condition (MA) Reason for Exam: hx of dissection Acuity: Unknown Type of Exam: Unknown FINDINGS: Mediastinum: Evaluation is limited by the absence of contrast.  Interval median sternotomy with ascending aortic dissection repair and placement of a prosthetic aortic valve. Vessel evaluation is limited. The ascending aortic caliber just distal to the operative repair spans approximately 4.8 cm. The proximal descending thoracic aorta spans approximately 4.9 cm, in the distal descending aorta measures approximately 3 cm. The intimal flap is partially visualized within the transverse and descending aorta with the true and false lumen roughly similar in size to one another. Significant improvement in the associated mediastinal hematoma. Small volume residual hemopericardium. No pneumomediastinum or adenopathy. Lungs/pleura: There are some new air bronchograms at the dependent left lower lobe. Background slightly increased atelectatic changes with pulmonary parenchymal evaluation motion degraded. Right upper lobe calcified granuloma. Resolved right and slightly increased left pleural effusions. No pneumothorax. Upper Abdomen: Abdominal findings are reported separately under same-day abdominal CT.  Soft Tissues/Bones: No organized fluid collection or soft tissue gas along the healing median sternotomy. There is mild osseous resorption along the sternotomy incision which remains grossly approximated. No appreciable osseous bridging. Callus formation in the region of the manubrium. Otherwise unchanged degenerative findings in the spine and shoulders. Vascular evaluation is limited by the absence of contrast. Interval operative repair of the ascending aortic dissection. Vessel caliber as described above with the ascending aorta just distal to the operative repair measuring up to 4.9 cm. The true and false lumens now all appear somewhat equal in caliber, though assessment is limited in the absence of contrast. Significant improvement in the associated mediastinal hematoma. Only small volume residual hemopericardium. New air bronchograms in the left lower lobe which may be due to developing infection in the appropriate clinical setting. Slightly increased atelectatic changes in the lung bases. Improved right and worsened left pleural effusions. CT CERVICAL SPINE WO CONTRAST    Result Date: 9/16/2021  EXAMINATION: CT OF THE CERVICAL SPINE WITHOUT CONTRAST 9/16/2021 4:29 pm TECHNIQUE: CT of the cervical spine was performed without the administration of intravenous contrast. Multiplanar reformatted images are provided for review. Dose modulation, iterative reconstruction, and/or weight based adjustment of the mA/kV was utilized to reduce the radiation dose to as low as reasonably achievable. COMPARISON: CT scan dated November 12, 2012 HISTORY: ORDERING SYSTEM PROVIDED HISTORY: found down TECHNOLOGIST PROVIDED HISTORY: found down Decision Support Exception - unselect if not a suspected or confirmed emergency medical condition->Emergency Medical Condition (MA) Reason for Exam: Fall, found down Acuity: Acute Type of Exam: Initial FINDINGS: BONES/ALIGNMENT: There is no acute fracture or traumatic malalignment. DEGENERATIVE CHANGES: No significant degenerative changes. SOFT TISSUES: There is no prevertebral soft tissue swelling. Calcification is present within the ligamentum nuchae. Images through the upper chest include the transverse portion of the aorta demonstrate a portion of the patient's known type A aortic dissection. .  Correlation with the scheduled CT scan of the chest abdomen pelvis recommended. 1. No evidence of an acute fracture or traumatic malalignment involving the cervical spine 2. Images through the upper chest partially demonstrate the patient's known type A aortic dissection. Patient is scheduled to have a CT scan of the chest abdomen and pelvis. 3.  Critical results were called by Dr. Shraddha Brenner to Dr. Radha Llamas on 9/16/2021 at 11 p.m. hours. IR FLUORO GUIDED CVA DEVICE PLMT/REPLACE/REMOVAL    Result Date: 8/27/2021  PROCEDURE: ULTRASOUND GUIDED VASCULAR ACCESS. Midline placement 8/27/2021. HISTORY: ORDERING SYSTEM PROVIDED HISTORY: Sepsis, discharge on IV antibiotic TECHNOLOGIST PROVIDED HISTORY: Sepsis, discharge on IV antibiotic Lumen?->Single Lumen SEDATION: None FLUOROSCOPY DOSE AND TYPE OR TIME AND EXPOSURES: None TECHNIQUE AND FINDINGS: Informed consent was obtained after a detailed explanation of the procedure including risks, benefits, and alternatives. Universal protocol was observed. The right arm was prepped and draped in sterile fashion using maximum sterile barrier technique. Local anesthesia was achieved with lidocaine. A micropuncture needle was used to access the right brachial vein using ultrasound guidance. An ultrasound image demonstrating patency of the vein with needle tip located within it. An image was obtained and stored in PACs. A 0.018 guidewire was used to place a peel-a-way sheath and a 4.5 Slovak 15 cm single-lumen midline catheter was placed with its tip directed centrally. Ultrasound images verify appropriate catheter positioning.   The catheter flushed easily and there was a good blood return. The catheter was secured to the skin. The patient tolerated the procedure well and there were no immediate complications. EBL: Less than 3 ml     Successful ultrasound guided midline placement     XR CHEST PORTABLE    Result Date: 8/25/2021  EXAMINATION: ONE XRAY VIEW OF THE CHEST 8/25/2021 5:47 am COMPARISON: 08/23/2021, 08/19/2021 HISTORY: ORDERING SYSTEM PROVIDED HISTORY: R/O CHF TECHNOLOGIST PROVIDED HISTORY: R/O CHF Reason for Exam: R/O CHF Acuity: Acute Type of Exam: Initial Additional signs and symptoms: R/O CHF Relevant Medical/Surgical History: R/O CHF FINDINGS: The cardiac and mediastinal contours appear unchanged. Basilar opacities and pleural effusions, left greater than right, are again demonstrated without appreciable change. No new airspace disease identified in the interval.  No evidence for pneumothorax. No significant interval change. XR CHEST PORTABLE    Result Date: 8/23/2021  EXAMINATION: ONE XRAY VIEW OF THE CHEST 8/23/2021 4:55 pm COMPARISON: 19 April 2021 HISTORY: ORDERING SYSTEM PROVIDED HISTORY: altered mental status, evaluate for pneumonia TECHNOLOGIST PROVIDED HISTORY: altered mental status, evaluate for pneumonia Reason for Exam: altered mental status, evaluate for pneumonia Acuity: Unknown Type of Exam: Unknown FINDINGS: AP portable view of the chest time stamped at 1725 hours demonstrates overlying cardiac monitoring electrodes. Stable cardiomegaly is noted. Prior median sternotomy is demonstrated. CT. No change in left pleural effusion and left basilar opacity. No vascular congestion or extrapleural air. Surgical clips are present the medial right axilla. Prosthetic aortic valve is noted. No change in left basilar opacity likely related to effusion and atelectasis with focal airspace disease not excluded.      CT CHEST ABDOMEN PELVIS WO CONTRAST    Result Date: 9/17/2021  EXAMINATION: CT OF THE CHEST, ABDOMEN, AND PELVIS WITHOUT CONTRAST 9/16/2021 10:32 pm TECHNIQUE: CT of the chest, abdomen and pelvis was performed without the administration of intravenous contrast. Multiplanar reformatted images are provided for review. Dose modulation, iterative reconstruction, and/or weight based adjustment of the mA/kV was utilized to reduce the radiation dose to as low as reasonably achievable. COMPARISON: 08/23/2021 and 07/09/2021 HISTORY: ORDERING SYSTEM PROVIDED HISTORY: found down, abdominal bruising, hypoxic TECHNOLOGIST PROVIDED HISTORY: found down, abdominal bruising, hypoxic Reason for Exam: found down, abdominal bruising, hypoxic Acuity: Acute Type of Exam: Initial Relevant Medical/Surgical History: colectomy, colostomy FINDINGS: Chest: Mediastinum: Suboptimal evaluation of the aorta without intravenous contrast. Stable postoperative findings status post aortic valve replacement and ascending aortic dissection repair. Grossly stable appearance of the persistent [place] type B aortic dissection within the limitations of a noncontrast exam.  No evidence of acute intramural hematoma. 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 The heart and pericardium are without acute abnormality. No mediastinal adenopathy. Coronary artery calcifications are noted. Trace pericardial effusion appears similar to the prior. Lungs/pleura: Small left pleural effusion, similar to the prior study. Motion limited evaluation of the lung bases. There is bilateral dependent atelectasis. No evidence of focal consolidation. No pneumothorax. Soft Tissues/Bones: No acute bony abnormality. Abdomen/Pelvis: Organs: The solid organs are incompletely evaluated without intravenous contrast.  Unchanged hepatic and renal cysts. A mildly complex 1.8 cm right lower pole renal cyst is indeterminate. The solid organs are without acute noncontrast findings. The gallbladder is present without radiopaque cholelithiasis.  GI/Bowel: No mechanical bowel obstruction. Stable postoperative findings status post subtotal colectomy with a right lower quadrant ostomy. Pelvis: Several bladder calcifications are again seen. The prostate is enlarged. Gas in the urinary bladder is suggestive of cystitis. Small fat-containing right inguinal hernia. Peritoneum/Retroperitoneum: Moderate atherosclerotic plaque. The known type B aortic dissection extends into the right common iliac artery, similar to the prior exam. Bones/Soft Tissues: There is a left lower quadrant and midline soft tissue contusion. No acute bony abnormality. 1. Limited evaluation of the aorta without intravenous contrast.  Similar appearance of an extensive [place] type B aortic dissection. Unchanged postoperative appearance of the ascending aorta status post aortic valve replacement and ascending aortic dissection repair. The splanchnic arteries are not well evaluated. 2. Unchanged small left pleural effusion. 3. Gas in the urinary bladder is suggestive of cystitis. Correlate with urinalysis. Unchanged layering bladder calculi. 4. Left lower quadrant and midline abdominal wall soft tissue contusion. No additional noncontrast evidence of acute traumatic injury in the chest, abdomen or pelvis. 5. Indeterminate 1.8 cm right lower pole cystic lesion. If clinically appropriate, outpatient renal protocol MRI can be obtained to better characterize. FL MODIFIED BARIUM SWALLOW W VIDEO    Result Date: 8/24/2021  EXAMINATION: MODIFIED BARIUM SWALLOW WAS PERFORMED IN CONJUNCTION WITH SPEECH PATHOLOGY SERVICES TECHNIQUE: Fluoroscopic evaluation of the swallowing mechanism was performed with multiple consistency of barium product. FLUOROSCOPY DOSE AND TYPE OR TIME AND EXPOSURES: Fluoroscopic time of 3 minutes and 9 seconds. DAP of 130.2 dGycm2.  COMPARISON: None HISTORY: ORDERING SYSTEM PROVIDED HISTORY: Aspiration risk TECHNOLOGIST PROVIDED HISTORY: Aspiration risk Reason for Exam: aspiration risk Acuity: Unknown Type of Exam: Unknown FINDINGS: Multiple swallows were attempted with multiple consistencies in the presence of speech pathology under fluoroscopic evaluation. Penetration was visualized with thin liquid. There is no evidence for aspiration. Penetration with thin liquid. No evidence for aspiration. Please see separate speech pathology report for full discussion of findings and recommendations.           Current Facility-Administered Medications   Medication Dose Route Frequency Provider Last Rate Last Admin    0.45 % sodium chloride infusion   IntraVENous Continuous Marguerite Macias MD 75 mL/hr at 09/24/21 1637 New Bag at 09/24/21 1637    QUEtiapine (SEROQUEL) tablet 25 mg  25 mg Oral Nightly Marguerite Macias MD   25 mg at 09/24/21 2120    meropenem (MERREM) 1,000 mg in sodium chloride 0.9 % 100 mL IVPB (mini-bag)  1,000 mg IntraVENous Q8H Dionne Brown MD 33.3 mL/hr at 09/25/21 1343 1,000 mg at 09/25/21 1343    QUEtiapine (SEROQUEL) tablet 25 mg  25 mg Oral BID PRN Sommer Becerra MD   25 mg at 09/23/21 1012    perflutren lipid microspheres (DEFINITY) injection 2.2 mg  2 mL IntraVENous ONCE PRN Pauline Rasmussen MD        [Held by provider] [facility]) tablet 5 mg  5 mg Oral BID Walt Habermann, MD   5 mg at 09/24/21 1247    dexamethasone (DECADRON) tablet 6 mg  6 mg Oral Daily Dionne Brown MD   6 mg at 09/25/21 0739    aluminum & magnesium hydroxide-simethicone (MAALOX) 200-200-20 MG/5ML suspension 30 mL  30 mL Oral Q6H PRN MOISES Horta CNP        amiodarone (CORDARONE) tablet 200 mg  200 mg Oral Daily MOISES Horta CNP   200 mg at 09/25/21 0739    ascorbic acid (VITAMIN C) tablet 500 mg  500 mg Oral Daily MOISES Horta CNP   500 mg at 09/25/21 0739    atorvastatin (LIPITOR) tablet 20 mg  20 mg Oral Daily MOISES Horta CNP   20 mg at 09/25/21 0739    divalproex (DEPAKOTE SPRINKLE) capsule 125 mg  125 mg Oral BID MOISES Horta CNP   125 mg at 09/25/21 0787    ferrous sulfate (IRON 325) tablet 325 mg  325 mg Oral Daily with breakfast MOISES Reis CNP   325 mg at 09/25/21 0739    [Held by provider] furosemide (LASIX) tablet 20 mg  20 mg Oral Daily MOISES Reis CNP        latanoprost (XALATAN) 0.005 % ophthalmic solution 1 drop  1 drop Both Eyes Nightly MOISES Reis CNP   1 drop at 09/24/21 2133    melatonin tablet 3 mg  3 mg Oral Nightly PRN MOISES Reis CNP   3 mg at 09/24/21 2120    therapeutic multivitamin-minerals 1 tablet  1 tablet Oral Daily MOISES Reis CNP   1 tablet at 09/25/21 0739    timolol (TIMOPTIC) 0.5 % ophthalmic solution 1 drop  1 drop Both Eyes Daily MOISES Reis CNP   1 drop at 09/25/21 0740    zinc sulfate (ZINCATE) capsule 50 mg  50 mg Oral Daily MOISES Reis CNP   50 mg at 09/25/21 0739    sodium chloride flush 0.9 % injection 5-40 mL  5-40 mL IntraVENous 2 times per day MOISES Reis CNP   10 mL at 09/25/21 1017    sodium chloride flush 0.9 % injection 10 mL  10 mL IntraVENous PRN MOISES Reis CNP   10 mL at 09/25/21 0740    0.9 % sodium chloride infusion  25 mL IntraVENous PRN MOISES Reis CNP        ondansetron (ZOFRAN-ODT) disintegrating tablet 4 mg  4 mg Oral Q8H PRN MOISES Reis CNP        Or    ondansetron (ZOFRAN) injection 4 mg  4 mg IntraVENous Q6H PRN MOISES Reis CNP   4 mg at 09/19/21 1025    polyethylene glycol (GLYCOLAX) packet 17 g  17 g Oral Daily PRN MOISES Reis CNP        acetaminophen (TYLENOL) tablet 650 mg  650 mg Oral Q6H PRN MOISES Reis CNP   650 mg at 09/24/21 2120    Or    acetaminophen (TYLENOL) suppository 650 mg  650 mg Rectal Q6H PRN [place] Castellani, APRN - CNP        aspirin EC tablet 81 mg  81 mg Oral Daily Leonel Lakhani APRN - CNP   81 mg at 09/25/21 0973     Impressions :      1.  Active Problems:    Septicemia ([facility] [address]    Urinary tract infection in male    Severe malnutrition ([facility] [address]    COVID-19    Hypoxia    Palliative care encounter    Goals of care, counseling/discussion    DNR (do not resuscitate) discussion    ACP (advance care planning)    Elevated C-reactive protein (CRP)    Elevated ferritin    Allergy to sulfa drugs  Resolved Problems:    * No resolved hospital problems. *        2.  has a past medical history of Acute respiratory failure following trauma and surgery ([facility] [address] Acute respiratory failure following trauma and surgery ([facility] [address] Altered bowel elimination due to intestinal ostomy ([facility] [address] Full dentures, Full dentures, Gall stones, GERD (gastroesophageal reflux disease), GI bleed, Hemorrhage of gastrointestinal tract, unspecified, Hemorrhage of gastrointestinal tract, unspecified, Hyperlipidemia, Hypertension, Kidney stones, Kidney stones, Primary localized osteoarthrosis, lower leg, Primary localized osteoarthrosis, lower leg, Prostate disorder, Transient disorder of initiating or maintaining sleep, Transient disorder of initiating or maintaining sleep, Unspecified disorder of skin and subcutaneous tissue, Wears glasses, and Wears glasses. Plans:   1.   COVID-19 pneumonia, patient already received Decadron 5 dosages  ESBL UTI on meropenem  History of aortic valve replacement with aortic dissection  Acute hypoxic respiratory failure on oxygen  Patient is delirium, multifactorial hypernatremia, metabolic encephalopathy from hypoxemia, bacteremia  Hematuria, holding Eliquis, requesting urology consult  CODE STATUS changed to DNR CCA  Overall prognosis guarded  Discussed case with Dr. Jimmie Maddox  Urology inputs awaited  Ordering swallow study      Destinee Mark MD  9/25/2021  3:29 PM

## 2021-09-26 NOTE — FLOWSHEET NOTE
Writer received perfect serve message that pt   Spoke with pt's rn who notified the family  Called the son  to verify that they coming to the hospital or to offer to do the paperwork over the phone  Son is  21- 30 mins away (and picking up pt's wife)  and communicated that they would like to [facility] quickly and then leave\"   Explained process and the needed information and told the family I will meet them  Family expressed gratitude for the call and explanation of process         21 0853   Encounter Summary   Services provided to: Family   Referral/Consult From: Nurse   Support System Family members   Continue Visiting   (21)   Complexity of Encounter Moderate   Length of Encounter 15 minutes   Spiritual/Temple   Type Spiritual support   Grief and Life Adjustment   Type Death   Intervention Grief care   Outcome Comfort;Expressed gratitude;Engaged in conversation; Less anxious, less agitated;Receptive

## 2021-09-26 NOTE — PROGRESS NOTES
RN spoke with dr Parker Patrick regarding patient heart rate in the 70R and systolic bp in 00W, RN requested atropine, physician ordered 1mg of epi instead and 1L nacl bolus. Dr Pritesh Dickerson updated on patient case. Patient  at 0. Senior resident at bedside to confirm.  notified.   Rn spoke with patients agus chawla and felicita, informed them there was a change in patient status and they can come to the hospital.

## 2021-09-26 NOTE — FLOWSHEET NOTE
[facility]     Patient Death Note                                      DEATH                Room # 2122/2122-01   Name: Milly Wilson            Age: 78 y.o. Gender: male          [place]: [employer]     Admit Date & Time: 2021  7:45 PM     Referral: Pt Rn  Actual date of death: 2021   TOD: 0724       SITUATION AT DEATH:  Patient is a 78 y.o. male. Was admitted on 21 for Covid. Spiritual Care has been providing support to this patient and family throughout his hospilization. Pt is  DNR-CC. Nurse in room  at time of death and then notified family. This is not a 's case. SPIRITUAL/EMOTIONAL INTERVENTION:  Writer was notified via perfect serve of pt death  Met pt's wife, Matt Lin and pt's daughter in law in pt's room. They were experiencing emotions of sorrow and grief but mutually supportive and loving to each other. They welcomed prayer and comforting presence of writer. Helped completed necessary paperwork. Matt Lin informed writer that pt's son- Madison Garrido is on his way and that Madison Garrido is the only other family member coming. Pt's son, Madison Garrido, and his wife arrived shortly after Pt wife left the room. They welcomed prayer and comforting presence of writer as well. Madison Garrido communicated that they were leaving and writer notified pt RN that the family has seen the pt and they have left pt's room        Family Received Grief Packet? NO     HOME:  Name: [facility]: [facility]   [facility]   Phone Number: [phone]    NEXT OF KIN:  Name: Allegra Pair  Relationship: Wife  Street Address: [address] [place]: [place]: [place]  Zip code: [address]  Phone Number: [phone]    Jacky Stephens  2021 9:56 AM        21 2585   Encounter Summary   Services provided to: Patient and family together   Referral/Consult From: Nurse;Palliative Care   Continue Visiting   (21)   Complexity of Encounter Moderate   Length of Encounter 1 hour   Spiritual/Yazidi   Type Spiritual support   Grief and Life Adjustment   Type Death   Assessment Calm; Approachable;Tearful;Grieving;Coping   Intervention Prayer;Sustaining presence/ Ministry of presence;Grief care   Outcome Comfort;Expressed gratitude;Engaged in conversation;Coping;Tearful;Grieving;Receptive

## 2021-09-26 NOTE — PLAN OF CARE
Problem: Airway Clearance - Ineffective  Goal: Achieve or maintain patent airway  9/26/2021 0135 by Inga Ha RN  Outcome: Ongoing     Problem: Breathing Pattern - Ineffective  Goal: Ability to achieve and maintain a regular respiratory rate  9/26/2021 0135 by Inga Ha RN  Outcome: Ongoing     Problem: Nutrition  Goal: Optimal nutrition therapy  9/26/2021 0135 by Inga Ha RN  Outcome: Ongoing     Problem: Falls - Risk of:  Goal: Will remain free from falls  Description: Will remain free from falls  9/26/2021 0135 by Inga Ha RN  Outcome: Ongoing  Pt assessed as a fall risk this shift. Remains free from falls and accidental injury at this time. Fall precautions in place, including falling star sign and fall risk band on pt. Floor free from obstacles, and bed is locked and in lowest position. Adequate lighting provided. Pt encouraged to call before getting OOB for any need. Bed alarm activated. Will continue to monitor needs during hourly rounding, and reinforce education on use of call light.     Problem: Skin Integrity:  Goal: Will show no infection signs and symptoms  Description: Will show no infection signs and symptoms  9/26/2021 0135 by Inga Ha RN  Outcome: Ongoing

## 2021-09-26 NOTE — PROGRESS NOTES
MOBILIZE SECRETIONS    [x]   ASSESS BREATH SOUNDS  [x]   ASSESS SPUTUM PRODUCTION  [x]   COUGH AND DEEP BREATHING  [x]  IMPLEMENT SECRETION MANAGEMENT PROTOCOL  [x]   PATIENT EDUCATION AS NEEDED    Pt currently on 4L NC. Audible rhonchi noted upon arrival. Pt is having difficulties clearing secretions. Attempted to assist by form of NTS. Minimal improvement noted. Flutter valve left at bedside if pt becomes strong enough to perform as well. RN made aware.

## 2021-09-26 NOTE — PROGRESS NOTES
RN spoke with Dr Isa Espinosa regarding patients breathing and rhonchi noted.  RN requested respiratory eval and treat, ok to order  Electronically signed by Tania Kate RN on 9/26/2021 at 1:27 AM

## 2021-09-26 NOTE — PROGRESS NOTES
Rn also spoke with dr Uday Serrano regarding patiens sodium levels, he states ok to order for q12h with first one at 0400  Electronically signed by Maegan Vasquez RN on 9/26/2021 at 1:33 AM

## 2021-09-26 NOTE — PROGRESS NOTES
Rt suctioning pt in room  59/40 bp, 71 hr.   Rapid called 715 am  Pt placed into joseph [facility]as. Pt dnkofi Navarrete rn in room. tremaine pct in room. valery rt in room  39/27 bp recheck, 42 hr 717 am.  1 epi ordered 717 am  02 not reading levels.    [facility]  Non rebreather placed [phone]

## 2021-09-26 NOTE — CODE DOCUMENTATION
tammy and jane to room. Pt slow rr. Not responding. Non rebreather on. Pt hr in 30s on monitor. Dr Helena Cheadle ordered epi. Paged dr Rian Garrett.

## 2021-09-27 ENCOUNTER — TELEPHONE (OUTPATIENT)
Dept: INTERNAL MEDICINE CLINIC | Age: 81
End: 2021-09-27

## 2021-09-27 NOTE — TELEPHONE ENCOUNTER
Received call from [facility], Anuja Holt  on 2021 at [facility]. Would you sign death certificate?

## 2021-09-27 NOTE — DISCHARGE SUMMARY
[facility]    Discharge Summary     Patient ID: Sergey Lezama  :  1940   MRN: [id number]     ACCOUNT:  [de-identified]   Patient's PCP: Melvi Crump MD  Admit Date: 2021   Discharge Date: 2021    Length of Stay: 9  Code Status:  Prior  Admitting Physician: Jorge Luis Bull MD  Discharge Physician: Vanessa Giron MD     Active Discharge Diagnoses:     Primary Problem  <principal problem not specified>      [place] Problems    Diagnosis Date Noted    Elevated C-reactive protein (CRP) [R79.82]     Elevated ferritin [R79.89]     Allergy to sulfa drugs [Z88.2]     COVID-19 [U07.1]     Hypoxia [R09.02]     Palliative care encounter [Z51.5]     Goals of care, counseling/discussion [Z71.89]     DNR (do not resuscitate) discussion [Z71.89]     ACP (advance care planning) [Z71.89]     Severe malnutrition ([facility] [address] [E43] 2021    Urinary tract infection in male [N39.0] 2021    Septicemia ([facility] [address] [A41.9] 2021       Admission Condition:  Poor     Discharged Condition: Patient     Hospital Stay:     Hospital Course:  Sergey Lezama is a 78 y.o. male who was admitted for the management of <principal problem not specified> , presented to ER with Fall  Patient was transferred from nursing facility, with worsening hypoxemia. He was diagnosed with Covid on . He has extensive medical history which includes history of arctic dissection s/p surgery, he was treated with Decadron. He developed ESBL UTI and bacteremia, treated with meropenem. Patient was seen by ID physician, pulmonologist.  He developed metabolic encephalopathy, delirium, he has very poor oral intake,  Patient had downhill course during hospital stay, palliative care was also consulted.   His CODE STATUS was changed to DNR CCA, unfortunately on , when nurse went into the patient room to check on him, he was bradycardic, hypotensive, epinephrine was given without much improvement. Patient , on 7 24 a.m. 9.            Discharge plan:   Patient     Electronically signed by   Debby Nieves MD  2021  11:22 AM      Thank you Dr. Gavin Ralph MD for the opportunity to be involved in this patient's care.

## 2021-10-04 NOTE — TELEPHONE ENCOUNTER
Spoke w/ Deloris Dinero and informed her that we were able to print blank dc from original e-mail that was sent to [facility] so there's no need to drop off another one. Dc was placed on Dr. Rehan Tafoya desk along w/ a print copy of original that he signed as a reference. Once new dc is re-signed by Dr. Ton Gillis will p/u. Blank dc placed on Dr. Rehan Tafoya desfrancy for him to re-sign when he comes into the office on Wed.

## 2021-10-04 NOTE — TELEPHONE ENCOUNTER
called & stated when the signed dc was received by e-mail it wasn't clear and requested to p/u original.     was informed the original was already shredded.  to drop off another blank dc for Dr. Carlene Sanchez to resigned. They are aware that Dr. Carlene Sanchez won't be in until Wed to re-sign.

## (undated) DEVICE — COVER,MAYO STAND,STERILE: Brand: MEDLINE

## (undated) DEVICE — Z INACTIVE USE 2635503 SOLUTION IRRIG 3000ML ST H2O USP UROMATIC PLAS CONT

## (undated) DEVICE — GOWN,AURORA,NONREINFORCED,LARGE: Brand: MEDLINE

## (undated) DEVICE — Z DUP USE 2257490 ADHESIVE SKIN CLSRE 036ML TPCL 2CTL CNCRLTE HIGH VSCSTY DRMB

## (undated) DEVICE — PAD,NON-ADHERENT,3X8,STERILE,LF,1/PK: Brand: MEDLINE

## (undated) DEVICE — AMBU AURASTRAIGHT U SIZE 4: Brand: AURASTRAIGHT

## (undated) DEVICE — DRESSING TRNSPAR W5XL4.5IN FLM SHT SEMIPERMEABLE WIND

## (undated) DEVICE — SOLUTION IV IRRIG POUR BRL 0.9% SODIUM CHL 2F7124

## (undated) DEVICE — SHEET, T, LAPAROTOMY, STERILE: Brand: MEDLINE

## (undated) DEVICE — DRESSING TRNSPAR W2XL2.75IN FLM SHT SEMIPERMEABLE WIND

## (undated) DEVICE — GOWN SURGICALXL REUSE REPROC

## (undated) DEVICE — GLOVE ORTHO 7 1/2   MSG9475

## (undated) DEVICE — NO USE 18 MONTHS GOWN STD LG  1153D

## (undated) DEVICE — MERCY HEALTH ST CHARLES: Brand: MEDLINE INDUSTRIES, INC.

## (undated) DEVICE — SINGLE PORT MANIFOLD: Brand: NEPTUNE 2

## (undated) DEVICE — 3M™ STERI-STRIP™ REINFORCED ADHESIVE SKIN CLOSURES, R1547, 1/2 IN X 4 IN (12 MM X 100 MM), 6 STRIPS/ENVELOPE: Brand: 3M™ STERI-STRIP™

## (undated) DEVICE — GUIDEWIRE URO L150CM DIA0.035IN STIFF NIT HYDRPHLC STR TIP

## (undated) DEVICE — GLOVE SURG SZ 75 STD WHT LTX SYN POLYMER BEAD REINF ANTI RL

## (undated) DEVICE — STRIP,CLOSURE,WOUND,MEDI-STRIP,1/2X4: Brand: MEDLINE

## (undated) DEVICE — SUTURE VCRL + SZ 2-0 L27IN ABSRB UD CT-2 L26MM 1/2 CIR TAPR VCP269H

## (undated) DEVICE — SUTURE MCRYL + SZ 4-0 L27IN ABSRB UD L19MM PS-2 3/8 CIR MCP426H

## (undated) DEVICE — SOLUTION IV IRRIG WATER 1000ML POUR BRL 2F7114

## (undated) DEVICE — ST CHARLES MINOR ABDOMINAL PK: Brand: MEDLINE INDUSTRIES, INC.

## (undated) DEVICE — ST CHARLES CYSTO PACK: Brand: MEDLINE INDUSTRIES, INC.